# Patient Record
Sex: MALE | Race: WHITE | Employment: UNEMPLOYED | URBAN - METROPOLITAN AREA
[De-identification: names, ages, dates, MRNs, and addresses within clinical notes are randomized per-mention and may not be internally consistent; named-entity substitution may affect disease eponyms.]

---

## 2018-01-22 DIAGNOSIS — M25.552 PELVIC JOINT PAIN, LEFT: Primary | ICD-10-CM

## 2018-01-23 ENCOUNTER — OFFICE VISIT (OUTPATIENT)
Dept: ORTHOPEDIC SURGERY | Age: 23
End: 2018-01-23
Payer: MEDICARE

## 2018-01-23 VITALS — WEIGHT: 315 LBS | BODY MASS INDEX: 41.75 KG/M2 | HEIGHT: 73 IN

## 2018-01-23 DIAGNOSIS — M25.552 PELVIC JOINT PAIN, LEFT: Primary | ICD-10-CM

## 2018-01-23 DIAGNOSIS — S32.402D CLOSED DISPLACED FRACTURE OF LEFT ACETABULUM WITH ROUTINE HEALING, UNSPECIFIED PORTION OF ACETABULUM, SUBSEQUENT ENCOUNTER: ICD-10-CM

## 2018-01-23 PROBLEM — R45.851 SUICIDAL IDEATION: Status: ACTIVE | Noted: 2017-10-03

## 2018-01-23 PROBLEM — Z72.51 RISK FOR SEXUALLY TRANSMITTED DISEASE: Status: ACTIVE | Noted: 2017-11-28

## 2018-01-23 PROBLEM — K92.1 BLOOD IN THE STOOL: Status: ACTIVE | Noted: 2017-11-28

## 2018-01-23 PROBLEM — J01.00 ACUTE NON-RECURRENT MAXILLARY SINUSITIS: Status: ACTIVE | Noted: 2017-11-24

## 2018-01-23 PROBLEM — R79.89 ELEVATED LFTS: Status: ACTIVE | Noted: 2018-01-09

## 2018-01-23 PROBLEM — Z87.01 HISTORY OF PNEUMONIA: Status: ACTIVE | Noted: 2017-11-24

## 2018-01-23 PROBLEM — K21.9 GERD (GASTROESOPHAGEAL REFLUX DISEASE): Status: ACTIVE | Noted: 2017-07-08

## 2018-01-23 PROBLEM — T14.8XXA SKIN EXCORIATION: Status: ACTIVE | Noted: 2017-11-28

## 2018-01-23 PROBLEM — K59.09 OTHER CONSTIPATION: Status: ACTIVE | Noted: 2017-01-11

## 2018-01-23 PROBLEM — F98.8 ATTENTION DEFICIT DISORDER: Status: ACTIVE | Noted: 2017-07-08

## 2018-01-23 PROCEDURE — 4004F PT TOBACCO SCREEN RCVD TLK: CPT | Performed by: ORTHOPAEDIC SURGERY

## 2018-01-23 PROCEDURE — 99213 OFFICE O/P EST LOW 20 MIN: CPT | Performed by: ORTHOPAEDIC SURGERY

## 2018-01-23 PROCEDURE — G8484 FLU IMMUNIZE NO ADMIN: HCPCS | Performed by: ORTHOPAEDIC SURGERY

## 2018-01-23 PROCEDURE — G8427 DOCREV CUR MEDS BY ELIG CLIN: HCPCS | Performed by: ORTHOPAEDIC SURGERY

## 2018-01-23 PROCEDURE — G8417 CALC BMI ABV UP PARAM F/U: HCPCS | Performed by: ORTHOPAEDIC SURGERY

## 2018-01-23 RX ORDER — OMEPRAZOLE 20 MG/1
20 CAPSULE, DELAYED RELEASE ORAL 2 TIMES DAILY
Status: ON HOLD | COMMUNITY
End: 2019-04-24 | Stop reason: SDUPTHER

## 2018-01-23 RX ORDER — CITALOPRAM 40 MG/1
40 TABLET ORAL DAILY
Status: ON HOLD | COMMUNITY
End: 2019-04-24 | Stop reason: SDUPTHER
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
## 2019-04-19 ENCOUNTER — HOSPITAL ENCOUNTER (INPATIENT)
Age: 24
LOS: 5 days | Discharge: HOME | DRG: 885 | End: 2019-04-24
Attending: EMERGENCY MEDICINE | Admitting: PSYCHIATRY & NEUROLOGY
Payer: MEDICARE

## 2019-04-19 DIAGNOSIS — F32.A DEPRESSION WITH SUICIDAL IDEATION: Primary | ICD-10-CM

## 2019-04-19 DIAGNOSIS — R45.851 DEPRESSION WITH SUICIDAL IDEATION: Primary | ICD-10-CM

## 2019-04-19 PROBLEM — F32.3 MAJOR DEPRESSIVE DISORDER WITH PSYCHOTIC FEATURES (HCC): Status: ACTIVE | Noted: 2019-04-19

## 2019-04-19 PROBLEM — F33.9 MDD (MAJOR DEPRESSIVE DISORDER), RECURRENT EPISODE (HCC): Status: ACTIVE | Noted: 2019-04-19

## 2019-04-19 PROCEDURE — 6370000000 HC RX 637 (ALT 250 FOR IP): Performed by: PSYCHIATRY & NEUROLOGY

## 2019-04-19 PROCEDURE — 99285 EMERGENCY DEPT VISIT HI MDM: CPT

## 2019-04-19 PROCEDURE — 6370000000 HC RX 637 (ALT 250 FOR IP): Performed by: EMERGENCY MEDICINE

## 2019-04-19 PROCEDURE — 1240000000 HC EMOTIONAL WELLNESS R&B

## 2019-04-19 RX ORDER — BENZTROPINE MESYLATE 1 MG/ML
2 INJECTION INTRAMUSCULAR; INTRAVENOUS 2 TIMES DAILY PRN
Status: CANCELLED | OUTPATIENT
Start: 2019-04-19

## 2019-04-19 RX ORDER — CHOLECALCIFEROL (VITAMIN D3) 1250 MCG
50000 CAPSULE ORAL WEEKLY
COMMUNITY
End: 2019-06-10 | Stop reason: DRUGHIGH

## 2019-04-19 RX ORDER — NICOTINE 21 MG/24HR
1 PATCH, TRANSDERMAL 24 HOURS TRANSDERMAL DAILY
Status: CANCELLED | OUTPATIENT
Start: 2019-04-19

## 2019-04-19 RX ORDER — HALOPERIDOL 5 MG/ML
10 INJECTION INTRAMUSCULAR EVERY 6 HOURS PRN
Status: CANCELLED | OUTPATIENT
Start: 2019-04-19

## 2019-04-19 RX ORDER — RISPERIDONE 2 MG/1
2 TABLET, FILM COATED ORAL 2 TIMES DAILY
Status: DISCONTINUED | OUTPATIENT
Start: 2019-04-19 | End: 2019-04-24 | Stop reason: HOSPADM

## 2019-04-19 RX ORDER — TRAZODONE HYDROCHLORIDE 50 MG/1
50 TABLET ORAL NIGHTLY PRN
Status: CANCELLED | OUTPATIENT
Start: 2019-04-19

## 2019-04-19 RX ORDER — CITALOPRAM 40 MG/1
40 TABLET ORAL DAILY
Status: DISCONTINUED | OUTPATIENT
Start: 2019-04-19 | End: 2019-04-20

## 2019-04-19 RX ORDER — ALPRAZOLAM 0.25 MG/1
0.5 TABLET ORAL ONCE
Status: COMPLETED | OUTPATIENT
Start: 2019-04-19 | End: 2019-04-19

## 2019-04-19 RX ORDER — IBUPROFEN 400 MG/1
200 TABLET ORAL EVERY 6 HOURS PRN
Status: DISCONTINUED | OUTPATIENT
Start: 2019-04-19 | End: 2019-04-24 | Stop reason: HOSPADM

## 2019-04-19 RX ORDER — MAGNESIUM HYDROXIDE/ALUMINUM HYDROXICE/SIMETHICONE 120; 1200; 1200 MG/30ML; MG/30ML; MG/30ML
30 SUSPENSION ORAL EVERY 6 HOURS PRN
Status: CANCELLED | OUTPATIENT
Start: 2019-04-19

## 2019-04-19 RX ORDER — BENZTROPINE MESYLATE 1 MG/ML
2 INJECTION INTRAMUSCULAR; INTRAVENOUS 2 TIMES DAILY PRN
Status: DISCONTINUED | OUTPATIENT
Start: 2019-04-19 | End: 2019-04-24 | Stop reason: HOSPADM

## 2019-04-19 RX ORDER — OMEGA-3 FATTY ACIDS/FISH OIL 300-1000MG
1 CAPSULE ORAL EVERY 6 HOURS PRN
Status: DISCONTINUED | OUTPATIENT
Start: 2019-04-19 | End: 2019-04-19 | Stop reason: CLARIF

## 2019-04-19 RX ORDER — CETIRIZINE HYDROCHLORIDE 10 MG/1
10 TABLET ORAL DAILY
Status: DISCONTINUED | OUTPATIENT
Start: 2019-04-19 | End: 2019-04-24 | Stop reason: HOSPADM

## 2019-04-19 RX ORDER — BUSPIRONE HYDROCHLORIDE 10 MG/1
10 TABLET ORAL 2 TIMES DAILY
Status: DISCONTINUED | OUTPATIENT
Start: 2019-04-19 | End: 2019-04-24 | Stop reason: HOSPADM

## 2019-04-19 RX ORDER — ACETAMINOPHEN 325 MG/1
650 TABLET ORAL EVERY 4 HOURS PRN
Status: CANCELLED | OUTPATIENT
Start: 2019-04-19

## 2019-04-19 RX ORDER — LORAZEPAM 2 MG/ML
2 INJECTION INTRAMUSCULAR EVERY 6 HOURS PRN
Status: CANCELLED | OUTPATIENT
Start: 2019-04-19

## 2019-04-19 RX ORDER — ACETAMINOPHEN 325 MG/1
650 TABLET ORAL EVERY 4 HOURS PRN
Status: DISCONTINUED | OUTPATIENT
Start: 2019-04-19 | End: 2019-04-24 | Stop reason: HOSPADM

## 2019-04-19 RX ORDER — PANTOPRAZOLE SODIUM 40 MG/1
40 TABLET, DELAYED RELEASE ORAL
Status: DISCONTINUED | OUTPATIENT
Start: 2019-04-20 | End: 2019-04-24 | Stop reason: HOSPADM

## 2019-04-19 RX ORDER — ERGOCALCIFEROL 1.25 MG/1
50000 CAPSULE ORAL WEEKLY
Status: DISCONTINUED | OUTPATIENT
Start: 2019-04-21 | End: 2019-04-24 | Stop reason: HOSPADM

## 2019-04-19 RX ORDER — HYDROXYZINE PAMOATE 50 MG/1
25 CAPSULE ORAL 3 TIMES DAILY PRN
Status: CANCELLED | OUTPATIENT
Start: 2019-04-19

## 2019-04-19 RX ORDER — RANITIDINE 150 MG/1
150 TABLET ORAL 2 TIMES DAILY
Status: ON HOLD | COMMUNITY
End: 2019-04-24 | Stop reason: SDUPTHER

## 2019-04-19 RX ORDER — MAGNESIUM HYDROXIDE/ALUMINUM HYDROXICE/SIMETHICONE 120; 1200; 1200 MG/30ML; MG/30ML; MG/30ML
15 SUSPENSION ORAL EVERY 6 HOURS PRN
Status: DISCONTINUED | OUTPATIENT
Start: 2019-04-19 | End: 2019-04-24 | Stop reason: HOSPADM

## 2019-04-19 RX ORDER — CETIRIZINE HYDROCHLORIDE 10 MG/1
10 TABLET ORAL DAILY
Status: ON HOLD | COMMUNITY
End: 2019-06-14 | Stop reason: SDUPTHER

## 2019-04-19 RX ORDER — NICOTINE 21 MG/24HR
1 PATCH, TRANSDERMAL 24 HOURS TRANSDERMAL DAILY
Status: DISCONTINUED | OUTPATIENT
Start: 2019-04-19 | End: 2019-04-22

## 2019-04-19 RX ORDER — TRAZODONE HYDROCHLORIDE 50 MG/1
50 TABLET ORAL NIGHTLY PRN
Status: DISCONTINUED | OUTPATIENT
Start: 2019-04-19 | End: 2019-04-24 | Stop reason: HOSPADM

## 2019-04-19 RX ORDER — RISPERIDONE 2 MG/1
2 TABLET, FILM COATED ORAL 2 TIMES DAILY
Status: ON HOLD | COMMUNITY
End: 2019-04-24 | Stop reason: HOSPADM

## 2019-04-19 RX ORDER — FAMOTIDINE 20 MG/1
20 TABLET, FILM COATED ORAL DAILY
Status: DISCONTINUED | OUTPATIENT
Start: 2019-04-19 | End: 2019-04-24 | Stop reason: HOSPADM

## 2019-04-19 RX ORDER — HALOPERIDOL 5 MG/ML
5 INJECTION INTRAMUSCULAR EVERY 4 HOURS PRN
Status: DISCONTINUED | OUTPATIENT
Start: 2019-04-19 | End: 2019-04-24 | Stop reason: HOSPADM

## 2019-04-19 RX ORDER — OLANZAPINE 5 MG/1
5 TABLET ORAL
Status: COMPLETED | OUTPATIENT
Start: 2019-04-19 | End: 2019-04-19

## 2019-04-19 RX ADMIN — OLANZAPINE 5 MG: 5 TABLET, FILM COATED ORAL at 21:10

## 2019-04-19 RX ADMIN — FAMOTIDINE 20 MG: 20 TABLET, FILM COATED ORAL at 18:49

## 2019-04-19 RX ADMIN — CITALOPRAM HYDROBROMIDE 40 MG: 40 TABLET ORAL at 18:49

## 2019-04-19 RX ADMIN — ALPRAZOLAM 0.5 MG: 0.25 TABLET ORAL at 15:02

## 2019-04-19 RX ADMIN — TRAZODONE HYDROCHLORIDE 50 MG: 50 TABLET ORAL at 21:10

## 2019-04-19 RX ADMIN — BUSPIRONE HYDROCHLORIDE 10 MG: 10 TABLET ORAL at 18:49

## 2019-04-19 RX ADMIN — CETIRIZINE HYDROCHLORIDE 10 MG: 10 TABLET, FILM COATED ORAL at 18:49

## 2019-04-19 RX ADMIN — RISPERIDONE 2 MG: 2 TABLET ORAL at 21:10

## 2019-04-19 ASSESSMENT — SLEEP AND FATIGUE QUESTIONNAIRES
AVERAGE NUMBER OF SLEEP HOURS: 5
SLEEP PATTERN: DIFFICULTY FALLING ASLEEP;INSOMNIA;RESTLESSNESS;DISTURBED/INTERRUPTED SLEEP
DIFFICULTY ARISING: NO
DIFFICULTY STAYING ASLEEP: YES
DIFFICULTY FALLING ASLEEP: YES
RESTFUL SLEEP: NO
DO YOU USE A SLEEP AID: NO
DO YOU HAVE DIFFICULTY SLEEPING: YES

## 2019-04-19 ASSESSMENT — PAIN DESCRIPTION - ONSET: ONSET: GRADUAL

## 2019-04-19 ASSESSMENT — ENCOUNTER SYMPTOMS
ABDOMINAL PAIN: 0
SHORTNESS OF BREATH: 0

## 2019-04-19 ASSESSMENT — PAIN DESCRIPTION - FREQUENCY: FREQUENCY: CONTINUOUS

## 2019-04-19 ASSESSMENT — PAIN DESCRIPTION - PAIN TYPE: TYPE: ACUTE PAIN

## 2019-04-19 ASSESSMENT — PATIENT HEALTH QUESTIONNAIRE - PHQ9: SUM OF ALL RESPONSES TO PHQ QUESTIONS 1-9: 19

## 2019-04-19 ASSESSMENT — PAIN DESCRIPTION - DESCRIPTORS: DESCRIPTORS: BURNING

## 2019-04-19 ASSESSMENT — PAIN SCALES - GENERAL: PAINLEVEL_OUTOF10: 3

## 2019-04-19 ASSESSMENT — PAIN DESCRIPTION - LOCATION: LOCATION: BUTTOCKS

## 2019-04-19 ASSESSMENT — LIFESTYLE VARIABLES: HISTORY_ALCOHOL_USE: NO

## 2019-04-19 NOTE — PROGRESS NOTES
Medication History completed:    New medications: ranitidine, vitamin D3, cetirizine, risperidone    Medications discontinued: Trintellix, methylphenidate, aripiprazole, Adderall, alprazolam    Changes to dosing: none    Stated allergies: As listed    Other pertinent information: Medications confirmed with [employer]. OARRS history reviewed.     Thank you,  Arlene Millard, PharmD  [phone]

## 2019-04-19 NOTE — ED PROVIDER NOTES
EMERGENCY DEPARTMENT ENCOUNTER    Pt Name: Stalin Loya  MRN: [id number]  [place] 1995  Date of evaluation: 4/19/19  CHIEF COMPLAINT       Chief Complaint   Patient presents with    Suicidal     HISTORY OF PRESENT ILLNESS   Presents with suicidal thoughts. H/o depression with psychotic features and autism spectrum. Always feels suicidal. Worse after arguing with his parents yesterday. Lives alone in his side of a duplex. Does not share it with his parents. States he requires visiting nurses so he doesn't overdose on all his pills and kill himself. Has considered jumping off a bridge. Hasn't been here since 2015, but has required many [facility] admissions at outside facilities. The history is provided by the patient. Mental Health Problem   Presenting symptoms: suicidal thoughts    Degree of incapacity (severity): Moderate  Onset quality:  Unable to specify  Duration:  2 days (worse from baseline)  Timing:  Constant  Progression:  Worsening  Chronicity:  Chronic  Context: not noncompliant    Treatment compliance: All of the time  Relieved by:  Nothing  Worsened by:  Family interactions  Associated symptoms: no abdominal pain, no chest pain, no fatigue and no headaches    Risk factors: hx of mental illness        REVIEW OF SYSTEMS     Review of Systems   Constitutional: Negative for fatigue. HENT: Negative for congestion. Eyes: Negative for visual disturbance. Respiratory: Negative for shortness of breath. Cardiovascular: Negative for chest pain. Gastrointestinal: Negative for abdominal pain. Endocrine: Negative for polydipsia and polyuria. Musculoskeletal: Negative for neck pain. Skin: Negative for pallor. Neurological: Negative for headaches. Hematological: Does not bruise/bleed easily. Psychiatric/Behavioral: Positive for suicidal ideas.      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    Injury of frontal lobe ([facility] [address]     Multiple sensory deficit syndrome      SURGICAL HISTORY       Past Surgical History:   Procedure Laterality Date    BLADDER SURGERY      FRACTURE SURGERY  4/6/2012    Lt hip    MOUTH SURGERY      PELVIC FRACTURE SURGERY       CURRENT MEDICATIONS       Current Discharge Medication List      CONTINUE these medications which have NOT CHANGED    Details   risperiDONE (RISPERDAL) 2 MG tablet Take 2 mg by mouth 2 times daily      Cholecalciferol (VITAMIN D3) 14752 units CAPS Take 50,000 Units by mouth once a week      cetirizine (ZYRTEC) 10 MG tablet Take 10 mg by mouth daily      ranitidine (ZANTAC) 150 MG tablet Take 150 mg by mouth 2 times daily      omeprazole (PRILOSEC) 20 MG delayed release capsule Take 20 mg by mouth 2 times daily      citalopram (CELEXA) 40 MG tablet Take 40 mg by mouth daily      Mirabegron ER 25 MG TB24 Take 25 mg by mouth daily      busPIRone (BUSPAR) 10 MG tablet Take 10 mg by mouth 2 times daily. ibuprofen (ADVIL;MOTRIN) 200 MG CAPS Take 1 capsule by mouth every 6 hours as needed for Pain            ALLERGIES     is allergic to hydromorphone. FAMILY HISTORY     is adopted. SOCIAL HISTORY       Social History     Tobacco Use    Smoking status: Current Every Day Smoker     Packs/day: 0.25   Substance Use Topics    Alcohol use: No    Drug use: Yes     Types: Marijuana, Cocaine     Comment: Last use 2 weeks before Christmas, not since being put on probation     PHYSICAL EXAM     INITIAL VITALS: BP (!) 130/92   Pulse 96   Temp 98.1 °F (36.7 °C) (Oral)   Resp 14   Ht 6' 2\" (1.88 m)   Wt (!) 396 lb (179.6 kg)   SpO2 96%   BMI 50.84 kg/m²    Physical Exam   Constitutional: He appears well-developed and well-nourished. He appears distressed. HENT:   Nose: No rhinorrhea. Mouth/Throat: Oropharynx is clear and moist.   Eyes: Conjunctivae are normal. No scleral icterus. Cardiovascular: Normal rate, regular rhythm and intact distal pulses. Pulmonary/Chest: Effort normal and breath sounds normal. No respiratory distress. He has no wheezes. He has no rales. Abdominal: Soft. There is no tenderness. There is no guarding. Musculoskeletal: Normal range of motion. He exhibits no deformity. Neurological: He is alert. He exhibits normal muscle tone. Coordination normal.   Skin: Capillary refill takes less than 2 seconds. No rash noted. No pallor. Psychiatric: His speech is normal. He expresses suicidal ideation. MEDICAL DECISION MAKING:   Expresses suicidal ideation. Endorses compliance with medications. Has a plan. Medically cleared at 2:40 PM without need for labs. Accepted for admission to [facility] by the Psychiatrist.         CRITICAL CARE:       PROCEDURES:    Procedures    DIAGNOSTIC RESULTS   EKG:All EKG's are interpreted by the Emergency Department Physician who either signs or Co-signs this chart in the absence of a cardiologist.        RADIOLOGY:All plain film, CT, MRI, and formal ultrasound images (except ED bedside ultrasound) are read by the radiologist, see reports below, unless otherwisenoted in MDM or here. No orders to display     LABS: All lab results were reviewed by myself, and all abnormals are listed below.   Labs Reviewed - No data to display    EMERGENCY DEPARTMENTCOURSE:         Vitals:    Vitals:    04/19/19 1419 04/19/19 1648   BP: (!) 115/99 (!) 130/92   Pulse: 87 96   Resp: 15 14   Temp: 97.2 °F (36.2 °C) 98.1 °F (36.7 °C)   TempSrc: Oral Oral   SpO2: 96% 96%   Weight: (!) 396 lb (179.6 kg) (!) 396 lb (179.6 kg)   Height: 6' 2\" (1.88 m) 6' 2\" (1.88 m)       The patient was given the following medications while in the emergency department:  Orders Placed This Encounter   Medications    ALPRAZolam (XANAX) tablet 0.5 mg    busPIRone (BUSPAR) tablet 10 mg    cetirizine (ZYRTEC) tablet 10 mg    Vitamin D3 CAPS 50,000 Units    citalopram (CELEXA) tablet 40 mg    DISCONTD: ibuprofen (ADVIL;MOTRIN) CAPS 1 capsule    Mirabegron ER TB24 25 mg    pantoprazole (PROTONIX) tablet 40 mg    famotidine (PEPCID) tablet 20 mg    risperiDONE (RISPERDAL) tablet 2 mg    acetaminophen (TYLENOL) tablet 650 mg    OLANZapine (ZYPREXA) tablet 5 mg    haloperidol lactate (HALDOL) injection 5 mg    traZODone (DESYREL) tablet 50 mg    benztropine mesylate (COGENTIN) injection 2 mg    magnesium hydroxide (MILK OF MAGNESIA) 400 MG/5ML suspension 30 mL    aluminum & magnesium hydroxide-simethicone (MAALOX) 200-200-20 MG/5ML suspension 15 mL    nicotine (NICODERM CQ) 21 MG/24HR 1 patch    ibuprofen (ADVIL;MOTRIN) tablet 200 mg     CONSULTS:  IP CONSULT TO HOSPITALIST    FINAL IMPRESSION      1.  Depression with suicidal ideation          DISPOSITION/PLAN   DISPOSITION Admitted 04/19/2019 03:41:47 PM      PATIENT REFERRED TO:  Ho Nelsno, APRN - [facility]  [address]  [phone]          DISCHARGE MEDICATIONS:  Current Discharge Medication List        Jo Lopez MD  Attending Emergency Physician                    César Tanner MD  04/19/19 2002

## 2019-04-19 NOTE — BH NOTE
Patient given tobacco quitline number 33908810788 at this time, refusing to call at this time, states \" I just dont want to quit now\"- patient given information as to the dangers of long term tobacco use. Continue to reinforce the importance of tobacco cessation.

## 2019-04-19 NOTE — ED NOTES
Pt states he has super woods. Pt states he has a tumor in his ear and that if he adds water it will grow and kill him. Pt states he does not trust himself and would probably OD or jump off bridge.  Pt cooperative     Kate Pedersen RN  04/19/19 [address] [facility] [place], JUSTIN  04/19/19 9575
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
## 2019-04-20 PROCEDURE — 90792 PSYCH DIAG EVAL W/MED SRVCS: CPT | Performed by: NURSE PRACTITIONER

## 2019-04-20 PROCEDURE — 6370000000 HC RX 637 (ALT 250 FOR IP): Performed by: NURSE PRACTITIONER

## 2019-04-20 PROCEDURE — 6370000000 HC RX 637 (ALT 250 FOR IP): Performed by: PSYCHIATRY & NEUROLOGY

## 2019-04-20 PROCEDURE — 1240000000 HC EMOTIONAL WELLNESS R&B

## 2019-04-20 PROCEDURE — 99222 1ST HOSP IP/OBS MODERATE 55: CPT | Performed by: INTERNAL MEDICINE

## 2019-04-20 RX ORDER — HYDROXYZINE HYDROCHLORIDE 25 MG/1
25 TABLET, FILM COATED ORAL 3 TIMES DAILY PRN
Status: DISCONTINUED | OUTPATIENT
Start: 2019-04-20 | End: 2019-04-24 | Stop reason: HOSPADM

## 2019-04-20 RX ADMIN — HYDROXYZINE HYDROCHLORIDE 25 MG: 25 TABLET ORAL at 15:17

## 2019-04-20 RX ADMIN — TRAZODONE HYDROCHLORIDE 50 MG: 50 TABLET ORAL at 21:36

## 2019-04-20 RX ADMIN — HYDROXYZINE HYDROCHLORIDE 25 MG: 25 TABLET ORAL at 21:36

## 2019-04-20 RX ADMIN — BUSPIRONE HYDROCHLORIDE 10 MG: 10 TABLET ORAL at 17:13

## 2019-04-20 RX ADMIN — RISPERIDONE 2 MG: 2 TABLET ORAL at 21:36

## 2019-04-20 RX ADMIN — LURASIDONE HYDROCHLORIDE 40 MG: 40 TABLET, FILM COATED ORAL at 17:13

## 2019-04-20 ASSESSMENT — SLEEP AND FATIGUE QUESTIONNAIRES
AVERAGE NUMBER OF SLEEP HOURS: 5
DO YOU HAVE DIFFICULTY SLEEPING: YES
RESTFUL SLEEP: NO
SLEEP PATTERN: DIFFICULTY FALLING ASLEEP;DISTURBED/INTERRUPTED SLEEP;EARLY AWAKENING;RESTLESSNESS
DIFFICULTY STAYING ASLEEP: YES
DIFFICULTY FALLING ASLEEP: YES
DO YOU USE A SLEEP AID: NO
DIFFICULTY ARISING: NO

## 2019-04-20 ASSESSMENT — PATIENT HEALTH QUESTIONNAIRE - PHQ9: SUM OF ALL RESPONSES TO PHQ QUESTIONS 1-9: 17

## 2019-04-20 ASSESSMENT — LIFESTYLE VARIABLES: HISTORY_ALCOHOL_USE: NO

## 2019-04-20 NOTE — GROUP NOTE
[facility] not participate in [facility] despite staff invitation to attend.

## 2019-04-20 NOTE — PLAN OF CARE
Problem: Pain:  Description  Pain management should include both nonpharmacologic and pharmacologic interventions. Goal: Pain level will decrease  4/20/2019 1540 by Maritza Hernandez RN  Outcome: Ongoing     Problem: Suicide risk  Description  Suicide risk  Goal: Provide patient with safe environment  4/20/2019 1540 by Maritza Hernandez RN  Outcome: Ongoing     Problem: Anger Management/Homicidal Ideation:  Goal: Able to display appropriate communication and problem solving  4/20/2019 1540 by Maritza Hernandez RN  Outcome: Ongoing     Problem: Anger Management/Homicidal Ideation:  Goal: Absence of homicidal ideation  4/20/2019 1540 by Maritza Hernandez RN  Outcome: Ongoing     Problem: Altered Mood, Psychotic Behavior:  Goal: Able to demonstrate trust by eating, participating in treatment and following staff's direction  4/20/2019 1540 by Maritza Hernandez RN  Outcome: Ongoing     Problem: Altered Mood, Psychotic Behavior:  Goal: Able to verbalize decrease in frequency and intensity of hallucinations  4/20/2019 1540 by Maritza Hernandez RN  Outcome: Ongoing     Problem: Altered Mood, Psychotic Behavior:  Goal: Able to verbalize reality based thinking  4/20/2019 1540 by Maritza Hernandez RN  Outcome: Ongoing     Problem: Altered Mood, Psychotic Behavior:  Goal: Absence of self-harm  4/20/2019 1540 by Maritza Hernandez RN  Outcome: Ongoing     Problem: Altered Mood, Psychotic Behavior:  Goal: Compliance with prescribed medication regimen will improve  4/20/2019 1540 by Maritza Hernandez RN  Outcome: Ongoing     Patient is medication compliant and in behavioral control. Patient has attended groups and been social with peers in the common areas of the unit. Patient admits to homicidal ideation with no plan. Patient relates that he might become suicidal when he is discharged due to family stress. He states that, \"after I leave I'm afraid my monsters will come out. \"  Patient denies auditory and visual hallucinations.   Patient requested medication for increased anxiety after 1:1 talk time. Patient has engaged in ADL's. Patient has consumed meals and snacks this shift. Patient reports improved sleep. Patient has remained free from harm. Every 15 minute and spontaneous safety checks have been maintained.

## 2019-04-20 NOTE — GROUP NOTE
Group Therapy Note    Date: April 20    Group Start Time: 1600  Group End Time: 4339  Group Topic: Healthy Living/Wellness    [facility]    Yung Urbano    Patient's Goal:  Increased coping skills    Notes:  Pt participated in group appropriately    Status After Intervention:  Improved    Participation Level:  Active Listener and Interactive    Participation Quality: Appropriate, Attentive, Sharing and Supportive    Speech:  normal    Thought Process/Content: Logical  Linear    Affective Functioning: Congruent    Mood: anxious and depressed    Level of consciousness:  Alert, Oriented x4 and Attentive    Response to Learning: Able to verbalize current knowledge/experience, Able to verbalize/acknowledge new learning, Able to retain information, Capable of insight and Progressing to goal    Endings: None Reported    Modes of Intervention: Education, Support, Socialization and Exploration    Discipline Responsible: [employer]    Signature:  Yung Urbano

## 2019-04-20 NOTE — BH NOTE
Psychiatric Admission Note Nurse Practitioner     Mendel Lobstein is a 26 y.o. male who was voluntarily admitted from the [address] [place] [address] AN AFFILIATE OF [facility] with SI to jump off a bridge into the water. He also admitted to HI to kill his family/parents with whom he recently had a fight. He admitted to voices of the devil telling him to kill himself. Today Cr Vu is interviewed in his room in bed. He is lethargic requiring considerable encouragement to participate in our interview. He is flat and poorly reactive. He stated that he does not feel that his depression medication is working but that some of his medications do work. He has a nursing agency that comes in daily to administer his medications. There is obvious thought blocking and delayed response during our interaction. Flaco admits that when he gets into a fight with his family, the voices increase as well as his behaviors. He was discharged from [facility] due to threatened violence towards his provider. He is denying SI, HI, hallucinations, depression and anxiety currently stating that he is feeling better since admission. Specifically when asked regarding the command devil voices, he denies. He is not seen responding to internal stimuli. Chart and medications reviewed. Therapeutic support, empathetic care and psycho education provided greater than 20 minutes. At this time there is no safe alternative other than inpatient care. Past Psychiatric History   Patient was discharged from his previous psychiatrist at [address] [place] [address] due to threatened violence towards the provider. . Reported history of psychiatric inpatient hospitalizations. Reported history of suicide attempts. History of Substance Abuse     Denies alcohol use or use of any illicit drugs. He does admit to a previous history of cigarette, marijuana, ETOH and cocaine use.     Family History of psychiatric disorders    Family history: denied Patient is adopted and is unaware of his education: Not on file    Highest education level: Not on file   Occupational History    Not on file   Social Needs    Financial resource strain: Not on file    Food insecurity:     Worry: Not on file     Inability: Not on file    Transportation needs:     Medical: Not on file     Non-medical: Not on file   Tobacco Use    Smoking status: Current Every Day Smoker     Packs/day: 0.25   Substance and Sexual Activity    Alcohol use: No    Drug use: Yes     Types: Marijuana, Cocaine     Comment: Last use 2 weeks before Lali, not since being put on probation    Sexual activity: Never   Lifestyle    Physical activity:     Days per week: Not on file     Minutes per session: Not on file    Stress: Not on file   Relationships    Social connections:     Talks on phone: Not on file     Gets together: Not on file     Attends Sabianism service: Not on file     Active member of club or organization: Not on file     Attends meetings of clubs or organizations: Not on file     Relationship status: Not on file    Intimate partner violence:     Fear of current or ex partner: Not on file     Emotionally abused: Not on file     Physically abused: Not on file     Forced sexual activity: Not on file   Other Topics Concern    Not on file   Social History Narrative    Not on file         Mental Status  Pt. was alert, fully oriented, and cooperative. Appearance and hygiene weredisheveled, poor hygiene . Mood was depressed. Affect was dysthymic and poorly reactive. Thought process was demonstrative of poverty of thought and thought blocking. Patient denied any hallucinations or paranoia. Patient denied suicidal ideations. Patient denied homicidal ideations . Patient's gross cognitive functions were impaired. Insight and judgement were poor. Both recent and remote memory were impaired.     Psychomotor status was slowed     Diagnostic Impression    Schizoaffective Disorder      Medications   busPIRone  10 mg Oral BID    cetirizine  10 mg Oral Daily    Vitamin D3  50,000 Units Oral Weekly    citalopram  40 mg Oral Daily    pantoprazole  40 mg Oral QAM AC    famotidine  20 mg Oral Daily    risperiDONE  2 mg Oral BID    nicotine  1 patch Transdermal Daily     acetaminophen, haloperidol lactate, traZODone, benztropine mesylate, magnesium hydroxide, aluminum & magnesium hydroxide-simethicone, ibuprofen    Treatment Plan:  · Continue inpatient psychiatric treatment  · Supportive therapy with medication management. Reviewed risks and benefits as well as potential side effects with patient. · Continue home medications. · Discontinue Celexa 40mg daily. · New Order for Latuda 40mg daily with dinner beginning 4/20/19. · Ordered IM consult for elevated BP on 4/20/19. · Review medications for efficacy and side effects. · Therapeutic support and empathetic care provided greater than 20 minutes. · Engage in therapeutic activities and groups. · Follow up at [facility] after symptoms stabilized.     Estimated length of stay: 5-7 days    Diya Forte APRN - CNP  Psychiatric Advanced Practice Nurse

## 2019-04-20 NOTE — PLAN OF CARE
Problem: Anger Management/Homicidal Ideation:  Goal: Able to display appropriate communication and problem solving  Description  Able to display appropriate communication and problem solving  Outcome: Ongoing  Note:   Pt displayed appropriate communication and problem solving. Pt asked to talk to writer in his room. Expressed concerns over \"hatred\" by adopted/biological parents. Pt was re-directed with positive results. Problem: Anger Management/Homicidal Ideation:  Goal: Ability to verbalize frustrations and anger appropriately will improve  Description  Ability to verbalize frustrations and anger appropriately will improve  Outcome: Ongoing  Note:   Pt verbalized frustrations and anger appropriately and followed directions. Problem: Anger Management/Homicidal Ideation:  Goal: Absence of angry outbursts  Description  Absence of angry outbursts  Outcome: Ongoing  Note:   Pt had no angry outbursts at this time. Problem: Anger Management/Homicidal Ideation:  Goal: Absence of homicidal ideation  Description  Absence of homicidal ideation  Outcome: Ongoing  Note:   Pt admits to fleeting homicidal ideations with no plans. Receptive of re-direction. Problem: Anger Management/Homicidal Ideation:  Goal: Participates in care planning  Description  Participates in care planning  Outcome: Ongoing  Note:   Pt participates in care planning. Problem: Anger Management/Homicidal Ideation:  Goal: Patient specific goal  Description  Patient specific goal  Outcome: Ongoing     Problem: Altered Mood, Psychotic Behavior:  Goal: Able to demonstrate trust by eating, participating in treatment and following staff's direction  Description  Able to demonstrate trust by eating, participating in treatment and following staff's direction  Outcome: Ongoing  Note:   Pt Patient has been calm, controlled and med complaint. Accepting of 1:1 talk time with staff.       Problem: Altered Mood, Psychotic Behavior:  Goal: Able to verbalize decrease in frequency and intensity of hallucinations  Description  Able to verbalize decrease in frequency and intensity of hallucinations  Outcome: Ongoing  Note:   Pt denies hallucinations at this time. Problem: Altered Mood, Psychotic Behavior:  Goal: Able to verbalize reality based thinking  Description  Able to verbalize reality based thinking  Outcome: Ongoing  Note:   Problem: Altered Mood, Psychotic Behavior:  Goal: Absence of self-harm  Description  Absence of self-harm  Outcome: Ongoing  Note:   Pt admits to fleeting SI with no plans, contracts for safety. Problem: Altered Mood, Psychotic Behavior:  Goal: Ability to achieve adequate nutritional intake will improve  Description  Ability to achieve adequate nutritional intake will improve  Outcome: Ongoing  Note:   Pt has adequate nutritional intake. Problem: Altered Mood, Psychotic Behavior:  Goal: Ability to interact with others will improve  Description  Ability to interact with others will improve  Outcome: Ongoing  Note:   Pt is social with peers at this time. Problem: Altered Mood, Psychotic Behavior:  Goal: Compliance with prescribed medication regimen will improve  Description  Compliance with prescribed medication regimen will improve  Outcome: Ongoing  Note:   Pt is medication compliant at this time. Pt able to verbalize reality based thinking.

## 2019-04-20 NOTE — PLAN OF CARE
[facility]  Initial Interdisciplinary Treatment Plan NOTE    Original treatment plan Date & Time: 4/20/19 0810    Admission Type:  Admission Type: Voluntary    Reason for admission:   Reason for Admission: SI to jump off bridge into river; HI toward parents, caretakers, peers    Estimated Length of Stay:  5-7days  Estimated Discharge Date:   to be determined by physician    PATIENT STRENGTHS:  Patient Strengths:Strengths: Communication  Patient Strengths and Limitations:Limitations: Perceives need for assistance with self-care, General negative or hopeless attitude about future/recovery, Apathetic / unmotivated, Difficult relationships / poor social skills, Hopeless about future  Addictive Behavior: Addictive Behavior  In the past 3 months, have you felt or has someone told you that you have a problem with:  : Eating (too much/too little)  Do you have a history of Chemical Use?: Yes  Do you have a history of Alcohol Use?: No  Do you have a history of Street Drug Abuse?: Yes  Histroy of Prescripton Drug Abuse?: No  Medical Problems:  Past Medical History:   Diagnosis Date    Autistic disorder     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    Injury of frontal lobe (HCC)     Multiple sensory deficit syndrome      Status EXAM:Status and Exam  Normal: No  Facial Expression: Flat  Affect: Blunt  Level of Consciousness: Alert  Mood:Normal: No  Mood: Anxious  Motor Activity:Normal: Yes  Motor Activity: Decreased  Interview Behavior: Cooperative  Preception: [place] to Person, [place] to Time, [place] to Place, [place] to Situation  Attention:Normal: No  Attention: Distractible, Unable to Concentrate  Thought Processes: Circumstantial, Flt.of Ideas  Thought Content:Normal: No  Thought Content: Poverty of Content  Hallucinations: (vopices \"telling me to hurt self\" but no voices at this time.)  Delusions: Yes  Delusions: Grandeur  Memory:Normal: No  Memory: Poor Recent, Poor Remote  Insight and Judgment: No  Insight

## 2019-04-20 NOTE — CONSULTS
[address] [facility]    Consult Note. Date:   4/20/2019  Patient name:  Stalin Loya  Date of admission:  4/19/2019  2:00 PM  MRN:   [id number]  YOB: 1995    Pt seen at the request of Jessy Das MD    CHIEF COMPLAINT:     History Obtained From:  Patient and chart review. HISTORY OF PRESENT ILLNESS:      The patient is a 26 y.o.  male who is admitted to the hospital for medical management      Past Medical History:   has a past medical history of Autistic disorder, Benign tumor of ear canal, Injury of frontal lobe ([address] and Multiple sensory deficit syndrome. Past Surgical History:   has a past surgical history that includes Bladder surgery; fracture surgery (4/6/2012); Pelvic fracture surgery; and Mouth surgery. Home Medications:    Prior to Admission medications    Medication Sig Start Date End Date Taking? Authorizing Provider   risperiDONE (RISPERDAL) 2 MG tablet Take 2 mg by mouth 2 times daily    Historical Provider, MD   Cholecalciferol (VITAMIN D3) 48825 units CAPS Take 50,000 Units by mouth once a week    Historical Provider, MD   cetirizine (ZYRTEC) 10 MG tablet Take 10 mg by mouth daily    Historical Provider, MD   ranitidine (ZANTAC) 150 MG tablet Take 150 mg by mouth 2 times daily    Historical Provider, MD   omeprazole (PRILOSEC) 20 MG delayed release capsule Take 20 mg by mouth 2 times daily    Historical Provider, MD   citalopram (CELEXA) 40 MG tablet Take 40 mg by mouth daily    Historical Provider, MD   Mirabegron ER 25 MG TB24 Take 25 mg by mouth daily    Historical Provider, MD   busPIRone (BUSPAR) 10 MG tablet Take 10 mg by mouth 2 times daily. Historical Provider, MD   ibuprofen (ADVIL;MOTRIN) 200 MG CAPS Take 1 capsule by mouth every 6 hours as needed for Pain     Historical Provider, MD       Allergies:  Hydromorphone    Social History:   reports that he has been smoking. He has been smoking about 0.25 packs per day. He does not have any smokeless tobacco history on file. He reports that he has current or past drug history. Drugs: Marijuana and Cocaine. He reports that he does not drink alcohol. Family History: family history is not on file. He was adopted. REVIEW OF SYSTEMS:    · Constitutional: Negative for weight loss  · Eyes: Negative for visual changes, diplopia, scleral icterus. · ENT: Negative for Headaches, hearing loss, vertigo, mouth sores, sore throat. · Cardiovascular: Negative for lightheadedness/orthostatic symptoms ,chest pain, dyspnea on exertion, palpitations or loss of consciousness. · Respiratory: Negative for cough or wheezing, sputum production, hemoptysis, pleuritic pain. · Gastrointestinal: Negative for nausea/vomiting, change in bowel habits, abdominal pain, dysphagia/appetite loss, hematemesis, blood in stools. · Genitourinary:Negative for change in bladder habits, dysuria, trouble voiding, hematuria. · Musculoskeletal: Negative for gait disturbance, weakness, joint complaints. · Integumentary: Negative for rash, pruritis. · Neurological: Negative for headache, dizziness, change in muscle strength, numbness/tingling, change in gait, balance, coordination,   · Endocrine: negative for temperature intolerance, excessive thirst, fluid intake, or urination, tremor. · Hematologic/Lymphatic: negative for abnormal bruising or bleeding, blood clots, swollen lymph nodes. · Allergic/Immunologic: negative for nasal congestion, pruritis, hives. PHYSICAL EXAM:    /68   Pulse 71   Temp 98 °F (36.7 °C) (Oral)   Resp 12   Ht 6' 2\" (1.88 m)   Wt (!) 396 lb (179.6 kg)   SpO2 96%   BMI 50.84 kg/m²      · General appearance: well nourished  · HEENT: Head: Normocephalic, no lesions, without obvious abnormality.   · Lungs: clear to auscultation bilaterally  · Heart: regular rate and rhythm, S1, S2 normal, no murmur, click, rub or the stool    Body mass index 45.0-49.9, adult (HCC)    Elevated LFTs    GERD (gastroesophageal reflux disease)    History of pneumonia    Other constipation    Risk for sexually transmitted disease    Severe recurrent major [facility]    Skin excoriation    Suicidal ideation    Major depressive disorder with psychotic features [facility]    MDD (major depressive disorder), recurrent episode ([facility]    Depression with suicidal ideation     Obesity  With tripp   not using cpap due to ontolerance   sbp 100     No sx   cont sa,  nme   will follow    gerd on ppi     PLAN:  Cont same    follow bp       MD BRITTANIE Martinez[address], [address] [place] [address].    Phone [phone]   Fax: [phone]  Answering Service: [phone]

## 2019-04-20 NOTE — H&P
None     Inability: None    Transportation needs:     Medical: None     Non-medical: None   Tobacco Use    Smoking status: Current Every Day Smoker     Packs/day: 0.25   Substance and Sexual Activity    Alcohol use: No    Drug use: Yes     Types: Marijuana, Cocaine     Comment: Last use 2 weeks before Christmas, not since being put on probation    Sexual activity: Never   Lifestyle    Physical activity:     Days per week: None     Minutes per session: None    Stress: None   Relationships    Social connections:     Talks on phone: None     Gets together: None     Attends Evangelical service: None     Active member of club or organization: None     Attends meetings of clubs or organizations: None     Relationship status: None    Intimate partner violence:     Fear of current or ex partner: None     Emotionally abused: None     Physically abused: None     Forced sexual activity: None   Other Topics Concern    None   Social History Narrative    None           REVIEW OF SYSTEMS      Allergies   Allergen Reactions    Hydromorphone Itching     Other reaction(s): ITCH       No current facility-administered medications on file prior to encounter. Current Outpatient Medications on File Prior to Encounter   Medication Sig Dispense Refill    risperiDONE (RISPERDAL) 2 MG tablet Take 2 mg by mouth 2 times daily      Cholecalciferol (VITAMIN D3) 57415 units CAPS Take 50,000 Units by mouth once a week      cetirizine (ZYRTEC) 10 MG tablet Take 10 mg by mouth daily      ranitidine (ZANTAC) 150 MG tablet Take 150 mg by mouth 2 times daily      omeprazole (PRILOSEC) 20 MG delayed release capsule Take 20 mg by mouth 2 times daily      citalopram (CELEXA) 40 MG tablet Take 40 mg by mouth daily      Mirabegron ER 25 MG TB24 Take 25 mg by mouth daily      busPIRone (BUSPAR) 10 MG tablet Take 10 mg by mouth 2 times daily.       ibuprofen (ADVIL;MOTRIN) 200 MG CAPS Take 1 capsule by mouth every 6 hours as needed for Pain                         General health:  Fairly good. No fever or chills. Skin:  No itching, redness or rash. Head, eyes, ears, nose, throat:  No headache, epistaxis, rhinorrhea hearing loss or sore throat. Neck:  No pain, stiffness or masses. Cardiovascular/Respiratory system:  No chest pain, palpitation, shortness of breath, coughing or expectoration. Gastrointestinal tract: No abdominal pain, nausea, vomiting, diarrhea or constipation. Genitourinary:  No burning on micturition. No hesitancy, urgency, frequency or discoloration of urine. Locomotor:  No bone or joint pains. No swelling or deformities. Neuropsychiatric:  See HPI. GENERAL PHYSICAL EXAM:     Vitals: /68   Pulse 71   Temp 98 °F (36.7 °C) (Oral)   Resp 12   Ht 6' 2\" (1.88 m)   Wt (!) 396 lb (179.6 kg)   SpO2 96%   BMI 50.84 kg/m²  Body mass index is 50.84 kg/m². Pt was examined with a nurse present in the room. GENERAL APPEARANCE:  Ada Narvaez is 26 y.o.,  male, severely obese, nourished, conscious, alert. Does not appear to be distress or pain at this time. SKIN:  Warm, dry, no cyanosis or jaundice. HEAD:  Normocephalic, atraumatic, no swelling or tenderness. EYES:  Pupils equal, reactive to light, Conjunctiva is clear, EOMs intact yesi. eyelids WNL. EARS:  No discharge, no marked hearing loss. NOSE:  No rhinorrhea, epistaxis or septal deformity. THROAT:  Not congested. No ulceration bleeding or discharge. NECK:  No stiffness, trachea central.  No palpable masses or L.N.      CHEST:  Symmetrical and equal on expansion. HEART:  Regular rate and rhythm. S1 > S2, No audible murmurs or gallops. LUNGS:  Equal on expansion, normal breath sounds. No adventitious sounds. ABDOMEN:  Obese. Soft on palpation. No localized tenderness. No guarding or rigidity. No palpable organomegaly.     LYMPHATICS:  No palpable cervical Lymphadenopathy. LOCOMOTOR, BACK AND SPINE:  No tenderness or deformities. EXTREMITIES:  Symmetrical, no pretibial edema. Nings sign negative. No discoloration or ulcerations. NEUROLOGIC:  The patient is conscious, alert, oriented, as autism Cranial nerve II-XII intact, taste was not examined. No apparent focal sensory or motor deficits. Muscle strength equal Willis. No facial droop, tongue protrudes centrally, no slurring of the speech. PROVISIONAL DIAGNOSES:      Active Problems:    Major depressive disorder with psychotic features ([facility])    MDD (major depressive disorder), [facility]    Depression with suicidal ideation  Resolved Problems:    * No resolved hospital problems.  Merlyn SCHUMACHER PA-C on 4/20/2019 at 6:09 PM

## 2019-04-20 NOTE — PLAN OF CARE
Problem: Anger Management/Homicidal Ideation:  Goal: Ability to verbalize frustrations and anger appropriately will improve  4/20/2019 0927 by Des Leo RN  Outcome: Completed     Problem: Anger Management/Homicidal Ideation:  Goal: Participates in care planning  4/20/2019 0927 by Des Leo RN  Outcome: Completed     Problem: Anger Management/Homicidal Ideation:  Goal: Patient specific goal  4/20/2019 0927 by Des Leo RN  Outcome: Completed

## 2019-04-20 NOTE — GROUP NOTE
Group Therapy Note    Date: April 20    Group Start Time: 0900  Group End Time: 2998  Group Topic: [facility], [facility]    Pt did not participate in [employer] and Goal Setting Group despite staff encouragement.         Signature:  Susannah Mon

## 2019-04-20 NOTE — PLAN OF CARE
Problem: Altered Mood, Psychotic Behavior:  Goal: Ability to achieve adequate nutritional intake will improve  4/20/2019 0929 by Roxanne Foreman RN  Outcome: Completed     Problem: Altered Mood, Psychotic Behavior:  Goal: Ability to interact with others will improve  4/20/2019 0929 by Roxanne Foreman RN  Outcome: Completed     Problem: Altered Mood, Psychotic Behavior:  Goal: Patient specific goal  Outcome: Completed

## 2019-04-20 NOTE — GROUP NOTE
Group Therapy Note    Date:     Group Start Time:   Group End Time:   Group Topic: Healthy Living/Wellness    [facility] MABLE Brar        Group Therapy Note    Attendees: All participants shared group materials no issues.          Patient's Goal:  ***    Notes:  ***    Status After Intervention:  {Status After Intervention:612511453}    Participation Level: {Participation Level:219186007}    Participation Quality: {[facility] PARTICIPATION QUALITY:895095533}      Speech:  {ED  CD_SPEECH:35277}      Thought Process/Content: {Thought Process/Content:358319556}      Affective Functioning: {Affective Functionin}      Mood: {Mood:931515694}      Level of consciousness:  {Level of consciousness:663997667}      Response to Learnin Sima Conor [facility] Responses to Learnin}      Endings: {[facility] Endings:33143}    Modes of Intervention: {[facility] Modes of Intervention:549390701}      Discipline Responsible: Brie PAGAN Multidisciplinary:[id number]}      Signature:  Lula Brar

## 2019-04-21 PROCEDURE — 6370000000 HC RX 637 (ALT 250 FOR IP): Performed by: NURSE PRACTITIONER

## 2019-04-21 PROCEDURE — 6370000000 HC RX 637 (ALT 250 FOR IP): Performed by: PSYCHIATRY & NEUROLOGY

## 2019-04-21 PROCEDURE — 1240000000 HC EMOTIONAL WELLNESS R&B

## 2019-04-21 PROCEDURE — 99231 SBSQ HOSP IP/OBS SF/LOW 25: CPT | Performed by: INTERNAL MEDICINE

## 2019-04-21 PROCEDURE — 99232 SBSQ HOSP IP/OBS MODERATE 35: CPT | Performed by: NURSE PRACTITIONER

## 2019-04-21 RX ADMIN — RISPERIDONE 2 MG: 2 TABLET ORAL at 21:49

## 2019-04-21 RX ADMIN — FAMOTIDINE 20 MG: 20 TABLET, FILM COATED ORAL at 08:11

## 2019-04-21 RX ADMIN — BUSPIRONE HYDROCHLORIDE 10 MG: 10 TABLET ORAL at 17:09

## 2019-04-21 RX ADMIN — CETIRIZINE HYDROCHLORIDE 10 MG: 10 TABLET, FILM COATED ORAL at 08:11

## 2019-04-21 RX ADMIN — TRAZODONE HYDROCHLORIDE 50 MG: 50 TABLET ORAL at 21:49

## 2019-04-21 RX ADMIN — HYDROXYZINE HYDROCHLORIDE 25 MG: 25 TABLET ORAL at 08:12

## 2019-04-21 RX ADMIN — HYDROXYZINE HYDROCHLORIDE 25 MG: 25 TABLET ORAL at 16:37

## 2019-04-21 RX ADMIN — BUSPIRONE HYDROCHLORIDE 10 MG: 10 TABLET ORAL at 08:11

## 2019-04-21 RX ADMIN — RISPERIDONE 2 MG: 2 TABLET ORAL at 08:11

## 2019-04-21 RX ADMIN — PANTOPRAZOLE SODIUM 40 MG: 40 TABLET, DELAYED RELEASE ORAL at 08:11

## 2019-04-21 RX ADMIN — ERGOCALCIFEROL 50000 UNITS: 1.25 CAPSULE ORAL at 08:11

## 2019-04-21 RX ADMIN — LURASIDONE HYDROCHLORIDE 40 MG: 40 TABLET, FILM COATED ORAL at 17:09

## 2019-04-21 NOTE — PROGRESS NOTES
to ASD   Suicidal Ideations: denies suicidal ideation   Homicidal Ideations: Negative for homicidal ideation      Medication Side Effects: absent       Attention Span attention span appeared shorter than expected for age     Clinical Assessment Medical Decision    Axis I: Major Depressive Disorder with Psychotic Features    Precautions with Justification:   None    Medication Review/Mgmt: Medications reviewed without changes    Medical Issues: See Chart    Assessment of Risk for Harm to Self/Others:  None    PLAN:  · Continue inpatient psychiatric treatment  · Supportive therapy with medication management. Reviewed risks and benefits as well as potential side effects with patient. · Continue home medications. · Discontinue Celexa 40mg daily. · New Order for Latuda 40mg daily with dinner beginning 4/20/19. · Ordered IM consult for elevated BP on 4/20/19. · Review medications for efficacy and side effects. · Therapeutic support and empathetic care provided. · Engage in therapeutic activities and groups. · Follow up at [facility] after symptoms stabilized.     Anticipated Discharge Date: TBD    Patient's Response to Treatment: positive    Rudy Masters  4/21/2019  8:14 AM

## 2019-04-21 NOTE — PROGRESS NOTES
[address] [facility].                Date:   4/21/2019  Patient name:  Kailash Harrington  Date of admission:  4/19/2019  2:00 PM  MRN:   [id number]  YOB: 1995    Pt seen at the request of Aureliano Lynch MD    CHIEF COMPLAINT:     History Obtained From:  Patient and chart review. HISTORY OF PRESENT ILLNESS:      The patient is a 26 y.o.  male who is admitted to the hospital for medical management     bp controlled   Past Medical History:   has a past medical history of Autistic disorder, Benign tumor of ear canal, Injury of frontal lobe ([address] and Multiple sensory deficit syndrome. Past Surgical History:   has a past surgical history that includes Bladder surgery; fracture surgery (4/6/2012); Pelvic fracture surgery; and Mouth surgery. Home Medications:    Prior to Admission medications    Medication Sig Start Date End Date Taking? Authorizing Provider   risperiDONE (RISPERDAL) 2 MG tablet Take 2 mg by mouth 2 times daily    Historical Provider, MD   Cholecalciferol (VITAMIN D3) 67876 units CAPS Take 50,000 Units by mouth once a week    Historical Provider, MD   cetirizine (ZYRTEC) 10 MG tablet Take 10 mg by mouth daily    Historical Provider, MD   ranitidine (ZANTAC) 150 MG tablet Take 150 mg by mouth 2 times daily    Historical Provider, MD   omeprazole (PRILOSEC) 20 MG delayed release capsule Take 20 mg by mouth 2 times daily    Historical Provider, MD   citalopram (CELEXA) 40 MG tablet Take 40 mg by mouth daily    Historical Provider, MD   Mirabegron ER 25 MG TB24 Take 25 mg by mouth daily    Historical Provider, MD   busPIRone (BUSPAR) 10 MG tablet Take 10 mg by mouth 2 times daily. Historical Provider, MD   ibuprofen (ADVIL;MOTRIN) 200 MG CAPS Take 1 capsule by mouth every 6 hours as needed for Pain     Historical Provider, MD       Allergies:  Hydromorphone    Social History:   reports that he has been smoking. He has been smoking about 0.25 packs per day. He does not have any smokeless tobacco history on file. He reports that he has current or past drug history. Drugs: Marijuana and Cocaine. He reports that he does not drink alcohol. Family History: family history is not on file. He was adopted. REVIEW OF SYSTEMS:    · Constitutional: Negative for weight loss  · Eyes: Negative for visual changes, diplopia, scleral icterus. · ENT: Negative for Headaches, hearing loss, vertigo, mouth sores, sore throat. · Cardiovascular: Negative for lightheadedness/orthostatic symptoms ,chest pain, dyspnea on exertion, palpitations or loss of consciousness. · Respiratory: Negative for cough or wheezing, sputum production, hemoptysis, pleuritic pain. · Gastrointestinal: Negative for nausea/vomiting, change in bowel habits, abdominal pain, dysphagia/appetite loss, hematemesis, blood in stools. · Genitourinary:Negative for change in bladder habits, dysuria, trouble voiding, hematuria. · Musculoskeletal: Negative for gait disturbance, weakness, joint complaints. · Integumentary: Negative for rash, pruritis. · Neurological: Negative for headache, dizziness, change in muscle strength, numbness/tingling, change in gait, balance, coordination,   · Endocrine: negative for temperature intolerance, excessive thirst, fluid intake, or urination, tremor. · Hematologic/Lymphatic: negative for abnormal bruising or bleeding, blood clots, swollen lymph nodes. · Allergic/Immunologic: negative for nasal congestion, pruritis, hives. PHYSICAL EXAM:    BP (!) 104/57   Pulse 85   Temp 98.2 °F (36.8 °C) (Oral)   Resp 14   Ht 6' 2\" (1.88 m)   Wt (!) 396 lb (179.6 kg)   SpO2 96%   BMI 50.84 kg/m²      · General appearance: well nourished  · HEENT: Head: Normocephalic, no lesions, without obvious abnormality.   · Lungs: clear to auscultation bilaterally  · Heart: regular rate and rhythm, S1, S2 normal, no murmur, click, rub or gallop  · Abdomen: soft, non-tender; bowel sounds normal; no masses,  no organomegaly  · Extremities: extremities normal, atraumatic, no cyanosis or edema  · Neurological: Gait normal. Reflexes normal and symmetric. Sensation grossly normal  · Skin - no rash, no lump   · Eye no icterus no redness  · Lymphatic system-no lymphadenopathy no splenomegaly       DIAGNOSTICS:    Laboratory Testing:  CBC: No results for input(s): WBC, HGB, PLT in the last 72 hours. BMP:  No results for input(s): NA, K, CL, CO2, BUN, CREATININE, GLUCOSE in the last 72 hours. S. Calcium:No results for input(s): CALCIUM in the last 72 hours. S. Ionized Calcium:No results for input(s): IONCA in the last 72 hours. S. Magnesium:No results for input(s): MG in the last 72 hours. S. Phosphorus:No results for input(s): PHOS in the last 72 hours. S. Glucose:No results for input(s): POCGLU in the last 72 hours. Glycosylated hemoglobin A1C: No results for input(s): LABA1C in the last 72 hours. INR: No results for input(s): INR in the last 72 hours. Hepatic functions: No results for input(s): ALKPHOS, ALT, AST, PROT, BILITOT, BILIDIR, LABALBU in the last 72 hours. Pancreatic functions:No results for input(s): LACTA, AMYLASE in the last 72 hours. S. Lactic Acid: No results for input(s): LACTA in the last 72 hours. Cardiac enzymes:No results for input(s): CKTOTAL, CKMB, CKMBINDEX, TROPONINI in the last 72 hours. BNP:No results for input(s): BNP in the last 72 hours. Lipid profile: No results for input(s): CHOL, TRIG, HDL, LDLCALC in the last 72 hours. Invalid input(s): LDL  Blood Gases: No results found for: PH, PCO2, PO2, HCO3, O2SAT  Thyroid functions:   Lab Results   Component Value Date    TSH 1.83 11/03/2012        Imaging/Diagonstics:    No results found.       ASSESSMENT:    Patient Active Problem List   Diagnosis    Autistic spectrum disorder    Acute non-recurrent maxillary sinusitis    Attention deficit disorder    Blood in the stool    Body mass index 45.0-49.9, adult (HCC)    Elevated LFTs    GERD (gastroesophageal reflux disease)    History of pneumonia    Other constipation    Risk for sexually transmitted disease    Severe recurrent major depression with psychotic features [facility]    Skin excoriation    Suicidal ideation    Major depressive disorder with psychotic features ([facility]    MDD (major depressive disorder), recurrent episode ([facility]    Depression with suicidal ideation     Obesity  With tripp   not using cpap due to ontolerance   sbp 100     No sx   cont sa,  nme   will follow    gerd on ppi     PLAN:  Cont same    follow bp      4/21   bp controlled   cont same     MD BRITTANIE Borden[address], [address] [place] [address].    Phone [phone]   Fax: [phone]  Answering Service: [phone]

## 2019-04-21 NOTE — GROUP NOTE
Group Therapy Note    Date: April 21    Group Start Time: 1000  Group End Time: 1050  Group Topic: Psychoeducation    RAJ Moreno, LSW    Pt declined to attend psycho education at 1000 am despite encouragement. Pt offered 1:1 and refused. Signature:   RAJ Kahn, DANNY

## 2019-04-21 NOTE — PLAN OF CARE
[address] [facility]  Day 3 Interdisciplinary Treatment Plan NOTE    Review Date & Time: 4/21/19 1516    Admission Type:   Admission Type: Voluntary    Reason for admission:  Reason for Admission: SI to jump off bridge into river; HI toward parents, caretakers, peers  Estimated Length of Stay:  5-7 days  Estimated Discharge Date Update: to be determined by physician    PATIENT STRENGTHS:  Patient Strengths:Strengths: Communication, Connection to output provider, Medication Compliance, Positive Support, Social Skills  Patient Strengths and Limitations:Limitations: Difficult relationships / poor social skills, External locus of control, Tendency to isolate self, Difficulty problem solving/relies on others to help solve problems, Hopeless about future  Addictive Behavior:Addictive Behavior  In the past 3 months, have you felt or has someone told you that you have a problem with:  : None  Do you have a history of Chemical Use?: Yes  Do you have a history of Alcohol Use?: No  Do you have a history of Street Drug Abuse?: Yes  Histroy of Prescripton Drug Abuse?: No  Medical Problems:  Past Medical History:   Diagnosis Date    Autistic disorder     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    Injury of frontal lobe (HCC)     Multiple sensory deficit syndrome        Risk:  Fall RiskTotal: 69  Bairon Scale Bairon Scale Score: 22  BVC Total: 0  Change in scores:  No Changes to plan of Care:  No    Status EXAM:   Status and Exam  Normal: No  Facial Expression: Flat  Affect: Blunt  Level of Consciousness: Alert  Mood:Normal: No  Mood: Helpless, Anxious, Depressed  Motor Activity:Normal: Yes  Motor Activity: Decreased  Interview Behavior: Cooperative  Preception: [place] to Person, [place] to Time, [place] to Place, [place] to Situation  Attention:Normal: No  Attention: Distractible  Thought Processes: Circumstantial, Flt.of Ideas  Thought Content:Normal: No  Thought Content: Preoccupations  Hallucinations: None(pt denies)  Delusions: No  Delusions: Grandeur  Memory:Normal: No  Memory: Poor Recent, Poor Remote  Insight and Judgment: No  Insight and Judgment: Poor Judgment  Present Suicidal Ideation: No  Present Homicidal Ideation: No    Daily Assessment Last Entry:   Daily Sleep (WDL): Within Defined Limits         Patient Currently in Pain: Denies  Daily Nutrition (WDL): Within Defined Limits    Patient Monitoring:  Frequency of Checks: 4 times per hour, close    Psychiatric Symptoms:   Depression Symptoms  Depression Symptoms: Impaired concentration  Anxiety Symptoms  Anxiety Symptoms: Generalized  Milly Symptoms  Milly Symptoms: Flight of ideas, Pressured speech     Psychosis Symptoms  Delusion Type: Somatic    Suicide Risk CSSR-S:  1) Within the past month, have you wished you were dead or wished you could go to sleep and not wake up? : Yes  2) Have you actually had any thoughts of killing yourself? : Yes  3) Have you been thinking about how you might kill yourself? : Yes  5) Have you started to work out or worked out the details of how to kill yourself?  Do you intend to carry out this plan? : Yes  6) Have you ever done anything, started to do anything, or prepared to do anything to end your life?: Yes  Change in Result: Change in Plan of care:      EDUCATION:   Learner Progress Toward Treatment Goals: Reviewed results and recommendations of this team, Reviewed group plan and strategies, Reviewed signs, symptoms and risk of self harm and violent behavior, Reviewed goals and plan of care    Method:  small group, individual verbal education    Outcome: Verbalized by patient but needs reinforcement to obtain goals    PATIENT GOALS:  Short term: Use coping skills to help with issues with step father  Long term:  Continue using coping skills    PLAN/TREATMENT RECOMMENDATIONS UPDATE:  continue with group therapies, increased socialization, continue planning for after discharge goals, continue with medication compliance    SHORT-TERM GOALS UPDATE:   Time frame for Short-Term Goals:  5-7 days    LONG-TERM GOALS UPDATE:   Time frame for Long-Term Goals:  6 months    Members Present in Team Meeting:   See signature sheet  Alexandra Taylor

## 2019-04-21 NOTE — PLAN OF CARE
Problem: Suicide risk  Goal: Provide patient with safe environment  Description  Provide patient with safe environment  4/20/2019 2242 by Nehal Monsalve RN  Outcome: Ongoing  Note:   Patient denies any suicidal or homicidal ideations. Patient denies any hallucinations. Patient spoke to writer 1:1 for a while about  his day today with his mother, patient states he is mad at his mother for being with his stepdad, patient doesn't like step dad. Patient also states he has lost a lot of weight since he has been here because he has a very strict diet of only being able to eat \"high processed foods, I can only eat [employer] and cheese and [employer]". Patient states he isn't sleeping well. Patient was social with peers and ate two hot dogs tonight. Problem: Anger Management/Homicidal Ideation:  Goal: Able to display appropriate communication and problem solving  Description  Able to display appropriate communication and problem solving  4/20/2019 2242 by Nehal Monsalve RN  Outcome: Ongoing  Note:   Patient denies any suicidal or homicidal ideations. Patient denies any hallucinations. Patient spoke to writer 1:1 for a while about  his day today with his mother, patient states he is mad at his mother for being with his stepdad, patient doesn't like step dad. Patient also states he has lost a lot of weight since he has been here because he has a very strict diet of only being able to eat \"high processed foods, I can only eat [employer] and cheese and [employer]". Patient states he isn't sleeping well. Patient was social with peers and ate two hot dogs tonight. Problem: Anger Management/Homicidal Ideation:  Goal: Absence of angry outbursts  Description  Absence of angry outbursts  4/20/2019 2242 by Nehal Monsalve RN  Outcome: Ongoing  Note:   Patient denies any suicidal or homicidal ideations. Patient denies any hallucinations.  Patient spoke to writer 1:1 for a while about  his day today with his mother, patient states he is mad at his mother for being with his stepdad, patient doesn't like step dad. Patient also states he has lost a lot of weight since he has been here because he has a very strict diet of only being able to eat \"high processed foods, I can only eat [place] and cheese and [employer]". Patient states he isn't sleeping well. Patient was social with peers and ate two hot dogs tonight.

## 2019-04-21 NOTE — GROUP NOTE
Group Therapy Note    Date: April 21    Group Start Time: 0900  Group End Time: 0915  Group Topic: Community Meeting    NATALIE AKINS    SHIRIN Ortega    Group Therapy Note    Attendees: 9         Patient's Goal:  Pt will demonstrate improved interpersonal skills    Notes:  Pt attended group and participated    Status After Intervention:  Improved    Participation Level:  Active Listener and Interactive    Participation Quality: Appropriate, Attentive and Sharing      Speech:  normal      Thought Process/Content: Logical      Affective Functioning: Congruent      Mood: euthymic      Level of consciousness:  Alert, Oriented x4 and Attentive      Response to Learning: Able to verbalize current knowledge/experience, Able to verbalize/acknowledge new learning, Able to retain information and Progressing to goal      Endings: None Reported    Modes of Intervention: Education, Support, Socialization, Exploration, Problem-solving and Limit-setting      Discipline Responsible: Psychoeducational Specialist      Signature:  SHIRIN Ortega

## 2019-04-21 NOTE — GROUP NOTE
Group Therapy Note    Date: April 21    Group Start Time: 1330  Group End Time: 4990  Group Topic: Psychoeducation    NATALIE BHSHIRIN Lockhart    Group Therapy Note    Attendees: 11         Patient's Goal:  Pt will demonstrate improved interpersonal skills    Notes:  Pt attended group and participated appropriately      Status After Intervention:  Improved    Participation Level: Interactive    Participation Quality: Appropriate, Attentive, Sharing and Intrusive      Speech:  loud      Thought Process/Content: Logical      Affective Functioning: Exaggerated      Mood: euthymic      Level of consciousness:  Alert, Oriented x4 and Attentive      Response to Learning: Able to verbalize current knowledge/experience, Able to verbalize/acknowledge new learning, Able to retain information and Able to change behavior      Endings: None Reported    Modes of Intervention: Education, Support, Socialization, Exploration and Media      Discipline Responsible: Psychoeducational Specialist      Signature:  Brian Bourne

## 2019-04-22 PROBLEM — F32.3 MAJOR DEPRESSIVE DISORDER WITH PSYCHOTIC FEATURES (HCC): Chronic | Status: ACTIVE | Noted: 2019-04-19

## 2019-04-22 PROCEDURE — 6370000000 HC RX 637 (ALT 250 FOR IP): Performed by: PSYCHIATRY & NEUROLOGY

## 2019-04-22 PROCEDURE — 6370000000 HC RX 637 (ALT 250 FOR IP): Performed by: NURSE PRACTITIONER

## 2019-04-22 PROCEDURE — 1240000000 HC EMOTIONAL WELLNESS R&B

## 2019-04-22 PROCEDURE — 99232 SBSQ HOSP IP/OBS MODERATE 35: CPT | Performed by: PSYCHIATRY & NEUROLOGY

## 2019-04-22 RX ADMIN — RISPERIDONE 2 MG: 2 TABLET ORAL at 20:26

## 2019-04-22 RX ADMIN — TRAZODONE HYDROCHLORIDE 50 MG: 50 TABLET ORAL at 20:26

## 2019-04-22 RX ADMIN — HYDROXYZINE HYDROCHLORIDE 25 MG: 25 TABLET ORAL at 08:07

## 2019-04-22 RX ADMIN — HYDROXYZINE HYDROCHLORIDE 25 MG: 25 TABLET ORAL at 20:26

## 2019-04-22 RX ADMIN — BUSPIRONE HYDROCHLORIDE 10 MG: 10 TABLET ORAL at 08:07

## 2019-04-22 RX ADMIN — RISPERIDONE 2 MG: 2 TABLET ORAL at 08:07

## 2019-04-22 RX ADMIN — LURASIDONE HYDROCHLORIDE 40 MG: 40 TABLET, FILM COATED ORAL at 16:59

## 2019-04-22 RX ADMIN — BUSPIRONE HYDROCHLORIDE 10 MG: 10 TABLET ORAL at 16:59

## 2019-04-22 RX ADMIN — FAMOTIDINE 20 MG: 20 TABLET, FILM COATED ORAL at 08:07

## 2019-04-22 RX ADMIN — IBUPROFEN 200 MG: 400 TABLET, FILM COATED ORAL at 09:33

## 2019-04-22 RX ADMIN — CETIRIZINE HYDROCHLORIDE 10 MG: 10 TABLET, FILM COATED ORAL at 08:07

## 2019-04-22 RX ADMIN — PANTOPRAZOLE SODIUM 40 MG: 40 TABLET, DELAYED RELEASE ORAL at 08:11

## 2019-04-22 ASSESSMENT — PAIN - FUNCTIONAL ASSESSMENT
PAIN_FUNCTIONAL_ASSESSMENT: 0-10
PAIN_FUNCTIONAL_ASSESSMENT: ACTIVITIES ARE NOT PREVENTED

## 2019-04-22 ASSESSMENT — PAIN SCALES - GENERAL: PAINLEVEL_OUTOF10: 3

## 2019-04-22 ASSESSMENT — PAIN DESCRIPTION - DESCRIPTORS: DESCRIPTORS: ACHING;DISCOMFORT

## 2019-04-22 NOTE — GROUP NOTE
Group Therapy Note    Date: April 21    Group Start Time: 2030  Group End Time: 2122  Group Topic: Wrap-Up    [facility]    [place] [facility]        Group Therapy Note    Attendees: 16         Patient's Goal:  Pt refused group attendance    Notes:      Status After Intervention:      Participation Level:     Participation Quality:       Affective Functioning:       Mood:       Level of consciousness:        Response to Learning:       Endings:     Modes of Intervention:       Discipline Responsible: Behavorial Health Tech      Signature:  Derrel Loveless

## 2019-04-22 NOTE — BH NOTE
Department of Psychiatry  Attending Progress Note  Chief Complaint: Major depressive disorder with psychotic features ([address]     SUBJECTIVE:    Patient is a 68-year-old male with a long-term history of autistic spectrum disorder. He is angry upset with his father about finances matters and he was threatening him and he got angry with his mother. He said his parents live about 3 minutes to walk from his home. He gets Social Security disability. He denies any drug use. He was having suicidal thoughts and was brought to ER and got admitted to the hospital.  He still has some suicidal thoughts. He denies any side effects from medication. He denies any hallucinations or delusions. He denies any homicidal thoughts or plans. He has limited insight. His judgment is impaired.   OBJECTIVE    Physical  /79   Pulse 96   Temp 97.8 °F (36.6 °C) (Oral)   Resp 14   Ht 6' 2\" (1.88 m)   Wt (!) 396 lb (179.6 kg)   SpO2 96%   BMI 50.84 kg/m²      Mental Status Evaluation:  Orientation: alertness: alert   Mood:. anxious      Affect:  constricted      Appearance:  age appropriate   Activity:  Psychomotor Agitation   Gait/Posture: Normal   Speech:  pressured   Thought Process:  circumstantial   Thought Content:  He denies delusions and hallucinations   Sensorium:  person and place   Cognition:  grossly intact   Memory: intact   Insight:  limited   Judgment: limited   Suicidal Ideations: passive   Homicidal Ideations: Negative for homicidal ideation      Medication Side Effects: absent       Attention Span attention span appeared shorter than expected for age     Medications  Current Facility-Administered Medications   Medication Dose Route Frequency Provider Last Rate Last Dose    lurasidone (LATUDA) tablet 40 mg  40 mg Oral Dinner LITO Sevilla - CNP   40 mg at 04/21/19 1709    hydrOXYzine (ATARAX) tablet 25 mg  25 mg Oral TID PRN Marcelo Huang APRN - CNP   25 mg at 04/22/19 0807    Mirabegron ER TB24 25 mg  25 mg Oral Daily Hansa Cancer, APRN - CNP   25 mg at 04/22/19 0496    busPIRone (BUSPAR) tablet 10 mg  10 mg Oral BID Claudette Valiente MD   10 mg at 04/22/19 3860    cetirizine (ZYRTEC) tablet 10 mg  10 mg Oral Daily Claudette Valiente MD   10 mg at 04/22/19 9146    vitamin D (ERGOCALCIFEROL) capsule 50,000 Units  50,000 Units Oral Weekly Claudette Valiente MD   50,000 Units at 04/21/19 0811    pantoprazole (PROTONIX) tablet 40 mg  40 mg Oral QAM AC Claudette Valiente MD   40 mg at 04/22/19 2632    famotidine (PEPCID) tablet 20 mg  20 mg Oral Daily Claudette Valiente MD   20 mg at 04/22/19 5447    risperiDONE (RISPERDAL) tablet 2 mg  2 mg Oral BID Claudette Valiente MD   2 mg at 04/22/19 2037    acetaminophen (TYLENOL) tablet 650 mg  650 mg Oral Q4H PRN Claudette Valiente MD        haloperidol lactate (HALDOL) injection 5 mg  5 mg Intramuscular Q4H PRN Claudette Valiente MD        traZODone (DESYREL) tablet 50 mg  50 mg Oral Nightly PRN Claudette Valinete MD   50 mg at 04/21/19 2149    benztropine mesylate (COGENTIN) injection 2 mg  2 mg Intramuscular BID PRN Claudette Valiente MD        magnesium hydroxide (MILK OF MAGNESIA) 400 MG/5ML suspension 30 mL  30 mL Oral Daily PRN Claudette Valiente MD        aluminum & magnesium hydroxide-simethicone (MAALOX) 200-200-20 MG/5ML suspension 15 mL  15 mL Oral Q6H PRN Claudette Valiente MD        ibuprofen (ADVIL;MOTRIN) tablet 200 mg  200 mg Oral Q6H PRN Clarissa Gurrola MD             lurasidone  40 mg Oral Dinner    Mirabegron ER  25 mg Oral Daily    busPIRone  10 mg Oral BID    cetirizine  10 mg Oral Daily    vitamin D  50,000 Units Oral Weekly    pantoprazole  40 mg Oral QAM AC    famotidine  20 mg Oral Daily    risperiDONE  2 mg Oral BID       ASSESSMENT  Major depressive disorder with psychotic features ([facility]     Patient's Response to Treatment: positive    PLAN  Dragon voice recognition software used in portions of this document.   To continue current management.

## 2019-04-22 NOTE — GROUP NOTE
Group Therapy Note    Date: April 22    Group Start Time: 0900  Group End Time: 0915  Group Topic: Community Meeting    NATALIE Mccullough [place], [address]           Patient's Goal:  Discharge planning. Prepare for talk that he has to have with his father once discharged. Status After Intervention:  Improved    Participation Level:  Active Listener and Interactive    Participation Quality: Appropriate, Attentive and Sharing      Speech:  normal      Thought Process/Content: Logical      Affective Functioning: Congruent      Level of consciousness:  Alert and Attentive      Response to Learning: Able to verbalize current knowledge/experience, Able to verbalize/acknowledge new learning, Able to retain information, Capable of insight and Progressing to goal      Endings: None Reported    Modes of Intervention: Education, Support, Socialization, Exploration, Clarifying, Problem-solving, Confrontation and Reality-testing      Discipline Responsible: Psychoeducational Specialist      Signature:  Zev Spence

## 2019-04-22 NOTE — GROUP NOTE
Group Therapy Note    Date: April 22    Group Start Time: 1330  Group End Time: 1415  Group Topic: Psychoeducation    NATALIE Akers, CTRS       Patient's Goal:  To increase awareness / knowledge of facts about mental health in order to be able to better advocate for self. Notes:  Patient attended group and actively participated in task at hand. Patient conversed appropriately with peers and Aristides Zhou. Status After Intervention:  Improved    Participation Level:  Active Listener and Interactive    Participation Quality: Appropriate, Attentive, Sharing and Supportive      Speech:  normal      Thought Process/Content: Logical      Affective Functioning: Congruent      Level of consciousness:  Alert and Attentive      Response to Learning: Able to verbalize current knowledge/experience, Able to verbalize/acknowledge new learning, Able to retain information, Capable of insight and Progressing to goal      Endings: None Reported    Modes of Intervention: Education, Support, Socialization, Exploration, Clarifying, Problem-solving, Activity, Confrontation, Limit-setting and Reality-testing      Discipline Responsible: Psychoeducational Specialist      Signature:  Ailyn Santana

## 2019-04-22 NOTE — PLAN OF CARE
Problem: Anger Management/Homicidal Ideation:  Goal: Able to display appropriate communication and problem solving  Description  Able to display appropriate communication and problem solving  Outcome: Ongoing  Note:   Patient denies suicidal/homicidal ideations, denies hallucinations, patient was isolative to his room most of shift, came out later for medications, accepting of 1:1 time, patient reports people making fun of him about being bisexual is a trigger for him and he sometimes has problems being in the hospital, patient pleasant and cooperative and verbalized understanding of appropriate outlets for his anger, expressed desire for discharge but he verbalized understanding of needing to be controlled before that is able to happen, accepting of his need for hospitalization at this time, medication compliant and behaviorally controlled

## 2019-04-22 NOTE — GROUP NOTE
Group Documentation    Date: April 22    Group Start Time: 1600  Group End Time: 2057  Group Topic: Healthy Living/Wellness    [facility] MABLE Castro    Patient's Goal:  Increased knowledge of life situations and goals    Status After Intervention:  Improved    Participation Level:  Active Listener and Interactive    Participation Quality: Appropriate, Attentive, Sharing and Supportive    Speech:  normal    Thought Process/Content: Logical  Linear    Affective Functioning: Congruent    Mood: anxious    Level of consciousness:  Alert, Oriented x4 and Attentive    Response to Learning: Able to verbalize current knowledge/experience, Able to verbalize/acknowledge new learning, Able to retain information, Capable of insight and Progressing to goal    Endings: None Reported    Modes of Intervention: Education, Support, Socialization and Exploration    Discipline Responsible: [employer]    Signature:  Mike Castro

## 2019-04-22 NOTE — GROUP NOTE
Group Therapy Note    Date: April 22    Group Start Time: 1100  Group End Time: 1145  Group Topic: Psychotherapy    [address] LPC        Group Therapy Note    Attendees: 5/11         Patient's Goal:  To work on relationships and increase coping strategies    Notes:  Participated in group     Status After Intervention:  Improved    Participation Level: Interactive    Participation Quality: Sharing      Speech:  normal      Thought Process/Content: Linear      Affective Functioning: Constricted/Restricted      Mood: anxious      Level of consciousness:  Oriented x4      Response to Learning: Able to verbalize current knowledge/experience      Endings: None Reported    Modes of Intervention: Problem-solving      Discipline Responsible: /Counselor      Signature:  Yu Johnson LPC

## 2019-04-23 PROCEDURE — 6370000000 HC RX 637 (ALT 250 FOR IP): Performed by: NURSE PRACTITIONER

## 2019-04-23 PROCEDURE — 6370000000 HC RX 637 (ALT 250 FOR IP): Performed by: PSYCHIATRY & NEUROLOGY

## 2019-04-23 PROCEDURE — 99232 SBSQ HOSP IP/OBS MODERATE 35: CPT | Performed by: PSYCHIATRY & NEUROLOGY

## 2019-04-23 PROCEDURE — 1240000000 HC EMOTIONAL WELLNESS R&B

## 2019-04-23 RX ADMIN — RISPERIDONE 2 MG: 2 TABLET ORAL at 08:30

## 2019-04-23 RX ADMIN — CETIRIZINE HYDROCHLORIDE 10 MG: 10 TABLET, FILM COATED ORAL at 08:30

## 2019-04-23 RX ADMIN — TRAZODONE HYDROCHLORIDE 50 MG: 50 TABLET ORAL at 19:55

## 2019-04-23 RX ADMIN — HYDROXYZINE HYDROCHLORIDE 25 MG: 25 TABLET ORAL at 14:49

## 2019-04-23 RX ADMIN — RISPERIDONE 2 MG: 2 TABLET ORAL at 19:55

## 2019-04-23 RX ADMIN — HYDROXYZINE HYDROCHLORIDE 25 MG: 25 TABLET ORAL at 19:56

## 2019-04-23 RX ADMIN — PANTOPRAZOLE SODIUM 40 MG: 40 TABLET, DELAYED RELEASE ORAL at 08:30

## 2019-04-23 RX ADMIN — HYDROXYZINE HYDROCHLORIDE 25 MG: 25 TABLET ORAL at 08:30

## 2019-04-23 RX ADMIN — BUSPIRONE HYDROCHLORIDE 10 MG: 10 TABLET ORAL at 08:30

## 2019-04-23 RX ADMIN — LURASIDONE HYDROCHLORIDE 40 MG: 40 TABLET, FILM COATED ORAL at 18:18

## 2019-04-23 RX ADMIN — BUSPIRONE HYDROCHLORIDE 10 MG: 10 TABLET ORAL at 18:18

## 2019-04-23 RX ADMIN — IBUPROFEN 200 MG: 400 TABLET, FILM COATED ORAL at 19:55

## 2019-04-23 RX ADMIN — FAMOTIDINE 20 MG: 20 TABLET, FILM COATED ORAL at 08:30

## 2019-04-23 ASSESSMENT — PAIN SCALES - GENERAL: PAINLEVEL_OUTOF10: 4

## 2019-04-23 NOTE — BH NOTE
Department of Psychiatry  Attending Progress Note  Chief Complaint: Major depressive disorder with psychotic features ([address]     SUBJECTIVE:    Patient is pacing up and down. He has poor attention and concentration. Is unable to sit in one place. He still has some anger and thoughts to go off on people but he said he won't do weight and in control himself. He also spoke to his mother agreed for him to come back home when he is stable.   OBJECTIVE    Physical  /80   Pulse 85   Temp 98.6 °F (37 °C) (Oral)   Resp 14   Ht 6' 2\" (1.88 m)   Wt (!) 396 lb (179.6 kg)   SpO2 96%   BMI 50.84 kg/m²      Mental Status Evaluation:  Orientation: alertness: alert   Mood:. anxious and constricted      Affect:  constricted      Appearance:  age appropriate   Activity:  Psychomotor Agitation   Gait/Posture: Normal   Speech:  delayed, increased latency of response, normal pitch and normal volume   Thought Process:  circumstantial   Thought Content:  He denies delusions and hallucinations   Sensorium:  person, place and time/date   Cognition:  grossly intact   Memory: intact   Insight:  limited   Judgment: limited   Suicidal Ideations: denies suicidal ideation   Homicidal Ideations: Negative for homicidal ideation      Medication Side Effects: absent       Attention Span attention span appeared shorter than expected for age     Medications  Current Facility-Administered Medications   Medication Dose Route Frequency Provider Last Rate Last Dose    lurasidone (LATUDA) tablet 40 mg  40 mg Oral Dinner LITO Catherine - CNP   40 mg at 04/22/19 1659    hydrOXYzine (ATARAX) tablet 25 mg  25 mg Oral TID PRN Milagro Torres APRN - CNP   25 mg at 04/23/19 0830    Mirabegron ER TB24 25 mg  25 mg Oral Daily Milagro Torres, APRN - CNP   25 mg at 04/23/19 0830    busPIRone (BUSPAR) tablet 10 mg  10 mg Oral BID Tanika Gamboa MD   10 mg at 04/23/19 0830    cetirizine (ZYRTEC) tablet 10 mg  10 mg Oral Daily Tanika Gamboa MD   10 mg at 04/23/19 0830    vitamin D (ERGOCALCIFEROL) capsule 50,000 Units  50,000 Units Oral Weekly Johan Hunter MD   50,000 Units at 04/21/19 0811    pantoprazole (PROTONIX) tablet 40 mg  40 mg Oral QAM AC Johan Hunter MD   40 mg at 04/23/19 0830    famotidine (PEPCID) tablet 20 mg  20 mg Oral Daily Johan Hunter MD   20 mg at 04/23/19 0830    risperiDONE (RISPERDAL) tablet 2 mg  2 mg Oral BID Johan Hunter MD   2 mg at 04/23/19 0830    acetaminophen (TYLENOL) tablet 650 mg  650 mg Oral Q4H PRN Johan Hunter MD        haloperidol lactate (HALDOL) injection 5 mg  5 mg Intramuscular Q4H PRN Johan Hunter MD        traZODone (DESYREL) tablet 50 mg  50 mg Oral Nightly PRN Johan Hunter MD   50 mg at 04/22/19 2026    benztropine mesylate (COGENTIN) injection 2 mg  2 mg Intramuscular BID PRN Johan Hunter MD        magnesium hydroxide (MILK OF MAGNESIA) 400 MG/5ML suspension 30 mL  30 mL Oral Daily PRN Johan Hunter MD        aluminum & magnesium hydroxide-simethicone (MAALOX) 200-200-20 MG/5ML suspension 15 mL  15 mL Oral Q6H PRN Johan Hunter MD        ibuprofen (ADVIL;MOTRIN) tablet 200 mg  200 mg Oral Q6H PRN Michael MANRIQUEZ MD   200 mg at 04/22/19 0933         lurasidone  40 mg Oral Dinner    Mirabegron ER  25 mg Oral Daily    busPIRone  10 mg Oral BID    cetirizine  10 mg Oral Daily    vitamin D  50,000 Units Oral Weekly    pantoprazole  40 mg Oral QAM AC    famotidine  20 mg Oral Daily    risperiDONE  2 mg Oral BID       ASSESSMENT  Major depressive disorder with psychotic features ([facility] [address]     Patient's Response to Treatment: positive    PLAN  Dragon voice recognition software used in portions of this document. To continue current management.

## 2019-04-23 NOTE — GROUP NOTE
Group Therapy Note    Date: April 23    Group Start Time: 0900  Group End Time: 0930  Group Topic: Community Meeting    SHIRIN Gama    Patient's Goal:  Discharge planning, stable mood, go to groups, talk with parents, talk with     Status After Intervention:  Improved    Participation Level:  Active Listener and Interactive    Participation Quality: Appropriate, Attentive and Sharing    Speech:  normal    Thought Process/Content: Logical    Affective Functioning: Congruent    Level of consciousness:  Alert, Oriented x4 and Attentive    Response to Learning: Able to verbalize current knowledge/experience, Capable of insight and Progressing to goal    Endings: None Reported    Modes of Intervention: Education, Socialization, Exploration, Problem-solving and Activity    Discipline Responsible: Psychoeducational Specialist    Signature:  German Vega, [address]

## 2019-04-23 NOTE — PLAN OF CARE
Problem: Suicide risk  Goal: Provide patient with safe environment  Description  Provide patient with safe environment  4/22/2019 2213 by Hudson Gorman RN  Outcome: Ongoing     Problem: Altered Mood, Psychotic Behavior:  Goal: Able to demonstrate trust by eating, participating in treatment and following staff's direction  Description  Able to demonstrate trust by eating, participating in treatment and following staff's direction  4/22/2019 2213 by Hudson Gorman RN  Outcome: Ongoing     Problem: Altered Mood, Psychotic Behavior:  Goal: Able to verbalize decrease in frequency and intensity of hallucinations  Description  Able to verbalize decrease in frequency and intensity of hallucinations  4/22/2019 2213 by Hudson Gorman RN  Outcome: Ongoing  Note:   Patient denies any hallucinations at this time. Pt reports he always feels suicidal at home because he gets lonely and has nobody to talk to. Pt denies suicidal thoughts at this time. Pt is agreeable to seeking out should thoughts of self harm arise. Safe environment maintained. Q15 minute checks for safety cont per unit policy. Will cont to monitor for safety and provide support and reassurance as needed. Pt is medication compliant, social on the unit, and attends groups. Pt reports readiness for discharge.

## 2019-04-23 NOTE — GROUP NOTE
Group Therapy Note    Date: April 22    Group Start Time: 2030  Group End Time: 2050  Group Topic: Wrap-Up    NATALIE Koehler, RN        Group Therapy Note    Attendees: 10         Patient's Goal:  To talk with mother    Notes:  PT states he talked with mother today and it went well they did not argue    Status After Intervention:  Improved    Participation Level:  Active Listener    Participation Quality: Appropriate      Speech:  pressured      Thought Process/Content: Logical      Affective Functioning: Congruent      Mood: anxious      Level of consciousness:  Alert and Oriented x4      Response to Learning: Progressing to goal      Endings: None Reported    Modes of Intervention: Education      Discipline Responsible: Registered Nurse      Signature:  Nallely Robertson RN

## 2019-04-23 NOTE — GROUP NOTE
Group Therapy Note    Date: April 23    Group Start Time: 1000  Group End Time: 0247  Group Topic: Healthy Living/Wellness    [address], [facility], [place]        Group Therapy Note    Attendees: 5/10         Patient's Goal:  Learn how to budget money    Notes:       Status After Intervention:  Improved    Participation Level:  Active Listener and Interactive    Participation Quality: Appropriate      Speech:  normal      Thought Process/Content: Logical  Linear      Affective Functioning: Congruent      Mood: euphoric      Level of consciousness:  Alert and Oriented x4      Response to Learning: Able to verbalize current knowledge/experience and Able to verbalize/acknowledge new learning      Endings: None Reported    Modes of Intervention: Education, Support, Socialization and Exploration      Discipline Responsible: Psychoeducational Specialist      Signature:  Raheel Wayne

## 2019-04-23 NOTE — PLAN OF CARE
Patient observed with bright affect and pleasant/cooperative upon approach. Anxious, but is controlled. Focused and motivated for discharge. Explained what warrants discharge and the importance of compliance upon discharge. Patient agrees. ADL's intact and appropriately dressed. Reports \"good\" sleep and appetite. 1:1 interaction provided. Med education provided and patient receptive. Patient denies SI, HI, and hallucinations. Writer explained unit routines and programming expectations. Encouraged to attend groups and socialize with peers. Patient receptive. Patient is medication compliant, behavior control, and has remained free from harm. Writer will continue to monitor and intervene as needed. Safety maintained.

## 2019-04-23 NOTE — GROUP NOTE
Group Therapy Note    Date: April 23    Group Start Time: 1100  Group End Time: 1140  Group Topic: Psychotherapy    NATALIE CUEVASI C    Via Chad Peters, LPC        Group Therapy Note    Attendees: 5/10         Patient's Goal:  To improve relationships and increase coping skills    Notes:  participated well in group    Status After Intervention:  Improved    Participation Level: Interactive    Participation Quality: Sharing      Speech:  normal      Thought Process/Content: Logical      Affective Functioning: Congruent      Mood: anxious      Level of consciousness:  Oriented x4      Response to Learning: Able to verbalize current knowledge/experience      Endings: None Reported    Modes of Intervention: Problem-solving      Discipline Responsible: /Counselor      Signature:  Sophia Crow LPC

## 2019-04-23 NOTE — GROUP NOTE
Group Therapy Note    Date: April 23    Group Start Time: 1600  Group End Time: 1625  Group Topic: Healthy Living/Wellness    [facility]    Tate Waters RN        Group Therapy Note    Attendees: 10/20         Patient's Goal:  Smoking Cessation    Notes:  Patient Participated    Status After Intervention:  Improved    Participation Level:  Active Listener    Participation Quality: Appropriate      Speech:  normal      Thought Process/Content: Logical      Affective Functioning: Congruent      Mood: euthymic      Level of consciousness:  Oriented x4      Response to Learning: Able to verbalize current knowledge/experience      Endings: None Reported    Modes of Intervention: Education      Discipline Responsible: Registered Nurse      Signature:  Tate Waters RN

## 2019-04-24 VITALS
OXYGEN SATURATION: 96 % | TEMPERATURE: 98.4 F | RESPIRATION RATE: 14 BRPM | DIASTOLIC BLOOD PRESSURE: 73 MMHG | BODY MASS INDEX: 40.43 KG/M2 | WEIGHT: 315 LBS | HEIGHT: 74 IN | SYSTOLIC BLOOD PRESSURE: 120 MMHG | HEART RATE: 59 BPM

## 2019-04-24 PROCEDURE — 6370000000 HC RX 637 (ALT 250 FOR IP): Performed by: PSYCHIATRY & NEUROLOGY

## 2019-04-24 PROCEDURE — 5130000000 HC BRIDGE APPOINTMENT

## 2019-04-24 PROCEDURE — 99239 HOSP IP/OBS DSCHRG MGMT >30: CPT | Performed by: PSYCHIATRY & NEUROLOGY

## 2019-04-24 RX ORDER — TRAZODONE HYDROCHLORIDE 50 MG/1
50 TABLET ORAL NIGHTLY PRN
Qty: 30 TABLET | Refills: 0 | Status: SHIPPED | OUTPATIENT
Start: 2019-04-24 | End: 2019-06-10 | Stop reason: DRUGHIGH

## 2019-04-24 RX ORDER — CITALOPRAM 40 MG/1
40 TABLET ORAL DAILY
Qty: 30 TABLET | Refills: 0 | Status: ON HOLD | OUTPATIENT
Start: 2019-04-24 | End: 2019-06-14 | Stop reason: SDUPTHER

## 2019-04-24 RX ORDER — BUSPIRONE HYDROCHLORIDE 10 MG/1
10 TABLET ORAL 2 TIMES DAILY
Qty: 60 TABLET | Refills: 0 | Status: ON HOLD | OUTPATIENT
Start: 2019-04-24 | End: 2019-06-14 | Stop reason: SDUPTHER

## 2019-04-24 RX ORDER — OMEPRAZOLE 20 MG/1
20 CAPSULE, DELAYED RELEASE ORAL 2 TIMES DAILY
Qty: 30 CAPSULE | Refills: 0 | Status: ON HOLD | OUTPATIENT
Start: 2019-04-24 | End: 2019-06-14 | Stop reason: SDUPTHER

## 2019-04-24 RX ORDER — RANITIDINE 150 MG/1
150 TABLET ORAL 2 TIMES DAILY
Qty: 60 TABLET | Refills: 0 | Status: ON HOLD | OUTPATIENT
Start: 2019-04-24 | End: 2019-06-14 | Stop reason: HOSPADM

## 2019-04-24 RX ADMIN — BUSPIRONE HYDROCHLORIDE 10 MG: 10 TABLET ORAL at 08:10

## 2019-04-24 RX ADMIN — CETIRIZINE HYDROCHLORIDE 10 MG: 10 TABLET, FILM COATED ORAL at 08:10

## 2019-04-24 RX ADMIN — RISPERIDONE 2 MG: 2 TABLET ORAL at 08:10

## 2019-04-24 RX ADMIN — PANTOPRAZOLE SODIUM 40 MG: 40 TABLET, DELAYED RELEASE ORAL at 08:10

## 2019-04-24 RX ADMIN — FAMOTIDINE 20 MG: 20 TABLET, FILM COATED ORAL at 08:10

## 2019-04-24 NOTE — GROUP NOTE
Group Therapy Note    Date: April 24    Group Start Time: 1100  Group End Time: 9362  Group Topic: Psychotherapy    [facility]    DANNY Stanford        Group Therapy Note    Attendees: 7/11         Patient's Goal:  Provide patient with safe environment to express self and process emotions     Notes:  Pt left group early, stated he needed to use the bathroom. Pt is scheduled to be discharged today. Status After Intervention:  Improved    Participation Level:  Active Listener    Participation Quality: Appropriate      Speech:  normal      Thought Process/Content: Logical      Affective Functioning: Congruent      Mood: euthymic      Level of consciousness:  Alert and Oriented x4      Response to Learning: Able to verbalize current knowledge/experience      Endings: None Reported    Modes of Intervention: Education, Support and Socialization      Discipline Responsible: /Counselor      Signature:  DANNY Brock

## 2019-04-24 NOTE — BH NOTE
[facility]  Discharge Note    Pt discharged with followings belongings:   Dentures: None  Vision - Corrective Lenses: None  Hearing Aid: None  Jewelry: None  Body Piercings Removed: N/A  Clothing: Pants, Shirt, Undergarments (Comment), Jacket / coat, Footwear  Were All Patient Medications Collected?: Not Applicable   Pt had no valuable, was sent home with documented belongings. Patient left department with Departure Mode: By self, With caregiver via Mobility at Departure: Ambulatory, discharged to Discharged to: Private Residence. Patient education on aftercare instructions: yes  Information faxed to [facility] by LPN Patient verbalize understanding of AVS:  yes.     Status EXAM upon discharge:  Status and Exam  Normal: Yes  Facial Expression: Brightened, Exaggerated  Affect: Appropriate  Level of Consciousness: Alert  Mood:Normal: Yes  Mood: Anxious  Motor Activity:Normal: Yes  Motor Activity: Increased  Interview Behavior: Cooperative  Preception: [place] to Person, [place] Daily to Time, [place] to Place, [place] to Situation  Attention:Normal: No  Attention: Distractible  Thought Processes: Circumstantial  Thought Content:Normal: Yes  Thought Content: Preoccupations  Hallucinations: None  Delusions: No  Delusions: [place]  Memory:Normal: Yes  Memory: Poor Recent, Poor Remote  Insight and Judgment: No  Insight and Judgment: Poor Judgment, Poor Insight  Present Suicidal Ideation: No  Present Homicidal Ideation: No    Edinson Mejia RN

## 2019-04-24 NOTE — DISCHARGE SUMMARY
DISCHARGE SUMMARY  Patient ID:  Ziyad Rivera  [id number]  87 y.o.  1995    Admit date: 4/19/2019    Discharge date and time: 4/24/2019  10:00 AM     Admitting Physician: Deshaun Vásquez MD     Discharge Physician:  Earline Contreras MD    Admission Diagnoses: Major depressive disorder with psychotic features ([facility] [address] [F32.3]  MDD (major depressive disorder), recurrent episode ([address] [F33.9]    Discharge Diagnoses:   Major depressive disorder with psychotic features [facility])     Patient Active Problem List   Diagnosis Code    Autistic spectrum disorder F84.0    Acute non-recurrent maxillary sinusitis J01.00    Attention deficit disorder F98.8    Blood in the stool K92.1    Body mass index 45.0-49.9, adult ([address] Z68.42    Elevated LFTs R94.5    GERD (gastroesophageal reflux disease) K21.9    History of pneumonia Z87.01    Other constipation K59.09    Risk for sexually transmitted disease Z72.51    Severe recurrent major depression with psychotic features ([address] F33.3    Skin excoriation T14. 8XXA    Suicidal ideation R45.851    Major depressive disorder with psychotic features ([address] F32.3    MDD (major depressive disorder), recurrent episode ([address] F33.9    Depression with suicidal ideation F32.9, R45.851        Admission Condition: poor    Discharged Condition: stable    Indication for Admission: threat to self    History of Present Illnes (present tense wording indicates findings from admission exam on 4/19/2019 and are not necessarily indicative of current findings): Hospital Course:   Upon admission, Ziyad Rivera was provided a safe secure environment, introduced to unit milieu. Patient participated in groups and individual therapies. Meds were adjusted. After few days of hospital care, patient began to feel improvement. Depression lifted, thoughts to harm self ceased. Sleep improved, appetite was good. On morning rounds 4/24/2019, patient endorsed feeling ready for discharge.   Patient MG tablet Comments:   Reason for Stopping:                   Discharge Exam:  Level of consciousness:  Within normal limits  Appearance: Street clothes, seated, with good grooming  Behavior/Motor: No abnormalities noted  Attitude toward examiner:  Cooperative, attentive, good eye contact  Speech:  spontaneous, normal rate, normal volume and well articulated  Mood:  euthymic  Affect:  mood congruent  Thought processes:  linear, goal directed and coherent  Thought content:  Homocidal ideation denies  Suicidal Ideation:  denies suicidal ideation  Delusions:  no evidence of delusions  Perceptual Disturbance:  denies any perceptual disturbance  Cognition:  In tact  Memory: age appropriate  Insight & Judgement: fair  Medication side effects:  denies     Disposition: home    Patient Instructions: Activity: activity as tolerated    Follow-up as scheduled with Zepf     Time Spent: 35 minutes    Engagement: Patient displayed a good level of engagement with the treatments offered during this admission. Discharge planning, findings, and recommendations were discussed with patient's mother  Signed:  Alyssa Kelly   4/24/2019  10:00 AM  Dragon voice recognition software used in portions of this document.

## 2019-04-24 NOTE — CARE COORDINATION
Bridge Appointment completed: Reviewed Discharge Instructions with patient. Patient verbalizes understanding and agreement with the discharge plan using the teachback method. Discharge Arrangements:  Follow up with Cheyenne Munroe notified: n/a  Discharge destination/address: [place]  Transported by:  [address] [place] [address] provides picking pt up around 1 pm with staff Kerry Sewell
Clinician spoke with Rosa Smart [phone] whom reports patient is linked with them, and on going issues with father and patient, they are both triggers for each other. STANLEY to confirm where patient gets psych services according to STANLEY she thinks he is still seeing Dr Camilo Russ at [facility] and will check with mother to see where he is getting medications.
Patient is in agreement to receive all services and groups at [facility] and would like that instead of doing a telemed and getting therapy from several different providers and have stable services in one place.
Patient is requesting services at [facility] and clinician spoke with STANLEY and she reports all services need to be in one place due to mother not happy with [facility] telemed doc and with Dr Consuelo Mariee and has not been seeing either recently.
[facility] per patient request to advise of patient d/c, patient states he receives weekly nursing services through this agency.
for self harm attempts [facility] 2016    Plan of Care   medication management, group/individual therapies, family meetings, psycho -education, treatment team meetings to assist with stabilization    Initial Discharge Plan:  [facility] , [address] and parents to transport home if [facility] [phone] supported living services cannot    Clinical Summary: Patient is a single 26year old  male admitted to the [facility] for SI HI with plan to jump off [place] bridge. Patient reports no current SI AH VH but reports continued HI towards a current patient that is' \"Loud, annoying and thinks he can preach to anyone\" and towards his father but has no specific plan on how he would harm them. Patient reports he is currently being switched over by his mother to a new psychiatrist through [facility] and they will be going to [place] to see this doctor. Patient reports he lives in own apartment with 24 hour staffing through [employer] and gets nursing services through [facility]. Patient reports strained reationships with parents and especially with his dad per patient. Patient reports he has support services from , two health care groups and from his mom at times. Patient Ox4, cooperative, impulsive, hygiene was poor, but patient had clean clothes on. Affect was constricted most of assessment and then elevated when patient spoke about peer on unit and father. Patient to return home to apartment via [employer] staff and to link with "[employer] per patient. Patient reports no legal issues, denies AOD use and reports no armed forces hx.

## 2019-04-24 NOTE — GROUP NOTE
Group Therapy Note    Date: April 24    Group Start Time: 0900  Group End Time: 0930  Group Topic: Community Meeting    NATALIE HURLEY    [place], [place]           Patient's short term goal for today:  Mend relationships- make phone calls. Status After Intervention:  Improved    Participation Level:  Active Listener and Interactive    Participation Quality: Appropriate, Attentive and Sharing      Speech:  normal      Thought Process/Content: Logical      Affective Functioning: Congruent      Level of consciousness:  Alert and Attentive      Response to Learning: Able to verbalize current knowledge/experience, Able to verbalize/acknowledge new learning, Able to retain information, Capable of insight and Progressing to goal      Endings: None Reported    Modes of Intervention: Education, Support, Socialization, Exploration, Clarifying, Problem-solving, Confrontation, Limit-setting and Reality-testing      Discipline Responsible: Psychoeducational Specialist      Signature:  Delma Villalba

## 2019-04-24 NOTE — DISCHARGE INSTR - COC
Continuity of Care Form    Patient Name: Ziyad Rivera   :  1995  MRN:  [id number]    Admit date:  2019  Discharge date:  ***    Code Status Order: Full Code   Advance Directives:   Advance Care Flowsheet Documentation     Date/Time Healthcare Directive Type of Healthcare Directive Copy in [address] Agent's Name Healthcare Agent's Phone Number    19 1628  No, patient does not have an advance directive for healthcare treatment -- -- -- -- --          Admitting Physician:  Deshaun Vásquez MD  PCP: LITO Cunningham CNP    Discharging Nurse: [facility] Unit/Room#: 3267/7108-08  Discharging Unit Phone Number: ***    Emergency Contact:   Extended Emergency Contact Information  Primary Emergency Contact: Belen Cedclark  Address: [address] [place] [address] Phone: [phone]  Work Phone: [phone]  Relation: Parent  Secondary Emergency Contact: Tanmay Carrillo  Address: [address] [place] [address] Phone: [phone]  Work Phone: [phone]  Relation: Parent    Past Surgical History:  Past Surgical History:   Procedure Laterality Date    BLADDER SURGERY      FRACTURE SURGERY  2012    Lt hip    MOUTH SURGERY      PELVIC FRACTURE SURGERY         Immunization History: There is no immunization history on file for this patient. Active Problems:  Patient Active Problem List   Diagnosis Code    Autistic spectrum disorder F84.0    Acute non-recurrent maxillary sinusitis J01.00    Attention deficit disorder F98.8    Blood in the stool K92.1    Body mass index 45.0-49.9, adult (HCC) Z68.42    Elevated LFTs R94.5    GERD (gastroesophageal reflux disease) K21.9    History of pneumonia Z87.01    Other constipation K59.09    Risk for sexually transmitted disease Z72.51    Severe recurrent major depression with psychotic features ([facility] [address] F33.3    Skin excoriation T14. 8XXA    Suicidal ideation R45.851    Major depressive disorder with psychotic features ([facility] [address] F32.3    MDD (major depressive disorder), recurrent episode ([facility] F33.9    Depression with suicidal ideation F32.9, R45.851       Isolation/Infection:   Isolation          No Isolation            Nurse Assessment:  Last Vital Signs: /73   Pulse 59   Temp 98.4 °F (36.9 °C)   Resp 14   Ht 6' 2\" (1.88 m)   Wt (!) 396 lb (179.6 kg)   SpO2 96%   BMI 50.84 kg/m²     Last documented pain score (0-10 scale): Pain Level: 4  Last Weight:   Wt Readings from Last 1 Encounters:   04/19/19 (!) 396 lb (179.6 kg)     Mental Status:  {IP PT MENTAL STATUS:20030}    IV Access:  { HILARIO IV ACCESS:774578326}    Nursing Mobility/ADLs:  Walking   {P DME WHBW:815588228}  Transfer  {P DME OXAP:432067328}  Bathing  {P DME BRANDEN:350583084}  Dressing  {P DME CTQX:372684162}  Toileting  {P DME CIAP:242300183}  Feeding  {[facility] DME VPXE:789978401}  Med Admin  {P DME ZHJI:805252292}  Med Delivery   { HILARIO MED Delivery:398122325}    Wound Care Documentation and Therapy:        Elimination:  Continence:   · Bowel: {YES / FO:97263}  · Bladder: {YES / LZ:28321}  Urinary Catheter: {Urinary Catheter:430210690}   Colostomy/Ileostomy/Ileal Conduit: {YES / IZ:24682}       Date of Last BM: ***  No intake or output data in the 24 hours ending 04/24/19 1020  No intake/output data recorded.     Safety Concerns:     [address] [employer] Safety Concerns:940913333}    Impairments/Disabilities:      [address] [employer] Impairments/Disabilities:613437120}    Nutrition Therapy:  Current Nutrition Therapy:   [address] [employer] Diet List:145047549}    Routes of Feeding: {CHP DME Other Feedings:828887873}  Liquids: {Slp liquid thickness:06630}  Daily Fluid Restriction: {CHP DME Yes amt example:976959068}  Last Modified Barium Swallow with Video (Video Swallowing Test): {[facility] [place]:483178686}    Treatments at the Time of Hospital Discharge:   Respiratory Treatments: ***  Oxygen Therapy:  {Therapy; copd oxygen:69114}  Ventilator:    {MH CC Vent DQCA:190572205}    Rehab Therapies: {THERAPEUTIC INTERVENTION:5673542259}  Weight Bearing Status/Restrictions: 50Dulce GALDAMEZ Weight Bearin}  Other Medical Equipment (for information only, NOT a DME order):  {EQUIPMENT:314368778}  Other Treatments: ***    Patient's personal belongings (please select all that are sent with patient):  {P DME Belongings:686819212}    RN SIGNATURE:  {Esignature:715720574}    CASE MANAGEMENT/SOCIAL WORK SECTION    Inpatient Status Date: ***    Readmission Risk Assessment Score:  Readmission Risk              Risk of Unplanned Readmission:        6           Discharging to Facility/ Agency   · Name:   · Address:  · Phone:  · Fax:    Dialysis Facility (if applicable)   · Name:  · Address:  · Dialysis Schedule:  · Phone:  · Fax:    / signature: {Esignature:327396659}    PHYSICIAN SECTION    Prognosis: {Prognosis:6040291742}    Condition at Discharge: [address] Patient Condition:235653998}    Rehab Potential (if transferring to Rehab): {Prognosis:8503829315}    Recommended Labs or Other Treatments After Discharge: ***    Physician Certification: I certify the above information and transfer of Concepcion Reeder  is necessary for the continuing treatment of the diagnosis listed and that he requires {Admit to Appropriate Level of Care:36238} for {GREATER/LESS:794706102} 30 days.      Update Admission H&P: {CHP DME Changes in KJTON:135088969}    PHYSICIAN SIGNATURE:  {Esignature:070370157}

## 2019-04-24 NOTE — BH NOTE
Patient given tobacco quitline number [id number] at this time, refusing to call at this time, states \" I just dont want to quit now\"- patient given information as to the dangers of long term tobacco use. Continue to reinforce the importance of tobacco cessation.

## 2019-06-10 ENCOUNTER — HOSPITAL ENCOUNTER (INPATIENT)
Age: 24
LOS: 4 days | Discharge: HOME | DRG: 885 | End: 2019-06-14
Attending: EMERGENCY MEDICINE | Admitting: PSYCHIATRY & NEUROLOGY
Payer: MEDICARE

## 2019-06-10 DIAGNOSIS — R45.851 DEPRESSION WITH SUICIDAL IDEATION: Primary | ICD-10-CM

## 2019-06-10 DIAGNOSIS — F32.A DEPRESSION WITH SUICIDAL IDEATION: Primary | ICD-10-CM

## 2019-06-10 PROCEDURE — 1240000000 HC EMOTIONAL WELLNESS R&B

## 2019-06-10 PROCEDURE — 80307 DRUG TEST PRSMV CHEM ANLYZR: CPT

## 2019-06-10 PROCEDURE — 99285 EMERGENCY DEPT VISIT HI MDM: CPT

## 2019-06-10 RX ORDER — ASCORBIC ACID 500 MG
500 TABLET ORAL DAILY
Status: ON HOLD | COMMUNITY
End: 2019-06-14 | Stop reason: SDUPTHER

## 2019-06-10 RX ORDER — TRAZODONE HYDROCHLORIDE 50 MG/1
50 TABLET ORAL NIGHTLY PRN
Status: DISCONTINUED | OUTPATIENT
Start: 2019-06-11 | End: 2019-06-14 | Stop reason: HOSPADM

## 2019-06-10 RX ORDER — GLUCOSAMINE/CHONDR SU A SOD 750-600 MG
120 TABLET ORAL DAILY
Status: ON HOLD | COMMUNITY
End: 2019-06-14 | Stop reason: SDUPTHER

## 2019-06-10 RX ORDER — PHENTERMINE HYDROCHLORIDE 37.5 MG/1
37.5 TABLET ORAL
Status: ON HOLD | COMMUNITY
End: 2019-06-11

## 2019-06-10 RX ORDER — DOCUSATE SODIUM 100 MG/1
100 CAPSULE, LIQUID FILLED ORAL 2 TIMES DAILY
Status: ON HOLD | COMMUNITY
End: 2019-06-14 | Stop reason: SDUPTHER

## 2019-06-10 RX ORDER — BLACK COHOSH ROOT EXTRACT 80 MG
1 CAPSULE ORAL DAILY
Status: ON HOLD | COMMUNITY
End: 2019-06-14 | Stop reason: HOSPADM

## 2019-06-10 RX ORDER — HYDROXYZINE HYDROCHLORIDE 25 MG/1
25 TABLET, FILM COATED ORAL 3 TIMES DAILY PRN
Status: DISCONTINUED | OUTPATIENT
Start: 2019-06-10 | End: 2019-06-14 | Stop reason: HOSPADM

## 2019-06-10 RX ORDER — NICOTINE 21 MG/24HR
1 PATCH, TRANSDERMAL 24 HOURS TRANSDERMAL DAILY
Status: DISCONTINUED | OUTPATIENT
Start: 2019-06-11 | End: 2019-06-10

## 2019-06-10 RX ORDER — RISPERIDONE 2 MG/1
2 TABLET, FILM COATED ORAL 2 TIMES DAILY
Status: ON HOLD | COMMUNITY
End: 2019-06-14 | Stop reason: SDUPTHER

## 2019-06-10 RX ORDER — VITAMIN E 268 MG
800 CAPSULE ORAL DAILY
Status: ON HOLD | COMMUNITY
End: 2019-06-14 | Stop reason: HOSPADM

## 2019-06-10 RX ORDER — MAGNESIUM HYDROXIDE/ALUMINUM HYDROXICE/SIMETHICONE 120; 1200; 1200 MG/30ML; MG/30ML; MG/30ML
30 SUSPENSION ORAL EVERY 6 HOURS PRN
Status: DISCONTINUED | OUTPATIENT
Start: 2019-06-10 | End: 2019-06-14 | Stop reason: HOSPADM

## 2019-06-10 RX ORDER — BENZTROPINE MESYLATE 1 MG/ML
2 INJECTION INTRAMUSCULAR; INTRAVENOUS 2 TIMES DAILY PRN
Status: DISCONTINUED | OUTPATIENT
Start: 2019-06-10 | End: 2019-06-14 | Stop reason: HOSPADM

## 2019-06-10 RX ORDER — TRAZODONE HYDROCHLORIDE 100 MG/1
100 TABLET ORAL NIGHTLY
Status: ON HOLD | COMMUNITY
End: 2019-06-14 | Stop reason: SDUPTHER

## 2019-06-10 RX ORDER — ACETAMINOPHEN 325 MG/1
650 TABLET ORAL EVERY 4 HOURS PRN
Status: DISCONTINUED | OUTPATIENT
Start: 2019-06-10 | End: 2019-06-14 | Stop reason: HOSPADM

## 2019-06-10 ASSESSMENT — LIFESTYLE VARIABLES: HISTORY_ALCOHOL_USE: NO

## 2019-06-10 ASSESSMENT — SLEEP AND FATIGUE QUESTIONNAIRES
AVERAGE NUMBER OF SLEEP HOURS: 8
DO YOU USE A SLEEP AID: NO
DO YOU HAVE DIFFICULTY SLEEPING: NO

## 2019-06-10 ASSESSMENT — PATIENT HEALTH QUESTIONNAIRE - PHQ9: SUM OF ALL RESPONSES TO PHQ QUESTIONS 1-9: 6

## 2019-06-10 ASSESSMENT — PAIN SCALES - GENERAL: PAINLEVEL_OUTOF10: 0

## 2019-06-11 LAB
ABSOLUTE EOS #: 0.2 K/UL (ref 0–0.4)
ABSOLUTE IMMATURE GRANULOCYTE: ABNORMAL K/UL (ref 0–0.3)
ABSOLUTE LYMPH #: 4.9 K/UL (ref 1–4.8)
ABSOLUTE MONO #: 1 K/UL (ref 0.1–1.3)
ALBUMIN SERPL-MCNC: 3.8 G/DL (ref 3.5–5.2)
ALBUMIN/GLOBULIN RATIO: ABNORMAL (ref 1–2.5)
ALP BLD-CCNC: 94 U/L (ref 40–129)
ALT SERPL-CCNC: 67 U/L (ref 5–41)
ANION GAP SERPL CALCULATED.3IONS-SCNC: 12 MMOL/L (ref 9–17)
AST SERPL-CCNC: 47 U/L
BASOPHILS # BLD: 1 % (ref 0–2)
BASOPHILS ABSOLUTE: 0.1 K/UL (ref 0–0.2)
BILIRUB SERPL-MCNC: 0.42 MG/DL (ref 0.3–1.2)
BUN BLDV-MCNC: 7 MG/DL (ref 6–20)
BUN/CREAT BLD: ABNORMAL (ref 9–20)
CALCIUM SERPL-MCNC: 9.3 MG/DL (ref 8.6–10.4)
CHLORIDE BLD-SCNC: 105 MMOL/L (ref 98–107)
CHOLESTEROL/HDL RATIO: 6.4
CHOLESTEROL: 161 MG/DL
CO2: 25 MMOL/L (ref 20–31)
CREAT SERPL-MCNC: 0.86 MG/DL (ref 0.7–1.2)
DIFFERENTIAL TYPE: ABNORMAL
EOSINOPHILS RELATIVE PERCENT: 2 % (ref 0–4)
ESTIMATED AVERAGE GLUCOSE: 137 MG/DL
GFR AFRICAN AMERICAN: >60 ML/MIN
GFR NON-AFRICAN AMERICAN: >60 ML/MIN
GFR SERPL CREATININE-BSD FRML MDRD: ABNORMAL ML/MIN/{1.73_M2}
GFR SERPL CREATININE-BSD FRML MDRD: ABNORMAL ML/MIN/{1.73_M2}
GLUCOSE BLD-MCNC: 116 MG/DL (ref 70–99)
HBA1C MFR BLD: 6.4 % (ref 4–6)
HCT VFR BLD CALC: 38.8 % (ref 41–53)
HDLC SERPL-MCNC: 25 MG/DL
HEMOGLOBIN: 12.7 G/DL (ref 13.5–17.5)
IMMATURE GRANULOCYTES: ABNORMAL %
LDL CHOLESTEROL: 94 MG/DL (ref 0–130)
LYMPHOCYTES # BLD: 49 % (ref 24–44)
MCH RBC QN AUTO: 28.3 PG (ref 26–34)
MCHC RBC AUTO-ENTMCNC: 32.6 G/DL (ref 31–37)
MCV RBC AUTO: 86.6 FL (ref 80–100)
MONOCYTES # BLD: 10 % (ref 1–7)
NRBC AUTOMATED: ABNORMAL PER 100 WBC
PDW BLD-RTO: 14 % (ref 11.5–14.9)
PLATELET # BLD: 259 K/UL (ref 150–450)
PLATELET ESTIMATE: ABNORMAL
PMV BLD AUTO: 8.6 FL (ref 6–12)
POTASSIUM SERPL-SCNC: 4.5 MMOL/L (ref 3.7–5.3)
RBC # BLD: 4.49 M/UL (ref 4.5–5.9)
RBC # BLD: ABNORMAL 10*6/UL
SEG NEUTROPHILS: 38 % (ref 36–66)
SEGMENTED NEUTROPHILS ABSOLUTE COUNT: 3.6 K/UL (ref 1.3–9.1)
SODIUM BLD-SCNC: 142 MMOL/L (ref 135–144)
THYROXINE, FREE: 1.02 NG/DL (ref 0.93–1.7)
TOTAL PROTEIN: 6.7 G/DL (ref 6.4–8.3)
TRIGL SERPL-MCNC: 210 MG/DL
TSH SERPL DL<=0.05 MIU/L-ACNC: 3.92 MIU/L (ref 0.3–5)
VLDLC SERPL CALC-MCNC: ABNORMAL MG/DL (ref 1–30)
WBC # BLD: 9.7 K/UL (ref 3.5–11)
WBC # BLD: ABNORMAL 10*3/UL

## 2019-06-11 PROCEDURE — 36415 COLL VENOUS BLD VENIPUNCTURE: CPT

## 2019-06-11 PROCEDURE — 6370000000 HC RX 637 (ALT 250 FOR IP): Performed by: NURSE PRACTITIONER

## 2019-06-11 PROCEDURE — 85025 COMPLETE CBC W/AUTO DIFF WBC: CPT

## 2019-06-11 PROCEDURE — 80061 LIPID PANEL: CPT

## 2019-06-11 PROCEDURE — 80053 COMPREHEN METABOLIC PANEL: CPT

## 2019-06-11 PROCEDURE — 84439 ASSAY OF FREE THYROXINE: CPT

## 2019-06-11 PROCEDURE — 1240000000 HC EMOTIONAL WELLNESS R&B

## 2019-06-11 PROCEDURE — 83036 HEMOGLOBIN GLYCOSYLATED A1C: CPT

## 2019-06-11 PROCEDURE — 90792 PSYCH DIAG EVAL W/MED SRVCS: CPT | Performed by: PSYCHIATRY & NEUROLOGY

## 2019-06-11 PROCEDURE — 84443 ASSAY THYROID STIM HORMONE: CPT

## 2019-06-11 RX ORDER — OMEGA-3-ACID ETHYL ESTERS 1 G/1
2 CAPSULE, LIQUID FILLED ORAL DAILY
Status: DISCONTINUED | OUTPATIENT
Start: 2019-06-11 | End: 2019-06-14 | Stop reason: HOSPADM

## 2019-06-11 RX ORDER — DOCUSATE SODIUM 100 MG/1
100 CAPSULE, LIQUID FILLED ORAL 2 TIMES DAILY
Status: DISCONTINUED | OUTPATIENT
Start: 2019-06-11 | End: 2019-06-14 | Stop reason: HOSPADM

## 2019-06-11 RX ORDER — LACTOBACILLUS RHAMNOSUS GG 10B CELL
1 CAPSULE ORAL DAILY
Status: DISCONTINUED | OUTPATIENT
Start: 2019-06-11 | End: 2019-06-14 | Stop reason: HOSPADM

## 2019-06-11 RX ORDER — ASCORBIC ACID 500 MG
500 TABLET ORAL DAILY
Status: DISCONTINUED | OUTPATIENT
Start: 2019-06-11 | End: 2019-06-14 | Stop reason: HOSPADM

## 2019-06-11 RX ORDER — FAMOTIDINE 20 MG/1
20 TABLET, FILM COATED ORAL 2 TIMES DAILY
Status: DISCONTINUED | OUTPATIENT
Start: 2019-06-11 | End: 2019-06-14 | Stop reason: HOSPADM

## 2019-06-11 RX ORDER — BLACK COHOSH ROOT EXTRACT 80 MG
1 CAPSULE ORAL DAILY
Status: DISCONTINUED | OUTPATIENT
Start: 2019-06-11 | End: 2019-06-11 | Stop reason: CLARIF

## 2019-06-11 RX ORDER — RISPERIDONE 2 MG/1
2 TABLET, FILM COATED ORAL 2 TIMES DAILY
Status: DISCONTINUED | OUTPATIENT
Start: 2019-06-11 | End: 2019-06-14 | Stop reason: HOSPADM

## 2019-06-11 RX ORDER — CETIRIZINE HYDROCHLORIDE 10 MG/1
10 TABLET ORAL DAILY
Status: DISCONTINUED | OUTPATIENT
Start: 2019-06-11 | End: 2019-06-14 | Stop reason: HOSPADM

## 2019-06-11 RX ORDER — FLUTICASONE PROPIONATE 110 UG/1
2 AEROSOL, METERED RESPIRATORY (INHALATION) 2 TIMES DAILY
Status: DISCONTINUED | OUTPATIENT
Start: 2019-06-11 | End: 2019-06-14 | Stop reason: HOSPADM

## 2019-06-11 RX ORDER — VITAMIN E 268 MG
800 CAPSULE ORAL DAILY
Status: DISCONTINUED | OUTPATIENT
Start: 2019-06-11 | End: 2019-06-14 | Stop reason: HOSPADM

## 2019-06-11 RX ORDER — BUSPIRONE HYDROCHLORIDE 5 MG/1
10 TABLET ORAL 2 TIMES DAILY
Status: DISCONTINUED | OUTPATIENT
Start: 2019-06-11 | End: 2019-06-14 | Stop reason: HOSPADM

## 2019-06-11 RX ADMIN — TRAZODONE HYDROCHLORIDE 50 MG: 50 TABLET ORAL at 21:15

## 2019-06-11 RX ADMIN — Medication 1 CAPSULE: at 15:08

## 2019-06-11 RX ADMIN — BUSPIRONE HYDROCHLORIDE 10 MG: 5 TABLET ORAL at 17:56

## 2019-06-11 RX ADMIN — VITAMIN D, TAB 1000IU (100/BT) 1000 UNITS: 25 TAB at 15:09

## 2019-06-11 RX ADMIN — FAMOTIDINE 20 MG: 20 TABLET ORAL at 15:10

## 2019-06-11 RX ADMIN — DOCUSATE SODIUM 100 MG: 100 CAPSULE, LIQUID FILLED ORAL at 15:10

## 2019-06-11 RX ADMIN — CETIRIZINE HYDROCHLORIDE 10 MG: 10 TABLET, FILM COATED ORAL at 15:08

## 2019-06-11 RX ADMIN — DOCUSATE SODIUM 100 MG: 100 CAPSULE, LIQUID FILLED ORAL at 21:15

## 2019-06-11 RX ADMIN — RISPERIDONE 2 MG: 2 TABLET ORAL at 15:08

## 2019-06-11 RX ADMIN — OMEGA-3-ACID ETHYL ESTERS 2 G: 1 CAPSULE, LIQUID FILLED ORAL at 15:08

## 2019-06-11 RX ADMIN — FAMOTIDINE 20 MG: 20 TABLET ORAL at 21:15

## 2019-06-11 RX ADMIN — RISPERIDONE 2 MG: 2 TABLET ORAL at 21:15

## 2019-06-11 RX ADMIN — FLUTICASONE PROPIONATE 2 PUFF: 110 AEROSOL, METERED RESPIRATORY (INHALATION) at 15:09

## 2019-06-11 RX ADMIN — FLUTICASONE PROPIONATE 2 PUFF: 110 AEROSOL, METERED RESPIRATORY (INHALATION) at 21:15

## 2019-06-11 RX ADMIN — VITAMIN E CAP 400 UNIT 800 UNITS: 400 CAP at 15:10

## 2019-06-11 RX ADMIN — OXYCODONE HYDROCHLORIDE AND ACETAMINOPHEN 500 MG: 500 TABLET ORAL at 15:10

## 2019-06-11 RX ADMIN — HYDROXYZINE HYDROCHLORIDE 25 MG: 25 TABLET, FILM COATED ORAL at 08:42

## 2019-06-11 ASSESSMENT — PATIENT HEALTH QUESTIONNAIRE - PHQ9: SUM OF ALL RESPONSES TO PHQ QUESTIONS 1-9: 6

## 2019-06-11 ASSESSMENT — SLEEP AND FATIGUE QUESTIONNAIRES
AVERAGE NUMBER OF SLEEP HOURS: 8
DO YOU HAVE DIFFICULTY SLEEPING: NO
DO YOU USE A SLEEP AID: NO

## 2019-06-11 ASSESSMENT — LIFESTYLE VARIABLES: HISTORY_ALCOHOL_USE: NO

## 2019-06-11 ASSESSMENT — ENCOUNTER SYMPTOMS
COUGH: 0
ABDOMINAL PAIN: 0

## 2019-06-11 NOTE — GROUP NOTE
Group Therapy Note    Date: June 11    Group Start Time: 1100  Group End Time: 1140  Group Topic: Recreational    [facility] [id number] [facility]        Group Therapy Note    Attendees: 4         Patient's Goal: Patient will demonstrate increased interpersonal interaction   Notes:  Patient attended group and participated fully     Status After Intervention:  Improved    Participation Level:  Active Listener and Interactive    Participation Quality: Appropriate      Speech:  normal      Thought Process/Content: Logical      Affective Functioning: Congruent      Mood: euthymic      Level of consciousness:  Alert and Oriented x4      Response to Learning: Progressing to goal      Endings: None Reported    Modes of Intervention: Socialization and Activity      Discipline Responsible: Psychoeducational Specialist      Signature:  Julianne Stacy

## 2019-06-11 NOTE — BH NOTE
Psychiatric Admission Note         Priti Ervin is a 27 y.o. male who was admitted from the emergency department. He said he lost his cocoon when he was interacting with his mother. He lives in a group home. He was threatening the group home people his mother and wanted to kill himself. He said he was angry about a small thing and he could not control himself. With this he is admitted to [facility]    Patient is living in a group home. It is stab to 15 hours a day. They are now trying to change him to a different group home. He denies any drug and alcohol use. His mother and father are involved in his care. He has good relationship with his siblings. He denies any legal problems. He denies any physical sexual or emotional abuse in him. He denies any family history of mental illness suicide or drug and alcohol abuse. Past Psychiatric History   Patient reports current outpatient psychiatric linkage. . Reported history of psychiatric inpatient hospitalizations. Reported history of suicide attempts. History of Substance Abuse     Denies alcohol use or use of any illicit drugs.     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    Injury of frontal lobe ([address]     Multiple sensory deficit syndrome       Past Surgical History:   Procedure Laterality Date    BLADDER SURGERY      FRACTURE SURGERY  4/6/2012    Lt hip    MOUTH SURGERY      PELVIC FRACTURE SURGERY         Labs  Recent Results (from the past 72 hour(s))   Comprehensive Metabolic Panel w/ Reflex to MG    Collection Time: 06/11/19  7:02 AM   Result Value Ref Range    Glucose 116 (H) 70 - 99 mg/dL    BUN 7 6 - 20 mg/dL    CREATININE 0.86 0.70 - 1.20 mg/dL    Bun/Cre Ratio NOT REPORTED 9 - 20    Calcium 9.3 8.6 - 10.4 mg/dL    Sodium 142 135 - 144 mmol/L    Potassium 4.5 3.7 - 5.3 mmol/L    Chloride 105 98 - 107 mmol/L    CO2 25 20 - 31 mmol/L    Anion Gap 12 9 - 17 mmol/L    Alkaline Phosphatase 94 40 - 129 U/L    ALT 67 (H) 5 - 41 U/L    AST 47 (H) <40 U/L    Total Bilirubin 0.42 0.3 - 1.2 mg/dL    Total Protein 6.7 6.4 - 8.3 g/dL    Alb 3.8 3.5 - 5.2 g/dL    Albumin/Globulin Ratio NOT REPORTED 1.0 - 2.5    GFR Non-African American >60 >60 mL/min    GFR African American >60 >60 mL/min    GFR Comment          GFR Staging NOT REPORTED    Hemoglobin A1c    Collection Time: 06/11/19  7:02 AM   Result Value Ref Range    Hemoglobin A1C 6.4 (H) 4.0 - 6.0 %    Estimated Avg Glucose 137 mg/dL   TSH without Reflex    Collection Time: 06/11/19  7:02 AM   Result Value Ref Range    TSH 3.92 0.30 - 5.00 mIU/L   T4, free    Collection Time: 06/11/19  7:02 AM   Result Value Ref Range    Thyroxine, Free 1.02 0.93 - 1.70 ng/dL   Lipid panel - fasting    Collection Time: 06/11/19  7:02 AM   Result Value Ref Range    Cholesterol 161 <200 mg/dL    HDL 25 (L) >40 mg/dL    LDL Cholesterol 94 0 - 130 mg/dL    Chol/HDL Ratio 6.4 (H) <5    Triglycerides 210 (H) <150 mg/dL    VLDL NOT REPORTED (H) 1 - 30 mg/dL   CBC auto differential    Collection Time: 06/11/19  7:02 AM   Result Value Ref Range    WBC 9.7 3.5 - 11.0 k/uL    RBC 4.49 (L) 4.5 - 5.9 m/uL    Hemoglobin 12.7 (L) 13.5 - 17.5 g/dL    Hematocrit 38.8 (L) 41 - 53 %    MCV 86.6 80 - 100 fL    MCH 28.3 26 - 34 pg    MCHC 32.6 31 - 37 g/dL    RDW 14.0 11.5 - 14.9 %    Platelets 763 932 - 356 k/uL    MPV 8.6 6.0 - 12.0 fL    NRBC Automated NOT REPORTED per 100 WBC    Differential Type NOT REPORTED     Seg Neutrophils 38 36 - 66 %    Lymphocytes 49 (H) 24 - 44 %    Monocytes 10 (H) 1 - 7 %    Eosinophils % 2 0 - 4 %    Basophils 1 0 - 2 %    Immature Granulocytes NOT REPORTED 0 %    Segs Absolute 3.60 1.3 - 9.1 k/uL    Absolute Lymph # 4.90 (H) 1.0 - 4.8 k/uL    Absolute Mono # 1.00 0.1 - 1.3 k/uL    Absolute Eos # 0.20 0.0 - 0.4 k/uL    Basophils # 0.10 0.0 - 0.2 k/uL    Absolute Immature Granulocyte NOT REPORTED 0.00 - 0.30 k/uL    WBC Morphology NOT REPORTED     RBC Morphology NOT REPORTED     Platelet Estimate NOT REPORTED        SOCIAL HISTORY  Social History     Socioeconomic History    Marital status: Single     Spouse name: Not on file    Number of children: Not on file    Years of education: Not on file    Highest education level: Not on file   Occupational History    Not on file   Social Needs    Financial resource strain: Not on file    Food insecurity:     Worry: Not on file     Inability: Not on file    Transportation needs:     Medical: Not on file     Non-medical: Not on file   Tobacco Use    Smoking status: Former Smoker     Packs/day: 0.00     Types: Cigarettes    Smokeless tobacco: Current User     Types: Chew   Substance and Sexual Activity    Alcohol use: No    Drug use: Yes     Types: Marijuana, Cocaine     Comment: Last use 2 weeks before [place], not since being put on probation    Sexual activity: Never   Lifestyle    Physical activity:     Days per week: Not on file     Minutes per session: Not on file    Stress: Not on file   Relationships    Social connections:     Talks on phone: Not on file     Gets together: Not on file     Attends Sikh service: Not on file     Active member of club or organization: Not on file     Attends meetings of clubs or organizations: Not on file     Relationship status: Not on file    Intimate partner violence:     Fear of current or ex partner: Not on file     Emotionally abused: Not on file     Physically abused: Not on file     Forced sexual activity: Not on file   Other Topics Concern    Not on file   Social History Narrative    Not on file         Mental Status  Pt. was alert, fully oriented, and cooperative. Appearance and hygiene wereappropriate, well-groomed . Mood was depressed.  Affect was \"dysthymic and poorly reactive Thought process was linear and well-organized. Patient denied any hallucinations or paranoia. Patient endorsed suicidal ideations. Patient denied homicidal ideations . Patient's gross cognitive functions were intact. Insight and judgement were poor. Both recent and remote memory were intact. Psychomotor status was slowed     Diagnostic Impression  Principal Problem:    Depression with suicidal ideation  Resolved Problems:    * No resolved hospital problems. *      Medications    acetaminophen, hydrOXYzine, traZODone, benztropine mesylate, magnesium hydroxide, aluminum & magnesium hydroxide-simethicone, nicotine polacrilex    Treatment Plan:    1. Admit to inpatient psychiatric treatment  2. Supportive therapy with medication management. Reviewed risks and benefits as well as potential side effects with patient. 3. Therapeutic activities and groups  4. Follow up at [facility] after symptoms stabilized    Estimated length of stay: 5-7 days    Keny Dietrich MD  Psychiatrist.  Dragon voice recognition software used in portions of this document.  Occasionally words are mis-transcribed

## 2019-06-11 NOTE — PLAN OF CARE
RT ASSESSMENT TREATMENT GOALS    ? Pt Goal:  Pt will identify 1-2 positive coping skills by time of discharge. ?Pt Goal:  Pt will identify 1-2 positive aspects of self by time of discharge. ?Pt Goal:  Pt will remain on task/topic for 15-30 minutes during group by time of discharge. ?Pt Goal:  Pt will identify 1-2 aspects of relapse prevention plan by time of discharge. ?Pt Goal:  Pt will join in conversation with peers 1-2 times per group by time of discharge. ?Pt Goal:  Pt will identify 1-2 new leisure interests by time of discharge. ?Pt Goal:  Pt will not voice any delusional content by time of discharge.

## 2019-06-11 NOTE — H&P
HISTORY and [id number]       NAME:  Zach Hamilton  MRN: [id number]   YOB: 1995   Date: 6/11/2019   Age: 27 y.o. Gender: male     COMPLAINT AND PRESENT HISTORY:    Zach Hamilton is a 27 y.o.  male, admitted because of increasing depression with suicidal ideation. Patient was brought into the emergency department by police. It was reported that the patient had taken out his window screen and was sitting out a 2nd story window and stating that he was going to jump, and he asked the police to get their guns and shoot him. He's says that he has been feeling depressed and suicidal for \"9 years\". Patient has a history of autism disorder. Patient admits to having some family issues. Admits to homicidal ideations towards his family. Patient admits to having fair to poor sleep patterns and some changes in his concentration. He denies any current history of alcohol or substance abuse. Patient states that he has been sober for 2 years with marijuana in 4 years with cocaine. Recent states that he has his own place. Patient states that he has been compliant with his medications. Patient denies any somatic complaints. No chest pain or shortness of breath. No fever/chills.       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    SURGICAL HISTORY       Past Surgical History:   Procedure Laterality Date    BLADDER SURGERY      FRACTURE SURGERY  4/6/2012    Lt hip    MOUTH SURGERY      PELVIC FRACTURE SURGERY         FAMILY HISTORY       Family History   Adopted: Yes       SOCIAL HISTORY       Social History     Socioeconomic History    Marital status: Single     Spouse name: None    Number of children: None    Years of education: None    Highest education level: None   Occupational History    None   Social Needs    Financial resource strain: None    Food insecurity:     Worry: None     Inability: None    Transportation needs:     Medical: None     Non-medical: None   Tobacco Use    Smoking status: Former Smoker     Packs/day: 0.00     Types: Cigarettes    Smokeless tobacco: Current User     Types: Chew   Substance and Sexual Activity    Alcohol use: No    Drug use: Yes     Types: Marijuana, Cocaine     Comment: Last use 2 weeks before [place], not since being put on probation    Sexual activity: Never   Lifestyle    Physical activity:     Days per week: None     Minutes per session: None    Stress: None   Relationships    Social connections:     Talks on phone: None     Gets together: None     Attends Mu-ism service: None     Active member of club or organization: None     Attends meetings of clubs or organizations: None     Relationship status: None    Intimate partner violence:     Fear of current or ex partner: None     Emotionally abused: None     Physically abused: None     Forced sexual activity: None   Other Topics Concern    None   Social History Narrative    None           REVIEW OF SYSTEMS      Allergies   Allergen Reactions    Hydromorphone Itching     Other reaction(s): ITCH       No current facility-administered medications on file prior to encounter.       Current Outpatient Medications on File Prior to Encounter Medication Sig Dispense Refill    budesonide (PULMICORT FLEXHALER) 180 MCG/ACT AEPB inhaler Inhale 1 puff into the lungs 2 times daily      vitamin D (CHOLECALCIFEROL) 1000 UNIT TABS tablet Take 1,000 Units by mouth daily      traZODone (DESYREL) 100 MG tablet Take 100 mg by mouth nightly      docusate sodium (COLACE) 100 MG capsule Take 100 mg by mouth 2 times daily      Omega 3-6-9 Fatty Acids (OMEGA 3-6-9 COMPLEX) CAPS Take 1 capsule by mouth daily      vitamin C (ASCORBIC ACID) 500 MG tablet Take 500 mg by mouth daily      Ginkgo Biloba 120 MG TABS Take 120 mg by mouth daily      vitamin E 400 UNIT capsule Take 800 Units by mouth daily      Probiotic CAPS Take 1 capsule by mouth daily      risperiDONE (RISPERDAL) 2 MG tablet Take 2 mg by mouth 2 times daily      busPIRone (BUSPAR) 10 MG tablet Take 1 tablet by mouth 2 times daily 60 tablet 0    omeprazole (PRILOSEC) 20 MG delayed release capsule Take 1 capsule by mouth 2 times daily 30 capsule 0    ranitidine (ZANTAC) 150 MG tablet Take 1 tablet by mouth 2 times daily 60 tablet 0    citalopram (CELEXA) 40 MG tablet Take 1 tablet by mouth daily 30 tablet 0    cetirizine (ZYRTEC) 10 MG tablet Take 10 mg by mouth daily      Mirabegron ER 25 MG TB24 Take 25 mg by mouth daily      ibuprofen (ADVIL;MOTRIN) 200 MG CAPS Take 1 capsule by mouth every 6 hours as needed for Pain                         General health:  Fairly good. No fever or chills. Skin:  No itching, redness or rash. Head, eyes, ears, nose, throat:  No headache, epistaxis, rhinorrhea hearing loss or sore throat. Neck:  No pain, stiffness or masses. Cardiovascular/Respiratory system:  No chest pain, palpitation, shortness of breath, coughing or expectoration. Gastrointestinal tract: No abdominal pain, nausea, vomiting, diarrhea or constipation. Genitourinary:  No burning on micturition.   No hesitancy, urgency, frequency or discoloration of urine. Locomotor:  No bone or joint pains. No swelling or deformities. Neuropsychiatric:  See HPI. GENERAL PHYSICAL EXAM:     Vitals: BP (!) 132/90   Pulse 75   Temp 97.5 °F (36.4 °C) (Oral)   Resp 18   Ht 6' 1\" (1.854 m)   Wt (!) 405 lb (183.7 kg)   SpO2 96%   BMI 53.43 kg/m²  Body mass index is 53.43 kg/m². Pt was examined with a nurse present in the room. GENERAL APPEARANCE:  Pascual Feliz is 27 y.o.,  male, severely obese, nourished, conscious, alert. Does not appear to be distress or pain at this time. SKIN:  Warm, dry, no cyanosis or jaundice. HEAD:  Normocephalic, atraumatic, no swelling or tenderness. EYES:  Pupils equal, reactive to light, Conjunctiva is clear, EOMs intact willis. eyelids WNL. EARS:  No discharge, no marked hearing loss. NOSE:  No rhinorrhea, epistaxis or septal deformity. THROAT:  Not congested. No ulceration bleeding or discharge. NECK:  No stiffness, trachea central.  No palpable masses or L.N.      CHEST:  Symmetrical and equal on expansion. HEART:  Regular rate and rhythm. S1 > S2, No audible murmurs or gallops. LUNGS:  Equal on expansion, normal breath sounds. No adventitious sounds. ABDOMEN:  Obese. Soft on palpation. No localized tenderness. No guarding or rigidity. No palpable organomegaly. LYMPHATICS:  No palpable cervical Lymphadenopathy. LOCOMOTOR, BACK AND SPINE:  No tenderness or deformities. EXTREMITIES:  Symmetrical, no pretibial edema. Nings sign negative. No discoloration or ulcerations. NEUROLOGIC:  The patient is conscious, alert, oriented,Cranial nerve II-XII intact, taste was not examined. No apparent focal sensory or motor deficits. Muscle strength equal Willis. No facial droop, tongue protrudes centrally, no slurring of the speech.             PROVISIONAL DIAGNOSES:      Principal Problem:    Depression with suicidal ideation  Resolved Problems:    * No resolved hospital problems.  *      TEO HIGH, LITO - CNP on 6/11/2019 at 1:45 PM
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
## 2019-06-11 NOTE — GROUP NOTE
Group Therapy Note    Date: June 11    Group Start Time: 1330  Group End Time: 8467  Group Topic: Healthy Living/Wellness    [address], [facility]        Group Therapy Note    Attendees: 7/18         Patient's Goal:  Understand the benefits of exercise and healthy daily activities    Notes: Active in healthy living    Status After Intervention:  Improved    Participation Level:  Active Listener and Interactive    Participation Quality: Appropriate and Attentive      Speech:  normal      Thought Process/Content: Logical      Affective Functioning: Congruent      Mood: euphoric      Level of consciousness:  Alert and Oriented x4      Response to Learning: Able to verbalize current knowledge/experience and Able to verbalize/acknowledge new learning      Endings: None Reported    Modes of Intervention: Education, Socialization, Problem-solving and Activity      Discipline Responsible: Psychoeducational Specialist      Signature:  Xavier Dhillon

## 2019-06-11 NOTE — ED NOTES
Paperwork and pt's belongings provided to [facility] staff. Pt escorted to [facility] via two [facility] staff members. Pt cooperative exiting [facility].

## 2019-06-11 NOTE — ED PROVIDER NOTES
[address] [facility]  eMERGENCY dEPARTMENT eNCOUnter      Pt Name: Radu Celis  MRN: [id number]  [place] 1995  Date of evaluation: 6/10/19      CHIEF COMPLAINT     Suicidal Thoughts    HISTORY OF PRESENT ILLNESS   HPI 27 y.o. male was brought to the Emergency Department by police. Mother called police because the patient had made suicidal thoughts. Police completed a pink slip. Police report that he had taken out his window screen and was sitting out a 2nd story window and stating that he was going to jump, and he asked the police to get their guns and shoot him. He's says that he has been feeling depressed and suicidal for \"9 years\". Pt was seen at an OSH ed on 5/25/19 for suicidal thoughts, but those changed and he was discharged home. REVIEW OF SYSTEMS     Review of Systems   Constitutional: Negative for chills and fever. HENT: Negative for congestion. Eyes: Negative for visual disturbance. Respiratory: Negative for cough. Cardiovascular: Negative for chest pain. Gastrointestinal: Negative for abdominal pain. Genitourinary: Negative for dysuria. Musculoskeletal: Negative for arthralgias. Neurological: Negative for headaches. Hematological: Negative for adenopathy. Psychiatric/Behavioral: Positive for decreased concentration, dysphoric mood and suicidal ideas.          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    Injury of frontal lobe (HCC)     Multiple sensory deficit syndrome        SURGICAL HISTORY       Past Surgical History:   Procedure Laterality Date    BLADDER SURGERY      FRACTURE SURGERY  4/6/2012    Lt hip    MOUTH SURGERY      PELVIC FRACTURE SURGERY         CURRENT MEDICATIONS       Current Discharge Medication List      CONTINUE these medications which have NOT CHANGED    Details   vitamin D (CHOLECALCIFEROL) 1000 UNIT TABS tablet Take 1,000 Units by mouth daily traZODone (DESYREL) 100 MG tablet Take 100 mg by mouth nightly      phentermine (ADIPEX-P) 37.5 MG tablet Take 37.5 mg by mouth every morning (before breakfast). docusate sodium (COLACE) 100 MG capsule Take 100 mg by mouth 2 times daily      Omega 3-6-9 Fatty Acids (OMEGA 3-6-9 COMPLEX) CAPS Take 1 capsule by mouth daily      vitamin C (ASCORBIC ACID) 500 MG tablet Take 500 mg by mouth daily      Ginkgo Biloba 120 MG TABS Take 120 mg by mouth daily      vitamin E 400 UNIT capsule Take 800 Units by mouth daily      Probiotic CAPS Take 1 capsule by mouth daily      risperiDONE (RISPERDAL) 2 MG tablet Take 2 mg by mouth 2 times daily      busPIRone (BUSPAR) 10 MG tablet Take 1 tablet by mouth 2 times daily  Qty: 60 tablet, Refills: 0      omeprazole (PRILOSEC) 20 MG delayed release capsule Take 1 capsule by mouth 2 times daily  Qty: 30 capsule, Refills: 0      ranitidine (ZANTAC) 150 MG tablet Take 1 tablet by mouth 2 times daily  Qty: 60 tablet, Refills: 0      citalopram (CELEXA) 40 MG tablet Take 1 tablet by mouth daily  Qty: 30 tablet, Refills: 0      cetirizine (ZYRTEC) 10 MG tablet Take 10 mg by mouth daily      Mirabegron ER 25 MG TB24 Take 25 mg by mouth daily      ibuprofen (ADVIL;MOTRIN) 200 MG CAPS Take 1 capsule by mouth every 6 hours as needed for Pain              ALLERGIES     is allergic to hydromorphone. FAMILY HISTORY     is adopted. SOCIAL HISTORY      reports that he has quit smoking. His smoking use included cigarettes. He smoked 0.00 packs per day. His smokeless tobacco use includes chew. He reports that he has current or past drug history. Drugs: Marijuana and Cocaine. He reports that he does not drink alcohol.     PHYSICAL EXAM     INITIAL VITALS: BP (!) 147/86   Pulse 75   Temp 98.6 °F (37 °C) (Oral)   Resp 16   Ht 6' 1\" (1.854 m)   Wt (!) 405 lb (183.7 kg)   SpO2 96%   BMI 53.43 kg/m²   Gen:NAD  Head: Normocephalic, atraumatic  Eye: Pupils equal round reactive to light, no conjunctivitis  Heart: Regular rate and rhythm no murmurs  Lungs: Clear to auscultation bilaterally, no respiratory distress  Chest wall: No crepitus, no tenderness palpation  Abdomen: Soft, nontender, nondistended, with no peritoneal signs  Neurologic: Patient is alert and oriented x3, motor and sensation is intact in all 4 extremities, cerebellar function is normal  Extremities: Full range of motion, no cyanosis, no edema, no signs of trauma, no tenderness to palpation    MEDICAL DECISION MAKING:   MDM    27 y.o. male with depression, suicidal thoughts,  He does not appear intoxicated. He is showing no signs of any acute active toxidrome. He denies any overdose attempt. He denies any medical complaints at this time. We'll screen for any acetaminophen or salicylate ingestion, we'll check baseline renal function, liver function, a drug screen, cbc and reassess. Hospital Course:   8:48 PM  Pt is refusing any laboratory studies. Reports he is afraid of needles. CBC, Lipid panel, T4, TSH, CMP from 2/14/19 reviewed and were unremarkable at that time. Pt is medically clear for psychiatric evaluation. DIAGNOSTIC RESULTS       LABS: All lab results were reviewed by myself, and all abnormals are listed below.   Labs Reviewed   URINE DRUG SCREEN   COMPREHENSIVE METABOLIC PANEL W/ REFLEX TO MG FOR LOW K   HEMOGLOBIN A1C   TSH WITHOUT REFLEX   T4, FREE   LIPID PANEL   CBC WITH AUTO DIFFERENTIAL       EMERGENCY DEPARTMENT COURSE:   Vitals:    Vitals:    06/10/19 2027 06/10/19 2311 06/10/19 2314   BP: (!) 152/83 (!) 147/86    Pulse: 88 75    Resp: 16 16    Temp: 98.9 °F (37.2 °C) 98.6 °F (37 °C)    TempSrc: Oral Oral    SpO2: 94%  96%   Weight: (!) 400 lb (181.4 kg) (!) 405 lb (183.7 kg)    Height:  6' 1\" (1.854 m)        The patient was given the following medications while in the emergency department:  Orders Placed This Encounter   Medications    acetaminophen (TYLENOL) tablet 650 mg   

## 2019-06-11 NOTE — PROGRESS NOTES
Medication History completed:    New medications: docusate, ginko biloba, omega complex, phentermine, probiotic, risperidone, vitamin C, vitamin E    Medications discontinued: Latuda    Changes to dosing:   Vitamin D changed to 1,000 units daily  Trazodone changed to 100 mg nightly    Stated allergies: As listed    Other pertinent information: Medications confirmed with list provided by home health nurse.      Thank you,  Mary Chamorro, PharmD  [phone]

## 2019-06-11 NOTE — PLAN OF CARE
[facility]  Initial Interdisciplinary Treatment Plan NO      Original treatment plan Date & Time:6/11/19    Admission Type:  Admission Type:  Involuntary(Pt signed in on admission)    Reason for admission:   Reason for Admission: Pt is suicidal and having increased depression    Estimated Length of Stay:  5-7days  Estimated Discharge Date: to be determined by physician    PATIENT STRENGTHS:  Patient Strengths:Strengths: No significant Physical Illness, Medication Compliance  Patient Strengths and Limitations:Limitations: Difficult relationships / poor social skills, General negative or hopeless attitude about future/recovery, Tendency to isolate self  Addictive Behavior: Addictive Behavior  In the past 3 months, have you felt or has someone told you that you have a problem with:  : None  Do you have a history of Chemical Use?: No  Do you have a history of Alcohol Use?: No  Do you have a history of Street Drug Abuse?: No  Histroy of Prescripton Drug Abuse?: No  Medical Problems:  Past Medical History:   Diagnosis Date    Autistic disorder     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    Injury of frontal lobe (HCC)     Multiple sensory deficit syndrome      Status EXAM:Status and Exam  Normal: Yes  Facial Expression: Brightened  Affect: Blunt  Level of Consciousness: Alert  Mood:Normal: No  Mood: Depressed  Motor Activity:Normal: Yes  Interview Behavior: Cooperative, Evasive  Preception: [place] to Person, [place] to Time, [place] to Place, [place] to Situation  Attention:Normal: No  Attention: Distractible  Thought Content:Normal: No  Thought Content: Preoccupations, Poverty of Content  Hallucinations: None  Delusions: No  Memory:Normal: Yes  Insight and Judgment: No  Insight and Judgment: Poor Judgment, Poor Insight  Present Suicidal Ideation: No(pt denies)  Present Homicidal Ideation: No(homicidal toward family members, denies current plan)    EDUCATION:   Learner Progress Toward Treatment Goals: reviewed group plans and strategies for care    Method:group therapy, medication compliance, individualized assessments and care planning    Outcome: needs reinforcement    PATIENT GOALS: to be discussed with patient within 72 hours    PLAN/TREATMENT RECOMMENDATIONS:     continue group therapy , medications compliance, goal setting, individualized assessments and care, continue to monitor pt on unit      SHORT-TERM GOALS:   Time frame for Short-Term Goals: 5-7 days    LONG-TERM GOALS:  Time frame for Long-Term Goals: 6 months  Members Present in Team Meeting: See Signature Sheet    [place], [place]

## 2019-06-11 NOTE — ED NOTES
Pt refusing blood work at this time. Dr Moncho Dinh notified. Per Dr Moncho Dinh, pt medically cleared at this time.       Edyta Bell RN  06/10/19 2045

## 2019-06-11 NOTE — GROUP NOTE
Group Therapy Note    Date: June 11    Group Start Time: 0900  Group End Time: 0945  Group Topic: Community Meeting    [facility] [address] , [place]        Group Therapy Note    Attendees: 8         Patient's Goal:  Patient will demonstrate increased interpersonal interaction     Notes:  Patient attended group and participated appropriately     Status After Intervention:  Improved    Participation Level:  Active Listener and Interactive    Participation Quality: Appropriate, Attentive and Sharing      Speech:  normal      Thought Process/Content: Logical      Affective Functioning: Congruent      Mood: euthymic      Level of consciousness:  Alert and Oriented x4      Response to Learning: Progressing to goal      Endings: None Reported    Modes of Intervention: Education, Support and Socialization      Discipline Responsible: Psychoeducational Specialist      Signature:  Jennifer Monique

## 2019-06-11 NOTE — CARE COORDINATION
BHI Biopsychosocial Assessment    Current Level of Psychosocial Functioning     Independent x  Dependent    Minimal Assist     Comments:    Psychosocial High Risk Factors (check all that apply)    Unable to obtain meds   Chronic illness/pain    Substance abuse   Lack of Family Support   Financial stress   Isolation   Inadequate Community Resources  Suicide attempt(s)  Not taking medications   Victim of crime   Developmental Delay x  Unable to manage personal needs    Age 72 or older   Homeless  No transportation   Readmission within 30 days  Unemployment  Traumatic Event    Comments:   Psychiatric Advanced Directives: pt denies     Family to Involve in Treatment: pt reports his mother is supportive; signed FATOUMATA for mother, Dennis ANGEL, and [facility].. Sexual Orientation:  N/A    Patient Strengths: pt has stable housing, support staff, stable income; community supports     Patient Barriers: pt has history of repeated psychiatric hospitalizations and history of violent behavior       Opiate Education Provided:  Pt denies history of opiate abuse. CMHC/mental health history: Pt states he has in-home services for mental health resources     Plan of Care   medication management, group/individual therapies, family meetings, psycho -education, treatment team meetings to assist with stabilization    Initial Discharge Plan:  Pt states he may lose in-home support staff due to negative interactions with staff prior to his admit. SW left voice mail with Dennis ANGEL to inquire as to patient supports. Clinical Summary:  Ladonna Zavala is a 27year old who has been admitted to [facility] with report of increase in depression and anxiety; pt chart states patient was making suicidal gestures prior to admission, including sitting in an open 2nd floor window and threatening to jump. Patient is observed this date as cooperative during assessment, though labile throughout.  Pt needs redirection to stay on topic,

## 2019-06-11 NOTE — BH NOTE
`[facility]  Admission Note     Admission Type:   Admission Type: Voluntary    Reason for admission:  Reason for Admission: Pt is suicidal and having increased depression    PATIENT STRENGTHS:  Strengths: No significant Physical Illness, Medication Compliance    Patient Strengths and Limitations:  Limitations: Difficult relationships / poor social skills, General negative or hopeless attitude about future/recovery, Tendency to isolate self    Addictive Behavior:   Addictive Behavior  In the past 3 months, have you felt or has someone told you that you have a problem with:  : None  Do you have a history of Chemical Use?: No  Do you have a history of Alcohol Use?: No  Do you have a history of Street Drug Abuse?: No  Histroy of Prescripton Drug Abuse?: No    Medical Problems:   Past Medical History:   Diagnosis Date    Autistic disorder     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    Injury of frontal lobe (HCC)     Multiple sensory deficit syndrome        Status EXAM:  Status and Exam  Normal: Yes  Facial Expression: Brightened  Affect: Blunt  Level of Consciousness: Alert  Mood:Normal: No  Mood: Depressed  Motor Activity:Normal: Yes  Interview Behavior: Cooperative, Evasive  Preception: [place] to Person, [place] to Time, [place] to Place, [place] to Situation  Attention:Normal: No  Attention: Distractible  Thought Content:Normal: No  Thought Content: Preoccupations, Poverty of Content  Hallucinations: None  Delusions: No  Memory:Normal: Yes  Insight and Judgment: No  Insight and Judgment: Poor Judgment, Poor Insight  Present Suicidal Ideation: No(pt denies)  Present Homicidal Ideation: No(homicidal toward family members, denies current plan)    Pt admitted with followings belongings:  Dentures: None  Vision - Corrective Lenses: None  Hearing Aid: None  Jewelry: None  Body Piercings Removed: N/A  Clothing: Socks, Footwear, Pants, Shirt, Undergarments (Comment)  Were All Patient Medications Collected?: Not Applicable  Other Valuables: Cell phone, [employer] placed in safe in security envelope, number: [phone]. Patient's home medications were verified. Patient oriented to surroundings and program expectations and copy of patient rights given. Received admission packet. Consents reviewed, signed. Patient verbalized understanding. Suicidal to jump off second story ledge after argument with adoptive mother, asked police to shoot him, threatened to tie up family and kill them one by one to watch them suffer. Pt stated on admission that he had argument with family over finances. Pt reports he has been taking his meds, home nurse gives doses. Lives alone. Last here April 2019. Autistic, history of violence toward police, on probation.                  Jamaica Feliz RN

## 2019-06-12 PROCEDURE — 6370000000 HC RX 637 (ALT 250 FOR IP): Performed by: NURSE PRACTITIONER

## 2019-06-12 PROCEDURE — 99232 SBSQ HOSP IP/OBS MODERATE 35: CPT | Performed by: PSYCHIATRY & NEUROLOGY

## 2019-06-12 PROCEDURE — 1240000000 HC EMOTIONAL WELLNESS R&B

## 2019-06-12 RX ADMIN — BUSPIRONE HYDROCHLORIDE 10 MG: 5 TABLET ORAL at 17:48

## 2019-06-12 RX ADMIN — Medication 1 CAPSULE: at 09:24

## 2019-06-12 RX ADMIN — FAMOTIDINE 20 MG: 20 TABLET ORAL at 09:24

## 2019-06-12 RX ADMIN — RISPERIDONE 2 MG: 2 TABLET ORAL at 21:17

## 2019-06-12 RX ADMIN — OMEGA-3-ACID ETHYL ESTERS 2 G: 1 CAPSULE, LIQUID FILLED ORAL at 09:24

## 2019-06-12 RX ADMIN — VITAMIN D, TAB 1000IU (100/BT) 1000 UNITS: 25 TAB at 09:25

## 2019-06-12 RX ADMIN — TRAZODONE HYDROCHLORIDE 50 MG: 50 TABLET ORAL at 21:16

## 2019-06-12 RX ADMIN — RISPERIDONE 2 MG: 2 TABLET ORAL at 09:24

## 2019-06-12 RX ADMIN — VITAMIN E CAP 400 UNIT 800 UNITS: 400 CAP at 09:24

## 2019-06-12 RX ADMIN — FAMOTIDINE 20 MG: 20 TABLET ORAL at 21:17

## 2019-06-12 RX ADMIN — OXYCODONE HYDROCHLORIDE AND ACETAMINOPHEN 500 MG: 500 TABLET ORAL at 09:24

## 2019-06-12 RX ADMIN — BUSPIRONE HYDROCHLORIDE 10 MG: 5 TABLET ORAL at 09:24

## 2019-06-12 RX ADMIN — HYDROXYZINE HYDROCHLORIDE 25 MG: 25 TABLET, FILM COATED ORAL at 21:17

## 2019-06-12 RX ADMIN — FLUTICASONE PROPIONATE 2 PUFF: 110 AEROSOL, METERED RESPIRATORY (INHALATION) at 21:15

## 2019-06-12 RX ADMIN — FLUTICASONE PROPIONATE 2 PUFF: 110 AEROSOL, METERED RESPIRATORY (INHALATION) at 09:23

## 2019-06-12 RX ADMIN — CETIRIZINE HYDROCHLORIDE 10 MG: 10 TABLET, FILM COATED ORAL at 09:24

## 2019-06-12 RX ADMIN — DOCUSATE SODIUM 100 MG: 100 CAPSULE, LIQUID FILLED ORAL at 21:17

## 2019-06-12 NOTE — GROUP NOTE
Group Therapy Note    Date: June 12    Group Start Time: 0900  Group End Time: 0920  Group Topic: Community Meeting    NATALIE AKINS    SHIRIN Lizarraga    Group Therapy Note    Attendees: 11    Patient's Goal:  Pt will demonstrate improved interpersonal skills and identify daily goal    Notes:  Pt attended group but left group early and did not participate. Status After Intervention:  Unchanged    Participation Level:  Active Listener and Minimal    Participation Quality: Appropriate and Resistant      Speech:  normal      Thought Process/Content: Logical      Affective Functioning: Constricted/Restricted      Mood: euthymic      Level of consciousness:  Alert and Inattentive      Response to Learning: Able to verbalize current knowledge/experience and Resistant      Endings: None Reported    Modes of Intervention: Education, Support, Socialization, Exploration, Activity and Limit-setting      Discipline Responsible: Psychoeducational Specialist      Signature:  SHIRIN Lizarraga

## 2019-06-12 NOTE — PROGRESS NOTES
Pharmacy Med Education Group Note    Date: 6/12/19  Start Time: 6151  End Time: 7630    Number Participants in Group:  9    Goal:  Patient will demonstrate an understanding of the medications intended purpose and possible adverse effects  Topic: [place] for Pharmacy Med Ed Group    Discipline Responsible:     OT  AT  [facility].  RT     X Other       Participation Level:     None  Minimal      X Active Listener    X Interactive    Monopolizing         Participation Quality:    X Appropriate  Inappropriate     X       Attentive        Intrusive          Sharing        Resistant          Supportive        Lethargic       Affective:     X Congruent  Incongruent  Blunted  Flat    Constricted  Anxious  Elated  Angry    Labile  Depressed  Other         Cognitive:    X Alert  Oriented PPTP     Concentration   X G  F  P   Attention Span   X G  F  P   Short-Term Memory   X G  F  P   Long-Term Memory  G  F  P   ProblemSolving/  Decision Making  G  F  P   Ability to Process  Information   X G  F  P      Contributing Factors             Delusional             Hallucinating             Flight of Ideas             Other:       Modes of Intervention:    X Education   X Support  Exploration    Clarifying  Problem Solving  Confrontation    Socialization  Limit Setting  Reality Testing    Activity  Movement  Media    Other:            Response to Learning:    X Able to verbalize current knowledge/experience    Able to verbalize/acknowledge new learning    Able to retain information    Capable of insight    Able to change behavior    Progressing to goal    Other:        Comments:     Marianela Quiñones,PharmD, 6/12/2019, 4:34 PM

## 2019-06-12 NOTE — PLAN OF CARE
Pt denies all except anxiety and depression calm controlled cooperative with treatment cooperative during 1:1, reports normal sleep and appetite, out in the dayroom selectively social attending groups. Spontaneous safety checks to be maintained by staff.

## 2019-06-12 NOTE — PLAN OF CARE
Problem: Anger Management/Homicidal Ideation:  Goal: Ability to verbalize frustrations and anger appropriately will improve  Description  Ability to verbalize frustrations and anger appropriately will improve  6/11/2019 2209 by Noy Madrigal RN  Note:   PT was accepting of 1:1 meet with RN. PT was accepting of PM program.  PT has been flat, isolative most of PM shift. When asked, pt denied current SI, HI, voices/visuals, admitted to anx/dep however stated he it is getting better. PT was compliant with PM medications. Pt Agreed to seek out health care staff if stressors were to become to be to much. Safety checks have been maintained every 15 minutes and at irregular intervals throughout the shift. Problem: Depressive Behavior With or Without Suicide Precautions:  Goal: Able to verbalize and/or display a decrease in depressive symptoms  Description  Able to verbalize and/or display a decrease in depressive symptoms  Note:   PT was accepting of 1:1 meet with RN. PT was accepting of PM program.  PT has been flat, isolative most of PM shift. When asked, pt denied current SI, HI, voices/visuals, admitted to anx/dep however stated he it is getting better. PT denied further 1:1 and agreed to come talk to staff if needed. PT was compliant with PM medications. Pt Agreed to seek out health care staff if stressors were to become to be to much. Safety checks have been maintained every 15 minutes and at irregular intervals throughout the shift. Problem: Depressive Behavior With or Without Suicide Precautions:  Goal: Absence of self-harm  Description  Absence of self-harm  Note:   PT was accepting of 1:1 meet with RN. PT remains free from any self harm, falls, or any other type of injury as of this time. PT is wearing non-skid stockings at the time of assessment. PT verbalizes understanding of individual fall risks and ways to prevent them. PT agreed to use the call light if needed.   PT agreed to seek out health care staff if stressors or other issues were to become to be too much. Safety checks have been maintained every 15 minutes and at irregular intervals throughout this shift.

## 2019-06-12 NOTE — GROUP NOTE
Group Therapy Note    Date: June 12    Group Start Time: 1000  Group End Time: 5200  Group Topic: Cognitive Skills    SHIRIN Boyd        Group Therapy Note    Attendees: 11         Patient's Goal:  Patient will demonstrate increased interpersonal interaction     Notes:  Patient attended group and participated fully     Status After Intervention:  Improved    Participation Level:  Active Listener and Interactive    Participation Quality: Appropriate, Attentive and Sharing      Speech:  normal      Thought Process/Content: Logical      Affective Functioning: Congruent      Mood: euthymic      Level of consciousness:  Alert and Oriented x4      Response to Learning: Progressing to goal      Endings: None Reported    Modes of Intervention: Socialization, Problem-solving and Activity      Discipline Responsible: Psychoeducational Specialist      Signature:  Aleyda Tanner

## 2019-06-12 NOTE — BH NOTE
famotidine (PEPCID) tablet 20 mg  20 mg Oral BID Rebecca Montoya, APRN - CNP   20 mg at 06/12/19 8241    lactobacillus (CULTURELLE) capsule 1 capsule  1 capsule Oral Daily Rebecca Montoya, APRN - CNP   1 capsule at 06/12/19 8117    docusate sodium (COLACE) capsule 100 mg  100 mg Oral BID Rebecca Montoya, APRN - CNP   100 mg at 06/11/19 2115    cetirizine (ZYRTEC) tablet 10 mg  10 mg Oral Daily Rebecca Montoya, APRN - CNP   10 mg at 06/12/19 3815    busPIRone (BUSPAR) tablet 10 mg  10 mg Oral BID Rebecca Montoya, APRN - CNP   10 mg at 06/12/19 0924    fluticasone (FLOVENT HFA) 110 MCG/ACT inhaler 2 puff  2 puff Inhalation BID Rebecca Montoya, APRN - CNP   2 puff at 06/12/19 0923    Mirabegron ER TB24 25 mg  25 mg Oral Daily Rebecca Montoya, APRN - CNP        vitamin E capsule 800 Units  800 Units Oral Daily Rebecca Montoya, APRN - CNP   800 Units at 06/12/19 6168    omega-3 acid ethyl esters (LOVAZA) capsule 2 g  2 g Oral Daily Rebecca Montoya, APRN - CNP   2 g at 06/12/19 1632    acetaminophen (TYLENOL) tablet 650 mg  650 mg Oral Q4H PRN Willaim Marvin, APRN - CNP        hydrOXYzine (ATARAX) tablet 25 mg  25 mg Oral TID PRN Willaim Marvin, APRN - CNP   25 mg at 06/11/19 0842    traZODone (DESYREL) tablet 50 mg  50 mg Oral Nightly PRN Willaim Marvin, APRN - CNP   50 mg at 06/11/19 2115    benztropine mesylate (COGENTIN) injection 2 mg  2 mg Intramuscular BID PRN Willaim Marvin, APRN - CNP        magnesium hydroxide (MILK OF MAGNESIA) 400 MG/5ML suspension 30 mL  30 mL Oral Daily PRN Willaim Marvin, APRN - CNP        aluminum & magnesium hydroxide-simethicone (MAALOX) 200-200-20 MG/5ML suspension 30 mL  30 mL Oral Q6H PRN Willaim Marvin, APRN - CNP        nicotine polacrilex (NICORETTE) gum 2 mg  2 mg Oral Q2H PRN LITO English - PREMA             risperiDONE  2 mg Oral BID    vitamin C  500 mg Oral Daily    vitamin D  1,000 Units Oral Daily    famotidine  20 mg Oral BID    lactobacillus  1 capsule Oral Daily    docusate sodium  100 mg Oral BID    cetirizine  10 mg Oral Daily    busPIRone  10 mg Oral BID    fluticasone  2 puff Inhalation BID    Mirabegron ER  25 mg Oral Daily    vitamin E  800 Units Oral Daily    omega-3 acid ethyl esters  2 g Oral Daily       ASSESSMENT  Depression with suicidal ideation     Patient's Response to Treatment: positive    PLAN  Dragon voice recognition software used in portions of this document. To continue current management.

## 2019-06-12 NOTE — PLAN OF CARE
[address] [place] [address]  Day 3 Interdisciplinary Treatment Plan NOTE    Review Date & Time: 6/12/2019   1322    Patient was in treatment team    Admission Type:   Admission Type: Involuntary(Pt signed in on admission)    Reason for admission:  Reason for Admission: Pt is suicidal and having increased depression  Estimated Length of Stay Update:  5-7 days   Estimated Discharge Date Update: to be determined by physician     PATIENT STRENGTHS:  Patient Strengths Strengths: No significant Physical Illness, Communication  Patient Strengths and Limitations:Limitations: Unrealistic self-view, Lacks leisure interests  Addictive Behavior:Addictive Behavior  In the past 3 months, have you felt or has someone told you that you have a problem with:  : None  Do you have a history of Chemical Use?: No  Do you have a history of Alcohol Use?: No  Do you have a history of Street Drug Abuse?: No  Histroy of Prescripton Drug Abuse?: No  Medical Problems:  Past Medical History:   Diagnosis Date    Autistic disorder     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  Changes to plan of Care 0    Status EXAM:   Status and Exam  Normal: No  Facial Expression: Flat  Affect: Appropriate  Level of Consciousness: Alert  Mood:Normal: No  Mood: Depressed  Motor Activity:Normal: No  Motor Activity: Decreased  Interview Behavior: Cooperative, Evasive  Preception: [place] to Person, [place] to Time, [place] to Place, [place] to Situation  Attention:Normal: No  Attention: Distractible  Thought Processes: Circumstantial  Thought Content:Normal: No  Thought Content: Preoccupations, Poverty of Content  Hallucinations: None  Delusions: No  Memory:Normal: Yes  Insight and Judgment: No  Insight and Judgment: Poor Judgment, Poor Insight, Unmotivated  Present Suicidal Ideation: No(denied

## 2019-06-12 NOTE — GROUP NOTE
Group Therapy Note    Date: June 12    Group Start Time: 1100  Group End Time: 0474  Group Topic: Psychotherapy    NATALIE BHI MABLE Izaguirre LPC    Group Therapy Note    Attendees: 11/20Patient's Goal:  Improve relationships and increase insight and coping skills    Notes:  participated in group    Status After Intervention:  Improved    Participation Level:  Active Listener and Interactive    Participation Quality: Appropriate, Attentive, Sharing and Supportive    Speech:  normal    Thought Process/Content: Logical /Linear    Affective Functioning: Congruent    Mood: anxious    Level of consciousness:  Alert, Oriented x4 and Attentive    Response to Learning: Able to verbalize current knowledge/experience, Able to verbalize/acknowledge new learning, Able to retain information, Capable of insight, Able to change behavior and Progressing to goal    Endings: None Reported    Modes of Intervention: Support, Exploration and Problem-solving    Discipline Responsible: /Counselor      Signature:  Jaymie Moraes LPC

## 2019-06-13 PROCEDURE — 1240000000 HC EMOTIONAL WELLNESS R&B

## 2019-06-13 PROCEDURE — 6370000000 HC RX 637 (ALT 250 FOR IP): Performed by: NURSE PRACTITIONER

## 2019-06-13 PROCEDURE — 99232 SBSQ HOSP IP/OBS MODERATE 35: CPT | Performed by: NURSE PRACTITIONER

## 2019-06-13 RX ADMIN — BUSPIRONE HYDROCHLORIDE 10 MG: 5 TABLET ORAL at 08:30

## 2019-06-13 RX ADMIN — OMEGA-3-ACID ETHYL ESTERS 2 G: 1 CAPSULE, LIQUID FILLED ORAL at 08:34

## 2019-06-13 RX ADMIN — VITAMIN D, TAB 1000IU (100/BT) 1000 UNITS: 25 TAB at 08:40

## 2019-06-13 RX ADMIN — RISPERIDONE 2 MG: 2 TABLET ORAL at 08:30

## 2019-06-13 RX ADMIN — HYDROXYZINE HYDROCHLORIDE 25 MG: 25 TABLET, FILM COATED ORAL at 21:23

## 2019-06-13 RX ADMIN — HYDROXYZINE HYDROCHLORIDE 25 MG: 25 TABLET, FILM COATED ORAL at 08:32

## 2019-06-13 RX ADMIN — OXYCODONE HYDROCHLORIDE AND ACETAMINOPHEN 500 MG: 500 TABLET ORAL at 08:31

## 2019-06-13 RX ADMIN — FLUTICASONE PROPIONATE 2 PUFF: 110 AEROSOL, METERED RESPIRATORY (INHALATION) at 08:37

## 2019-06-13 RX ADMIN — BUSPIRONE HYDROCHLORIDE 10 MG: 5 TABLET ORAL at 17:50

## 2019-06-13 RX ADMIN — Medication 1 CAPSULE: at 08:34

## 2019-06-13 RX ADMIN — FAMOTIDINE 20 MG: 20 TABLET ORAL at 08:31

## 2019-06-13 RX ADMIN — DOCUSATE SODIUM 100 MG: 100 CAPSULE, LIQUID FILLED ORAL at 21:23

## 2019-06-13 RX ADMIN — RISPERIDONE 2 MG: 2 TABLET ORAL at 21:24

## 2019-06-13 RX ADMIN — CETIRIZINE HYDROCHLORIDE 10 MG: 10 TABLET, FILM COATED ORAL at 08:31

## 2019-06-13 RX ADMIN — DOCUSATE SODIUM 100 MG: 100 CAPSULE, LIQUID FILLED ORAL at 08:31

## 2019-06-13 RX ADMIN — TRAZODONE HYDROCHLORIDE 50 MG: 50 TABLET ORAL at 21:23

## 2019-06-13 RX ADMIN — ACETAMINOPHEN 650 MG: 325 TABLET, FILM COATED ORAL at 14:03

## 2019-06-13 RX ADMIN — VITAMIN E CAP 400 UNIT 800 UNITS: 400 CAP at 10:16

## 2019-06-13 RX ADMIN — FLUTICASONE PROPIONATE 2 PUFF: 110 AEROSOL, METERED RESPIRATORY (INHALATION) at 21:24

## 2019-06-13 RX ADMIN — FAMOTIDINE 20 MG: 20 TABLET ORAL at 21:23

## 2019-06-13 ASSESSMENT — PAIN SCALES - GENERAL
PAINLEVEL_OUTOF10: 7
PAINLEVEL_OUTOF10: 4

## 2019-06-13 NOTE — PROGRESS NOTES
Department of Psychiatry  Attending Progress Note    Chief Complaint: Depression with suicidal ideation     SUBJECTIVE:  Patient is seen today in the day area. He is cooperative and pleasant. He states that he he is feeling better. He denies any suicidal or homicidal thoughts. He has been attending select groups. He showered today. He remains impulsive and childlike at times. Talked about ways to control his temper and his behaviors. Explored his feelings and concerns. Reassurance and support provided. Charting and medications reviewed. There is no identifiable safe alternative other than continued hospitalization.        OBJECTIVE    Physical  /68   Pulse 63   Temp 97.5 °F (36.4 °C) (Oral)   Resp 16   Ht 6' 1\" (1.854 m)   Wt (!) 405 lb (183.7 kg)   SpO2 96%   BMI 53.43 kg/m²      Mental Status Evaluation:  Orientation: alertness: alert   Mood:. anxious and depressed      Affect:  blunted      Appearance:  casually dressed and overweight   Activity:  Restless & fidgety   Gait/Posture: Normal   Speech:  loud and pressured   Thought Process:  circumstantial   Thought Content:  logical   Sensorium:  person, place and time/date   Cognition:  grossly intact   Memory: intact   Insight:  fair   Judgment: fair   Suicidal Ideations: denies suicidal ideation   Homicidal Ideations: Negative for homicidal ideation      Medication Side Effects: absent       Attention Span attention span appeared shorter than expected for age     Medications  Current Facility-Administered Medications   Medication Dose Route Frequency Provider Last Rate Last Dose    risperiDONE (RISPERDAL) tablet 2 mg  2 mg Oral BID LITO Salcido - CNP   2 mg at 06/13/19 0830    vitamin C (ASCORBIC ACID) tablet 500 mg  500 mg Oral Daily Leonid Graves APRN - CNP   500 mg at 06/13/19 0831    vitamin D (CHOLECALCIFEROL) tablet 1,000 Units  1,000 Units Oral Daily Leonid Graves, APRN - CNP   1,000 Units at 06/13/19 0840    famotidine (PEPCID) tablet 20 mg sodium  100 mg Oral BID    cetirizine  10 mg Oral Daily    busPIRone  10 mg Oral BID    fluticasone  2 puff Inhalation BID    Mirabegron ER  25 mg Oral Daily    vitamin E  800 Units Oral Daily    omega-3 acid ethyl esters  2 g Oral Daily       ASSESSMENT  Depression with suicidal ideation     Patient's Response to Treatment: positive    PLAN    · Supportive therapy with medication management. Reviewed risks and benefits as well as potential side effects with patient. · Therapeutic activities and groups  · Follow up at [facility] mental health [place] after symptoms stabilized  · Discharge planning with social work.        Electronically signed by LITO Gonzalez CNP on 6/13/2019 at 3:13 PM.

## 2019-06-13 NOTE — BH NOTE
patient refused to attend Wellness  group at 1600 after encouragement from staff.   1:1 talk time provided as alternative to group session

## 2019-06-13 NOTE — GROUP NOTE
Group Therapy Note    Date: June 13    Group Start Time: 1100  Group End Time: 1140  Group Topic: Psychoeducation    [facility]    Patient refused to attend Psychoeducational / Interpersonal Skills group at 1100 after encouragement from staff. 1:1 talk time offered as alternative to group session but pt declined.        Signature:  Alexandra Morton [place]

## 2019-06-13 NOTE — GROUP NOTE
Group Therapy Note    Date: June 13    Group Start Time: 1330  Group End Time: 9766  Group Topic: Recreational    [facility] LATASHA Taylor, PATRICES        Group Therapy Note    Attendees: 7/18    patient refused to attend stress management group at [phone] after encouragement from staff. 1:1 talk time provided as alternative to group session.

## 2019-06-13 NOTE — PLAN OF CARE
Problem: Depressive Behavior With or Without Suicide Precautions:  Goal: Able to verbalize and/or display a decrease in depressive symptoms  Description  Able to verbalize and/or display a decrease in depressive symptoms  6/12/2019 2222 by David Barragan LPN  Outcome: Ongoing  Patient isolative to room. Patient has flat affect and verbalizes little insight. Patient is medication compliant and verbalizes no concerns with his medications. Patient discusses no plans for discharge at this time. Will continue to monitor. Problem: Depressive Behavior With or Without Suicide Precautions:  Goal: Absence of self-harm  Description  Absence of self-harm  6/12/2019 2222 by David Barragan LPN  Outcome: Ongoing  Patient remains free from self-harm. Patient currently denies any suicidal or homicidal thoughts, as well as any auditory or visual hallucinations. Patient agrees to seek staff if thoughts arise. 15 minute checks maintained for patient safety. Will continue to monitor and provide support and reassurance as needed.

## 2019-06-13 NOTE — GROUP NOTE
Group Therapy Note    Date: June 13    Group Start Time: 1430  Group End Time: 1520  Group Topic: Psychoeducation    NATALIE LATASHA AKINS    SHIRIN Yip    Group Therapy Note    Attendees: 8         Patient's Goal:  To increase interpersonal skills. Notes:  Pt attended group and participated appropriately. Status After Intervention:  Improved    Participation Level:  Active Listener and Interactive    Participation Quality: Appropriate, Attentive and Sharing      Speech:  normal      Thought Process/Content: Logical      Affective Functioning: Congruent      Mood: euthymic      Level of consciousness:  Alert, Oriented x4 and Attentive      Response to Learning: Able to verbalize current knowledge/experience, Able to verbalize/acknowledge new learning, Able to retain information, Capable of insight and Progressing to goal      Endings: None Reported    Modes of Intervention: Education, Support, Socialization, Exploration, Clarifying, Confrontation and Reality-testing      Discipline Responsible: Psychoeducational Specialist      Signature:  SHIRIN Moreno

## 2019-06-13 NOTE — GROUP NOTE
Group Therapy Note    Date: June 13    Group Start Time: 1600  Group End Time: 1630  Group Topic: Healthy Living/Wellness    [facility] LATASHA Crawley LPN        Group Therapy Note    Attendees: 9         Patient's Goal:  To improve his mood.     Notes:      Status After Intervention:  Improved    Participation Level: Interactive    Participation Quality: Sharing      Speech:  normal      Thought Process/Content: Logical      Affective Functioning: Flat      Mood: elevated      Level of consciousness:  Alert      Response to Learning: Able to retain information      Endings: None Reported    Modes of Intervention: Socialization      Discipline Responsible: Licensed Practical Nurse      Signature:  Debbie Zuluaga LPN

## 2019-06-14 VITALS
RESPIRATION RATE: 16 BRPM | WEIGHT: 315 LBS | TEMPERATURE: 97.5 F | DIASTOLIC BLOOD PRESSURE: 78 MMHG | HEART RATE: 54 BPM | BODY MASS INDEX: 41.75 KG/M2 | OXYGEN SATURATION: 96 % | HEIGHT: 73 IN | SYSTOLIC BLOOD PRESSURE: 116 MMHG

## 2019-06-14 PROCEDURE — 6370000000 HC RX 637 (ALT 250 FOR IP): Performed by: NURSE PRACTITIONER

## 2019-06-14 PROCEDURE — 5130000000 HC BRIDGE APPOINTMENT

## 2019-06-14 PROCEDURE — 99239 HOSP IP/OBS DSCHRG MGMT >30: CPT | Performed by: PSYCHIATRY & NEUROLOGY

## 2019-06-14 RX ORDER — OMEPRAZOLE 20 MG/1
20 CAPSULE, DELAYED RELEASE ORAL 2 TIMES DAILY
Qty: 30 CAPSULE | Refills: 0 | Status: SHIPPED | OUTPATIENT
Start: 2019-06-14

## 2019-06-14 RX ORDER — GLUCOSAMINE/CHONDR SU A SOD 750-600 MG
120 TABLET ORAL DAILY
Qty: 60 TABLET | Refills: 0 | Status: ON HOLD | OUTPATIENT
Start: 2019-06-14 | End: 2019-08-29 | Stop reason: HOSPADM

## 2019-06-14 RX ORDER — ASCORBIC ACID 500 MG
500 TABLET ORAL DAILY
Qty: 30 TABLET | Refills: 0 | Status: ON HOLD | OUTPATIENT
Start: 2019-06-14 | End: 2019-08-29 | Stop reason: HOSPADM

## 2019-06-14 RX ORDER — BUSPIRONE HYDROCHLORIDE 10 MG/1
10 TABLET ORAL 2 TIMES DAILY
Qty: 60 TABLET | Refills: 0 | Status: ON HOLD | OUTPATIENT
Start: 2019-06-14 | End: 2019-08-29 | Stop reason: HOSPADM

## 2019-06-14 RX ORDER — OMEGA-3 FATTY ACIDS/FISH OIL 300-1000MG
1 CAPSULE ORAL EVERY 6 HOURS PRN
Qty: 60 CAPSULE | Refills: 0 | Status: SHIPPED | OUTPATIENT
Start: 2019-06-14 | End: 2020-06-28

## 2019-06-14 RX ORDER — DOCUSATE SODIUM 100 MG/1
100 CAPSULE, LIQUID FILLED ORAL 2 TIMES DAILY
Qty: 60 CAPSULE | Refills: 0 | Status: ON HOLD | OUTPATIENT
Start: 2019-06-14 | End: 2019-08-29 | Stop reason: HOSPADM

## 2019-06-14 RX ORDER — RISPERIDONE 2 MG/1
2 TABLET, FILM COATED ORAL 2 TIMES DAILY
Qty: 60 TABLET | Refills: 0 | Status: ON HOLD | OUTPATIENT
Start: 2019-06-14 | End: 2019-08-29 | Stop reason: HOSPADM

## 2019-06-14 RX ORDER — CITALOPRAM 40 MG/1
40 TABLET ORAL DAILY
Qty: 30 TABLET | Refills: 0 | Status: ON HOLD | OUTPATIENT
Start: 2019-06-14 | End: 2019-08-29 | Stop reason: HOSPADM

## 2019-06-14 RX ORDER — TRAZODONE HYDROCHLORIDE 100 MG/1
100 TABLET ORAL NIGHTLY
Qty: 30 TABLET | Refills: 0 | Status: ON HOLD | OUTPATIENT
Start: 2019-06-14 | End: 2019-08-29 | Stop reason: HOSPADM

## 2019-06-14 RX ORDER — OMEGA-3-ACID ETHYL ESTERS 1 G/1
2 CAPSULE, LIQUID FILLED ORAL DAILY
Qty: 60 CAPSULE | Refills: 0 | Status: ON HOLD | OUTPATIENT
Start: 2019-06-15 | End: 2019-08-29 | Stop reason: HOSPADM

## 2019-06-14 RX ORDER — CETIRIZINE HYDROCHLORIDE 10 MG/1
10 TABLET ORAL DAILY
Qty: 30 TABLET | Refills: 0 | Status: SHIPPED | OUTPATIENT
Start: 2019-06-14

## 2019-06-14 RX ADMIN — DOCUSATE SODIUM 100 MG: 100 CAPSULE, LIQUID FILLED ORAL at 08:30

## 2019-06-14 RX ADMIN — OMEGA-3-ACID ETHYL ESTERS 2 G: 1 CAPSULE, LIQUID FILLED ORAL at 08:30

## 2019-06-14 RX ADMIN — BUSPIRONE HYDROCHLORIDE 10 MG: 5 TABLET ORAL at 08:30

## 2019-06-14 RX ADMIN — VITAMIN E CAP 400 UNIT 800 UNITS: 400 CAP at 08:30

## 2019-06-14 RX ADMIN — OXYCODONE HYDROCHLORIDE AND ACETAMINOPHEN 500 MG: 500 TABLET ORAL at 08:30

## 2019-06-14 RX ADMIN — Medication 1 CAPSULE: at 08:30

## 2019-06-14 RX ADMIN — RISPERIDONE 2 MG: 2 TABLET ORAL at 08:30

## 2019-06-14 RX ADMIN — VITAMIN D, TAB 1000IU (100/BT) 1000 UNITS: 25 TAB at 08:30

## 2019-06-14 RX ADMIN — FAMOTIDINE 20 MG: 20 TABLET ORAL at 08:30

## 2019-06-14 RX ADMIN — CETIRIZINE HYDROCHLORIDE 10 MG: 10 TABLET, FILM COATED ORAL at 08:30

## 2019-06-14 RX ADMIN — FLUTICASONE PROPIONATE 2 PUFF: 110 AEROSOL, METERED RESPIRATORY (INHALATION) at 08:30

## 2019-06-14 ASSESSMENT — PAIN SCALES - GENERAL: PAINLEVEL_OUTOF10: 5

## 2019-06-14 NOTE — GROUP NOTE
Group Therapy Note    Date: June 14    Group Start Time: 1000  Group End Time: 1050  Group Topic: Psychoeducation    SHIRIN Maher    Group Therapy Note    Attendees: 5       Patient's Goal:  Pt will demonstrate improved interpersonal skills and identify motivation     Notes:  Pt attended group and participated appropriately    Status After Intervention:  Improved    Participation Level:  Active Listener and Interactive    Participation Quality: Appropriate and Supportive      Speech:  normal      Thought Process/Content: Logical  Linear      Affective Functioning: Congruent      Mood: euthymic      Level of consciousness:  Alert, Oriented x4 and Attentive      Response to Learning: Able to verbalize current knowledge/experience, Able to verbalize/acknowledge new learning, Able to retain information, Capable of insight, Able to change behavior and Progressing to goal      Endings: None Reported    Modes of Intervention: Education, Support, Socialization, Exploration, Activity and Media      Discipline Responsible: Psychoeducational Specialist      Signature:  Brian Valencia [place]

## 2019-06-14 NOTE — GROUP NOTE
Group Therapy Note    Date: June 14    Group Start Time: 0900  Group End Time: 0930  Group Topic: Community Meeting    [address], [address]        Group Therapy Note    Attendees: 4/17         Patient's Goal:  To set a daily goal    Status After Intervention:  Improved    Participation Level:  Active Listener and Interactive    Participation Quality: Appropriate and Attentive      Speech:  normal      Thought Process/Content: Logical      Affective Functioning: Congruent      Mood: euphoric      Level of consciousness:  Alert and Oriented x4      Response to Learning: Able to verbalize current knowledge/experience and Able to verbalize/acknowledge new learning      Endings: None Reported    Modes of Intervention: Education, Support and Socialization      Discipline Responsible: Psychoeducational Specialist      Signature:  Ollie Pretty

## 2019-06-14 NOTE — BH NOTE
[facility][1]  Discharge Note    Pt discharged with followings belongings:   Dentures: None  Vision - Corrective Lenses: None  Hearing Aid: None  Jewelry: None  Body Piercings Removed: N/A  Clothing: Socks, Footwear, Pants, Shirt, Undergarments (Comment)  Were All Patient Medications Collected?: Not Applicable  Other Valuables: Cell phone, [address] sent home with patient. Valuables retrieved from safe, Security envelope number: [id number] and returned to patient. Patient left department with Departure Mode: With caregiver via Mobility at Departure: Ambulatory, discharged to Discharged to: Private Residence.  Patient education on aftercare instructions: complete  Information faxed to aftercare facility by staff Patient verbalize understanding of AVS.      Status EXAM upon discharge:  Status and Exam  Normal: Yes  Facial Expression: Brightened  Affect: Appropriate  Level of Consciousness: Alert  Mood:Normal: No  Mood: Helpless  Motor Activity:Normal: No  Motor Activity: Decreased  Interview Behavior: Cooperative  Preception: [place] to Person, [place] to Time, [place] to Place, [place] to Situation  Attention:Normal: No  Attention: Distractible  Thought Processes: Blocking  Thought Content:Normal: No  Thought Content: Poverty of Content, Preoccupations  Hallucinations: None  Delusions: No  Memory:Normal: Yes  Insight and Judgment: Yes  Insight and Judgment: Poor Judgment  Present Suicidal Ideation: No  Present Homicidal Ideation: No      Metabolic Screening:    Lab Results   Component Value Date    LABA1C 6.4 (H) 06/11/2019       Lab Results   Component Value Date    CHOL 161 06/11/2019    CHOL 150 11/03/2012     Lab Results   Component Value Date    TRIG 210 (H) 06/11/2019    TRIG 197 (H) 11/03/2012     Lab Results   Component Value Date    HDL 25 (L) 06/11/2019    HDL 32 (L) 11/03/2012     No components found for: LDLCAL  No results found for: LABVLDL    Monae Lin RN

## 2019-06-14 NOTE — DISCHARGE SUMMARY
DISCHARGE SUMMARY  Patient ID:  Jet Mejía  [id number]  45 y.o.  1995    Admit date: 6/10/2019    Discharge date and time: 6/14/2019  10:36 AM     Admitting Physician: Tanja Madrid MD     Discharge Physician:  Hiral Pinto MD    Admission Diagnoses: Depression with suicidal ideation [F32.9, R45.851]  Depression with suicidal ideation [F32.9, R45.851]    Discharge Diagnoses:   Depression with suicidal ideation     Patient Active Problem List   Diagnosis Code    Autistic spectrum disorder F84.0    Acute non-recurrent maxillary sinusitis J01.00    Attention deficit disorder F98.8    Blood in the stool K92.1    Body mass index 45.0-49.9, adult (HCC) Z68.42    Elevated LFTs R94.5    GERD (gastroesophageal reflux disease) K21.9    History of pneumonia Z87.01    Other constipation K59.09    Risk for sexually transmitted disease Z72.51    Severe recurrent major depression with psychotic features ([address] F33.3    Skin excoriation T14. 8XXA    Suicidal ideation R45.851    Major depressive disorder with psychotic features ([address] F32.3    MDD (major depressive disorder), recurrent episode ([address] F33.9    Depression with suicidal ideation F32.9, R45.851        Admission Condition: poor    Discharged Condition: stable    Indication for Admission: threat to self    History of Present Illnes (present tense wording indicates findings from admission exam on 6/10/2019 and are not necessarily indicative of current findings): Hospital Course:   Upon admission, Jet Mejía was provided a safe secure environment, introduced to unit milieu. Patient participated in groups and individual therapies. Meds were adjusted. After few days of hospital care, patient began to feel improvement. Depression lifted, thoughts to harm self ceased. Sleep improved, appetite was good. On morning rounds 6/14/2019, patient endorsed feeling ready for discharge.   Patient denies suicidal or homicidal ideations, denies hallucinations or delusions. Denies SE's from meds. It was decided that pt had achieved maximum benefit from hospital care and can be discharged     Consults:   None    Significant Diagnostic Studies: Routine labs and diagnostics    Treatments: Psychotropic medications, therapy with group, milieu, and 1:1 with nurses, social workers, PARUPAL/CNP, and Attending physician.       Discharge Medications:  Current Discharge Medication List      START taking these medications    Details   omega-3 acid ethyl esters (LOVAZA) 1 g capsule Take 2 capsules by mouth daily  Qty: 60 capsule, Refills: 0         CONTINUE these medications which have CHANGED    Details   ibuprofen (ADVIL;MOTRIN) 200 MG CAPS Take 1 capsule by mouth every 6 hours as needed for Pain  Qty: 60 capsule, Refills: 0      busPIRone (BUSPAR) 10 MG tablet Take 1 tablet by mouth 2 times daily  Qty: 60 tablet, Refills: 0      budesonide (PULMICORT FLEXHALER) 180 MCG/ACT AEPB inhaler Inhale 1 puff into the lungs 2 times daily  Qty: 1 each, Refills: 0      citalopram (CELEXA) 40 MG tablet Take 1 tablet by mouth daily  Qty: 30 tablet, Refills: 0      traZODone (DESYREL) 100 MG tablet Take 1 tablet by mouth nightly  Qty: 30 tablet, Refills: 0      Probiotic CAPS Take 1 capsule by mouth daily  Qty: 30 capsule, Refills: 0      cetirizine (ZYRTEC) 10 MG tablet Take 1 tablet by mouth daily  Qty: 30 tablet, Refills: 0      risperiDONE (RISPERDAL) 2 MG tablet Take 1 tablet by mouth 2 times daily  Qty: 60 tablet, Refills: 0      docusate sodium (COLACE) 100 MG capsule Take 1 capsule by mouth 2 times daily  Qty: 60 capsule, Refills: 0      Ginkgo Biloba 120 MG TABS Take 120 mg by mouth daily  Qty: 60 tablet, Refills: 0      omeprazole (PRILOSEC) 20 MG delayed release capsule Take 1 capsule by mouth 2 times daily  Qty: 30 capsule, Refills: 0      Mirabegron ER 25 MG TB24 Take 1 tablet by mouth daily  Qty: 30 tablet, Refills: 0      vitamin C (ASCORBIC ACID) 500 MG tablet Take 1

## 2019-06-14 NOTE — GROUP NOTE
Group Therapy Note    Date: June 14    Group Start Time: 1430  Group End Time: 7190  Group Topic: Recreational    [address], [place]        Group Therapy Note    Attendees: 2/15           Patient's Goal:  Learn the benefits of leisure education    Status After Intervention:  Improved    Participation Level:  Active Listener and Interactive    Participation Quality: Appropriate and Attentive      Speech:  normal      Thought Process/Content: Logical      Affective Functioning: Congruent      Mood: euphoric      Level of consciousness:  Alert and Oriented x4      Response to Learning: Able to verbalize current knowledge/experience and Able to verbalize/acknowledge new learning      Endings: None Reported    Modes of Intervention: Exploration      Discipline Responsible: Psychoeducational Specialist      Signature:  Rebecca John

## 2019-06-14 NOTE — PLAN OF CARE
Problem: Anger Management/Homicidal Ideation:  Goal: Absence of homicidal ideation  Description  Absence of homicidal ideation  Outcome: Ongoing  Note:   Patient denies any suicidal or homicidal ideations,denies any hallucinations. Patient states he has been eating well and sleeping well. Patient states he is going home to his family tomorrow and states he feels ready. Went to group and has been social on the unit. Problem: Depressive Behavior With or Without Suicide Precautions:  Goal: Able to verbalize and/or display a decrease in depressive symptoms  Description  Able to verbalize and/or display a decrease in depressive symptoms  6/13/2019 2043 by Ashely Kohler RN  Outcome: Ongoing  Note:   Patient denies any suicidal or homicidal ideations,denies any hallucinations. Patient states he has been eating well and sleeping well. Patient states he is going home to his family tomorrow and states he feels ready. Went to group and has been social on the unit. Problem: Depressive Behavior With or Without Suicide Precautions:  Goal: Absence of self-harm  Description  Absence of self-harm  6/13/2019 2043 by Ashely Kohler RN  Outcome: Ongoing  Note:   Patient denies any suicidal or homicidal ideations,denies any hallucinations. Patient states he has been eating well and sleeping well. Patient states he is going home to his family tomorrow and states he feels ready. Went to group and has been social on the unit.

## 2019-06-14 NOTE — GROUP NOTE
Group Therapy Note    Date: June 14    Group Start Time: 1100  Group End Time: 7357  Group Topic: Recreational    [facility] LATASHA [place]        Group Therapy Note    Attendees: 3/18         Patient's Goal:  LEARNING HOW TO BUDGET    Status After Intervention:  Improved    Participation Level:  Active Listener and Interactive    Participation Quality: Appropriate and Attentive      Speech:  normal      Thought Process/Content: Logical      Affective Functioning: Congruent      Mood: euphoric      Level of consciousness:  Alert and Oriented x4      Response to Learning: Able to verbalize current knowledge/experience and Able to verbalize/acknowledge new learning      Endings: None Reported    Modes of Intervention: Education and Socialization      Discipline Responsible: Psychoeducational Specialist      Signature:  Nadeen Carbone

## 2019-06-14 NOTE — PROGRESS NOTES
called pt's psychiatric medication provider twice. Once around 10am and again at 3:45pm and left 2 voicemails. No return call was made.

## 2019-06-14 NOTE — GROUP NOTE
Group Therapy Note    Date: June 14    Group Start Time: 1330  Group End Time: 2757  Group Topic: Recreational    [facility]    SHIRIN Farias    Group Therapy Note    Attendees: 4         Patient's Goal:  To increase interpersonal skills    Notes:  Pt attended group and participated appropriately. Status After Intervention:  Improved    Participation Level:  Active Listener and Interactive    Participation Quality: Appropriate, Attentive and Sharing      Speech:  normal      Thought Process/Content: Logical      Affective Functioning: Congruent      Mood: euthymic      Level of consciousness:  Alert, Oriented x4 and Attentive      Response to Learning: Able to verbalize current knowledge/experience, Able to verbalize/acknowledge new learning, Able to retain information, Capable of insight and Progressing to goal      Endings: None Reported    Modes of Intervention: Education, Support, Socialization, Exploration, Problem-solving, Activity, Media and Reality-testing      Discipline Responsible: Psychoeducational Specialist      Signature:  SHIRIN Farias

## 2019-08-24 ENCOUNTER — HOSPITAL ENCOUNTER (INPATIENT)
Age: 24
LOS: 5 days | Discharge: HOME | DRG: 885 | End: 2019-08-29
Attending: PSYCHIATRY & NEUROLOGY | Admitting: PSYCHIATRY & NEUROLOGY
Payer: MEDICARE

## 2019-08-24 PROBLEM — F33.3 MAJOR PSYCHOTIC DEPRESSION, RECURRENT (HCC): Status: ACTIVE | Noted: 2019-08-24

## 2019-08-24 PROBLEM — F33.9 MAJOR DEPRESSION, RECURRENT (HCC): Status: ACTIVE | Noted: 2019-08-24

## 2019-08-24 PROCEDURE — 6370000000 HC RX 637 (ALT 250 FOR IP): Performed by: PSYCHIATRY & NEUROLOGY

## 2019-08-24 PROCEDURE — 1240000000 HC EMOTIONAL WELLNESS R&B

## 2019-08-24 PROCEDURE — 6370000000 HC RX 637 (ALT 250 FOR IP): Performed by: NURSE PRACTITIONER

## 2019-08-24 RX ORDER — ACETAMINOPHEN 325 MG/1
650 TABLET ORAL EVERY 4 HOURS PRN
Status: DISCONTINUED | OUTPATIENT
Start: 2019-08-24 | End: 2019-08-29 | Stop reason: HOSPADM

## 2019-08-24 RX ORDER — PANTOPRAZOLE SODIUM 20 MG/1
20 TABLET, DELAYED RELEASE ORAL
Status: DISCONTINUED | OUTPATIENT
Start: 2019-08-24 | End: 2019-08-26

## 2019-08-24 RX ORDER — ESCITALOPRAM OXALATE 10 MG/1
10 TABLET ORAL DAILY
Status: ON HOLD | COMMUNITY
End: 2019-08-29 | Stop reason: SDUPTHER

## 2019-08-24 RX ORDER — BUSPIRONE HYDROCHLORIDE 10 MG/1
10 TABLET ORAL 2 TIMES DAILY
Status: DISCONTINUED | OUTPATIENT
Start: 2019-08-24 | End: 2019-08-27

## 2019-08-24 RX ORDER — CITALOPRAM 20 MG/1
20 TABLET ORAL DAILY
Status: DISCONTINUED | OUTPATIENT
Start: 2019-08-24 | End: 2019-08-25

## 2019-08-24 RX ORDER — IBUPROFEN 400 MG/1
400 TABLET ORAL EVERY 6 HOURS PRN
Status: DISCONTINUED | OUTPATIENT
Start: 2019-08-24 | End: 2019-08-29 | Stop reason: HOSPADM

## 2019-08-24 RX ORDER — FLUTICASONE PROPIONATE 110 UG/1
1 AEROSOL, METERED RESPIRATORY (INHALATION) 2 TIMES DAILY
Status: DISCONTINUED | OUTPATIENT
Start: 2019-08-24 | End: 2019-08-29 | Stop reason: HOSPADM

## 2019-08-24 RX ORDER — RANITIDINE 150 MG/1
150 TABLET ORAL 2 TIMES DAILY
Status: ON HOLD | COMMUNITY
End: 2019-08-29 | Stop reason: HOSPADM

## 2019-08-24 RX ORDER — CITALOPRAM 40 MG/1
40 TABLET ORAL DAILY
Status: DISCONTINUED | OUTPATIENT
Start: 2019-08-24 | End: 2019-08-24

## 2019-08-24 RX ORDER — NICOTINE 21 MG/24HR
1 PATCH, TRANSDERMAL 24 HOURS TRANSDERMAL DAILY
Status: DISCONTINUED | OUTPATIENT
Start: 2019-08-24 | End: 2019-08-29 | Stop reason: HOSPADM

## 2019-08-24 RX ORDER — CETIRIZINE HYDROCHLORIDE 10 MG/1
10 TABLET ORAL DAILY
Status: DISCONTINUED | OUTPATIENT
Start: 2019-08-24 | End: 2019-08-29 | Stop reason: HOSPADM

## 2019-08-24 RX ORDER — TRAZODONE HYDROCHLORIDE 50 MG/1
50 TABLET ORAL NIGHTLY PRN
Status: DISCONTINUED | OUTPATIENT
Start: 2019-08-24 | End: 2019-08-29 | Stop reason: HOSPADM

## 2019-08-24 RX ORDER — NAPROXEN 250 MG/1
250 TABLET ORAL 2 TIMES DAILY PRN
Status: ON HOLD | COMMUNITY
End: 2019-08-29 | Stop reason: HOSPADM

## 2019-08-24 RX ADMIN — PANTOPRAZOLE SODIUM 20 MG: 20 TABLET, DELAYED RELEASE ORAL at 17:14

## 2019-08-24 RX ADMIN — ACETAMINOPHEN 650 MG: 325 TABLET, FILM COATED ORAL at 17:51

## 2019-08-24 RX ADMIN — CETIRIZINE HYDROCHLORIDE 10 MG: 10 TABLET, FILM COATED ORAL at 11:49

## 2019-08-24 RX ADMIN — TRAZODONE HYDROCHLORIDE 50 MG: 50 TABLET ORAL at 20:51

## 2019-08-24 RX ADMIN — IBUPROFEN 400 MG: 400 TABLET ORAL at 20:51

## 2019-08-24 RX ADMIN — CITALOPRAM HYDROBROMIDE 20 MG: 20 TABLET ORAL at 11:49

## 2019-08-24 RX ADMIN — Medication 1 PUFF: at 20:58

## 2019-08-24 RX ADMIN — Medication 1 PUFF: at 11:49

## 2019-08-24 RX ADMIN — BUSPIRONE HYDROCHLORIDE 10 MG: 10 TABLET ORAL at 17:14

## 2019-08-24 ASSESSMENT — PAIN SCALES - GENERAL
PAINLEVEL_OUTOF10: 5
PAINLEVEL_OUTOF10: 0
PAINLEVEL_OUTOF10: 5
PAINLEVEL_OUTOF10: 9

## 2019-08-24 ASSESSMENT — PAIN - FUNCTIONAL ASSESSMENT
PAIN_FUNCTIONAL_ASSESSMENT: 0-10
PAIN_FUNCTIONAL_ASSESSMENT: 0-10

## 2019-08-24 ASSESSMENT — SLEEP AND FATIGUE QUESTIONNAIRES
DIFFICULTY FALLING ASLEEP: NO
DO YOU USE A SLEEP AID: YES
DIFFICULTY ARISING: NO
DIFFICULTY STAYING ASLEEP: YES
RESTFUL SLEEP: NO
DO YOU HAVE DIFFICULTY SLEEPING: YES
SLEEP PATTERN: INSOMNIA;RESTLESSNESS
AVERAGE NUMBER OF SLEEP HOURS: 4

## 2019-08-24 ASSESSMENT — PAIN DESCRIPTION - LOCATION: LOCATION: HIP

## 2019-08-24 ASSESSMENT — PAIN DESCRIPTION - PAIN TYPE: TYPE: OTHER (COMMENT)

## 2019-08-24 ASSESSMENT — PATIENT HEALTH QUESTIONNAIRE - PHQ9: SUM OF ALL RESPONSES TO PHQ QUESTIONS 1-9: 6

## 2019-08-24 ASSESSMENT — PAIN DESCRIPTION - ORIENTATION: ORIENTATION: RIGHT

## 2019-08-24 ASSESSMENT — LIFESTYLE VARIABLES: HISTORY_ALCOHOL_USE: NO

## 2019-08-24 ASSESSMENT — PAIN DESCRIPTION - DESCRIPTORS: DESCRIPTORS: ACHING;DISCOMFORT

## 2019-08-24 NOTE — GROUP NOTE
Group Therapy Note     Date: August 24     Group Start Time: 1330     Group End Time: 3688     Group Topic: Cognitive Skills     [facility] LATASHA Peter, CTRS           Group Therapy Note     Pt did not participate in Cognitive Skills Group at 1330 when encouraged by RT. Pt was resting in room et refused talk time offered as an alternative to group.      Discipline Responsible: Psychoeducational Specialist        Signature:  Mindy Gomez
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
## 2019-08-25 PROCEDURE — 6370000000 HC RX 637 (ALT 250 FOR IP): Performed by: NURSE PRACTITIONER

## 2019-08-25 PROCEDURE — 1240000000 HC EMOTIONAL WELLNESS R&B

## 2019-08-25 PROCEDURE — 6370000000 HC RX 637 (ALT 250 FOR IP): Performed by: PSYCHIATRY & NEUROLOGY

## 2019-08-25 PROCEDURE — 99223 1ST HOSP IP/OBS HIGH 75: CPT | Performed by: PSYCHIATRY & NEUROLOGY

## 2019-08-25 RX ORDER — HALOPERIDOL 5 MG/ML
10 INJECTION INTRAMUSCULAR 2 TIMES DAILY PRN
Status: DISCONTINUED | OUTPATIENT
Start: 2019-08-25 | End: 2019-08-29 | Stop reason: HOSPADM

## 2019-08-25 RX ORDER — LORAZEPAM 2 MG/ML
2 INJECTION INTRAMUSCULAR EVERY 6 HOURS PRN
Status: DISCONTINUED | OUTPATIENT
Start: 2019-08-25 | End: 2019-08-29 | Stop reason: HOSPADM

## 2019-08-25 RX ORDER — HALOPERIDOL 5 MG/ML
10 INJECTION INTRAMUSCULAR 2 TIMES DAILY PRN
Status: DISCONTINUED | OUTPATIENT
Start: 2019-08-25 | End: 2019-08-25

## 2019-08-25 RX ORDER — ESCITALOPRAM OXALATE 10 MG/1
10 TABLET ORAL DAILY
Status: DISCONTINUED | OUTPATIENT
Start: 2019-08-25 | End: 2019-08-29 | Stop reason: HOSPADM

## 2019-08-25 RX ORDER — LORAZEPAM 1 MG/1
2 TABLET ORAL EVERY 6 HOURS PRN
Status: DISCONTINUED | OUTPATIENT
Start: 2019-08-25 | End: 2019-08-29 | Stop reason: HOSPADM

## 2019-08-25 RX ORDER — HALOPERIDOL 10 MG/1
10 TABLET ORAL 2 TIMES DAILY PRN
Status: DISCONTINUED | OUTPATIENT
Start: 2019-08-25 | End: 2019-08-29 | Stop reason: HOSPADM

## 2019-08-25 RX ORDER — LORAZEPAM 2 MG/ML
2 INJECTION INTRAMUSCULAR EVERY 6 HOURS PRN
Status: DISCONTINUED | OUTPATIENT
Start: 2019-08-25 | End: 2019-08-25

## 2019-08-25 RX ORDER — PALIPERIDONE 3 MG/1
3 TABLET, EXTENDED RELEASE ORAL DAILY
Status: DISCONTINUED | OUTPATIENT
Start: 2019-08-25 | End: 2019-08-29 | Stop reason: HOSPADM

## 2019-08-25 RX ADMIN — CITALOPRAM HYDROBROMIDE 20 MG: 20 TABLET ORAL at 08:09

## 2019-08-25 RX ADMIN — Medication 1 PUFF: at 08:12

## 2019-08-25 RX ADMIN — ESCITALOPRAM OXALATE 10 MG: 10 TABLET ORAL at 12:40

## 2019-08-25 RX ADMIN — BUSPIRONE HYDROCHLORIDE 10 MG: 10 TABLET ORAL at 16:46

## 2019-08-25 RX ADMIN — BUSPIRONE HYDROCHLORIDE 10 MG: 10 TABLET ORAL at 08:09

## 2019-08-25 RX ADMIN — PALIPERIDONE 3 MG: 3 TABLET, EXTENDED RELEASE ORAL at 12:41

## 2019-08-25 RX ADMIN — LORAZEPAM 2 MG: 1 TABLET ORAL at 20:08

## 2019-08-25 RX ADMIN — PANTOPRAZOLE SODIUM 20 MG: 20 TABLET, DELAYED RELEASE ORAL at 08:09

## 2019-08-25 RX ADMIN — Medication 1 PUFF: at 20:07

## 2019-08-25 RX ADMIN — HALOPERIDOL 10 MG: 10 TABLET ORAL at 20:08

## 2019-08-25 RX ADMIN — TRAZODONE HYDROCHLORIDE 50 MG: 50 TABLET ORAL at 20:08

## 2019-08-25 RX ADMIN — CETIRIZINE HYDROCHLORIDE 10 MG: 10 TABLET, FILM COATED ORAL at 08:09

## 2019-08-26 PROCEDURE — 1240000000 HC EMOTIONAL WELLNESS R&B

## 2019-08-26 PROCEDURE — 6370000000 HC RX 637 (ALT 250 FOR IP): Performed by: PSYCHIATRY & NEUROLOGY

## 2019-08-26 PROCEDURE — 6370000000 HC RX 637 (ALT 250 FOR IP): Performed by: NURSE PRACTITIONER

## 2019-08-26 PROCEDURE — 99232 SBSQ HOSP IP/OBS MODERATE 35: CPT | Performed by: NURSE PRACTITIONER

## 2019-08-26 RX ORDER — PANTOPRAZOLE SODIUM 20 MG/1
20 TABLET, DELAYED RELEASE ORAL 2 TIMES DAILY WITH MEALS
Status: DISCONTINUED | OUTPATIENT
Start: 2019-08-26 | End: 2019-08-29 | Stop reason: HOSPADM

## 2019-08-26 RX ADMIN — TRAZODONE HYDROCHLORIDE 50 MG: 50 TABLET ORAL at 22:26

## 2019-08-26 RX ADMIN — PALIPERIDONE 3 MG: 3 TABLET, EXTENDED RELEASE ORAL at 08:02

## 2019-08-26 RX ADMIN — PANTOPRAZOLE SODIUM 20 MG: 20 TABLET, DELAYED RELEASE ORAL at 08:02

## 2019-08-26 RX ADMIN — BUSPIRONE HYDROCHLORIDE 10 MG: 10 TABLET ORAL at 16:42

## 2019-08-26 RX ADMIN — LORAZEPAM 2 MG: 1 TABLET ORAL at 16:41

## 2019-08-26 RX ADMIN — Medication 1 PUFF: at 08:07

## 2019-08-26 RX ADMIN — CETIRIZINE HYDROCHLORIDE 10 MG: 10 TABLET, FILM COATED ORAL at 08:02

## 2019-08-26 RX ADMIN — LORAZEPAM 2 MG: 1 TABLET ORAL at 22:25

## 2019-08-26 RX ADMIN — BUSPIRONE HYDROCHLORIDE 10 MG: 10 TABLET ORAL at 08:02

## 2019-08-26 RX ADMIN — PANTOPRAZOLE SODIUM 20 MG: 20 TABLET, DELAYED RELEASE ORAL at 16:43

## 2019-08-26 RX ADMIN — Medication 1 PUFF: at 22:25

## 2019-08-26 RX ADMIN — HALOPERIDOL 10 MG: 10 TABLET ORAL at 16:43

## 2019-08-26 RX ADMIN — ESCITALOPRAM OXALATE 10 MG: 10 TABLET ORAL at 08:02

## 2019-08-26 ASSESSMENT — ENCOUNTER SYMPTOMS
VOMITING: 0
DIARRHEA: 0
COUGH: 0
NAUSEA: 0
SHORTNESS OF BREATH: 0
WHEEZING: 0
ABDOMINAL PAIN: 0

## 2019-08-26 ASSESSMENT — PAIN SCALES - GENERAL: PAINLEVEL_OUTOF10: 0

## 2019-08-26 NOTE — PROGRESS NOTES
himself. He states at times he has thoughts of wanting to hurt others [place] some people here. \"  He refused to further elaborate as to who it was he wanted to harm. Denies any auditory or visual hallucinations. Patient does not appear to be responding. Due to the patient's continued risk of harm to self as well as others, his one-to-one close observation will continue. Patient makes hopeless and helpless statements about himself. He is unable to contract for safety inside the hospital, let alone outside the hospital.  Due to this the patient remains at risk of harm to himself and remain hospitalized for the purpose of safety and stabilization. OBJECTIVE    Physical  /66   Pulse 84   Temp 97.9 °F (36.6 °C) (Oral)   Resp 19   Ht 6' 1\" (1.854 m)   Wt (!) 415 lb (188.2 kg)   SpO2 (!) 89%   BMI 54.75 kg/m²      Mental Status Evaluation:  Orientation: alertness: alert   Mood:. angry, depressed, irritable and labile      Affect:  inappropriate, labile and mood-incongruent      Appearance:  age appropriate, casually dressed and overweight   Activity:  Psychomotor Retardation   Gait/Posture: Normal   Speech:  loud, pressured and profane   Thought Process:  tangential   Thought Content:  Denies hallucinations. Intermittent suicidal and homicidal ideation. No notes of delusions or paranoia. Sensorium:  person, place, time/date and situation   Cognition:  grossly intact   Memory: intact   Insight:  limited   Judgment: limited   Suicidal Ideations: active and without plan   Homicidal Ideations: Positive for homicidal ideation      Medication Side Effects: absent       Attention Span attention span and concentration were age appropriate       Labs  No results found for this or any previous visit (from the past 67 hour(s)).     Medications  Current Facility-Administered Medications   Medication Dose Route Frequency Provider Last Rate Last Dose    pantoprazole (PROTONIX) tablet 20 mg  20 mg Oral BID WC Sanna Parrish MD   20 mg at 08/26/19 1643    escitalopram (LEXAPRO) tablet 10 mg  10 mg Oral Daily Sanna Parrish MD   10 mg at 08/26/19 0802    paliperidone (INVEGA) extended release tablet 3 mg  3 mg Oral Daily Sanna Parrish MD   3 mg at 08/26/19 0802    haloperidol lactate (HALDOL) injection 10 mg  10 mg Intramuscular BID PRN LITO Greenwood CNP        Or    haloperidol (HALDOL) tablet 10 mg  10 mg Oral BID PRN LITO Greenwood CNP   10 mg at 08/26/19 1643    LORazepam (ATIVAN) injection 2 mg  2 mg Intramuscular Q6H PRN LITO Greenwood CNP        Or    LORazepam (ATIVAN) tablet 2 mg  2 mg Oral Q6H PRN LITO Greenwood CNP   2 mg at 08/26/19 1641    acetaminophen (TYLENOL) tablet 650 mg  650 mg Oral Q4H PRN Sanna Parrish MD   650 mg at 08/24/19 1751    traZODone (DESYREL) tablet 50 mg  50 mg Oral Nightly PRN Sanna Parrish MD   50 mg at 08/25/19 2008    magnesium hydroxide (MILK OF MAGNESIA) 400 MG/5ML suspension 30 mL  30 mL Oral Daily PRN Sanna Parrish MD        nicotine (NICODERM CQ) 21 MG/24HR 1 patch  1 patch Transdermal Daily Sanna Parrish MD        fluticasone (FLOVENT HFA) 110 MCG/ACT inhaler 1 puff  1 puff Inhalation BID Sanna Parrish MD   1 puff at 08/26/19 0807    busPIRone (BUSPAR) tablet 10 mg  10 mg Oral BID Sanna Parrish MD   10 mg at 08/26/19 1642    cetirizine (ZYRTEC) tablet 10 mg  10 mg Oral Daily Sanna Parrish MD   10 mg at 08/26/19 0802    ibuprofen (ADVIL;MOTRIN) tablet 400 mg  400 mg Oral Q6H PRN LITO Greenwood CNP   400 mg at 08/24/19 2051         pantoprazole  20 mg Oral BID WC    escitalopram  10 mg Oral Daily    paliperidone  3 mg Oral Daily    nicotine  1 patch Transdermal Daily    fluticasone  1 puff Inhalation BID    busPIRone  10 mg Oral BID    cetirizine  10 mg Oral Daily       ASSESSMENT  Major depression, recurrent ([facility] [address]     Patient's Response to Treatment: negative    PLAN  · Continue inpatient psychiatric treatment  · Supportive therapy

## 2019-08-26 NOTE — GROUP NOTE
Group Therapy Note    Date: August 26    Group Start Time: 1100  Group End Time: 9071  Group Topic: cognitive skills     SHIRIN Fine    Pt did not attend 1100 cognitive skills group d/t resting in room despite staff invitation to attend. talk time offered as alternative to group session, pt declined.          Signature:  Nyasia Givens

## 2019-08-26 NOTE — GROUP NOTE
Group Therapy Note    Date: August 26    Group Start Time: 1430  Group End Time: 1510  Group Topic: Cognitive Skills    [facility] ROXANNA Mosquera    Patient's Goal: To increase interpersonal interaction      Notes:      Status After Intervention:  Improved    Participation Level:  Active Listener and Interactive    Participation Quality: Appropriate, Attentive and Sharing      Speech:  normal      Thought Process/Content: Logical      Affective Functioning: Congruent      Mood: euthymic      Level of consciousness:  Alert, Oriented x4 and Attentive      Response to Learning: Progressing to goal      Endings: None Reported    Modes of Intervention: Education, Support, Socialization, Exploration, Clarifying, Problem-solving and Activity      Discipline Responsible: Psychoeducational Specialist      Signature:  Lashae Mosquera

## 2019-08-27 PROCEDURE — 6370000000 HC RX 637 (ALT 250 FOR IP): Performed by: NURSE PRACTITIONER

## 2019-08-27 PROCEDURE — 1240000000 HC EMOTIONAL WELLNESS R&B

## 2019-08-27 PROCEDURE — 6370000000 HC RX 637 (ALT 250 FOR IP): Performed by: PSYCHIATRY & NEUROLOGY

## 2019-08-27 PROCEDURE — 99232 SBSQ HOSP IP/OBS MODERATE 35: CPT | Performed by: NURSE PRACTITIONER

## 2019-08-27 RX ORDER — BUSPIRONE HYDROCHLORIDE 10 MG/1
10 TABLET ORAL 3 TIMES DAILY
Status: DISCONTINUED | OUTPATIENT
Start: 2019-08-27 | End: 2019-08-29 | Stop reason: HOSPADM

## 2019-08-27 RX ADMIN — BUSPIRONE HYDROCHLORIDE 10 MG: 10 TABLET ORAL at 16:34

## 2019-08-27 RX ADMIN — Medication 1 PUFF: at 08:59

## 2019-08-27 RX ADMIN — ESCITALOPRAM OXALATE 10 MG: 10 TABLET ORAL at 08:59

## 2019-08-27 RX ADMIN — BUSPIRONE HYDROCHLORIDE 10 MG: 10 TABLET ORAL at 08:59

## 2019-08-27 RX ADMIN — CETIRIZINE HYDROCHLORIDE 10 MG: 10 TABLET, FILM COATED ORAL at 08:59

## 2019-08-27 RX ADMIN — Medication 1 PUFF: at 21:20

## 2019-08-27 RX ADMIN — PALIPERIDONE 3 MG: 3 TABLET, EXTENDED RELEASE ORAL at 08:59

## 2019-08-27 RX ADMIN — PANTOPRAZOLE SODIUM 20 MG: 20 TABLET, DELAYED RELEASE ORAL at 16:34

## 2019-08-27 RX ADMIN — TRAZODONE HYDROCHLORIDE 50 MG: 50 TABLET ORAL at 21:20

## 2019-08-27 RX ADMIN — BUSPIRONE HYDROCHLORIDE 10 MG: 10 TABLET ORAL at 21:20

## 2019-08-27 RX ADMIN — PANTOPRAZOLE SODIUM 20 MG: 20 TABLET, DELAYED RELEASE ORAL at 08:59

## 2019-08-27 NOTE — PROGRESS NOTES
Department of Psychiatry  Attending Progress Note  Chief Complaint: Major depression, recurrent ([facility] [address]     SUBJECTIVE: This is a 27-year-old male being seen for follow-up. Patient was admitted due to worsening depression and suicidal ideation. Nursing reports that the patient's 1:1 close observation continues. He has been medication compliant. The patient did shower this morning. Today the patient is seen in his room with 1:1 present. The patient is more conversational and appropriate during interaction. The patient currently denies suicidal and homicidal ideation. He reports that he has thoughts of harming himself and others \"daily\" and that his last thoughts of death/dying were last night. The patient states that he had thoughts \"of just not wanting to be here\" but denied any specific plan or intent. He has been sleeping well. The patient denies difficulty with sleep onset or maintenance. The patient denies auditory and visual hallucination. Discussed the patient's continued worry as he states \"I think about everything bad that could happen. \"  Patient expresses difficulty in controlling his worry and thoughts. States that he often experiences racing thoughts. The patient reports that he has depression and that this is related \"the things I have to deal with. \"  He is oppositional to recommendations to improve his loneliness. He states \"I don't want to go to a club house and be with people who have disabilities. I want to be around normal people. I want my friends to be normal people. \"  The patient is tangential and does present easily distracted. No notes of delusions or paranoia. Supportive and empathetic listening provided. Documentation and charting reviewed from past 24 hours. Due to patient's inability to contract for safety outside of the hospital patient remains at risk of harm to himself and remain hospitalized for the purpose of safety and stabilization.     OBJECTIVE    Physical  BP 109/66   Pulse 84   Temp 97.9 °F (36.6 °C) (Oral)   Resp 19   Ht 6' 1\" (1.854 m)   Wt (!) 415 lb (188.2 kg)   SpO2 (!) 89%   BMI 54.75 kg/m²      Mental Status Evaluation:  Orientation: alertness: alert   Mood:. angry, depressed, irritable and labile      Affect:  inappropriate, labile and mood-incongruent      Appearance:  age appropriate, casually dressed and overweight   Activity:  Psychomotor Retardation   Gait/Posture: Normal   Speech:  loud, pressured and profane   Thought Process:  tangential   Thought Content:  Denies hallucinations. Intermittent suicidal and homicidal ideation. No notes of delusions or paranoia. Sensorium:  person, place, time/date and situation   Cognition:  grossly intact   Memory: intact   Insight:  limited   Judgment: limited   Suicidal Ideations: active and without plan   Homicidal Ideations: Positive for homicidal ideation      Medication Side Effects: absent       Attention Span Poor; distractible       Labs  No results found for this or any previous visit (from the past 67 hour(s)).     Medications  Current Facility-Administered Medications   Medication Dose Route Frequency Provider Last Rate Last Dose    busPIRone (BUSPAR) tablet 10 mg  10 mg Oral TID LITO Araujo CNP        pantoprazole (PROTONIX) tablet 20 mg  20 mg Oral BID  Joe Canela MD   20 mg at 08/27/19 0859    escitalopram (LEXAPRO) tablet 10 mg  10 mg Oral Daily Joe Canela MD   10 mg at 08/27/19 0859    paliperidone (INVEGA) extended release tablet 3 mg  3 mg Oral Daily Shyanne Greene MD   3 mg at 08/27/19 0859    haloperidol lactate (HALDOL) injection 10 mg  10 mg Intramuscular BID PRN Carolina Soto APRN - CNP        Or    haloperidol (HALDOL) tablet 10 mg  10 mg Oral BID PRN Carolina Soto APRN - CNP   10 mg at 08/26/19 1643    LORazepam (ATIVAN) injection 2 mg  2 mg Intramuscular Q6H PRN Carolina Soto APRBRET - CNP        Or    LORazepam (ATIVAN) tablet 2 mg  2 mg Oral Q6H PRN Melissa KIM

## 2019-08-27 NOTE — PLAN OF CARE
[facility]  Initial Interdisciplinary Treatment Plan NO      Original treatment plan Date & Time: 8/25/19  Admission Type:  Admission Type: Involuntary    Reason for admission:   Reason for Admission: SI no plan    Estimated Length of Stay:  5-7days  Estimated Discharge Date: to be determined by physician    PATIENT STRENGTHS:  Patient Strengths:Strengths: Positive Support, No significant Physical Illness  Patient Strengths and Limitations:Limitations: Tendency to isolate self, Lacks leisure interests, Difficulty problem solving/relies on others to help solve problems, Hopeless about future, Multiple barriers to leisure interests, Inappropriate/potentially harmful leisure interests (depression substance abuse anxiety poor coping skills)  Addictive Behavior: Addictive Behavior  In the past 3 months, have you felt or has someone told you that you have a problem with:  : None  Do you have a history of Chemical Use?: No  Do you have a history of Alcohol Use?: No  Do you have a history of Street Drug Abuse?: Yes  Histroy of Prescripton Drug Abuse?: No  Medical Problems:No past medical history on file.   Status EXAM:Status and Exam  Normal: No  Facial Expression: Elevated  Affect: Inappropriate  Level of Consciousness: Alert  Mood:Normal: No  Mood: Depressed, Anxious  Motor Activity:Normal: No  Motor Activity: Decreased  Interview Behavior: Cooperative  Preception: [place] to Person, Kely Delio to Time, [place] to Place, [place] to Situation  Attention:Normal: Yes  Attention: Distractible  Thought Processes: Circumstantial  Thought Content:Normal: Yes  Thought Content: Preoccupations  Hallucinations: None  Delusions: No  Memory:Normal: Yes  Memory: Poor Recent, Confabulation  Insight and Judgment: No  Insight and Judgment: Unmotivated  Present Suicidal Ideation: No  Present Homicidal Ideation: No    EDUCATION:   Learner Progress Toward Treatment Goals: reviewed group plans and strategies for care    Method:group
Problem: Anger Management/Homicidal Ideation:  Goal: Able to display appropriate communication and problem solving  Description  Able to display appropriate communication and problem solving  8/27/2019 1109 by Kyara Coates RN  Outcome: Ongoing  8/27/2019 1107 by Kyara Coates RN  Outcome: Ongoing  Goal: Absence of angry outbursts  Description  Absence of angry outbursts  8/27/2019 1109 by Kyara Coates RN  Outcome: Ongoing  8/27/2019 1107 by Kyara Coates RN  Outcome: Ongoing     Problem: Pain:  Goal: Pain level will decrease  Description  Pain level will decrease  8/27/2019 1109 by Kyara Coates RN  Outcome: Ongoing  8/27/2019 1107 by Kyara Coates RN  Outcome: Ongoing  Goal: Control of acute pain  Description  Control of acute pain  8/27/2019 1109 by Kyara Coates RN  Outcome: Ongoing  8/27/2019 1107 by Kyara Coates RN  Outcome: Ongoing  Goal: Control of chronic pain  Description  Control of chronic pain  8/27/2019 1109 by Kyara Coates RN  Outcome: Ongoing  8/27/2019 1107 by Kyara Coates RN  Outcome: Ongoing     Problem: Depressive Behavior With or Without Suicide Precautions:  Goal: Able to verbalize and/or display a decrease in depressive symptoms  Description  Able to verbalize and/or display a decrease in depressive symptoms  8/27/2019 1109 by Kyara Coates RN  Outcome: Ongoing  8/27/2019 1107 by Kyara Coates RN  Outcome: Ongoing  8/26/2019 2218 by Matilda Scott  Outcome: Ongoing  Goal: Ability to disclose and discuss suicidal ideas will improve  Description  Ability to disclose and discuss suicidal ideas will improve  8/27/2019 1109 by Kyara Coates RN  Outcome: Ongoing  8/27/2019 1107 by Kyara Coates RN  Outcome: Ongoing  8/26/2019 2226 by Matilda Scott  Outcome: Ongoing   Patient contracts for safety. Out for groups and meals. Showered. Medication compliant and behavior controlled. 1 to 1 discontinued.
Problem: Anger Management/Homicidal Ideation:  Goal: Able to display appropriate communication and problem solving  Description  Able to display appropriate communication and problem solving  Outcome: Ongoing  Note:   Patient is cooperative with thought blocking tendencies and preoccupied thoughts. Patient exhibits a flat affect, he's unable to id positive coping skills and expresses thoughts of hopelessness. Attempts to reinforce positive coping skills unsuccessful during assessment, group therapies encouraged but declined at this time.
Problem: Anger Management/Homicidal Ideation:  Goal: Absence of angry outbursts  Description  Absence of angry outbursts  Outcome: Ongoing  Note:   Pt encouraged to explore coping skills for reducing anxiety. None verbalized at this time. Pt admits to some  thoughts of self harm and is agreeable to seeking out should thoughts of self harm arise. Safe environment maintained. Q15 minute checks for safety cont per unit policy. Will cont to monitor for safety and provides support and reassurance as needed.
Problem: Depressive Behavior With or Without Suicide Precautions:  Goal: Ability to disclose and discuss suicidal ideas will improve  Description  Ability to disclose and discuss suicidal ideas will improve  8/26/2019 2226 by Rimma Vásquez  Outcome: Ongoing   Patient  continues 1:1 per doctor. Patient is been sleeping most of the shift. Currently denies all, just feeling frustrated. Med compliant.
and Judgment: Poor Judgment, Poor Insight  Present Suicidal Ideation: No  Present Homicidal Ideation: No    Daily Assessment Last Entry:   Daily Sleep (WDL): Within Defined Limits         Patient Currently in Pain: Denies  Daily Nutrition (WDL): Within Defined Limits    Patient Monitoring:  Frequency of Checks: 1 staff: 1 patient  - continuous    Psychiatric Symptoms:   Depression Symptoms  Depression Symptoms: Feelings of hopelessess, Impaired concentration  Anxiety Symptoms  Anxiety Symptoms: Generalized  Milly Symptoms  Milly Symptoms: Poor judgment, Pressured speech, Flight of ideas     Psychosis Symptoms  Delusion Type: No problems reported or observed. Suicide Risk CSSR-S:  1) Within the past month, have you wished you were dead or wished you could go to sleep and not wake up? : Yes  2) Have you actually had any thoughts of killing yourself? : No  6) Have you ever done anything, started to do anything, or prepared to do anything to end your life?: No  Change in Result No Change in Plan of care No      EDUCATION:   EDUCATION:   Learner Progress Toward Treatment Goals: Reviewed results and recommendations of this team, Reviewed group plan and strategies, Reviewed signs, symptoms and risk of self harm and violent behavior, Reviewed goals and plan of care    Method:small group, individual verbal education    Outcome:verbalized by patient, but needs reinforcement to obtain goals    PATIENT GOALS:  Short term: To increase positive coping skills  Long term:  To utilize support system     PLAN/TREATMENT RECOMMENDATIONS UPDATE: continue with group therapies, increased socialization, continue planning for after discharge goals, continue with medication compliance    SHORT-TERM GOALS UPDATE:   Time frame for Short-Term Goals: 5-7 days    LONG-TERM GOALS UPDATE:   Time frame for Long-Term Goals: 6 months  Members Present in Team Meeting: See Signature Sheet    Leslie Landa

## 2019-08-27 NOTE — GROUP NOTE
Group Therapy Note    Date: August 27    Group Start Time: 1330  Group End Time: 1400  Group Topic: cognitive skills     [facility] BHSHIRIN Haider    Pt did not attend 1330 cognitive skills group d/t resting in room despite staff invitation to attend. 1:1 talk time offered as alternative to group session, pt declined.          Signature:  Maria A Cerrato

## 2019-08-28 PROCEDURE — 6370000000 HC RX 637 (ALT 250 FOR IP): Performed by: NURSE PRACTITIONER

## 2019-08-28 PROCEDURE — 1240000000 HC EMOTIONAL WELLNESS R&B

## 2019-08-28 PROCEDURE — 6370000000 HC RX 637 (ALT 250 FOR IP): Performed by: PSYCHIATRY & NEUROLOGY

## 2019-08-28 PROCEDURE — 99232 SBSQ HOSP IP/OBS MODERATE 35: CPT | Performed by: NURSE PRACTITIONER

## 2019-08-28 RX ADMIN — ESCITALOPRAM OXALATE 10 MG: 10 TABLET ORAL at 08:19

## 2019-08-28 RX ADMIN — Medication 1 PUFF: at 08:19

## 2019-08-28 RX ADMIN — Medication 1 PUFF: at 21:11

## 2019-08-28 RX ADMIN — PANTOPRAZOLE SODIUM 20 MG: 20 TABLET, DELAYED RELEASE ORAL at 08:19

## 2019-08-28 RX ADMIN — IBUPROFEN 400 MG: 400 TABLET ORAL at 17:25

## 2019-08-28 RX ADMIN — BUSPIRONE HYDROCHLORIDE 10 MG: 10 TABLET ORAL at 08:19

## 2019-08-28 RX ADMIN — PALIPERIDONE 3 MG: 3 TABLET, EXTENDED RELEASE ORAL at 08:19

## 2019-08-28 RX ADMIN — CETIRIZINE HYDROCHLORIDE 10 MG: 10 TABLET, FILM COATED ORAL at 08:19

## 2019-08-28 RX ADMIN — BUSPIRONE HYDROCHLORIDE 10 MG: 10 TABLET ORAL at 21:11

## 2019-08-28 RX ADMIN — PANTOPRAZOLE SODIUM 20 MG: 20 TABLET, DELAYED RELEASE ORAL at 17:25

## 2019-08-28 RX ADMIN — BUSPIRONE HYDROCHLORIDE 10 MG: 10 TABLET ORAL at 17:00

## 2019-08-28 RX ADMIN — TRAZODONE HYDROCHLORIDE 50 MG: 50 TABLET ORAL at 21:11

## 2019-08-28 ASSESSMENT — PAIN SCALES - GENERAL: PAINLEVEL_OUTOF10: 10

## 2019-08-28 NOTE — GROUP NOTE
Group Therapy Note     Date: August 28, 2019     Group Start Time: 1000  Group End Time: 1045  Group Topic: Group Psychotherapy     [facility] A     Attendees: 5/11     Patient's Goal: PT will demonstrate increased interpersonal interaction and a clear understanding on multiple types of coping skills relating to the here-and-now therapeutic practice. Along with coping skills, group talked about [facility] 5 hierarchy of needs. Notes:  Patient is making progress, AEB participating in group discussion, actively listening, and supporting other group members. PT participates in group and encourages others to participate      Status After Intervention:  Improved     Participation Level: Active Listener and Interactive     Participation Quality: Appropriate, Attentive and Sharing     Speech:  Normal      Thought Process/Content: Logical      Affective Functioning: Congruent      Mood: Normal      Level of consciousness:  Alert, Oriented x4 and Attentive      Response to Learning: Able to verbalize current knowledge/experience, Able to verbalize/acknowledge new learning, Able to retain information and Progressing to goal      Endings: None Reported     Modes of Intervention: Education, Support, Socialization, Exploration, Clarifying and Problem-solving     Discipline Responsible: Licensed Social Worker     Lowell Rivas.  MSW, LSW

## 2019-08-28 NOTE — GROUP NOTE
Group Therapy Note    Date:     Group Start Time:   Group End Time:   Group Topic: Wrap-Up    NATALIE Campos RN      Group Therapy Note    Attendees:          Patient's Goal:  ***    Notes:  ***    Status After Intervention:  {Status After Intervention:509656913}    Participation Level: {Participation Level:093131663}    Participation Quality: {[facility] PARTICIPATION QUALITY:095502075}      Speech:  {[facility] CD_SPEECH:83167}      Thought Process/Content: {Thought Process/Content:642655323}      Affective Functioning: {Affective Functionin}      Mood: {Mood:067103649}      Level of consciousness:  {Level of consciousness:640964713}      Response to Learnin Sima Conor [facility] Responses to Learnin}      Endings: {[facility] Endings:98955}    Modes of Intervention: {[facility] Modes of Intervention:109414914}      Discipline Responsible: Brie PAGAN Multidisciplinary:[id number]}      Signature:  Yue Campos RN

## 2019-08-28 NOTE — GROUP NOTE
Group Therapy Note    Date: August 28    Group Start Time: 0900  Group End Time: 1422  Group Topic: Community Meeting    [facility] [address], CTRS        Group Therapy Note    Attendees: 7         Patient's Goal:  To increase socialization skills     Notes:  Patient attended group and participated     Status After Intervention:  Improved    Participation Level:  Active Listener and Interactive    Participation Quality: Appropriate, Attentive and Sharing      Speech:  normal      Thought Process/Content: Logical      Affective Functioning: Congruent      Mood: euthymic      Level of consciousness:  Alert and Oriented x4      Response to Learning: Progressing to goal      Endings: None Reported    Modes of Intervention: Education, Support and Socialization      Discipline Responsible: Psychoeducational Specialist      Signature:  Lorenzo Lind

## 2019-08-29 VITALS
HEIGHT: 73 IN | BODY MASS INDEX: 41.75 KG/M2 | WEIGHT: 315 LBS | HEART RATE: 62 BPM | RESPIRATION RATE: 14 BRPM | OXYGEN SATURATION: 89 % | TEMPERATURE: 97.3 F | SYSTOLIC BLOOD PRESSURE: 113 MMHG | DIASTOLIC BLOOD PRESSURE: 67 MMHG

## 2019-08-29 PROBLEM — F33.9 MAJOR DEPRESSION, RECURRENT (HCC): Status: RESOLVED | Noted: 2019-08-24 | Resolved: 2019-08-29

## 2019-08-29 PROBLEM — F33.3 MAJOR PSYCHOTIC DEPRESSION, RECURRENT (HCC): Status: RESOLVED | Noted: 2019-08-24 | Resolved: 2019-08-29

## 2019-08-29 PROCEDURE — 5130000000 HC BRIDGE APPOINTMENT

## 2019-08-29 PROCEDURE — 6370000000 HC RX 637 (ALT 250 FOR IP): Performed by: PSYCHIATRY & NEUROLOGY

## 2019-08-29 PROCEDURE — 99238 HOSP IP/OBS DSCHRG MGMT 30/<: CPT | Performed by: NURSE PRACTITIONER

## 2019-08-29 PROCEDURE — 6370000000 HC RX 637 (ALT 250 FOR IP): Performed by: NURSE PRACTITIONER

## 2019-08-29 RX ORDER — BUSPIRONE HYDROCHLORIDE 10 MG/1
10 TABLET ORAL 3 TIMES DAILY
Qty: 90 TABLET | Refills: 0 | Status: SHIPPED | OUTPATIENT
Start: 2019-08-29

## 2019-08-29 RX ORDER — PALIPERIDONE 3 MG/1
3 TABLET, EXTENDED RELEASE ORAL DAILY
Qty: 30 TABLET | Refills: 0 | Status: ON HOLD | OUTPATIENT
Start: 2019-08-30 | End: 2021-11-24 | Stop reason: HOSPADM

## 2019-08-29 RX ORDER — ESCITALOPRAM OXALATE 10 MG/1
10 TABLET ORAL DAILY
Qty: 30 TABLET | Refills: 0 | Status: ON HOLD | OUTPATIENT
Start: 2019-08-29 | End: 2021-11-24 | Stop reason: HOSPADM

## 2019-08-29 RX ORDER — TRAZODONE HYDROCHLORIDE 50 MG/1
50 TABLET ORAL NIGHTLY PRN
Qty: 30 TABLET | Refills: 0 | Status: SHIPPED | OUTPATIENT
Start: 2019-08-29

## 2019-08-29 RX ADMIN — Medication 1 PUFF: at 08:04

## 2019-08-29 RX ADMIN — BUSPIRONE HYDROCHLORIDE 10 MG: 10 TABLET ORAL at 14:53

## 2019-08-29 RX ADMIN — BUSPIRONE HYDROCHLORIDE 10 MG: 10 TABLET ORAL at 08:04

## 2019-08-29 RX ADMIN — ESCITALOPRAM OXALATE 10 MG: 10 TABLET ORAL at 08:04

## 2019-08-29 RX ADMIN — IBUPROFEN 400 MG: 400 TABLET ORAL at 10:52

## 2019-08-29 RX ADMIN — CETIRIZINE HYDROCHLORIDE 10 MG: 10 TABLET, FILM COATED ORAL at 08:04

## 2019-08-29 RX ADMIN — PANTOPRAZOLE SODIUM 20 MG: 20 TABLET, DELAYED RELEASE ORAL at 08:04

## 2019-08-29 RX ADMIN — PALIPERIDONE 3 MG: 3 TABLET, EXTENDED RELEASE ORAL at 08:04

## 2019-08-29 ASSESSMENT — PAIN SCALES - GENERAL: PAINLEVEL_OUTOF10: 6
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
## 2019-08-29 NOTE — GROUP NOTE
Group Therapy Note     Date: August 29  Group Start Time: 850am  Group End Time: 925am  Group Topic: Community meeting     Stephanie Schwartz, [place]           Group Therapy Note     Attendees: 6/20        Patient's Goal:  To increase social interaction and to explore and identify short term goals r/t wellness and recovery. RT discussed group schedule and unit structure/resources and encouraged pts to be engaged in their treatment. Pts were given opportunities to ask questions. Notes: Pt participated in group task and was able to identify short term goals r/t wellness and recovery with encouragement. Pt was pleasant on approach and supportive of peers. Status After Intervention:  Improved     Participation Level:  Active Listener and Interactive     Participation Quality: Appropriate, Attentive, Sharing         Speech:   Normal     Thought Process/Content: Logical,linear     Affective Functioning: Congruent     Mood: euthymic        Level of consciousness:  Alert, and Attentive        Response to Learning: Able to verbalize current knowledge/experience, Able to verbalize/acknowledge new learning, and Progressing to goal        Endings: None Reported     Modes of Intervention: Education, Support, Socialization, Exploration, Clarifying and Problem-solving        Discipline Responsible: Psychoeducational Specialist        Signature:  Nathaly Gao

## 2019-11-20 ENCOUNTER — HOSPITAL ENCOUNTER (EMERGENCY)
Age: 24
Discharge: HOME | End: 2019-11-21
Attending: EMERGENCY MEDICINE
Payer: MEDICARE

## 2019-11-20 DIAGNOSIS — R45.4 ANGER REACTION: Primary | ICD-10-CM

## 2019-11-20 PROCEDURE — 99285 EMERGENCY DEPT VISIT HI MDM: CPT

## 2019-11-21 VITALS
TEMPERATURE: 97.6 F | BODY MASS INDEX: 41.75 KG/M2 | HEART RATE: 90 BPM | HEIGHT: 73 IN | WEIGHT: 315 LBS | DIASTOLIC BLOOD PRESSURE: 86 MMHG | OXYGEN SATURATION: 96 % | RESPIRATION RATE: 16 BRPM | SYSTOLIC BLOOD PRESSURE: 136 MMHG

## 2019-11-21 ASSESSMENT — PAIN DESCRIPTION - LOCATION: LOCATION: BACK

## 2019-11-21 ASSESSMENT — PAIN SCALES - GENERAL: PAINLEVEL_OUTOF10: 5

## 2019-11-21 ASSESSMENT — PAIN DESCRIPTION - PAIN TYPE: TYPE: CHRONIC PAIN

## 2019-11-21 ASSESSMENT — PAIN DESCRIPTION - ORIENTATION: ORIENTATION: LOWER;UPPER

## 2019-11-21 ASSESSMENT — PAIN DESCRIPTION - DESCRIPTORS: DESCRIPTORS: ACHING

## 2019-11-23 ASSESSMENT — ENCOUNTER SYMPTOMS
COUGH: 0
RHINORRHEA: 0
SHORTNESS OF BREATH: 0
COLOR CHANGE: 0
EYE REDNESS: 0
SINUS PRESSURE: 0
VOMITING: 0
NAUSEA: 0
EYE PAIN: 0
WHEEZING: 0
SINUS PAIN: 0
DIARRHEA: 0
ABDOMINAL PAIN: 0

## 2019-12-10 ENCOUNTER — HOSPITAL ENCOUNTER (EMERGENCY)
Age: 24
Discharge: HOME | End: 2019-12-10
Attending: EMERGENCY MEDICINE
Payer: MEDICARE

## 2019-12-10 VITALS
BODY MASS INDEX: 42.66 KG/M2 | RESPIRATION RATE: 18 BRPM | HEART RATE: 74 BPM | DIASTOLIC BLOOD PRESSURE: 70 MMHG | HEIGHT: 72 IN | TEMPERATURE: 97.6 F | WEIGHT: 315 LBS | OXYGEN SATURATION: 100 % | SYSTOLIC BLOOD PRESSURE: 140 MMHG

## 2019-12-10 DIAGNOSIS — F32.A DEPRESSION, UNSPECIFIED DEPRESSION TYPE: Primary | ICD-10-CM

## 2019-12-10 PROCEDURE — 99284 EMERGENCY DEPT VISIT MOD MDM: CPT

## 2020-06-28 ENCOUNTER — HOSPITAL ENCOUNTER (EMERGENCY)
Age: 25
Discharge: HOME | End: 2020-06-28
Attending: EMERGENCY MEDICINE
Payer: MEDICARE

## 2020-06-28 VITALS
TEMPERATURE: 97.6 F | OXYGEN SATURATION: 97 % | WEIGHT: 315 LBS | RESPIRATION RATE: 18 BRPM | HEART RATE: 78 BPM | BODY MASS INDEX: 55.47 KG/M2 | DIASTOLIC BLOOD PRESSURE: 77 MMHG | SYSTOLIC BLOOD PRESSURE: 142 MMHG

## 2020-06-28 PROCEDURE — 96372 THER/PROPH/DIAG INJ SC/IM: CPT

## 2020-06-28 PROCEDURE — 6360000002 HC RX W HCPCS: Performed by: EMERGENCY MEDICINE

## 2020-06-28 PROCEDURE — 99283 EMERGENCY DEPT VISIT LOW MDM: CPT

## 2020-06-28 PROCEDURE — 6370000000 HC RX 637 (ALT 250 FOR IP): Performed by: EMERGENCY MEDICINE

## 2020-06-28 RX ORDER — LIDOCAINE 50 MG/G
1 PATCH TOPICAL DAILY
Qty: 10 PATCH | Refills: 0 | Status: SHIPPED | OUTPATIENT
Start: 2020-06-28 | End: 2020-07-08

## 2020-06-28 RX ORDER — ORPHENADRINE CITRATE 30 MG/ML
60 INJECTION INTRAMUSCULAR; INTRAVENOUS ONCE
Status: COMPLETED | OUTPATIENT
Start: 2020-06-28 | End: 2020-06-28

## 2020-06-28 RX ORDER — LIDOCAINE 4 G/G
1 PATCH TOPICAL ONCE
Status: DISCONTINUED | OUTPATIENT
Start: 2020-06-28 | End: 2020-06-28 | Stop reason: HOSPADM

## 2020-06-28 RX ORDER — IBUPROFEN 800 MG/1
800 TABLET ORAL EVERY 8 HOURS PRN
Qty: 30 TABLET | Refills: 0 | Status: SHIPPED | OUTPATIENT
Start: 2020-06-28

## 2020-06-28 RX ORDER — ACETAMINOPHEN 500 MG
1000 TABLET ORAL EVERY 6 HOURS PRN
Qty: 90 TABLET | Refills: 0 | Status: SHIPPED | OUTPATIENT
Start: 2020-06-28

## 2020-06-28 RX ORDER — KETOROLAC TROMETHAMINE 30 MG/ML
30 INJECTION, SOLUTION INTRAMUSCULAR; INTRAVENOUS ONCE
Status: COMPLETED | OUTPATIENT
Start: 2020-06-28 | End: 2020-06-28

## 2020-06-28 RX ORDER — CYCLOBENZAPRINE HCL 5 MG
5 TABLET ORAL 3 TIMES DAILY PRN
Qty: 15 TABLET | Refills: 0 | Status: SHIPPED | OUTPATIENT
Start: 2020-06-28 | End: 2020-07-08

## 2020-06-28 RX ADMIN — KETOROLAC TROMETHAMINE 30 MG: 30 INJECTION, SOLUTION INTRAMUSCULAR at 09:50

## 2020-06-28 RX ADMIN — ORPHENADRINE CITRATE 60 MG: 30 INJECTION INTRAMUSCULAR; INTRAVENOUS at 09:50

## 2020-06-28 ASSESSMENT — ENCOUNTER SYMPTOMS
ABDOMINAL PAIN: 0
SORE THROAT: 0
EYE PAIN: 0
DIARRHEA: 0
SHORTNESS OF BREATH: 0
BACK PAIN: 1
COUGH: 0
NAUSEA: 0

## 2020-06-28 ASSESSMENT — PAIN SCALES - GENERAL
PAINLEVEL_OUTOF10: 8
PAINLEVEL_OUTOF10: 8

## 2020-06-28 ASSESSMENT — PAIN DESCRIPTION - LOCATION: LOCATION: BACK
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
## 2020-06-28 NOTE — ED PROVIDER NOTES
[facility] ED     Emergency Department     Faculty Attestation        I performed a history and physical examination of the patient and discussed management with the resident. I reviewed the residents note and agree with the documented findings and plan of care. Any areas of disagreement are noted on the chart. I was personally present for the key portions of any procedures. I have documented in the chart those procedures where I was not present during the key portions. I have reviewed the emergency nurses triage note. I agree with the chief complaint, past medical history, past surgical history, allergies, medications, social and family history as documented unless otherwise noted below. For Physician Assistant/ Nurse Practitioner cases/documentation I have personally evaluated this patient and have completed at least one if not all key elements of the E/M (history, physical exam, and MDM). Additional findings are as noted. Vital Signs: BP: (!) 142/77  Pulse: 78  Resp: 18  Temp: 97.6 °F (36.4 °C) SpO2: 97 %  PCP:  SUDHA CORTEZ, APRN - CNP    Pertinent Comments:     Patient presents with acute on chronic back pain without any new injury. Denies Numbness/tingling or weakness. No loss of b/b function. No perianal numbness. Physical Exam:  5/5 strength in Bilateral LE's, sensation to light touch intact, DTR's are 2+ and equal.  Downgoing Babinski's Bilaterally. Abd is soft/nt/nd/+ BS/No pulsatile masses palpated. Plan: Pain control with short course followup. Critical Care  None    This patient was evaluated in the Emergency Department for symptoms described in the history of present illness. He/she was evaluated in the context of the global COVID-19 pandemic, which necessitated consideration that the patient might be at risk for infection with the SARS-CoV-2 virus that causes COVID-19.  Institutional protocols and algorithms that pertain to the evaluation of patients at risk for COVID-19 are in a state of rapid change based on information released by regulatory bodies including the CDC and federal and state organizations. These policies and algorithms were followed during the patient's care in the ED. (Please note that portions of this note were completed with a voice recognition program. Efforts were made to edit the dictations but occasionally words are mis-transcribed.  Whenever words are used in this note in any gender, they shall be construed as though they were used in the gender appropriate to the circumstances; and whenever words are used in this note in the singular or plural form, they shall be construed as though they were used in the form appropriate to the circumstances.)    MD Abiel Fisher  Attending Emergency Medicine Physician            Sadie Wolf MD  06/28/20 1754

## 2020-07-15 ENCOUNTER — NURSE TRIAGE (OUTPATIENT)
Dept: OTHER | Facility: CLINIC | Age: 25
End: 2020-07-15

## 2020-07-15 NOTE — TELEPHONE ENCOUNTER
Reason for Disposition   [1] COVID-19 diagnosed by positive lab test AND [2] mild symptoms (e.g., cough, fever, others) AND [0] no complications or SOB    Answer Assessment - Initial Assessment Questions  1. COVID-19 DIAGNOSIS: \"Who made your Coronavirus (COVID-19) diagnosis? \" \"Was it confirmed by a positive lab test?\" If not diagnosed by a HCP, ask \"Are there lots of cases (community spread) where you live? \" (See [facility] health department website, if unsure)      Nay francis  2. ONSET: \"When did the COVID-19 symptoms start? \"       7/14/2020  3. WORST SYMPTOM: \"What is your worst symptom? \" (e.g., cough, fever, shortness of breath, muscle aches)      body aches   4. COUGH: \"Do you have a cough? \" If so, ask: \"How bad is the cough? \"        no  5. FEVER: \"Do you have a fever? \" If so, ask: \"What is your temperature, how was it measured, and when did it start? \"      no  6. RESPIRATORY STATUS: \"Describe your breathing? \" (e.g., shortness of breath, wheezing, unable to speak)       no  7. BETTER-SAME-WORSE: [facility] you getting better, staying the same or getting worse compared to yesterday? \"  If getting worse, ask, \"In what way? \"      worse  8. HIGH RISK DISEASE: \"Do you have any chronic medical problems? \" (e.g., asthma, heart or lung disease, weak immune system, etc.)      Overweight, asthma  9. PREGNANCY: \"Is there any chance you are pregnant? \" \"When was your last menstrual period? \"      n/a  10. OTHER SYMPTOMS: \"Do you have any other symptoms? \"  (e.g., chills, fatigue, headache, loss of smell or taste, muscle pain, sore throat)        Sneezing, chills, joint and muscle pain, sore throat, headache and fatigue. Protocols used: CORONAVIRUS (GPTLQ-97) DIAGNOSED OR SUSPECTED-ADULT-    Caller was exposed to COVID 7/10/2020. S/s started 7/14/2020: sneezing, chills, joint and muscle pain, sore throat, headache and fatigue. No SOB or chest pain. Recommendation Home Care.

## 2021-04-10 ENCOUNTER — HOSPITAL ENCOUNTER (EMERGENCY)
Age: 26
Discharge: HOME | End: 2021-04-10
Attending: EMERGENCY MEDICINE
Payer: MEDICARE

## 2021-04-10 VITALS
RESPIRATION RATE: 16 BRPM | OXYGEN SATURATION: 98 % | TEMPERATURE: 98.3 F | HEART RATE: 80 BPM | SYSTOLIC BLOOD PRESSURE: 139 MMHG | DIASTOLIC BLOOD PRESSURE: 90 MMHG

## 2021-04-10 DIAGNOSIS — R45.851 SUICIDAL IDEATION: Primary | ICD-10-CM

## 2021-04-10 PROCEDURE — 99282 EMERGENCY DEPT VISIT SF MDM: CPT

## 2021-04-10 ASSESSMENT — ENCOUNTER SYMPTOMS
SHORTNESS OF BREATH: 0
DIARRHEA: 0
COUGH: 0
ABDOMINAL PAIN: 0
VOMITING: 0
BACK PAIN: 0

## 2021-04-11 NOTE — ED PROVIDER NOTES
EMERGENCY DEPARTMENT ENCOUNTER    Pt Name: Natalie Botello  MRN: [id number]  [place] 1995  Date of evaluation: 4/10/21  CHIEF COMPLAINT       Chief Complaint   Patient presents with    Mental Health Problem     HISTORY OF PRESENT ILLNESS   HPI     This is a 82-year-old male who comes in today the patient has a history of depression. Patient states that he feels suicidal he says that he has been feeling this way his whole life he has no plan. Patient tells me there is no way for me to kill myself at home because I do not have access to guns and the only knives that I have are in the kitchen and they are locked up. He says that he receives 24-hour care. Patient states that he has been feeling depressed because he has lost 3 jobs this year. He says that he feels sweaty because it is hot but he has no other medical complaints at this time. Says that he has not had any recent adjustment in his medications he follows up at [facility]. REVIEW OF SYSTEMS     Review of Systems   Constitutional: Negative for fever. HENT: Negative for congestion. Respiratory: Negative for cough and shortness of breath. Cardiovascular: Negative for chest pain. Gastrointestinal: Negative for abdominal pain, diarrhea and vomiting. Genitourinary: Negative for dysuria. Musculoskeletal: Negative for back pain. Skin: Negative for rash. Neurological: Negative for headaches. Psychiatric/Behavioral: Positive for suicidal ideas. All other systems reviewed and are negative.     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    Injury of frontal lobe (HCC)     Multiple sensory deficit syndrome      SURGICAL HISTORY       Past Surgical History:   Procedure Laterality Date    BLADDER SURGERY      FRACTURE SURGERY  4/6/2012    Lt hip    INNER EAR SURGERY Left     MOUTH SURGERY      PELVIC FRACTURE SURGERY Right     WISDOM TOOTH EXTRACTION CURRENT MEDICATIONS       Discharge Medication List as of 4/10/2021 11:26 PM      CONTINUE these medications which have NOT CHANGED    Details   ibuprofen (ADVIL;MOTRIN) 800 MG tablet Take 1 tablet by mouth every 8 hours as needed for Pain, Disp-30 tablet, R-0Print      acetaminophen (APAP EXTRA STRENGTH) 500 MG tablet Take 2 tablets by mouth every 6 hours as needed for Pain, Disp-90 tablet, R-0Print      busPIRone (BUSPAR) 10 MG tablet Take 1 tablet by mouth 3 times daily, Disp-90 tablet, R-0Normal      traZODone (DESYREL) 50 MG tablet Take 1 tablet by mouth nightly as needed for Sleep, Disp-30 tablet, R-0Normal      escitalopram (LEXAPRO) 10 MG tablet Take 1 tablet by mouth daily, Disp-30 tablet, R-0Normal      paliperidone (INVEGA) 3 MG extended release tablet Take 1 tablet by mouth daily, Disp-30 tablet, R-0Normal      budesonide (PULMICORT FLEXHALER) 180 MCG/ACT AEPB inhaler Inhale 1 puff into the lungs 2 times daily, Disp-1 each, R-0Normal      cetirizine (ZYRTEC) 10 MG tablet Take 1 tablet by mouth daily, Disp-30 tablet, R-0Normal      omeprazole (PRILOSEC) 20 MG delayed release capsule Take 1 capsule by mouth 2 times daily, Disp-30 capsule, R-0Normal           ALLERGIES     is allergic to hydromorphone. FAMILY HISTORY     is adopted. SOCIAL HISTORY       Social History     Tobacco Use    Smoking status: Current Some Day Smoker     Packs/day: 0.00     Types: Cigarettes    Smokeless tobacco: Current User     Types: Chew   Substance Use Topics    Alcohol use: No    Drug use: Yes     Types: Marijuana, Cocaine     Comment: Last use 2 weeks before Christmas, not since being put on probation     PHYSICAL EXAM     INITIAL VITALS: BP (!) 139/90   Pulse 80   Temp 98.3 °F (36.8 °C) (Oral)   Resp 16   SpO2 98%    Physical Exam  Vitals signs and nursing note reviewed. Constitutional:       General: He is not in acute distress. Appearance: He is well-developed. He is obese.    HENT:      Head: Normocephalic and atraumatic. Eyes:      Conjunctiva/sclera: Conjunctivae normal.   Neck:      Musculoskeletal: Neck supple. Cardiovascular:      Rate and Rhythm: Normal rate and regular rhythm. Heart sounds: No murmur. No friction rub. Pulmonary:      Effort: Pulmonary effort is normal. No respiratory distress. Breath sounds: Normal breath sounds. No wheezing or rhonchi. Abdominal:      General: There is no distension. Palpations: Abdomen is soft. Tenderness: There is no abdominal tenderness. There is no guarding or rebound. Skin:     General: Skin is warm and dry. Capillary Refill: Capillary refill takes less than 2 seconds. Neurological:      Mental Status: He is alert. Psychiatric:      Comments: Suicidal         EMERGENCY DEPARTMENTCOURSE:   Differential diagnosis includes exacerbation of chronic mental illness medication noncompliance, Acute stress reaction. Patient is complaining of suicidal ideation that has been going on his entire life he tells me himself that there is no way that he can kill himself and his 24-hour care facility do not believe that the patient meets inpatient criteria at this time. He will be discharged to follow-up with [place]            Vitals:    Vitals:    04/10/21 2259   BP: (!) 139/90   Pulse: 80   Resp: 16   Temp: 98.3 °F (36.8 °C)   TempSrc: Oral   SpO2: 98%       FINAL IMPRESSION      1.  Suicidal ideation         DISPOSITION/PLAN   DISPOSITION Decision To Discharge 04/10/2021 11:17:01 PM    Raymond Renee MD  Attending Emergency Physician    This charting supersedes any ED resident or staff charting and was written using speech recognition software        Raymond Renee MD  04/11/21 1458

## 2021-04-11 NOTE — ED NOTES
SW will continue to reach to out to pt's caregiver to pick pt up from the ED and transport pt back home. Pt waiting in ED waiting room until transport is arranged. SW encouraged pt to return to the hospital or call 911 if pt's symptoms worsen or pt is having thoughts of wanting to harm self/others. Pt verbalized pt's understanding. SW provided pt with pt's belongings and discharge paperwork. Pt cooperatively exited the [facility].

## 2021-04-11 NOTE — ED NOTES
CARLA contacted pt's on call caregiver. CARLA left a voicemail informing staff pt is being discharged and is in need of a ride back home.

## 2021-04-11 NOTE — ED NOTES
Adán Tyson is a 23year old male who presents to the ED via pt's caregiver for care of suicidal ideation. Pt reports pt is feeling suicidal and has been \"my whole life. \" Pt reports pt has been feeling increasingly depressed for the past two weeks due to \"life in general\" and arguing with pt's girlfriend. Pt has no plan or intent to harm self. Pt reports \" I can't hurt my self in the first place. I don't own a gun and all the knives are locked up in my home. \" Pt has 24 hr care. Pt is med compliant but states he my need a med adjustment. Pt is linked with [facility] and has an appt coming up with pt's therapist.     Enid Damon consulted with ED . ED Dr and CARLA agree pt is safe to be discharged home. Pt is not a risk to self or others at this time. Pt denies SI/HI. Pt agreeable to be discharged home and follow up outpatient services. SW encouraged pt to follow up with [facility] urgent care and [facility].

## 2021-11-20 ENCOUNTER — HOSPITAL ENCOUNTER (INPATIENT)
Age: 26
LOS: 4 days | Discharge: HOME | DRG: 885 | End: 2021-11-24
Attending: PSYCHIATRY & NEUROLOGY | Admitting: PSYCHIATRY & NEUROLOGY
Payer: MEDICARE

## 2021-11-20 PROBLEM — F43.10 PTSD (POST-TRAUMATIC STRESS DISORDER): Status: ACTIVE | Noted: 2021-11-20

## 2021-11-20 PROBLEM — F33.2 MAJOR DEPRESSIVE DISORDER, RECURRENT SEVERE WITHOUT PSYCHOTIC FEATURES (HCC): Status: ACTIVE | Noted: 2021-11-20

## 2021-11-20 PROBLEM — F41.1 GAD (GENERALIZED ANXIETY DISORDER): Status: ACTIVE | Noted: 2021-11-20

## 2021-11-20 PROBLEM — S06.9XAA TBI (TRAUMATIC BRAIN INJURY) (HCC): Status: ACTIVE | Noted: 2021-11-20

## 2021-11-20 PROCEDURE — 1240000000 HC EMOTIONAL WELLNESS R&B

## 2021-11-20 PROCEDURE — 6370000000 HC RX 637 (ALT 250 FOR IP): Performed by: PSYCHIATRY & NEUROLOGY

## 2021-11-20 PROCEDURE — APPSS60 APP SPLIT SHARED TIME 46-60 MINUTES: Performed by: NURSE PRACTITIONER

## 2021-11-20 PROCEDURE — 99223 1ST HOSP IP/OBS HIGH 75: CPT | Performed by: PSYCHIATRY & NEUROLOGY

## 2021-11-20 RX ORDER — MAGNESIUM HYDROXIDE/ALUMINUM HYDROXICE/SIMETHICONE 120; 1200; 1200 MG/30ML; MG/30ML; MG/30ML
30 SUSPENSION ORAL EVERY 6 HOURS PRN
Status: DISCONTINUED | OUTPATIENT
Start: 2021-11-20 | End: 2021-11-24 | Stop reason: HOSPADM

## 2021-11-20 RX ORDER — ACETAMINOPHEN 325 MG/1
650 TABLET ORAL EVERY 4 HOURS PRN
Status: DISCONTINUED | OUTPATIENT
Start: 2021-11-20 | End: 2021-11-24 | Stop reason: HOSPADM

## 2021-11-20 RX ORDER — TRAZODONE HYDROCHLORIDE 50 MG/1
50 TABLET ORAL NIGHTLY PRN
Status: DISCONTINUED | OUTPATIENT
Start: 2021-11-20 | End: 2021-11-20

## 2021-11-20 RX ORDER — PALIPERIDONE 3 MG/1
3 TABLET, EXTENDED RELEASE ORAL DAILY
Status: DISCONTINUED | OUTPATIENT
Start: 2021-11-20 | End: 2021-11-21

## 2021-11-20 RX ORDER — NICOTINE 21 MG/24HR
1 PATCH, TRANSDERMAL 24 HOURS TRANSDERMAL DAILY
Status: DISCONTINUED | OUTPATIENT
Start: 2021-11-20 | End: 2021-11-20

## 2021-11-20 RX ORDER — PANTOPRAZOLE SODIUM 40 MG/1
40 TABLET, DELAYED RELEASE ORAL
Status: DISCONTINUED | OUTPATIENT
Start: 2021-11-21 | End: 2021-11-24 | Stop reason: HOSPADM

## 2021-11-20 RX ORDER — HYDROXYZINE 50 MG/1
50 TABLET, FILM COATED ORAL 3 TIMES DAILY PRN
Status: DISCONTINUED | OUTPATIENT
Start: 2021-11-20 | End: 2021-11-24 | Stop reason: HOSPADM

## 2021-11-20 RX ORDER — ESCITALOPRAM OXALATE 10 MG/1
10 TABLET ORAL DAILY
Status: DISCONTINUED | OUTPATIENT
Start: 2021-11-20 | End: 2021-11-22

## 2021-11-20 RX ORDER — ACETAMINOPHEN 500 MG
1000 TABLET ORAL EVERY 6 HOURS PRN
Status: DISCONTINUED | OUTPATIENT
Start: 2021-11-20 | End: 2021-11-24 | Stop reason: HOSPADM

## 2021-11-20 RX ORDER — TRAZODONE HYDROCHLORIDE 50 MG/1
50 TABLET ORAL NIGHTLY PRN
Status: DISCONTINUED | OUTPATIENT
Start: 2021-11-20 | End: 2021-11-24 | Stop reason: HOSPADM

## 2021-11-20 RX ORDER — FLUTICASONE PROPIONATE 110 UG/1
1 AEROSOL, METERED RESPIRATORY (INHALATION) 2 TIMES DAILY
Refills: 0 | Status: DISCONTINUED | OUTPATIENT
Start: 2021-11-20 | End: 2021-11-24 | Stop reason: HOSPADM

## 2021-11-20 RX ORDER — POLYETHYLENE GLYCOL 3350 17 G/17G
17 POWDER, FOR SOLUTION ORAL DAILY PRN
Status: DISCONTINUED | OUTPATIENT
Start: 2021-11-20 | End: 2021-11-24 | Stop reason: HOSPADM

## 2021-11-20 RX ORDER — IBUPROFEN 400 MG/1
400 TABLET ORAL EVERY 6 HOURS PRN
Status: DISCONTINUED | OUTPATIENT
Start: 2021-11-20 | End: 2021-11-24 | Stop reason: HOSPADM

## 2021-11-20 RX ORDER — BUSPIRONE HYDROCHLORIDE 10 MG/1
10 TABLET ORAL 3 TIMES DAILY
Status: DISCONTINUED | OUTPATIENT
Start: 2021-11-20 | End: 2021-11-24 | Stop reason: HOSPADM

## 2021-11-20 RX ADMIN — BUSPIRONE HYDROCHLORIDE 10 MG: 10 TABLET ORAL at 14:53

## 2021-11-20 RX ADMIN — HYDROXYZINE HYDROCHLORIDE 50 MG: 50 TABLET, FILM COATED ORAL at 18:08

## 2021-11-20 RX ADMIN — PALIPERIDONE 3 MG: 3 TABLET, EXTENDED RELEASE ORAL at 14:53

## 2021-11-20 RX ADMIN — ESCITALOPRAM OXALATE 10 MG: 10 TABLET ORAL at 14:53

## 2021-11-20 ASSESSMENT — LIFESTYLE VARIABLES
HISTORY_ALCOHOL_USE: NO
HISTORY_ALCOHOL_USE: NO

## 2021-11-20 ASSESSMENT — SLEEP AND FATIGUE QUESTIONNAIRES
AVERAGE NUMBER OF SLEEP HOURS: 6
DO YOU HAVE DIFFICULTY SLEEPING: NO
DO YOU USE A SLEEP AID: NO

## 2021-11-20 ASSESSMENT — PAIN SCALES - GENERAL: PAINLEVEL_OUTOF10: 0

## 2021-11-20 ASSESSMENT — PATIENT HEALTH QUESTIONNAIRE - PHQ9: SUM OF ALL RESPONSES TO PHQ QUESTIONS 1-9: 17

## 2021-11-20 NOTE — PLAN OF CARE
Problem: Depressive Behavior With or Without Suicide Precautions:  Goal: Absence of self-harm  Description: Absence of self-harm  11/20/2021 1602 by Maru Joya LPN  Outcome: Met This Shift   Pt denies thoughts of self harm and is agreeable to seeking out should thoughts of self harm arise. Safe environment maintained. Q15 minute checks for safety cont per unit policy. Will cont to monitor for safety and provides support and reassurance as needed.

## 2021-11-20 NOTE — GROUP NOTE
Group Therapy Note    Date: 11/20/2021    Group Start Time: 1030  Group End Time: 7028  Group Topic: Psychoeducation    NATALIE BHRAJ Hogue, DANNY        Group Therapy Note    Attendees: 9/17       Patient was offered group therapy today but declined to participate despite encouragement from staff. 1:1 was offered.       Signature:  RAJ Abraham, DANNY

## 2021-11-20 NOTE — GROUP NOTE
Group Therapy Note    Date: 11/20/2021    Group Start Time: 0915  Group End Time: 0935  Group Topic: Community Meeting    NATALIE Mendieta        Group Therapy Note    Attendees: 9/17         Patient's Goal:  Make calls about housing    Notes:  Anxious, guarded    Status After Intervention:  Unchanged    Participation Level: Active Listener and Interactive    Participation Quality: Appropriate and Attentive      Speech:  normal      Thought Process/Content: Logical      Affective Functioning: Congruent      Mood: anxious      Level of consciousness:  Alert and Attentive      Response to Learning: Able to verbalize current knowledge/experience and Progressing to goal      Endings: None Reported    Modes of Intervention: Education, Support and Socialization      Discipline Responsible: Behavorial Health Tech      Signature:   Patel Mendieta

## 2021-11-20 NOTE — PLAN OF CARE
[id number] [address]  Initial Interdisciplinary Treatment Plan NO      Original treatment plan Date & Time: 11/20/2021 0845    Admission Type:  Admission Type: Voluntary    Reason for admission:   Reason for Admission: Pt voluntary from [facility] with suicidal ideation to jump out a window because him and his girlfriend broke up.     Estimated Length of Stay:  5-7days  Estimated Discharge Date: to be determined by physician    PATIENT STRENGTHS:  Patient Strengths:Strengths: Connection to output provider, Communication  Patient Strengths and Limitations:Limitations: General negative or hopeless attitude about future/recovery, Hopeless about future  Addictive Behavior: Addictive Behavior  In the past 3 months, have you felt or has someone told you that you have a problem with:  : None  Do you have a history of Chemical Use?: No  Do you have a history of Alcohol Use?: No  Do you have a history of Street Drug Abuse?: No  Histroy of Prescripton Drug Abuse?: No  Medical Problems:  Past Medical History:   Diagnosis Date    Autistic disorder     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    Injury of frontal lobe (HCC)     Multiple sensory deficit syndrome      Status EXAM:Status and Exam  Normal: No  Facial Expression: Expressionless  Affect: Blunt  Level of Consciousness: Alert  Mood:Normal: No  Mood: Anxious, Depressed, Sad  Motor Activity:Normal: Yes  Interview Behavior: Cooperative  Preception: Mentcle to Person, Katheleen Dessert to Time, Mentcle to Place, Mentcle to Situation  Attention:Normal: No  Attention: Distractible  Thought Processes: Circumstantial  Thought Content:Normal: No  Thought Content: Preoccupations  Hallucinations: None  Delusions: No  Memory:Normal: Yes  Insight and Judgment: No  Insight and Judgment: Poor Judgment, Poor Insight  Present Suicidal Ideation: Yes (contracts)  Present Homicidal Ideation: No    EDUCATION:   Learner Progress Toward Treatment Goals: reviewed group plans and strategies for

## 2021-11-20 NOTE — CARE COORDINATION
BHI Biopsychosocial Assessment    Current Level of Psychosocial Functioning     Independent X  Dependent    Minimal Assist     Comments:    Psychosocial High Risk Factors (check all that apply)    Unable to obtain meds   Chronic illness/pain    Substance abuse   Lack of Family Support X  Financial stress   Isolation   Inadequate Community Resources  Suicide attempt(s) X  Not taking medications   Victim of crime   Developmental Delay X  Unable to manage personal needs    Age 72 or older   Homeless  No transportation   Readmission within 30 days  Unemployment  Traumatic Event X    Comments:   Psychiatric Advanced Directives: n/a    Family to Involve in Treatment: Patient declined. Sexual Orientation:  n/a    Patient Strengths: Patient has insurance, income, housing, connection to community supports. Patient Barriers: Patient remains suicidal, is not able to identify appropriate coping skills, lacks family support. Opiate Education Provided:  Patient does not use opiates. CMHC/mental health history: [facility]   medication management, group/individual therapies, family meetings, psycho -education, treatment team meetings to assist with stabilization    Initial Discharge Plan:  Patient will discharge home where he has 24 hour staff through [facility]. He also has a DD . Clinical Summary:    Magdi Garsia is a 26 y/o male who was admitted to [place] [id number] for suicidal thoughts with a plan to hang himself or run into traffic. He continues to state he is suicidal during admission and states there is nothing anyone can do in the hospital to help him. Patient said he is not safe at home either and the only way SW can help him is to let him go so he can kill himself. Patient is labile and unable to express anything other than wanting to harm himself. He indicated he has been hospitalized multiple times and if they haven't been able to \"fix\" him then no one can.   Due to 24 hour staff assisting patient at home SW will need adequate notice to coordinate patient's discharge.
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
## 2021-11-20 NOTE — BH NOTE
[facility]  Admission Note     Admission Type:   Admission Type: Voluntary    Reason for admission:  Reason for Admission: Pt voluntary from [facility] with suicidal ideation to jump out a window because him and his girlfriend broke up.     PATIENT STRENGTHS:  Strengths: Connection to output provider, Communication    Patient Strengths and Limitations:  Limitations: General negative or hopeless attitude about future/recovery, Hopeless about future    Addictive Behavior:   Addictive Behavior  In the past 3 months, have you felt or has someone told you that you have a problem with:  : None  Do you have a history of Chemical Use?: No  Do you have a history of Alcohol Use?: No  Do you have a history of Street Drug Abuse?: No  Histroy of Prescripton Drug Abuse?: No    Medical Problems:   Past Medical History:   Diagnosis Date    Autistic disorder     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    Injury of frontal lobe (HCC)     Multiple sensory deficit syndrome        Status EXAM:  Status and Exam  Normal: No  Facial Expression: Expressionless  Affect: Blunt  Level of Consciousness: Alert  Mood:Normal: No  Mood: Anxious, Depressed, Sad  Motor Activity:Normal: Yes  Interview Behavior: Cooperative  Preception: [place] to Person, Marguerita Civil to Time, [place] to Place, [place] to Situation  Attention:Normal: No  Attention: Distractible  Thought Processes: Circumstantial  Thought Content:Normal: No  Thought Content: Preoccupations  Hallucinations: None  Delusions: No  Memory:Normal: Yes  Insight and Judgment: No  Insight and Judgment: Poor Judgment, Poor Insight  Present Suicidal Ideation: Yes (contracts)  Present Homicidal Ideation: No    Tobacco Screening:  Practical Counseling, on admission, garrett X, if applicable and completed (first 3 are required if patient doesn't refuse):            ( )  Recognizing danger situations (included triggers and roadblocks)                    ( )  Coping skills (new ways to manage stress, exercise, relaxation techniques, changing routine, distraction)                                                           ( )  Basic information about quitting (benefits of quitting, techniques in how to quit, available resources  ( ) Referral for counseling faxed to [facility]                                           ( ) Patient refused counseling  (X ) Patient has not smoked in the last 30 days    Metabolic Screening:    Lab Results   Component Value Date    LABA1C 6.4 (H) 06/11/2019       Lab Results   Component Value Date    CHOL 161 06/11/2019    CHOL 150 11/03/2012     Lab Results   Component Value Date    TRIG 210 (H) 06/11/2019    TRIG 197 (H) 11/03/2012     Lab Results   Component Value Date    HDL 25 (L) 06/11/2019    HDL 32 (L) 11/03/2012     No components found for: LDLCAL  No results found for: LABVLDL      Body mass index is 53.92 kg/m². BP Readings from Last 2 Encounters:   11/20/21 (!) 130/90   04/10/21 (!) 139/90           Pt admitted with followings belongings:  Dentures: None  Vision - Corrective Lenses: None  Hearing Aid: None  Jewelry: None  Body Piercings Removed: N/A  Clothing: Footwear, Jacket / coat, Pants, Shirt  Were All Patient Medications Collected?: Not Applicable  Other Valuables: Cell phone     Patient's home medications need verified with pharmacy. Patient oriented to surroundings and program expectations and copy of patient rights given. Received admission packet:  yes. Consents reviewed, signed yes. Refused n/a. Patient verbalize understanding:  yes. Patient education on precautions: yes. Patient voluntary from [facility] with suicidal ideation to jump out window due to break up with girlfriend. Patient states he has been off his medications for a couple days. Patient denies homicidal ideation. Patient denies auditory and visual hallucinations. Patient denies drug, alcohol, or tobacco use. Patient is linked with [facility] and lives in a group home.  Patient was cooperative with the admission process and signed all paperwork.               Alex Rao RN

## 2021-11-20 NOTE — PROGRESS NOTES
Behavioral Services  Medicare Certification Upon Admission    I certify that this patient's inpatient psychiatric hospital admission is medically necessary for:    [x] (1) Treatment which could reasonably be expected to improve this patient's condition,       [x] (2) Or for diagnostic study;     AND     [x](2) The inpatient psychiatric services are provided while the individual is under the care of a physician and are included in the individualized plan of care.     Estimated length of stay/service -3 to 6 days    Plan for post-hospital care -outpatient care    Electronically signed by Wan Hyman MD on 11/20/2021 at 12:36 PM

## 2021-11-21 PROCEDURE — 6370000000 HC RX 637 (ALT 250 FOR IP): Performed by: PSYCHIATRY & NEUROLOGY

## 2021-11-21 PROCEDURE — 1240000000 HC EMOTIONAL WELLNESS R&B

## 2021-11-21 PROCEDURE — 99232 SBSQ HOSP IP/OBS MODERATE 35: CPT | Performed by: NURSE PRACTITIONER

## 2021-11-21 PROCEDURE — 6370000000 HC RX 637 (ALT 250 FOR IP): Performed by: NURSE PRACTITIONER

## 2021-11-21 RX ORDER — DIPHENHYDRAMINE HYDROCHLORIDE 50 MG/ML
50 INJECTION INTRAMUSCULAR; INTRAVENOUS EVERY 6 HOURS PRN
Status: DISCONTINUED | OUTPATIENT
Start: 2021-11-21 | End: 2021-11-23

## 2021-11-21 RX ORDER — PALIPERIDONE 6 MG/1
6 TABLET, EXTENDED RELEASE ORAL DAILY
Status: DISCONTINUED | OUTPATIENT
Start: 2021-11-22 | End: 2021-11-24 | Stop reason: HOSPADM

## 2021-11-21 RX ORDER — LORAZEPAM 2 MG/ML
2 INJECTION INTRAMUSCULAR EVERY 6 HOURS PRN
Status: DISCONTINUED | OUTPATIENT
Start: 2021-11-21 | End: 2021-11-23

## 2021-11-21 RX ORDER — HALOPERIDOL 5 MG/ML
5 INJECTION INTRAMUSCULAR EVERY 6 HOURS PRN
Status: DISCONTINUED | OUTPATIENT
Start: 2021-11-21 | End: 2021-11-23

## 2021-11-21 RX ORDER — LORAZEPAM 1 MG/1
2 TABLET ORAL EVERY 6 HOURS PRN
Status: DISCONTINUED | OUTPATIENT
Start: 2021-11-21 | End: 2021-11-24 | Stop reason: HOSPADM

## 2021-11-21 RX ORDER — HALOPERIDOL 5 MG
5 TABLET ORAL EVERY 6 HOURS PRN
Status: DISCONTINUED | OUTPATIENT
Start: 2021-11-21 | End: 2021-11-24 | Stop reason: HOSPADM

## 2021-11-21 RX ADMIN — PALIPERIDONE 3 MG: 3 TABLET, EXTENDED RELEASE ORAL at 07:47

## 2021-11-21 RX ADMIN — BUSPIRONE HYDROCHLORIDE 10 MG: 10 TABLET ORAL at 07:48

## 2021-11-21 RX ADMIN — TRAZODONE HYDROCHLORIDE 50 MG: 50 TABLET ORAL at 20:50

## 2021-11-21 RX ADMIN — ESCITALOPRAM OXALATE 10 MG: 10 TABLET ORAL at 07:48

## 2021-11-21 RX ADMIN — PANTOPRAZOLE SODIUM 40 MG: 40 TABLET, DELAYED RELEASE ORAL at 07:48

## 2021-11-21 RX ADMIN — BUSPIRONE HYDROCHLORIDE 10 MG: 10 TABLET ORAL at 15:41

## 2021-11-21 RX ADMIN — HALOPERIDOL 5 MG: 5 TABLET ORAL at 20:50

## 2021-11-21 RX ADMIN — BUSPIRONE HYDROCHLORIDE 10 MG: 10 TABLET ORAL at 20:50

## 2021-11-21 NOTE — GROUP NOTE
Group Therapy Note    Date: 11/21/2021    Group Start Time: 1030  Group End Time: 5543  Group Topic: Psychoeducation    ARJ Concepcion, DANNY        Group Therapy Note    Attendees: 8/20         Patient was offered group therapy today but declined to participate despite encouragement from staff. 1:1 was offered.     Signature:  RAJ Vick, DANNY

## 2021-11-21 NOTE — PROGRESS NOTES
Daily Progress Note  11/21/2021    Patient Name: Karen July    CHIEF COMPLAINT: Depression with suicidal ideation with plan and intent to hang self or get hit by car         Emergency Medications: None     Scheduled medications: Adherent    SUBJECTIVE:   Patient was seen for follow-up assessment in his assigned room today. He immediately presented as irritable and expressed annoyance that writer was in his room talking to him. He remains discharge focused and stated \"I have been here long enough can you just discharge me already? \". Patient reported that he did not want to eat breakfast and that he only ate [employer] fries because the rest of the food was stuff he did not like. He was reminded that a dietitian would be reviewed with the to discuss food choices. He stated \"I do not know why it is not like that can fix anything I am still not going to like any of this food\". He continues to present with a very nihilistic and negative outlook. Patient rolled his eyes frequently during discussion. He described his mood today is horrible\". He was very sarcastic throughout conversation. Patient did share today that he has a history of cutting behavior when he was a teenager. He continues to endorse suicidal ideation but again stated \"but I am afraid of pain so I can never do it\". He denies that he is experiencing auditory or visual hallucinations. He makes no delusional or paranoid statements during conversation. He agrees to contract for safety on the unit but is unable to contract for safety in the community.     Appetite:  [] Normal/Adequate/Unchanged  [] Increased  [x] Decreased      Sleep:       [] Normal/Adequate/Unchanged  [x] Fair  [] Poor      Group Attendance on Unit:   [] Yes  [x] Selectively    [] No    Medication Side Effects: Denies         Mental Status Exam  Level of consciousness: Alert and awake   Appearance: Shirtless, seated on bed, with poor grooming and hygiene   Behavior/Motor: Guarded, no psychomotor abnormalities   Attitude toward examiner: Evasive, dismissive, sarcastic, good eye contact   Speech: spontaneous, monotone and loud   Mood: Irritable  Affect: Mood congruent  Thought processes: linear, goal directed and coherent   Thought content: Denies homicidal ideation  Suicidal Ideation: Endorses, no current plan, contracts for safety on unit  Delusions: Not evident  Perceptual Disturbance: patient is not observed responding to internal stimuli  Cognition: Oriented to self, location, time, and situation  Memory: Some impairment  Insight & Judgement: poor/limited    Data   height is 6' 2\" (1.88 m) and weight is 420 lb (190.5 kg) (abnormal). His oral temperature is 97.6 °F (36.4 °C). His blood pressure is 131/72 and his pulse is 50. His respiration is 14. Labs:   No visits with results within 2 Day(s) from this visit.    Latest known visit with results is:   Admission on 06/10/2019, Discharged on 06/14/2019   Component Date Value Ref Range Status    Glucose 06/11/2019 116* 70 - 99 mg/dL Final    BUN 06/11/2019 7  6 - 20 mg/dL Final    CREATININE 06/11/2019 0.86  0.70 - 1.20 mg/dL Final    Bun/Cre Ratio 06/11/2019 NOT REPORTED  9 - 20 Final    Calcium 06/11/2019 9.3  8.6 - 10.4 mg/dL Final    Sodium 06/11/2019 142  135 - 144 mmol/L Final    Potassium 06/11/2019 4.5  3.7 - 5.3 mmol/L Final    Chloride 06/11/2019 105  98 - 107 mmol/L Final    CO2 06/11/2019 25  20 - 31 mmol/L Final    Anion Gap 06/11/2019 12  9 - 17 mmol/L Final    Alkaline Phosphatase 06/11/2019 94  40 - 129 U/L Final    ALT 06/11/2019 67* 5 - 41 U/L Final    AST 06/11/2019 47* <40 U/L Final    Total Bilirubin 06/11/2019 0.42  0.3 - 1.2 mg/dL Final    Total Protein 06/11/2019 6.7  6.4 - 8.3 g/dL Final    Albumin 06/11/2019 3.8  3.5 - 5.2 g/dL Final    Albumin/Globulin Ratio 06/11/2019 NOT REPORTED  1.0 - 2.5 Final    GFR Non- 06/11/2019 >60  >60 mL/min Final    GFR African American 06/11/2019 >60  >60 mL/min Final    GFR Comment 06/11/2019        Final    Comment: Average GFR for 2129 years old:   116 mL/min/1.73sq m  Chronic Kidney Disease:   <60 mL/min/1.73sq m  Kidney failure:   <15 mL/min/1.73sq m              eGFR calculated using average adult body mass. Additional eGFR calculator available at:        byUs.br            GFR Staging 06/11/2019 NOT REPORTED   Final    Hemoglobin A1C 06/11/2019 6.4* 4.0 - 6.0 % Final    Estimated Avg Glucose 06/11/2019 137  mg/dL Final    Comment: The ADA and AACC recommend providing the estimated average glucose result to permit better   patient understanding of their HBA1c result.  TSH 06/11/2019 3.92  0.30 - 5.00 mIU/L Final    Thyroxine, Free 06/11/2019 1.02  0.93 - 1.70 ng/dL Final    Cholesterol 06/11/2019 161  <200 mg/dL Final    Comment:    Cholesterol Guidelines:      <200  Desirable   200-240  Borderline      >240  Undesirable         HDL 06/11/2019 25* >40 mg/dL Final    Comment:    HDL Guidelines:    <40     Undesirable   40-59    Borderline    >59     Desirable         LDL Cholesterol 06/11/2019 94  0 - 130 mg/dL Final    Comment:    LDL Guidelines:     <100    Desirable   100-129   Near to/above Desirable   130-159   Borderline      >159   Undesirable     Direct (measured) LDL and calculated LDL are not interchangeable tests.  Chol/HDL Ratio 06/11/2019 6.4* <5 Final            Triglycerides 06/11/2019 210* <150 mg/dL Final    Comment:    Triglyceride Guidelines:     <150   Desirable   150-199  Borderline   200-499  High     >499   Very high   Based on AHA Guidelines for fasting triglyceride, October 2012.          VLDL 06/11/2019 NOT REPORTED* 1 - 30 mg/dL Final    WBC 06/11/2019 9.7  3.5 - 11.0 k/uL Final    RBC 06/11/2019 4.49* 4.5 - 5.9 m/uL Final    Hemoglobin 06/11/2019 12.7* 13.5 - 17.5 g/dL Final    Hematocrit 06/11/2019 38.8* 41 - 53 % Final    MCV 06/11/2019 86.6  80 - suspension 30 mL, 30 mL, Oral, Q6H PRN  hydrOXYzine (ATARAX) tablet 50 mg, 50 mg, Oral, TID PRN  ibuprofen (ADVIL;MOTRIN) tablet 400 mg, 400 mg, Oral, Q6H PRN  polyethylene glycol (GLYCOLAX) packet 17 g, 17 g, Oral, Daily PRN  traZODone (DESYREL) tablet 50 mg, 50 mg, Oral, Nightly PRN  escitalopram (LEXAPRO) tablet 10 mg, 10 mg, Oral, Daily  busPIRone (BUSPAR) tablet 10 mg, 10 mg, Oral, TID  fluticasone (FLOVENT HFA) 110 MCG/ACT inhaler 1 puff, 1 puff, Inhalation, BID  acetaminophen (TYLENOL) tablet 1,000 mg, 1,000 mg, Oral, Q6H PRN  pantoprazole (PROTONIX) tablet 40 mg, 40 mg, Oral, QAM AC    ASSESSMENT  Major depressive disorder, recurrent severe without psychotic features ([facility] [address]         PLAN  Patient symptoms are: Remains Unstable  Titrate Invega to 6 mg by mouth daily  Monitor need and frequency of PRN medications  Encourage participation in groups and milieu  Attempt to develop insight  Psycho-education conducted  Supportive Therapy conducted  Probable discharge: To be determined by attending physician  Follow-up daily while inpatient    Patient continues to be monitored in the inpatient psychiatric facility at [facility] for safety and stabilization. Patient continues to need, on a daily basis, active treatment furnished directly by or requiring the supervision of inpatient psychiatric personnel. Electronically signed by LITO Dimas CNP on 11/21/2021 at 5:36 PM    **This report has been created using voice recognition software. It may contain minor errors which are inherent in voice recognition technology. **

## 2021-11-21 NOTE — PLAN OF CARE
Problem: Depressive Behavior With or Without Suicide Precautions:  Goal: Absence of self-harm  Description: Absence of self-harm  11/21/2021 1613 by Maximiliano Martínez LPN  Outcome: Ongoing   Pt admits to having thoughts of self harm. Agreeable to seeking out staff should feelings increase. Able to identify positive coping skills and plan for safety. Pt is med complaint and behavior controlled  Will cont to monitor q15 minutes for safety and provide support and reassurance as needed.

## 2021-11-21 NOTE — PLAN OF CARE
Problem: Altered Mood, Depressive Behavior:  Goal: Able to verbalize and/or display a decrease in depressive symptoms  Description: Able to verbalize and/or display a decrease in depressive symptoms  11/20/2021 2127 by Mark Rubio LPN  Outcome: Ongoing   Patient isolative to room. Will continue to monitor. Problem: Altered Mood, Depressive Behavior:  Goal: Able to verbalize support systems  Description: Able to verbalize support systems  11/20/2021 2127 by Mark Rubio LPN  Outcome: Ongoing     Problem: Depressive Behavior With or Without Suicide Precautions:  Goal: Ability to disclose and discuss suicidal ideas will improve  Description: Ability to disclose and discuss suicidal ideas will improve  11/20/2021 2127 by Mark Rubio LPN  Outcome: Ongoing   Patient admits to suicidal thoughts, although reports no plan. Patient contracts for safety. Safety checks maintained for patient safety. Will continue to monitor and provide support and reassurance as needed. Problem: Depressive Behavior With or Without Suicide Precautions:  Goal: Absence of self-harm  Description: Absence of self-harm  11/20/2021 2127 by Mark Rubio LPN  Outcome: Ongoing   Patient remains free from self harm. Patient agrees to seek staff if thoughts arise. 15 minute checks maintained for patient safety. Will continue to monitor and provide support and reassurance as needed.

## 2021-11-21 NOTE — BH NOTE
Safety Drill conducted on unit. Pt. Rooms and all areas of unit checked for safety issues. Pt. Denies any safety issues.

## 2021-11-21 NOTE — CONSULTS
Comprehensive Nutrition Assessment    Type and Reason for Visit:  Initial, Consult (Pt request to see RD, food preferences, ? eating disorder, nutrition supplements)    Nutrition Recommendations/Plan:  Continue current diet. Attempt to honor food preferences with available items. Nutrition Assessment:  Pt admitted due to depression with suicidal ideation. PO intake less than 50% of meals since admission. Pt reports a sensory disorder that contributes to intolerance to many foods related to taste and texture. He will only eat highly processed chicken, beef hot dogs, and [employer] Honey ham. Prefers [employer] chicken strips and [employer] and Cheese. States that he cannot eat the chicken strips provided in [facility] and likely will not like the ham. Voiced a few possible items that he may eat, such as peanut butter and jelly sandwiches and grilled cheese if made with cheddar cheese. Pt appeared to become angry with writer as he wants very specific foods and states that he eats a lot at home and doesn't want to be hungry. Declined offer of oral nutrition supplements. Attempts will be made to honor food preferences with foods available at this facility. Unsure if pt will be satisfied if his unreasonable requests are not met. Malnutrition Assessment:  Malnutrition Status:   At risk for malnutrition (Comment)    Context:  Acute Illness     Findings of the 6 clinical characteristics of malnutrition:  Energy Intake:  Mild decrease in energy intake (Comment)  Weight Loss:  No significant weight loss     Body Fat Loss:  No significant body fat loss     Muscle Mass Loss:  No significant muscle mass loss    Fluid Accumulation:  No significant fluid accumulation     Strength:  Not Performed    Estimated Daily Nutrient Needs:  Energy (kcal):  6114-9904 based on [place] with 1-1.1 factor; Weight Used for Energy Requirements:  Admission     Protein (g):  152 based on 0.8 gm per kg; Weight Used for Protein

## 2021-11-21 NOTE — PLAN OF CARE
[address] [place] [address]  Day 3 Interdisciplinary Treatment Plan NOTE    Review Date & Time: 11/21/2021 0928    Admission Type:   Admission Type: Voluntary    Reason for admission:  Reason for Admission: Pt voluntary from [facility] with suicidal ideation to jump out a window because him and his girlfriend broke up.   Estimated Length of Stay: 5-7 days  Estimated Discharge Date Update: to be determined by physician    PATIENT STRENGTHS:  Patient Strengths Strengths: Connection to output provider, Communication  Patient Strengths and Limitations:Limitations: Difficult relationships / poor social skills, External locus of control, Demonstrates discomfort with /lack of social skills, Lacks leisure interests, Multiple barriers to leisure interests, Inappropriate/potentially harmful leisure interests, Difficulty problem solving/relies on others to help solve problems, Apathetic / unmotivated, Hopeless about future  Addictive Behavior:Addictive Behavior  In the past 3 months, have you felt or has someone told you that you have a problem with:  : None  Do you have a history of Chemical Use?: No  Do you have a history of Alcohol Use?: No  Do you have a history of Street Drug Abuse?: No  Histroy of Prescripton Drug Abuse?: No  Medical Problems:  Past Medical History:   Diagnosis Date    Autistic disorder     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    Injury of frontal lobe (HCC)     Multiple sensory deficit syndrome        Risk:  Fall RiskTotal: 55  Bairon Scale Bairon Scale Score: 22  BVC    Change in scores no Changes to plan of Care no    Status EXAM:   Status and Exam  Normal: No  Facial Expression: Expressionless  Affect: Blunt  Level of Consciousness: Alert  Mood:Normal: No  Mood: Helpless, Depressed, Sad  Motor Activity:Normal: No  Motor Activity: Decreased  Interview Behavior: Cooperative  Preception: [place] to Person, [place] to Time, [place] to Place, [place] to Situation  Attention:Normal: No  Attention: Distractible  Thought Processes: Circumstantial  Thought Content:Normal: No  Thought Content: Preoccupations  Hallucinations: None  Delusions: No  Memory:Normal: Yes  Insight and Judgment: No  Insight and Judgment: Poor Judgment, Poor Insight  Present Suicidal Ideation: Yes (Pt admits suicidal thoughts - reports no plan, contracts for safety)  Present Homicidal Ideation: No    Daily Assessment Last Entry:             Patient Currently in Pain: No  Daily Nutrition (WDL): Within Defined Limits    Patient Monitoring:  Frequency of Checks: 4 times per hour, close    Psychiatric Symptoms:      Anxiety Symptoms  Anxiety Symptoms: Generalized  Milly Symptoms  Milly Symptoms: No problems reported or observed. Psychosis Symptoms  Delusion Type: No problems reported or observed. Suicide Risk CSSR-S:  1) Within the past month, have you wished you were dead or wished you could go to sleep and not wake up? : Yes  2) Have you actually had any thoughts of killing yourself? : Yes  3) Have you been thinking about how you might kill yourself? : No  5) Have you started to work out or worked out the details of how to kill yourself?  Do you intend to carry out this plan? : No  6) Have you ever done anything, started to do anything, or prepared to do anything to end your life?: No  Change in Result  NA  Change in Plan of care  NA       EDUCATION:   EDUCATION:   Learner Progress Toward Treatment Goals: Reviewed results and recommendations of this team, Reviewed group plan and strategies, Reviewed signs, symptoms and risk of self harm and violent behavior, Reviewed goals and plan of care    Method:small group, individual verbal education    Outcome:verbalized by patient, but needs reinforcement to obtain goals    PATIENT GOALS:  Short term:  Patient stated \"I don't think you guys can help me, I've been in and out of theses places since I was 11 and no one has ever helped me\" and was unable identify a short term goal despite encouragement from staff      Long term: Patient stated \"I just deal with it\"  Patient stated that his coping skills for \"dealing with it\"  include \"Shooting people in the face in video games, watching movies, listening to music, and long car drives\"     PLAN/TREATMENT RECOMMENDATIONS UPDATE: continue with group therapies, increased socialization, continue planning for after discharge goals, continue with medication compliance    SHORT-TERM GOALS UPDATE:   Time frame for Short-Term Goals: 5-7 days    LONG-TERM GOALS UPDATE:   Time frame for Long-Term Goals: 6 months  Members Present in Team Meeting: See Signature Sheet    Marley More

## 2021-11-22 PROCEDURE — 6370000000 HC RX 637 (ALT 250 FOR IP): Performed by: PSYCHIATRY & NEUROLOGY

## 2021-11-22 PROCEDURE — 99232 SBSQ HOSP IP/OBS MODERATE 35: CPT | Performed by: PSYCHIATRY & NEUROLOGY

## 2021-11-22 PROCEDURE — 6370000000 HC RX 637 (ALT 250 FOR IP): Performed by: NURSE PRACTITIONER

## 2021-11-22 PROCEDURE — 1240000000 HC EMOTIONAL WELLNESS R&B

## 2021-11-22 PROCEDURE — APPSS30 APP SPLIT SHARED TIME 16-30 MINUTES: Performed by: NURSE PRACTITIONER

## 2021-11-22 RX ORDER — ESCITALOPRAM OXALATE 10 MG/1
15 TABLET ORAL DAILY
Status: DISCONTINUED | OUTPATIENT
Start: 2021-11-23 | End: 2021-11-24 | Stop reason: HOSPADM

## 2021-11-22 RX ADMIN — HYDROXYZINE HYDROCHLORIDE 50 MG: 50 TABLET, FILM COATED ORAL at 19:51

## 2021-11-22 RX ADMIN — BUSPIRONE HYDROCHLORIDE 10 MG: 10 TABLET ORAL at 19:51

## 2021-11-22 RX ADMIN — ESCITALOPRAM OXALATE 10 MG: 10 TABLET ORAL at 10:12

## 2021-11-22 RX ADMIN — PANTOPRAZOLE SODIUM 40 MG: 40 TABLET, DELAYED RELEASE ORAL at 10:12

## 2021-11-22 RX ADMIN — BUSPIRONE HYDROCHLORIDE 10 MG: 10 TABLET ORAL at 14:19

## 2021-11-22 RX ADMIN — TRAZODONE HYDROCHLORIDE 50 MG: 50 TABLET ORAL at 19:51

## 2021-11-22 RX ADMIN — PALIPERIDONE 6 MG: 6 TABLET, EXTENDED RELEASE ORAL at 10:12

## 2021-11-22 RX ADMIN — BUSPIRONE HYDROCHLORIDE 10 MG: 10 TABLET ORAL at 10:12

## 2021-11-22 RX ADMIN — FLUTICASONE PROPIONATE 1 PUFF: 110 AEROSOL, METERED RESPIRATORY (INHALATION) at 19:50

## 2021-11-22 NOTE — PLAN OF CARE
Problem: Altered Mood, Depressive Behavior:  Goal: Able to verbalize and/or display a decrease in depressive symptoms  Description: Able to verbalize and/or display a decrease in depressive symptoms  Outcome: Ongoing  Note: Patient remains isolative and tearful. Patient denies thoughts to harm self but says he is \" scared of new people and doesn't trust anyone. \" Patient states he feels helpless about future. Problem: Altered Mood, Depressive Behavior:  Goal: Able to verbalize support systems  Description: Able to verbalize support systems  Outcome: Ongoing  Note: Patient states he has no support system accept for his staff. Broke up with girlfriend of 3 years and states its because \"we were controlling\"  Patient agrees to talk with staff when needed. Problem: Depressive Behavior With or Without Suicide Precautions:  Goal: Ability to disclose and discuss suicidal ideas will improve  Description: Ability to disclose and discuss suicidal ideas will improve  Outcome: Ongoing  Note: Patient denies suicidal ideations at this time and agrees to talk with staff if the thoughts arise. Problem: Depressive Behavior With or Without Suicide Precautions:  Goal: Absence of self-harm  Description: Absence of self-harm  Outcome: Ongoing  Note: Patient denies thoughts of self at this time and agrees to seek staff if the thoughts were to arise.
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
## 2021-11-22 NOTE — GROUP NOTE
Group Therapy Note    Date: 11/22/2021    Group Start Time: 1000  Group End Time: 2890  Group Topic: Psychotherapy    NATALIE HURLEY    RAJ Johnson LSW        Group Therapy Note    Attendees: 10/17         Patient's Goal:  Increase interpersonal relationship skills    Notes:  Patient was an active participant in group discussion    Status After Intervention:  Unchanged    Participation Level:  Active Listener and Interactive    Participation Quality: Appropriate      Speech:  pressured      Thought Process/Content: Linear      Affective Functioning: Congruent      Mood: depressed      Level of consciousness:  Alert and Oriented x4      Response to Learning: Able to verbalize current knowledge/experience and Able to verbalize/acknowledge new learning      Endings: None Reported    Modes of Intervention: Socialization, Exploration and Clarifying      Discipline Responsible: /Counselor      Signature:  RAJ Johnson LSW

## 2021-11-22 NOTE — GROUP NOTE
Group Therapy Note    Date: 11/22/2021    Group Start Time: 1100  Group End Time: 4896  Group Topic: Recreational    [facility]    Mariah Steel        Group Therapy Note    Attendees: 10/22       Patient's Goal:  Increase socialization; Increase cognitive simulation; Engage in leisure opportunities    Notes:  Patient attended and participated in group, having positive interactions with peers and staff throughout    Status After Intervention:  Improved    Participation Level:  Active Listener and Interactive    Participation Quality: Appropriate, Attentive and Sharing      Speech:  normal      Thought Process/Content: Logical  Linear      Affective Functioning: Congruent      Mood: euthymic      Level of consciousness:  Alert and Attentive      Response to Learning: Able to verbalize current knowledge/experience and Progressing to goal      Endings: None Reported    Modes of Intervention: Socialization, Activity and Reality-testing      Discipline Responsible: Psychoeducational Specialist      Signature:  Mariah Steel

## 2021-11-22 NOTE — GROUP NOTE
Group Therapy Note    Date: 11/22/2021    Group Start Time: 1430  Group End Time: 1525  Group Topic: Music Therapy    [facility]    Minerva Galena        Group Therapy Note    Attendees: 13/22       Patient's Goal:  Patients shared music and dedicated songs to important people in their lives. Goals to reflect on relationships; increase sense of community; Increase self-expression    Notes:  Patient attended and participated in group having positive interactions with peers and staff. Shared music and dedicated song, engaging in conversations about people. Dedicated song to his brothers    Status After Intervention:  Improved    Participation Level:  Active Listener and Interactive    Participation Quality: Appropriate, Attentive, Sharing and Supportive      Speech:  normal      Thought Process/Content: Logical  Linear      Affective Functioning: Congruent      Mood: euthymic      Level of consciousness:  Alert and Attentive      Response to Learning: Able to verbalize current knowledge/experience and Progressing to goal      Endings: None Reported    Modes of Intervention: Support, Socialization, Exploration, Activity, Media and Reality-testing      Discipline Responsible: Psychoeducational Specialist      Signature:  Minerva Lugo

## 2021-11-22 NOTE — PROGRESS NOTES
Pharmacy Med Education Group Note    Date: 11/22/2021  Start Time: 1330  End Time: 1400    Number Participants in Group:  13/22    Goal:  Patient will demonstrate an understanding of the medications intended purpose and possible adverse effects  Topic: [place] for Pharmacy Med Ed Group    Discipline Responsible:     OT  AT  [facility].  RT     X Other       Participation Level:     None  Minimal      X Active Listener    X Interactive    Monopolizing         Participation Quality:    X Appropriate  Inappropriate     X       Attentive        Intrusive          Sharing        Resistant          Supportive        Lethargic       Affective:     X Congruent  Incongruent  Blunted  Flat    Constricted  Anxious  Elated  Angry    Labile  Depressed  Other         Cognitive:    X Alert  Oriented PPTP     Concentration   X G  F  P   Attention Span   X G  F  P   Short-Term Memory   X G  F  P   Long-Term Memory  G  F  P   ProblemSolving/  Decision Making  G  F  P   Ability to Process  Information   X G  F  P      Contributing Factors             Delusional             Hallucinating             Flight of Ideas             Other:       Modes of Intervention:    X Education   X Support  Exploration    Clarifying  Problem Solving  Confrontation    Socialization  Limit Setting  Reality Testing    Activity  Movement  Media    Other:            Response to Learning:    X Able to verbalize current knowledge/experience    Able to verbalize/acknowledge new learning    Able to retain information    Capable of insight    Able to change behavior    Progressing to goal    Other:        Comments:     Virgil Heaton PharmD, BCPS  11/22/2021 2:18 PM

## 2021-11-23 LAB
CHOLESTEROL/HDL RATIO: 6
CHOLESTEROL: 169 MG/DL
ESTIMATED AVERAGE GLUCOSE: 117 MG/DL
HBA1C MFR BLD: 5.7 % (ref 4–6)
HDLC SERPL-MCNC: 28 MG/DL
LDL CHOLESTEROL: 109 MG/DL (ref 0–130)
TRIGL SERPL-MCNC: 161 MG/DL
VLDLC SERPL CALC-MCNC: ABNORMAL MG/DL (ref 1–30)

## 2021-11-23 PROCEDURE — 6370000000 HC RX 637 (ALT 250 FOR IP): Performed by: NURSE PRACTITIONER

## 2021-11-23 PROCEDURE — APPSS30 APP SPLIT SHARED TIME 16-30 MINUTES

## 2021-11-23 PROCEDURE — 36415 COLL VENOUS BLD VENIPUNCTURE: CPT

## 2021-11-23 PROCEDURE — 83036 HEMOGLOBIN GLYCOSYLATED A1C: CPT

## 2021-11-23 PROCEDURE — 80061 LIPID PANEL: CPT

## 2021-11-23 PROCEDURE — 6370000000 HC RX 637 (ALT 250 FOR IP): Performed by: PSYCHIATRY & NEUROLOGY

## 2021-11-23 PROCEDURE — 99232 SBSQ HOSP IP/OBS MODERATE 35: CPT | Performed by: PSYCHIATRY & NEUROLOGY

## 2021-11-23 PROCEDURE — 1240000000 HC EMOTIONAL WELLNESS R&B

## 2021-11-23 RX ORDER — LORAZEPAM 2 MG/ML
2 INJECTION INTRAMUSCULAR EVERY 6 HOURS PRN
Status: DISCONTINUED | OUTPATIENT
Start: 2021-11-23 | End: 2021-11-24 | Stop reason: HOSPADM

## 2021-11-23 RX ORDER — HALOPERIDOL 5 MG/ML
5 INJECTION INTRAMUSCULAR EVERY 6 HOURS PRN
Status: DISCONTINUED | OUTPATIENT
Start: 2021-11-23 | End: 2021-11-24 | Stop reason: HOSPADM

## 2021-11-23 RX ORDER — DIPHENHYDRAMINE HYDROCHLORIDE 50 MG/ML
50 INJECTION INTRAMUSCULAR; INTRAVENOUS EVERY 6 HOURS PRN
Status: DISCONTINUED | OUTPATIENT
Start: 2021-11-23 | End: 2021-11-24 | Stop reason: HOSPADM

## 2021-11-23 RX ADMIN — PANTOPRAZOLE SODIUM 40 MG: 40 TABLET, DELAYED RELEASE ORAL at 08:44

## 2021-11-23 RX ADMIN — BUSPIRONE HYDROCHLORIDE 10 MG: 10 TABLET ORAL at 14:22

## 2021-11-23 RX ADMIN — ESCITALOPRAM OXALATE 15 MG: 10 TABLET ORAL at 08:43

## 2021-11-23 RX ADMIN — PALIPERIDONE 6 MG: 6 TABLET, EXTENDED RELEASE ORAL at 08:43

## 2021-11-23 RX ADMIN — HYDROXYZINE HYDROCHLORIDE 50 MG: 50 TABLET, FILM COATED ORAL at 19:55

## 2021-11-23 RX ADMIN — BUSPIRONE HYDROCHLORIDE 10 MG: 10 TABLET ORAL at 19:55

## 2021-11-23 RX ADMIN — BUSPIRONE HYDROCHLORIDE 10 MG: 10 TABLET ORAL at 08:43

## 2021-11-23 RX ADMIN — TRAZODONE HYDROCHLORIDE 50 MG: 50 TABLET ORAL at 19:55

## 2021-11-23 NOTE — GROUP NOTE
Group Therapy Note    Date: 11/23/2021    Group Start Time: 0900  Group End Time: 0930  Group Topic: Community Meeting    [address] [facility] LATASHA Erwin        Group Therapy Note    Attendees: 13/22         Patient's Goal:  To attend Community Meeting      Notes:  Goals Group    Status After Intervention:  Unchanged    Participation Level:  Active Listener and Interactive    Participation Quality: Appropriate, Attentive, Sharing and Supportive      Speech:  normal      Thought Process/Content: Logical  Linear      Affective Functioning: Congruent      Mood: euthymic      Level of consciousness:  Oriented x4      Response to Learning: Able to verbalize current knowledge/experience and Able to verbalize/acknowledge new learning      Endings: None Reported    Modes of Intervention: Support and Socialization      Discipline Responsible: Behavorial Health Tech      Signature:  Anam Erwin

## 2021-11-23 NOTE — PROGRESS NOTES
Daily Progress Note  11/23/2021    Patient Name: Edd Deng:  Depression with suicidal ideation with plan and intent to hang self or get hit by a car          SUBJECTIVE:      Patient is seen today for a follow up assessment. Patient is compliant with scheduled medications. Patient has not received emergency medications in the past 24 hours. Patient is agreeable to interview in his room today. Nursing staff reports that patient has been somewhat irritable today but has been behaviorally in control. She is much less hyperverbal and less euphoric today. Patient endorses depression today rating is a 6 (0-10 scale 0 being none and 10 being the worst). Patient continues to endorse anxiety. Patient reports that his appetite has been adequate however he has been sleeping poorly and states that he has been waking up intermittently throughout the night. Patient reports improvement in suicidal ideation without current plan or intent. Patient denies homicidal ideation. Patient is able to contract for safety on this unit with this writer. Patient denies auditory and visual hallucinations. Patient reports that he lives in a \"duplex with staff. \"  He reports that lately he has been having trouble with staff because \"they do not want to staff me when I have friends over and I refuse to not have a social life. \"  He states that this is causing him some anxiety. He reports that he plans to go back to his duplex with the staff and hopefully that they cooperate with him. Per social work notes discharge planning was discussed with patient's . Patient denies medication side effects or medical concerns at this time. Patient continues to appear somewhat disheveled and unsure if patient is engaging hygiene needs. Writer encouraged patient to attend groups on the unit. At this time, the patient is not appropriate for a lower level of care.  There is risk of decompensation and patient warrants further hospitalization for safety and stabilization. Appetite:  [x] Normal/Adequate/Unchanged  [] Increased  [] Decreased      Sleep:       [] Normal/Adequate/Unchanged  [x] Fair  [] Poor      Group Attendance on Unit:   [] Yes  [x] Selectively    [] No    Medication Side Effects: Patient denies any medication side effects at the time of assessment. Mental Status Exam  Level of consciousness: Alert and awake. Appearance: Appropriate attire for setting, seated on bed, with poor grooming and hygiene, disheveled   Behavior/Motor: Approachable, no psychomotor abnormalities. Attitude toward examiner: Cooperative, attentive, fair eye contact. Speech: Normal rate, normal volume, monotone. Mood:  Patient reports \"okay I guess\". Affect: Blunted  Thought processes: Linear and coherent. Thought content: Denies homicidal ideation. Suicidal Ideation: Reports improvement in suicidal ideations, without current plan or intent, contracts for safety on the unit. Delusions: No evidence of delusions. Denies paranoia. Perceptual Disturbance: Patient does not appear to be responding to internal stimuli. Denies auditory hallucinations. Denies visual hallucinations. Cognition: Oriented to self, location, time, and situation. Memory: Intact. Insight & Judgement: Poor. Data   height is 6' 2\" (1.88 m) and weight is 420 lb (190.5 kg) (abnormal). His oral temperature is 97.2 °F (36.2 °C). His blood pressure is 118/57 (abnormal) and his pulse is 53. His respiration is 14. Labs:   Admission on 11/20/2021   Component Date Value Ref Range Status    Hemoglobin A1C 11/23/2021 5.7  4.0 - 6.0 % Final    Estimated Avg Glucose 11/23/2021 117  mg/dL Final    Comment: The ADA and AACC recommend providing the estimated average glucose result to permit better   patient understanding of their HBA1c result.       Cholesterol 11/23/2021 169  <200 mg/dL Final    Comment:    Cholesterol Guidelines:      <200 Desirable   200-240  Borderline      >240  Undesirable         HDL 11/23/2021 28* >40 mg/dL Final    Comment:    HDL Guidelines:    <40     Undesirable   40-59    Borderline    >59     Desirable         LDL Cholesterol 11/23/2021 109  0 - 130 mg/dL Final    Comment:    LDL Guidelines:     <100    Desirable   100-129   Near to/above Desirable   130-159   Borderline      >159   Undesirable     Direct (measured) LDL and calculated LDL are not interchangeable tests.  Chol/HDL Ratio 11/23/2021 6.0* <5 Final            Triglycerides 11/23/2021 161* <150 mg/dL Final    Comment:    Triglyceride Guidelines:     <150   Desirable   150-199  Borderline   200-499  High     >499   Very high   Based on AHA Guidelines for fasting triglyceride, October 2012.  VLDL 11/23/2021 NOT REPORTED* 1 - 30 mg/dL Final         Reviewed patient's current plan of care and vital signs with nursing staff.     Labs reviewed: [x] Yes    Medications  Current Facility-Administered Medications: haloperidol lactate (HALDOL) injection 5 mg, 5 mg, IntraMUSCular, Q6H PRN **AND** LORazepam (ATIVAN) injection 2 mg, 2 mg, IntraMUSCular, Q6H PRN **AND** diphenhydrAMINE (BENADRYL) injection 50 mg, 50 mg, IntraMUSCular, Q6H PRN  escitalopram (LEXAPRO) tablet 15 mg, 15 mg, Oral, Daily  haloperidol (HALDOL) tablet 5 mg, 5 mg, Oral, Q6H PRN **AND** LORazepam (ATIVAN) tablet 2 mg, 2 mg, Oral, Q6H PRN  paliperidone (INVEGA) extended release tablet 6 mg, 6 mg, Oral, Daily  acetaminophen (TYLENOL) tablet 650 mg, 650 mg, Oral, Q4H PRN  aluminum & magnesium hydroxide-simethicone (MAALOX) 200-200-20 MG/5ML suspension 30 mL, 30 mL, Oral, Q6H PRN  hydrOXYzine (ATARAX) tablet 50 mg, 50 mg, Oral, TID PRN  ibuprofen (ADVIL;MOTRIN) tablet 400 mg, 400 mg, Oral, Q6H PRN  polyethylene glycol (GLYCOLAX) packet 17 g, 17 g, Oral, Daily PRN  traZODone (DESYREL) tablet 50 mg, 50 mg, Oral, Nightly PRN  busPIRone (BUSPAR) tablet 10 mg, 10 mg, Oral, TID  fluticasone (FLOVENT HFA) 110 MCG/ACT inhaler 1 puff, 1 puff, Inhalation, BID  acetaminophen (TYLENOL) tablet 1,000 mg, 1,000 mg, Oral, Q6H PRN  pantoprazole (PROTONIX) tablet 40 mg, 40 mg, Oral, QAM AC    ASSESSMENT  Major depressive disorder, recurrent severe without psychotic features ([facility] [address]         PLAN  Patient symptoms are: Modestly Improving. Continue current medication regimen. Monitor need and frequency of PRN medications. Encourage participation in groups and milieu. Attempt to develop insight. Psycho-education conducted. Supportive Therapy conducted. Probable discharge is to be determined by MD.   Follow-up daily while inpatient. Patient continues to be monitored in the inpatient psychiatric facility at [facility] for safety and stabilization. Patient continues to need, on a daily basis, active treatment furnished directly by or requiring the supervision of inpatient psychiatric personnel. Electronically signed by LITO Acosta CNP on 11/23/2021 at 2:13 PM    **This report has been created using voice recognition software. It may contain minor errors which are inherent in voice recognition technology. **                                           Psychiatry Attending Attestation     I independently saw and evaluated the patient. I reviewed the Advance Practice Provider's documentation above. Any additional comments or changes to the Advance Practice Provider's documentation are stated below otherwise agree with assessment. Patient feels better than before. Mood and affect are better. Patient reports fleeting suicidal thoughts with no intent or plan. Patient notes that these thoughts are occurring less frequently. Denies any homicidal thoughts, that was explored with the patient. Oriented to time place and person. Recent and remote memory is intact. Patient feels hopeful. Sleep and appetite is good. No side effect from medication reported.  Side-effect of medication were discussed with the patient .  Patient is responding to current treatment. Discharge soon, if patient continues to show improvement. Case discussed with the staff.        Electronically signed by Akbar Kimbrough MD on 11/23/21 at 10:23 PM EST

## 2021-11-23 NOTE — GROUP NOTE
Group Therapy Note    Date: 11/23/2021    Group Start Time: 1100  Group End Time: 6754  Group Topic: Recreational    [facility] LATASHA Steel        Group Therapy Note    Pt did not attend recreational conversation group d/t resting in room despite staff invitation to attend. 1:1 talk time offered as alternative to group session, pt declined.

## 2021-11-23 NOTE — PLAN OF CARE
Problem: Altered Mood, Depressive Behavior:  Goal: Able to verbalize and/or display a decrease in depressive symptoms  Description: Able to verbalize and/or display a decrease in depressive symptoms  Outcome: Ongoing   Patient verbalizes/displays decrease in depressive symptoms aeb increased social interaction with peers/cooperation with staff    Problem: Depressive Behavior With or Without Suicide Precautions:  Goal: Ability to disclose and discuss suicidal ideas will improve  Description: Ability to disclose and discuss suicidal ideas will improve  Patient denies suicidal ideas at this time; patient agrees to seek out staff if suicidal ideas arise; 15-min safety checks continued      Problem: Depressive Behavior With or Without Suicide Precautions:  Goal: Absence of self-harm  Description: Absence of self-harm  Patient remained absent of self-harm this shift; patient agrees to seek out staff if thoughts to self-harm arise; 15-min safety checks continued

## 2021-11-23 NOTE — GROUP NOTE
Group Therapy Note    Date: 11/23/2021    Group Start Time: 1000  Group End Time: 2103  Group Topic: Psychotherapy    RAJ Munguia LSW        Group Therapy Note    Attendees: 11/22       Patient was offered group therapy today but declined to participate despite encouragement from staff. 1:1 was offered.         Signature:  RAJ Uriarte LSW

## 2021-11-23 NOTE — PLAN OF CARE
Problem: Depressive Behavior With or Without Suicide Precautions:  Goal: Ability to disclose and discuss suicidal ideas will improve  Description: Ability to disclose and discuss suicidal ideas will improve  Note: PT is brightened this evening and out and social with peers on unit. PT is hoping for discharge soon. PT did state he tried to call his ex girlfriend today and she hung up on him but he states he was able to stay in control after and states he knows he needs to stop calling her. PT took ordered medications and attends unit programming. PT maintained on 15 min safety checks and rounds at irregular intervals. Problem: Depressive Behavior With or Without Suicide Precautions:  Goal: Absence of self-harm  Description: Absence of self-harm  Note: Pt denies thoughts of self harm and is agreeable to seeking out should thoughts of self harm arise. Safe environment maintained. Q15 minute checks for safety cont per unit policy. Will cont to monitor for safety and provides support and reassurance as needed.

## 2021-11-23 NOTE — CARE COORDINATION
CARLA talked with patient's DD  Ramone Webster at [phone] regarding patient's demand for staff to be present 24/7 when he returns. Patient was overheard loudly talking to Ramone Webster earlier on the patient phone. She stated she discussed several options to finding a solution for more care for patient including day programming, increased remote access to staff when they are unable to be physically present and trying to locate additional staff as well as a different staffing agency. Patient was unwilling to accept any of her suggestions and unable to acknowledge his participation in the issue of his staffing. She states patient is able to be alone without staff, that he knows how to handle things when he feels unsafe, and that any knives or sharp objects are kept locked. Plan is for CARLA to call [employer] Corre[employer], his staffing agency, to coordinate probable discharge tomorrow for patient. Phone call to Janine Correia regarding patient's upcoming discharge- they can pick him up around [address] will verify with them tomorrow the time. Any new medications can be filled with the hospital pharmacy.

## 2021-11-24 VITALS
BODY MASS INDEX: 40.43 KG/M2 | HEIGHT: 74 IN | HEART RATE: 54 BPM | RESPIRATION RATE: 14 BRPM | DIASTOLIC BLOOD PRESSURE: 52 MMHG | SYSTOLIC BLOOD PRESSURE: 108 MMHG | WEIGHT: 315 LBS | TEMPERATURE: 98.2 F

## 2021-11-24 PROCEDURE — 99239 HOSP IP/OBS DSCHRG MGMT >30: CPT | Performed by: PSYCHIATRY & NEUROLOGY

## 2021-11-24 PROCEDURE — 6370000000 HC RX 637 (ALT 250 FOR IP): Performed by: PSYCHIATRY & NEUROLOGY

## 2021-11-24 PROCEDURE — 6370000000 HC RX 637 (ALT 250 FOR IP): Performed by: NURSE PRACTITIONER

## 2021-11-24 RX ORDER — PALIPERIDONE 6 MG/1
6 TABLET, EXTENDED RELEASE ORAL DAILY
Qty: 30 TABLET | Refills: 0 | Status: SHIPPED | OUTPATIENT
Start: 2021-11-24

## 2021-11-24 RX ORDER — ESCITALOPRAM OXALATE 5 MG/1
15 TABLET ORAL DAILY
Qty: 90 TABLET | Refills: 0 | Status: SHIPPED | OUTPATIENT
Start: 2021-11-24

## 2021-11-24 RX ADMIN — ESCITALOPRAM OXALATE 15 MG: 10 TABLET ORAL at 08:29

## 2021-11-24 RX ADMIN — PANTOPRAZOLE SODIUM 40 MG: 40 TABLET, DELAYED RELEASE ORAL at 08:30

## 2021-11-24 RX ADMIN — PALIPERIDONE 6 MG: 6 TABLET, EXTENDED RELEASE ORAL at 08:28

## 2021-11-24 RX ADMIN — BUSPIRONE HYDROCHLORIDE 10 MG: 10 TABLET ORAL at 08:30

## 2021-11-24 NOTE — GROUP NOTE
Group Therapy Note    Date: 11/24/2021    Group Start Time: 1040  Group End Time: 36  Group Topic: Psychotherapy    NATALIE HURLEY    RAJ Tomas LSW        Group Therapy Note    Attendees: 8/20         Patient's Goal:  Increase interpersonal relationship skills    Notes:  Patient was an active participant in group discussion    Status After Intervention:  Unchanged    Participation Level:  Active Listener and Interactive    Participation Quality: Appropriate      Speech:  pressured and loud      Thought Process/Content: Logical      Affective Functioning: Congruent      Mood: euthymic      Level of consciousness:  Alert, Oriented x4 and Attentive      Response to Learning: Able to verbalize current knowledge/experience, Able to verbalize/acknowledge new learning and Able to retain information      Endings: None Reported    Modes of Intervention: Support, Socialization and Exploration      Discipline Responsible: /Counselor      Signature:  RAJ Tomas LSW

## 2021-11-24 NOTE — GROUP NOTE
HS Relaxation Group   Date: November 23, 2021     Patient did not participate in HS relaxation group. 1:1 talk time was offered as an alternative. Will continue to encourage patient to participate in unit programming.      Signature: OTIS Tapia

## 2021-11-24 NOTE — DISCHARGE INSTR - DIET
Good nutrition is important when healing from an illness, injury, or surgery. Follow any nutrition recommendations given to you during your hospital stay. If you were given an oral nutrition supplement while in the hospital, continue to take this supplement at home. You can take it with meals, in-between meals, and/or before bedtime. These supplements can be purchased at most local grocery stores, pharmacies, and chain [email or web address]-stores. If you have any questions about your diet or nutrition, call the hospital and ask for the dietitian.   General diet  Increase fluids
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
## 2021-11-24 NOTE — GROUP NOTE
Group Therapy Note    Date: 11/24/2021    Group Start Time: 0900  Group End Time: 0940  Group Topic: Group Documentation    NATALIE Villela        Group Therapy Note    Attendees: 9/20         Patient's Goal:  D/C today - use coping skills at home and communicate with staff  Notes:  Goal setting/community meeting    Status After Intervention:  Improved    Participation Level:  Active Listener and Interactive    Participation Quality: Appropriate, Attentive, Sharing and Supportive      Speech:  normal      Thought Process/Content: Logical      Affective Functioning: Congruent      Mood: anxious      Level of consciousness:  Alert, Oriented x4 and Attentive      Response to Learning: Able to verbalize current knowledge/experience, Able to verbalize/acknowledge new learning, Able to retain information and Capable of insight      Endings: None Reported    Modes of Intervention: Education, Support, Socialization, Exploration and Problem-solving      Discipline Responsible: [address], [facility]      Signature:  Ellen Villela

## 2021-11-24 NOTE — PLAN OF CARE
Problem: Altered Mood, Depressive Behavior:  Goal: Able to verbalize and/or display a decrease in depressive symptoms  Description: Able to verbalize and/or display a decrease in depressive symptoms  11/23/2021 2233 by Parker Tan  Outcome: Ongoing  Patient denies depression but presents with depressive symptoms such as flat affect and isolative behaviors. Will continue to offer and provide support as needed as well as maintain Q15min checks for safety. Problem: Depressive Behavior With or Without Suicide Precautions:  Goal: Ability to disclose and discuss suicidal ideas will improve  Description: Ability to disclose and discuss suicidal ideas will improve  11/23/2021 2233 by Parker Tan  Outcome: Ongoing  Patient denies suicidal ideations. Agreeable to talking with staff if thoughts to harm self arise. Q15min checks maintained for safety. Problem: Depressive Behavior With or Without Suicide Precautions:  Goal: Absence of self-harm  Description: Absence of self-harm  11/23/2021 2233 by Parker Tan  Outcome: Ongoing  Safe environment maintained and patient remains free from self-harm. Agreeable to seeking staff should thoughts to harm self or others arise. Q15min checks for safety.

## 2021-11-24 NOTE — BH NOTE
[facility]  Discharge Note    Pt discharged with followings belongings:   Dentures: None  Vision - Corrective Lenses: None  Hearing Aid: None  Jewelry: None  Body Piercings Removed: N/A  Clothing: Footwear, Jacket / coat, Pants, Shirt  Were All Patient Medications Collected?: Not Applicable  Other Valuables: Cell phone   Valuables sent home with patient. Valuables retrieved from safe, Security envelope number:  [id number] and returned to patient. Patient education on aftercare instructions: yes  Information faxed to [facility] by RN Patient verbalize understanding of AVS:  yes. Status EXAM upon discharge:  Status and Exam  Normal: No  Facial Expression: Flat  Affect: Blunt  Level of Consciousness: Alert  Mood:Normal: No  Mood: Anxious  Motor Activity:Normal: Yes  Motor Activity: Decreased  Interview Behavior: Cooperative  Preception: [place] to Person, [place] to Time, [place] to Place, [place] to Situation  Attention:Normal: No  Attention: Distractible, Unable to Concentrate  Thought Processes: Circumstantial, Loose Assoc. Thought Content:Normal: No  Thought Content: Poverty of Content, Preoccupations  Hallucinations: None  Delusions: No  Memory:Normal: No  Memory: Confabulation  Insight and Judgment: No  Insight and Judgment: Poor Judgment, Poor Insight, Unrealistic  Present Suicidal Ideation: No  Present Homicidal Ideation: No      Metabolic Screening:    Lab Results   Component Value Date    LABA1C 5.7 11/23/2021       Lab Results   Component Value Date    CHOL 169 11/23/2021    CHOL 161 06/11/2019    CHOL 150 11/03/2012     Lab Results   Component Value Date    TRIG 161 (H) 11/23/2021    TRIG 210 (H) 06/11/2019    TRIG 197 (H) 11/03/2012     Lab Results   Component Value Date    HDL 28 (L) 11/23/2021    HDL 25 (L) 06/11/2019    HDL 32 (L) 11/03/2012     No components found for: LDLCAL  No results found for: LABVLDL    Patient discharged to home via mother with all belongings.  Patient denies thoughts of self harm and voices readiness for discharge.     Mare Billings RN

## 2021-11-24 NOTE — GROUP NOTE
HS Goal Group   Date: November 23, 2021     Patient did not participate in HS goal group. 1:1 talk time was offered as an alternative to group. Will continue to encourage patient to participate in unit programming.      Signature: OTIS Pham

## 2021-11-24 NOTE — CARE COORDINATION
Patient is now asking if one of his parents are able to come and pick him up. SW called his mom and left a voice message. Called his dad and he is not physically able to because of back injury. Notified [facility] (his staff) that there may be a possibility of his mom picking him up instead and they are OK with this as patient's dad said she is currently at a doctors appointment.

## 2023-01-14 ENCOUNTER — HOSPITAL ENCOUNTER (INPATIENT)
Age: 28
LOS: 4 days | Discharge: HOME | DRG: 885 | End: 2023-01-18
Attending: STUDENT IN AN ORGANIZED HEALTH CARE EDUCATION/TRAINING PROGRAM | Admitting: PSYCHIATRY & NEUROLOGY
Payer: MEDICARE

## 2023-01-14 DIAGNOSIS — R45.851 SUICIDAL IDEATION: Primary | ICD-10-CM

## 2023-01-14 LAB
ABSOLUTE EOS #: 0.1 K/UL (ref 0–0.4)
ABSOLUTE LYMPH #: 4.6 K/UL (ref 1–4.8)
ABSOLUTE MONO #: 0.9 K/UL (ref 0.1–1.3)
ALBUMIN SERPL-MCNC: 4.3 G/DL (ref 3.5–5.2)
ALP BLD-CCNC: 81 U/L (ref 40–129)
ALT SERPL-CCNC: 39 U/L (ref 5–41)
AMPHETAMINE SCREEN URINE: NEGATIVE
ANION GAP SERPL CALCULATED.3IONS-SCNC: 10 MMOL/L (ref 9–17)
AST SERPL-CCNC: 31 U/L
BACTERIA: NORMAL
BARBITURATE SCREEN URINE: NEGATIVE
BASOPHILS # BLD: 1 % (ref 0–2)
BASOPHILS ABSOLUTE: 0.1 K/UL (ref 0–0.2)
BENZODIAZEPINE SCREEN, URINE: NEGATIVE
BILIRUB SERPL-MCNC: 0.4 MG/DL (ref 0.3–1.2)
BILIRUBIN URINE: NEGATIVE
BUN BLDV-MCNC: 9 MG/DL (ref 6–20)
CALCIUM SERPL-MCNC: 9.7 MG/DL (ref 8.6–10.4)
CANNABINOID SCREEN URINE: NEGATIVE
CASTS UA: NORMAL /LPF
CHLORIDE BLD-SCNC: 103 MMOL/L (ref 98–107)
CO2: 25 MMOL/L (ref 20–31)
COCAINE METABOLITE, URINE: NEGATIVE
COLOR: YELLOW
CREAT SERPL-MCNC: 0.84 MG/DL (ref 0.7–1.2)
EOSINOPHILS RELATIVE PERCENT: 1 % (ref 0–4)
EPITHELIAL CELLS UA: NORMAL /HPF
ETHANOL PERCENT: <0.01 %
ETHANOL: <10 MG/DL
FENTANYL URINE: NEGATIVE
GFR SERPL CREATININE-BSD FRML MDRD: >60 ML/MIN/1.73M2
GLUCOSE BLD-MCNC: 105 MG/DL (ref 70–99)
GLUCOSE URINE: NEGATIVE
HCT VFR BLD CALC: 38.3 % (ref 41–53)
HEMOGLOBIN: 12.5 G/DL (ref 13.5–17.5)
KETONES, URINE: ABNORMAL
LEUKOCYTE ESTERASE, URINE: ABNORMAL
LYMPHOCYTES # BLD: 42 % (ref 24–44)
MAGNESIUM: 1.8 MG/DL (ref 1.6–2.6)
MCH RBC QN AUTO: 27.4 PG (ref 26–34)
MCHC RBC AUTO-ENTMCNC: 32.6 G/DL (ref 31–37)
MCV RBC AUTO: 84 FL (ref 80–100)
METHADONE SCREEN, URINE: NEGATIVE
MONOCYTES # BLD: 9 % (ref 1–7)
NITRITE, URINE: NEGATIVE
OPIATES, URINE: NEGATIVE
OXYCODONE SCREEN URINE: NEGATIVE
PDW BLD-RTO: 14.5 % (ref 11.5–14.9)
PH UA: 8 (ref 5–8)
PHENCYCLIDINE, URINE: NEGATIVE
PLATELET # BLD: 251 K/UL (ref 150–450)
PMV BLD AUTO: 8.7 FL (ref 6–12)
POTASSIUM SERPL-SCNC: 4 MMOL/L (ref 3.7–5.3)
PROTEIN UA: ABNORMAL
RBC # BLD: 4.56 M/UL (ref 4.5–5.9)
RBC UA: NORMAL /HPF
SEG NEUTROPHILS: 47 % (ref 36–66)
SEGMENTED NEUTROPHILS ABSOLUTE COUNT: 5.2 K/UL (ref 1.3–9.1)
SODIUM BLD-SCNC: 138 MMOL/L (ref 135–144)
SPECIFIC GRAVITY UA: 1.03 (ref 1–1.03)
TEST INFORMATION: NORMAL
TOTAL PROTEIN: 7.5 G/DL (ref 6.4–8.3)
TURBIDITY: CLEAR
URINE HGB: NEGATIVE
UROBILINOGEN, URINE: NORMAL
WBC # BLD: 10.9 K/UL (ref 3.5–11)
WBC UA: NORMAL /HPF

## 2023-01-14 PROCEDURE — G0480 DRUG TEST DEF 1-7 CLASSES: HCPCS

## 2023-01-14 PROCEDURE — 1240000000 HC EMOTIONAL WELLNESS R&B

## 2023-01-14 PROCEDURE — 83735 ASSAY OF MAGNESIUM: CPT

## 2023-01-14 PROCEDURE — 85025 COMPLETE CBC W/AUTO DIFF WBC: CPT

## 2023-01-14 PROCEDURE — 81001 URINALYSIS AUTO W/SCOPE: CPT

## 2023-01-14 PROCEDURE — 36415 COLL VENOUS BLD VENIPUNCTURE: CPT

## 2023-01-14 PROCEDURE — 80053 COMPREHEN METABOLIC PANEL: CPT

## 2023-01-14 PROCEDURE — 99285 EMERGENCY DEPT VISIT HI MDM: CPT

## 2023-01-14 PROCEDURE — 80307 DRUG TEST PRSMV CHEM ANLYZR: CPT

## 2023-01-14 ASSESSMENT — ENCOUNTER SYMPTOMS
CHEST TIGHTNESS: 0
EYE REDNESS: 0
EYE DISCHARGE: 0
NAUSEA: 0
VOMITING: 0
ABDOMINAL PAIN: 0
SORE THROAT: 0
RHINORRHEA: 0
SHORTNESS OF BREATH: 0
DIARRHEA: 0

## 2023-01-14 ASSESSMENT — LIFESTYLE VARIABLES
HOW OFTEN DO YOU HAVE A DRINK CONTAINING ALCOHOL: NEVER
HOW MANY STANDARD DRINKS CONTAINING ALCOHOL DO YOU HAVE ON A TYPICAL DAY: PATIENT DOES NOT DRINK

## 2023-01-14 ASSESSMENT — PAIN - FUNCTIONAL ASSESSMENT: PAIN_FUNCTIONAL_ASSESSMENT: NONE - DENIES PAIN

## 2023-01-15 PROCEDURE — 6370000000 HC RX 637 (ALT 250 FOR IP): Performed by: INTERNAL MEDICINE

## 2023-01-15 PROCEDURE — 99223 1ST HOSP IP/OBS HIGH 75: CPT | Performed by: NURSE PRACTITIONER

## 2023-01-15 PROCEDURE — 1240000000 HC EMOTIONAL WELLNESS R&B

## 2023-01-15 PROCEDURE — 6370000000 HC RX 637 (ALT 250 FOR IP): Performed by: NURSE PRACTITIONER

## 2023-01-15 PROCEDURE — 6370000000 HC RX 637 (ALT 250 FOR IP): Performed by: PSYCHIATRY & NEUROLOGY

## 2023-01-15 RX ORDER — HALOPERIDOL 5 MG/ML
5 INJECTION INTRAMUSCULAR EVERY 4 HOURS PRN
Status: DISCONTINUED | OUTPATIENT
Start: 2023-01-15 | End: 2023-01-18 | Stop reason: HOSPADM

## 2023-01-15 RX ORDER — PALIPERIDONE 6 MG/1
6 TABLET, EXTENDED RELEASE ORAL DAILY
Status: DISCONTINUED | OUTPATIENT
Start: 2023-01-15 | End: 2023-01-18 | Stop reason: HOSPADM

## 2023-01-15 RX ORDER — TRAZODONE HYDROCHLORIDE 50 MG/1
50 TABLET ORAL NIGHTLY PRN
Status: DISCONTINUED | OUTPATIENT
Start: 2023-01-15 | End: 2023-01-18 | Stop reason: HOSPADM

## 2023-01-15 RX ORDER — MAGNESIUM HYDROXIDE/ALUMINUM HYDROXICE/SIMETHICONE 120; 1200; 1200 MG/30ML; MG/30ML; MG/30ML
30 SUSPENSION ORAL EVERY 6 HOURS PRN
Status: DISCONTINUED | OUTPATIENT
Start: 2023-01-15 | End: 2023-01-18 | Stop reason: HOSPADM

## 2023-01-15 RX ORDER — FLUTICASONE PROPIONATE 110 UG/1
1 AEROSOL, METERED RESPIRATORY (INHALATION) 2 TIMES DAILY
Status: DISCONTINUED | OUTPATIENT
Start: 2023-01-15 | End: 2023-01-18 | Stop reason: HOSPADM

## 2023-01-15 RX ORDER — HYDROXYZINE 50 MG/1
50 TABLET, FILM COATED ORAL 3 TIMES DAILY PRN
Status: DISCONTINUED | OUTPATIENT
Start: 2023-01-15 | End: 2023-01-18 | Stop reason: HOSPADM

## 2023-01-15 RX ORDER — TRAZODONE HYDROCHLORIDE 50 MG/1
50 TABLET ORAL NIGHTLY PRN
Status: DISCONTINUED | OUTPATIENT
Start: 2023-01-15 | End: 2023-01-15

## 2023-01-15 RX ORDER — LORAZEPAM 1 MG/1
2 TABLET ORAL EVERY 4 HOURS PRN
Status: DISCONTINUED | OUTPATIENT
Start: 2023-01-15 | End: 2023-01-18 | Stop reason: HOSPADM

## 2023-01-15 RX ORDER — LORAZEPAM 2 MG/ML
2 INJECTION INTRAMUSCULAR EVERY 4 HOURS PRN
Status: DISCONTINUED | OUTPATIENT
Start: 2023-01-15 | End: 2023-01-18 | Stop reason: HOSPADM

## 2023-01-15 RX ORDER — HALOPERIDOL 5 MG/1
5 TABLET ORAL EVERY 4 HOURS PRN
Status: DISCONTINUED | OUTPATIENT
Start: 2023-01-15 | End: 2023-01-18 | Stop reason: HOSPADM

## 2023-01-15 RX ORDER — POLYETHYLENE GLYCOL 3350 17 G/17G
17 POWDER, FOR SOLUTION ORAL DAILY PRN
Status: DISCONTINUED | OUTPATIENT
Start: 2023-01-15 | End: 2023-01-18 | Stop reason: HOSPADM

## 2023-01-15 RX ORDER — DIPHENHYDRAMINE HYDROCHLORIDE 50 MG/ML
50 INJECTION INTRAMUSCULAR; INTRAVENOUS EVERY 4 HOURS PRN
Status: DISCONTINUED | OUTPATIENT
Start: 2023-01-15 | End: 2023-01-18 | Stop reason: HOSPADM

## 2023-01-15 RX ORDER — BUSPIRONE HYDROCHLORIDE 10 MG/1
10 TABLET ORAL 3 TIMES DAILY
Status: DISCONTINUED | OUTPATIENT
Start: 2023-01-15 | End: 2023-01-18 | Stop reason: HOSPADM

## 2023-01-15 RX ORDER — ACETAMINOPHEN 325 MG/1
650 TABLET ORAL EVERY 4 HOURS PRN
Status: DISCONTINUED | OUTPATIENT
Start: 2023-01-15 | End: 2023-01-18 | Stop reason: HOSPADM

## 2023-01-15 RX ORDER — ESCITALOPRAM OXALATE 10 MG/1
15 TABLET ORAL DAILY
Status: DISCONTINUED | OUTPATIENT
Start: 2023-01-15 | End: 2023-01-18 | Stop reason: HOSPADM

## 2023-01-15 RX ORDER — PANTOPRAZOLE SODIUM 40 MG/1
40 TABLET, DELAYED RELEASE ORAL DAILY
Status: DISCONTINUED | OUTPATIENT
Start: 2023-01-16 | End: 2023-01-18 | Stop reason: HOSPADM

## 2023-01-15 RX ORDER — IBUPROFEN 400 MG/1
400 TABLET ORAL EVERY 6 HOURS PRN
Status: DISCONTINUED | OUTPATIENT
Start: 2023-01-15 | End: 2023-01-18 | Stop reason: HOSPADM

## 2023-01-15 RX ORDER — NICOTINE 21 MG/24HR
1 PATCH, TRANSDERMAL 24 HOURS TRANSDERMAL DAILY
Status: DISCONTINUED | OUTPATIENT
Start: 2023-01-15 | End: 2023-01-17

## 2023-01-15 RX ORDER — PANTOPRAZOLE SODIUM 40 MG/1
40 TABLET, DELAYED RELEASE ORAL
Status: DISCONTINUED | OUTPATIENT
Start: 2023-01-16 | End: 2023-01-15

## 2023-01-15 RX ADMIN — HYDROXYZINE HYDROCHLORIDE 50 MG: 50 TABLET, FILM COATED ORAL at 00:34

## 2023-01-15 RX ADMIN — TRAZODONE HYDROCHLORIDE 50 MG: 50 TABLET ORAL at 22:22

## 2023-01-15 RX ADMIN — BUSPIRONE HYDROCHLORIDE 10 MG: 10 TABLET ORAL at 22:22

## 2023-01-15 RX ADMIN — HYDROXYZINE HYDROCHLORIDE 50 MG: 50 TABLET, FILM COATED ORAL at 22:22

## 2023-01-15 RX ADMIN — TRAZODONE HYDROCHLORIDE 50 MG: 50 TABLET ORAL at 00:34

## 2023-01-15 RX ADMIN — FLUTICASONE PROPIONATE 1 PUFF: 110 AEROSOL, METERED RESPIRATORY (INHALATION) at 22:22

## 2023-01-15 ASSESSMENT — PATIENT HEALTH QUESTIONNAIRE - PHQ9: SUM OF ALL RESPONSES TO PHQ QUESTIONS 1-9: 7

## 2023-01-15 ASSESSMENT — SLEEP AND FATIGUE QUESTIONNAIRES
SLEEP PATTERN: DIFFICULTY FALLING ASLEEP;DIFFICULTY ARISING;RESTLESSNESS
DO YOU USE A SLEEP AID: YES
DO YOU HAVE DIFFICULTY SLEEPING: YES
AVERAGE NUMBER OF SLEEP HOURS: 7

## 2023-01-15 NOTE — PROGRESS NOTES
RT ASSESSMENT TREATMENT GOALS    [x]Pt Goal: Pt will identify 1-2 positive coping skills by time of discharge. []Pt Goal: Pt will identify 1-2 positive aspects of self by time of discharge. []Pt Goal: Pt will remain on task/topic for 15-30 minutes during group by time of discharge. []Pt Goal: Pt will identify 1-2 aspects of relapse prevention plan by time of discharge. [x]Pt Goal: Pt will join in harmonious conversation with peers 1-2 times per group by time of discharge. []Pt Goal: Pt will identify 1-2 new leisure interests by time of discharge. []Pt Goal: Pt will not voice any delusional content by time of discharge.

## 2023-01-15 NOTE — GROUP NOTE
Group Therapy Note    Date: 1/15/2023    Group Start Time: 1400  Group End Time: 1500  Group Topic: Cognitive Skills    [facility]    Cyril Mccoy, PATRICES        Group Therapy Note    Attendees: 9/23     Patient's Goal: To increase social interaction and to improve/practice decision making, concentration, and communication skills. Notes: Pt participated fully in group task until pulled from group to talk with Nurse Practitioner. Pt was able to improve/practice decision making, concentration, and communication skills. Status After Intervention:  Improved     Participation Level: Active Listener and Interactive ,supportive     Participation Quality: Attentive, appropriate, sharing,supportive     Speech:  Normal content, pt does cooperate when asked to keep conversations focused on group task and lower volume of his voice slightly.      Thought Process/Content : Logical and linear r/t task     Affective Functioning: Congruent     Mood: Euthymic, laughing and joking with peers r/t task     Level of consciousness:  Alert     Response to Learning: Able to verbalize some current knowledge/experience, able to verbalize/acknowledge new learning , and Progressing to goal        Endings: None Reported     Modes of Intervention: Education, Support, Socialization, Exploration, Clarifying and Problem-solving        Discipline Responsible: Psychoeducational Specialist        Signature:  Anival Mejia

## 2023-01-15 NOTE — BH NOTE
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    Injury of frontal lobe (HCC)     Multiple sensory deficit syndrome        Status EXAM:  Mental Status and Behavioral Exam  Normal: No  Level of Assistance: Independent/Self  Facial Expression: Flat  Affect: Appropriate  Level of Consciousness: Alert  Frequency of Checks: 4 times per hour, close  Mood:Normal: No  Mood: Depressed, Anxious  Motor Activity:Normal: No  Motor Activity: Increased  Eye Contact: Good  Observed Behavior: Friendly, Cooperative, Preoccupied  Sexual Misconduct History: Current - no  Preception: [place] to person, [place] to time, [place] to place, [place] to situation  Attention:Normal: No  Attention: Distractible  Thought Processes: Circumstantial  Thought Content:Normal: No  Thought Content: Preoccupations  Depression Symptoms: Feelings of hopelessess, Feelings of worthlessness  Anxiety Symptoms: Generalized  Milly Symptoms: No problems reported or observed.  Hallucinations: None  Delusions: No  Memory:Normal: Yes  Insight and Judgment: No  Insight and Judgment: Poor judgment, Poor insight    Tobacco Screening:  Practical Counseling, on admission, garrett X, if applicable and completed (first 3 are required if patient doesn't refuse):            ( ) Recognizing danger situations (included triggers and roadblocks)                    ( ) Coping skills (new ways to manage stress,relaxation techniques, changing routine, distraction)                                                           ( ) Basic information about quitting (benefits of quitting, techniques in how  to quit, available resources  ( ) Referral for counseling faxed to [facility]                                                                                                                   ( ) Patient refused counseling  ( x) Patient has not smoked in the last 30 days    Metabolic Screening:    Lab Results   Component Value Date    LABA1C 5.7 11/23/2021       Lab Results   Component Value Date    CHOL 169 11/23/2021    CHOL 161 06/11/2019    CHOL 150 11/03/2012     Lab Results   Component Value Date    TRIG 161 (H) 11/23/2021    TRIG 210 (H) 06/11/2019    TRIG 197 (H) 11/03/2012     Lab Results   Component Value Date    HDL 28 (L) 11/23/2021    HDL 25 (L) 06/11/2019    HDL 32 (L) 11/03/2012     No components found for: LDLCAL  No results found for: LABVLDL      Body mass index is 55.08 kg/m². BP Readings from Last 2 Encounters:   01/15/23 134/85   11/24/21 (!) 108/52           Pt admitted with followings belongings:  Dental Appliances: None  Vision - Corrective Lenses: None  Hearing Aid: None  Jewelry: None  Body Piercings Removed: No  Clothing: Footwear, Pants, Shirt, Socks (sweat pants with string/ jacket with jimenez)  Other Valuables:  Other (Comment) (none)    Kaley Thomas RN

## 2023-01-15 NOTE — GROUP NOTE
Group Therapy Note    Date: 1/15/2023    Group Start Time: 2173  Group End Time: 0920  Group Topic: Community Meeting    [facility]    Marni Covarrubias LPN        Group Therapy Note    Attendees: 15/23  Community Meeting Group Note        Date: January 15, 2023 Start Time:  2234   End Time:  [address]      Number of Participants in Group & Unit Census:  8/23    Topic: community meeting    Goal of Group:today's goal      Comments:     Patient did not participate in [employer] group, despite staff encouragement and explanation of benefits. Patient remain seclusive to self. Q15 minute safety checks maintained for patient safety and will continue to encourage patient to attend unit programming.           Signature:  Marni Covarrubias LPN

## 2023-01-15 NOTE — ED PROVIDER NOTES
EMERGENCY DEPARTMENT ENCOUNTER    Pt Name: Damaris Swartz  MRN: [id number]  [place] 1995  Date of evaluation: 1/14/23  CHIEF COMPLAINT       Chief Complaint   Patient presents with    Suicidal     Brought by [employer] as walk in. Pt girlfriend called 46 for Pt making statements of suicide and having a lighter setting he was going to set himself on fire. Pt admits to suicidal thoughts. HISTORY OF PRESENT ILLNESS   This is a 68-year-old male he has a history of autism spectrum disorder and depression presenting with suicidal ideation    Suicidal plan to cut himself, shoot himself, shoot the police if they come to stop him    Chronic auditory hallucinations which tell him to do bad things    Admits to marijuana use no other drug or alcohol abuse    No other medical complaints    States he is \"on again off again\" in terms of taking his medication            REVIEW OF SYSTEMS     Review of Systems   Constitutional:  Negative for chills and fever. HENT:  Negative for rhinorrhea and sore throat. Eyes:  Negative for discharge and redness. Respiratory:  Negative for chest tightness and shortness of breath. Cardiovascular:  Negative for chest pain. Gastrointestinal:  Negative for abdominal pain, diarrhea, nausea and vomiting. Genitourinary:  Negative for dysuria and frequency. Musculoskeletal:  Negative for arthralgias and myalgias. Skin:  Negative for rash. Neurological:  Negative for weakness and numbness. Psychiatric/Behavioral:  Positive for suicidal ideas. All other systems reviewed and are negative.   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    Injury of frontal lobe ([facility] [address]     Multiple sensory deficit syndrome      Past Problem List  Patient Active Problem List   Diagnosis Code    Autism spectrum disorder F84.0    Acute non-recurrent maxillary sinusitis J01.00    Attention deficit disorder F98.8    Blood in the stool K92.1    Body mass index 45.0-49.9, adult ([facility]) Z68.42    Elevated LFTs R79.89    GERD (gastroesophageal reflux disease) K21.9    History of pneumonia Z87.01    Other constipation K59.09    Risk for sexually transmitted disease Z72.51    Severe recurrent major depression with psychotic features ([address] F33.3    Skin excoriation T14. 8XXA    Suicidal ideation R45.851    Major depressive disorder with psychotic features ([address] F32.3    MDD (major depressive disorder), recurrent episode ([address] F33.9    Depression with suicidal ideation F32. A, R45.851    Schizoaffective disorder, bipolar type ([address] F25.0    Major depressive disorder, recurrent severe without psychotic features ([address] F33.2    PTSD (post-traumatic stress disorder) F43.10    NATALYA (generalized anxiety disorder) F41.1    TBI (traumatic brain injury) S06. 9XAA     SURGICAL HISTORY       Past Surgical History:   Procedure Laterality Date    BLADDER SURGERY      FRACTURE SURGERY  4/6/2012    Lt hip    INNER EAR SURGERY Left     MOUTH SURGERY      PELVIC FRACTURE SURGERY Right     WISDOM TOOTH EXTRACTION       CURRENT MEDICATIONS       Current Discharge Medication List        CONTINUE these medications which have NOT CHANGED    Details   escitalopram (LEXAPRO) 5 MG tablet Take 3 tablets by mouth daily  Qty: 90 tablet, Refills: 0      paliperidone (INVEGA) 6 MG extended release tablet Take 1 tablet by mouth daily  Qty: 30 tablet, Refills: 0      ibuprofen (ADVIL;MOTRIN) 800 MG tablet Take 1 tablet by mouth every 8 hours as needed for Pain  Qty: 30 tablet, Refills: 0      acetaminophen (APAP EXTRA STRENGTH) 500 MG tablet Take 2 tablets by mouth every 6 hours as needed for Pain  Qty: 90 tablet, Refills: 0      busPIRone (BUSPAR) 10 MG tablet Take 1 tablet by mouth 3 times daily  Qty: 90 tablet, Refills: 0      traZODone (DESYREL) 50 MG tablet Take 1 tablet by mouth nightly as needed for Sleep  Qty: 30 tablet, Refills: 0      budesonide (PULMICORT FLEXHALER) 180 MCG/ACT AEPB inhaler Inhale 1 puff into the lungs 2 times daily  Qty: 1 each, Refills: 0      cetirizine (ZYRTEC) 10 MG tablet Take 1 tablet by mouth daily  Qty: 30 tablet, Refills: 0      omeprazole (PRILOSEC) 20 MG delayed release capsule Take 1 capsule by mouth 2 times daily  Qty: 30 capsule, Refills: 0           ALLERGIES     is allergic to hydromorphone.  FAMILY HISTORY     is adopted.      SOCIAL HISTORY       Social History     Tobacco Use    Smoking status: Former     Packs/day: 0.00     Types: Cigarettes    Smokeless tobacco: Current     Types: Chew   Vaping Use    Vaping Use: Never used   Substance Use Topics    Alcohol use: No    Drug use: Yes     Types: Marijuana (Weed), Cocaine     Comment: Last use 2 weeks before Christmas, not since being put on probation     PHYSICAL EXAM     INITIAL VITALS: BP (!) 140/82   Pulse 78   Temp 98.1 °F (36.7 °C) (Oral)   Resp 22   Ht 6' 2\" (1.88 m)   Wt (!) 429 lb (194.6 kg)   SpO2 95%   BMI 55.08 kg/m²    Physical Exam  Vitals and nursing note reviewed.   Constitutional:       Appearance: Normal appearance.   HENT:      Head: Normocephalic and atraumatic.      Nose: Nose normal.      Mouth/Throat:      Mouth: Mucous membranes are moist.   Eyes:      Conjunctiva/sclera: Conjunctivae normal.      Pupils: Pupils are equal, round, and reactive to light.   Cardiovascular:      Rate and Rhythm: Normal rate and regular rhythm.      Pulses: Normal pulses.      Heart sounds: Normal heart sounds.   Pulmonary:      Effort: Pulmonary effort is normal.      Breath sounds: Normal breath sounds.   Abdominal:      Palpations: Abdomen is soft.      Tenderness: There is no abdominal tenderness.   Musculoskeletal:         General: No swelling or deformity.      Cervical back: Normal range of motion.   Skin:     General: Skin is warm.      Findings: No rash.   Neurological:      General: No focal deficit present.      Mental Status: He is alert and oriented to person, place, and  time.   Psychiatric:         Mood and Affect: Mood normal.       MEDICAL DECISION MAKING / ED COURSE:     22-year-old presenting with suicidal homicidal ideation with plan    We will obtain medical clearance and discussed with psychiatry    1)  Number and Complexity of Problems Addressed at this Encounter  Problem List This Visit: Suicidal, homicidal    Differential Diagnosis: Depression with suicidal ideation, schizophrenia, malingering    Diagnoses Considered but Do Not Suspect: Metabolic derangement    Pertinent Comorbid Conditions: Depression    2)  Data Reviewed and Analyzed  (Lab and radiology tests/orders below in next section)    External Documents Reviewed: Previous admission to psychiatric facility here      3)  Treatment and Disposition    ED Course as of 01/15/23 0006   Sat Jan 14, 2023   2250 Microscopic Urinalysis:    WBC, UA 0 TO 2   RBC, UA 0 TO 2   Casts UA 0 TO 2   Epithelial Cells, UA 0 TO 2   Bacteria, UA None  unremarkable [JEAN CARLOS]   2311 CBC with Auto Differential(!):    WBC 10.9   RBC 4.56   Hemoglobin Quant 12.5(!)   Hematocrit 38.3(!)   MCV 84.0   MCH 27.4   MCHC 32.6   RDW 14.5   Platelet Count 452   MPV 8.7   Seg Neutrophils 47   Lymphocytes 42   Monocytes 9(!)   Eosinophils % 1   Basophils 1   Segs Absolute 5.20   Absolute Lymph # 4.60   Absolute Mono # 0.90   Absolute Eos # 0.10   Basophils Absolute 0.10  Unremarkable [JEAN CARLOS]   2312 Urine Drug Screen:    Amphetamine Screen, Ur NEGATIVE   Barbiturate Screen, Ur NEGATIVE   Benzodiazepine Screen, Urine NEGATIVE   Cocaine Metabolite, Urine NEGATIVE   METHADONE SCREEN, URINE, 90147 NEGATIVE   Opiates, Urine NEGATIVE   Phencyclidine, Urine NEGATIVE   Cannabinoid Scrn, Ur NEGATIVE   Oxycodone Screen, Ur NEGATIVE   Fentanyl, Ur NEGATIVE   TEST INFORMATION Assay provides medical screening only. The absence of expected drug(s) and/or metabolite(s) may indicate diluted or adulterated urine, limitations of testing or timing of collection.   Negative [JEAN CARLOS] 2320 CMP(!):    Glucose, Random 105(!)   BUN,BUNPL 9   Creatinine 0.84   Est, Glom Filt Rate >60   CALCIUM, SERUM, 198834 9.7   Sodium 138   Potassium 4.0   Chloride 103   CO2 25   Anion Gap 10   Alk Phos 81   ALT 39   AST 31   Bilirubin 0.4   Total Protein 7.5   Albumin 4.3  unremarkable [JEAN CARLOS]   2320 ETOH:    ETHANOL,ETHA <10   Ethanol percent <0.010  negative [JEAN CARLOS]   2320 Magnesium:    Magnesium 1.8  Negative   [JEAN CARLOS]   2320 Medically cleared [JEAN CARLOS]      ED Course User Index  [JEAN CARLOS] Genet Agrawal MD       Patient repeat assessment: Resting comfortably    Disposition discussion with patient/family, Shared Decision Making: Discussed admission to psychiatric facility patient agreeable    Case discussed with consulting clinician: Discussed with  who discussed with psychiatry patient will be admitted    72-year-old suicidal with plan admitted to psych facility    CRITICAL CARE:       PROCEDURES:    Procedures      DATA FOR LAB AND RADIOLOGY TESTS ORDERED BELOW ARE REVIEWED BY THE ED CLINICIAN:    RADIOLOGY: All x-rays, CT, MRI, and formal ultrasound images (except ED bedside ultrasound) are read by the radiologist, see reports below, unless otherwise noted in MDM or here. Reports below are reviewed by myself. No orders to display       LABS: Lab orders shown below, the results are reviewed by myself, and all abnormals are listed below.   Labs Reviewed   CBC WITH AUTO DIFFERENTIAL - Abnormal; Notable for the following components:       Result Value    Hemoglobin 12.5 (*)     Hematocrit 38.3 (*)     Monocytes 9 (*)     All other components within normal limits   COMPREHENSIVE METABOLIC PANEL - Abnormal; Notable for the following components:    Glucose 105 (*)     All other components within normal limits   URINALYSIS WITH REFLEX TO CULTURE - Abnormal; Notable for the following components:    Ketones, Urine TRACE (*)     Protein, UA NEGATIVE  Verified by sulfosalicylic acid test. (*)     Leukocyte Esterase, Urine TRACE (*)     All other components within normal limits   ETHANOL   MAGNESIUM   URINE DRUG SCREEN   MICROSCOPIC URINALYSIS       Vitals Reviewed:    Vitals:    01/14/23 2205 01/14/23 2230   BP: (!) 140/82    Pulse: 78 78   Resp: 22    Temp: 98.1 °F (36.7 °C)    TempSrc: Oral    SpO2: 95%    Weight: (!) 429 lb (194.6 kg)    Height: 6' 2\" (1.88 m)      MEDICATIONS GIVEN TO PATIENT THIS ENCOUNTER:  No orders of the defined types were placed in this encounter.    DISCHARGE PRESCRIPTIONS:  Current Discharge Medication List        PHYSICIAN CONSULTS ORDERED THIS ENCOUNTER:  IP CONSULT TO INTERNAL MEDICINE  FINAL IMPRESSION      1. Suicidal ideation          DISPOSITION/PLAN   DISPOSITION Admitted 01/14/2023 11:51:07 PM      OUTPATIENT FOLLOW UP THE PATIENT:  No follow-up provider specified.    MD Ari White MD  01/15/23 0006
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
## 2023-01-15 NOTE — PROGRESS NOTES
Behavioral Services  Medicare Certification Upon Admission    I certify that this patient's inpatient psychiatric hospital admission is medically necessary for:    [x] (1) Treatment which could reasonably be expected to improve this patient's condition,       [x] (2) Or for diagnostic study;     AND     [x](2) The inpatient psychiatric services are provided while the individual is under the care of a physician and are included in the individualized plan of care.     Estimated length of stay/service 2-9 days    Plan for post-hospital care -outpatient care    Electronically signed by Mono Hester MD on 1/15/2023 at 6:39 PM
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
## 2023-01-15 NOTE — PLAN OF CARE
[facility]  Initial Interdisciplinary Treatment Plan NO      Original treatment plan Date & Time: 1/15/2023   1325    Admission Type:  Admission Type: Voluntary    Reason for admission:   Reason for Admission: Suicidal/ increased depression    Estimated Length of Stay:  5-7days  Estimated Discharge Date: to be determined by physician    PATIENT STRENGTHS:  Patient Strengths:   Patient Strengths and Limitations:Limitations: Difficult relationships / poor social skills, Multiple barriers to leisure interests, Difficulty problem solving/relies on others to help solve problems, External locus of control  Addictive Behavior: Addictive Behavior  In the Past 3 Months, Have You Felt or Has Someone Told You That You Have a Problem With  : None  Medical Problems:  Past Medical History:   Diagnosis Date    Autistic disorder     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    Injury of frontal lobe ([facility]     Multiple sensory deficit syndrome      Status EXAM:Mental Status and Behavioral Exam  Normal: No  Level of Assistance: Independent/Self  Facial Expression: Flat  Affect: Appropriate  Level of Consciousness: Alert  Frequency of Checks: 4 times per hour, close  Mood:Normal: No  Mood: Depressed, Anxious  Motor Activity:Normal: No  Motor Activity: Increased  Eye Contact: Good  Observed Behavior: Friendly, Cooperative, Preoccupied  Sexual Misconduct History: Current - no  Preception: [place] to person, [place] to time, [place] to place, [place] to situation  Attention:Normal: No  Attention: Distractible  Thought Processes: Circumstantial  Thought Content:Normal: No  Thought Content: Preoccupations  Depression Symptoms: Feelings of hopelessess, Feelings of worthlessness  Anxiety Symptoms: Generalized  Milly Symptoms: No problems reported or observed.   Hallucinations: None  Delusions: No  Memory:Normal: Yes  Insight and Judgment: No  Insight and Judgment: Poor judgment, Poor insight    EDUCATION:   Learner Progress Toward Treatment Goals: reviewed group plans and strategies for care    Method:group therapy, medication compliance, individualized assessments and care planning    Outcome: needs reinforcement    PATIENT GOALS: to be discussed with patient within 72 hours    PLAN/TREATMENT RECOMMENDATIONS:     continue group therapy , medications compliance, goal setting, individualized assessments and care, continue to monitor pt on unit      SHORT-TERM GOALS: Patient refused to attend treatment team. No goals discussed   Time frame for Short-Term Goals: 5-7 days    LONG-TERM GOALS:Patient refused to attend treatment team. No goals discussed   Time frame for Long-Term Goals: 6 months  Members Present in Team Meeting: See Signature [facility], JUSTIN

## 2023-01-15 NOTE — ED NOTES
Nurse to nurse report called to [facility] unit.       Severa Blue, LPN  [phone]

## 2023-01-15 NOTE — PLAN OF CARE
Problem: Depression/Self Harm  Goal: Effect of psychiatric condition will be minimized and patient will be protected from self harm  Description: INTERVENTIONS:  1. Assess impact of patient's symptoms on level of functioning, self care needs and offer support as indicated  2. Assess patient/family knowledge of depression, impact on illness and need for teaching  3. Provide emotional support, presence and reassurance  4. Assess for possible suicidal thoughts or ideation. If patient expresses suicidal thoughts or statements do not leave alone, initiate Suicide Precautions, move to a room close to the nursing station and obtain sitter  5. Initiate consults as appropriate with Mental Health Professional, Spiritual Care, Psychosocial CNS, and consider a recommendation to the LIP for a Psychiatric Consultation  1/15/2023 1724 by Parminder Juan LPN  Outcome: Progressing  Flowsheets (Taken 1/15/2023 1724)  Effect of psychiatric condition will be minimized and patient will be protected from self harm:   Assess impact of patients symptoms on level of functioning, self care needs and offer support as indicated   Provide emotional support, presence and reassurance   Assess for suicidal thoughts or ideation. If patient expresses suicidal thoughts or statements do not leave alone, initiate Suicide Precautions, move near nurse station, obtain sitter  Note: Patient is thought blocked and slow to respond. Patient is denies thoughts of self harm, no thought of harming others, patient reports increase in anxiety and frustration with stressors at home. Patient denies having hallucinations. Patient is social on the unit with peers. Patient needs redirection at times in regards to treatment and milieu rules. Instructed on coping and communicating thoughts and concerns in regards to treatment. Visual checks maintained.

## 2023-01-15 NOTE — H&P
[facility] Internal Medicine  Gurmeet Najera MD; Manjit Collins MD; Rico Galvin MD; MD Monse Grijalva MD; Demarco Varma MD    [facility] Internal Medicine   IN-PATIENT SERVICE   [facility]    CONSULTATION / HISTORY AND PHYSICAL EXAMINATION            Date:   1/15/2023  Patient name:  Chino Carrillo  Date of admission:  1/14/2023 10:13 PM  MRN:   [id number]  Account:  [id number]  YOB: 1995  PCP:    LITO ABEBE CNP  Room:   [address] [place] [address]  Code Status:    Full Code    Physician Requesting Consult: Rafat Acosta MD    Reason for Consult:  H&P     Chief Complaint:     Chief Complaint   Patient presents with    Suicidal     Brought by [employer] as walk in. Pt girlfriend called 911 for Pt making statements of suicide and having a lighter setting he was going to set himself on fire. Pt admits to suicidal thoughts.        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 Authorizing Provider   escitalopram (LEXAPRO) 5 MG tablet Take 3 tablets by mouth daily 11/24/21   Simon Castellano MD   paliperidone (INVEGA) 6 MG extended release tablet Take 1 tablet by mouth daily 11/24/21   Simon Castellano  MD   ibuprofen (ADVIL;MOTRIN) 800 MG tablet Take 1 tablet by mouth every 8 hours as needed for Pain 6/28/20   Khurram Bianchi MD   acetaminophen (APAP EXTRA STRENGTH) 500 MG tablet Take 2 tablets by mouth every 6 hours as needed for Pain 6/28/20   Khurram Bianchi MD   busPIRone (BUSPAR) 10 MG tablet Take 1 tablet by mouth 3 times daily 8/29/19   LITO Rocha CNP   traZODone (DESYREL) 50 MG tablet Take 1 tablet by mouth nightly as needed for Sleep 8/29/19   LITO Rocha CNP   budesonide (PULMICORT FLEXHALER) 180 MCG/ACT AEPB inhaler Inhale 1 puff into the lungs 2 times daily 6/14/19   Saul Rodgers MD   cetirizine (ZYRTEC) 10 MG tablet Take 1 tablet by mouth daily 6/14/19   Saul Rodgers MD   omeprazole (PRILOSEC) 20 MG delayed release capsule Take 1 capsule by mouth 2 times daily 6/14/19   Saul Rodgers MD        Allergies:     Hydromorphone    Social History:     Tobacco:    reports that he has quit smoking. His smoking use included cigarettes. His smokeless tobacco use includes chew. Alcohol:      reports no history of alcohol use. Drug Use:  reports current drug use. Drugs: Marijuana (Weed) and Cocaine. Family History:     Family History   Adopted: Yes       Review of Systems:     Positive and Negative as described in HPI. CONSTITUTIONAL:  negative for fevers, chills, sweats, fatigue, weight loss  HEENT:  negative for vision, hearing changes, runny nose, throat pain  RESPIRATORY:  negative for shortness of breath, cough, congestion, wheezing. CARDIOVASCULAR:  negative for chest pain, palpitations.   GASTROINTESTINAL:  negative for nausea, vomiting, diarrhea, constipation, change in bowel habits, abdominal pain   GENITOURINARY:  negative for difficulty of urination, burning with urination, frequency   INTEGUMENT:  negative for rash, skin lesions, easy bruising   HEMATOLOGIC/LYMPHATIC:  negative for swelling/edema   ALLERGIC/IMMUNOLOGIC:  negative for urticaria , itching  ENDOCRINE:  negative increase in drinking, increase in urination, hot or cold intolerance  MUSCULOSKELETAL:  negative joint pains, muscle aches, swelling of joints  NEUROLOGICAL:  negative for headaches, dizziness, lightheadedness, numbness, pain, tingling extremities      Physical Exam:     /85   Pulse 82   Temp 98 °F (36.7 °C) (Oral)   Resp 16   Ht 6' 2\" (1.88 m)   Wt (!) 429 lb (194.6 kg)   SpO2 95%   BMI 55.08 kg/m²   Temp (24hrs), Av.1 °F (36.7 °C), Min:98 °F (36.7 °C), Max:98.1 °F (36.7 °C)    No results for input(s): POCGLU in the last 72 hours. No intake or output data in the 24 hours ending 01/15/23 1232    General Appearance:  alert, well appearing, and in no acute distress  Mental status: oriented to person, place, and time with normal affect  Head:  normocephalic, atraumatic. Eye: no icterus, redness, pupils equal and reactive, extraocular eye movements intact, conjunctiva clear  Ear: normal external ear, no discharge, hearing intact  Nose:  no drainage noted  Mouth: mucous membranes moist  Neck: supple, no carotid bruits, thyroid not palpable  Lungs: Bilateral equal air entry, clear to ausculation, no wheezing, rales or rhonchi, normal effort  Cardiovascular: normal rate, regular rhythm, no murmur, gallop, rub.   Abdomen: Soft, nontender, nondistended, normal bowel sounds, no hepatomegaly or splenomegaly  Neurologic: There are no new focal motor or sensory deficits, normal muscle tone and bulk, no abnormal sensation, normal speech, cranial nerves II through XII grossly intact  Skin: No gross lesions, rashes, bruising or bleeding on exposed skin area  Extremities:  peripheral pulses palpable, no pedal edema or calf pain with palpation    Investigations:      Laboratory Testing:  Recent Results (from the past 24 hour(s))   Urinalysis with Reflex to Culture    Collection Time: 23 10:29 PM    Specimen: Urine, clean catch   Result Value Ref Range    Color, UA Yellow Yellow    Turbidity UA Clear Clear    Glucose, Ur NEGATIVE NEGATIVE    Bilirubin Urine NEGATIVE NEGATIVE    Ketones, Urine TRACE (A) NEGATIVE    Specific Gravity, UA 1.029 1.000 - 1.030    Urine Hgb NEGATIVE NEGATIVE    pH, UA 8.0 5.0 - 8.0    Protein, UA NEGATIVE  Verified by sulfosalicylic acid test. (A) NEGATIVE    Urobilinogen, Urine Normal Normal    Nitrite, Urine NEGATIVE NEGATIVE    Leukocyte Esterase, Urine TRACE (A) NEGATIVE   Urine Drug Screen    Collection Time: 01/14/23 10:29 PM   Result Value Ref Range    Amphetamine Screen, Ur NEGATIVE NEGATIVE    Barbiturate Screen, Ur NEGATIVE NEGATIVE    Benzodiazepine Screen, Urine NEGATIVE NEGATIVE    Cocaine Metabolite, Urine NEGATIVE NEGATIVE    Methadone Screen, Urine NEGATIVE NEGATIVE    Opiates, Urine NEGATIVE NEGATIVE    Phencyclidine, Urine NEGATIVE NEGATIVE    Cannabinoid Scrn, Ur NEGATIVE NEGATIVE    Oxycodone Screen, Ur NEGATIVE NEGATIVE    Fentanyl, Ur NEGATIVE NEGATIVE    Test Information       Assay provides medical screening only. The absence of expected drug(s) and/or metabolite(s) may indicate diluted or adulterated urine, limitations of testing or timing of collection.    Microscopic Urinalysis    Collection Time: 01/14/23 10:29 PM   Result Value Ref Range    WBC, UA 0 TO 2 /HPF    RBC, UA 0 TO 2 /HPF    Casts UA 0 TO 2 /LPF    Epithelial Cells UA 0 TO 2 /HPF    Bacteria, UA None None   CBC with Auto Differential    Collection Time: 01/14/23 10:57 PM   Result Value Ref Range    WBC 10.9 3.5 - 11.0 k/uL    RBC 4.56 4.5 - 5.9 m/uL    Hemoglobin 12.5 (L) 13.5 - 17.5 g/dL    Hematocrit 38.3 (L) 41 - 53 %    MCV 84.0 80 - 100 fL    MCH 27.4 26 - 34 pg    MCHC 32.6 31 - 37 g/dL    RDW 14.5 11.5 - 14.9 %    Platelets 904 108 - 775 k/uL    MPV 8.7 6.0 - 12.0 fL    Seg Neutrophils 47 36 - 66 %    Lymphocytes 42 24 - 44 %    Monocytes 9 (H) 1 - 7 %    Eosinophils % 1 0 - 4 %    Basophils 1 0 - 2 %    Segs Absolute 5.20 1.3 - 9.1 k/uL    Absolute Lymph # 4.60 1.0 - 4.8 k/uL    Absolute Mono # 0.90 0.1 - 1.3 k/uL    Absolute Eos # 0.10 0.0 - 0.4 k/uL    Basophils Absolute 0.10 0.0 - 0.2 k/uL   CMP    Collection Time: 01/14/23 10:57 PM   Result Value Ref Range    Glucose 105 (H) 70 - 99 mg/dL    BUN 9 6 - 20 mg/dL    Creatinine 0.84 0.70 - 1.20 mg/dL    Est, Glom Filt Rate >60 >60 mL/min/1.73m2    Calcium 9.7 8.6 - 10.4 mg/dL    Sodium 138 135 - 144 mmol/L    Potassium 4.0 3.7 - 5.3 mmol/L    Chloride 103 98 - 107 mmol/L    CO2 25 20 - 31 mmol/L    Anion Gap 10 9 - 17 mmol/L    Alkaline Phosphatase 81 40 - 129 U/L    ALT 39 5 - 41 U/L    AST 31 <40 U/L    Total Bilirubin 0.4 0.3 - 1.2 mg/dL    Total Protein 7.5 6.4 - 8.3 g/dL    Albumin 4.3 3.5 - 5.2 g/dL   ETOH    Collection Time: 01/14/23 10:57 PM   Result Value Ref Range    Ethanol <10 <10 mg/dL    Ethanol percent <0.010 %   Magnesium    Collection Time: 01/14/23 10:57 PM   Result Value Ref Range    Magnesium 1.8 1.6 - 2.6 mg/dL       Imaging/Diagonstics:  No results found.     Assessment :      Hospital Problems             Last Modified POA    * (Principal) Depression with suicidal ideation (Chronic) 1/14/2023 Yes    Autism spectrum disorder 1/15/2023 Yes    Acute non-recurrent maxillary sinusitis 1/15/2023 Yes    Attention deficit disorder 1/15/2023 Yes    Body mass index 45.0-49.9, adult ([facility] [address] 1/15/2023 Yes    GERD (gastroesophageal reflux disease) 1/15/2023 Yes    Major depressive disorder with psychotic features ([facility] [address] (Chronic) 1/15/2023 Yes    PTSD (post-traumatic stress disorder) 1/15/2023 Yes       Plan:     82-year-old gentleman with underlying history of anxiety and depression admitted to inpatient psych for suicidal ideations,  Morbid obesity, low-calorie diet and lifestyle modification advised,  ADHD, continue home medications,  GERD, start omeprazole,  PTSD, home medication,  Chronic sinusitis, will monitor for any fever,  DVT prophylaxis, patient mobile,  Full CODE STATUS    Consultations: IP CONSULT TO INTERNAL MEDICINE  IP CONSULT TO Lesly Trimble MD  1/15/2023  12:32 PM    Copy sent to Dr. Ciara Shukla, APRN - CNP    Please note that this chart was generated using voice recognition Dragon dictation software. Although every effort was made to ensure the accuracy of this automated transcription, some errors in transcription may have occurred.

## 2023-01-15 NOTE — ED NOTES
Safeguard in [facility] AN AFFILIATE OF [facility] for patient watch. Safeguard informed that they need to stay with the patient at all time, must be present in the room and if they need a break or relief to let the nurse know so they can be replaced. Safeguard verbalizes understanding. Belongings and patient checked by security. Belongings locked up. Pt in blue gown. Mode of arrival (squad #, walk in, police, etc) : [employer]        Chief complaint(s): Mental Health Evaluation        Arrival Note (brief scenario, treatment PTA, etc). : Pt arrived to the ED voluntarily by [employer] due to suicidal ideation. PT attempted to cut his lower right leg with a tin can a few days ago when girlfriend intervened stated he was going to light himself on fire. Girlfriend called [employer] this evening due to pt referring to suicidal plans to harm himself. PT reported to writer he's autistic with suicidal to self, homicidal to no specific individual thoughts. PT stated he hasn't eaten for three days due to girlfriend spending all their food stamps on candy. Pt stated he lost his job today due to not having transportation and he states he lives 6 miles from job. PT has not been taking medications per ordered due to wanting to do what he wants and then takes medications when he feels like it. PT states another stressor at this time is that caregiver from group home living took his PS% and he wants it back causing him to feel more depressed. PT denies the use of drugs and alcohol at this time. PT denies visual and audio hallucinations. C= \"Have you ever felt that you should Cut down on your drinking? \"  No  A= \"Have people Annoyed you by criticizing your drinking? \"  No  G= \"Have you ever felt bad or Guilty about your drinking? \"  No  E= \"Have you ever had a drink as an Eye-opener first thing in the morning to steady your nerves or to help a hangover? \"  No      Deferred []      Reason for deferring: N/A    *If yes to two or more: probable alcohol abuse. Yousif Che, DEMETRION  20/81/66 8701

## 2023-01-16 LAB
CHOLESTEROL/HDL RATIO: 6.6
CHOLESTEROL: 172 MG/DL
ESTIMATED AVERAGE GLUCOSE: 114 MG/DL
HBA1C MFR BLD: 5.6 % (ref 4–6)
HDLC SERPL-MCNC: 26 MG/DL
LDL CHOLESTEROL: 108 MG/DL (ref 0–130)
TRIGL SERPL-MCNC: 188 MG/DL

## 2023-01-16 PROCEDURE — 83036 HEMOGLOBIN GLYCOSYLATED A1C: CPT

## 2023-01-16 PROCEDURE — 6370000000 HC RX 637 (ALT 250 FOR IP): Performed by: NURSE PRACTITIONER

## 2023-01-16 PROCEDURE — 6370000000 HC RX 637 (ALT 250 FOR IP): Performed by: INTERNAL MEDICINE

## 2023-01-16 PROCEDURE — 80061 LIPID PANEL: CPT

## 2023-01-16 PROCEDURE — 6370000000 HC RX 637 (ALT 250 FOR IP): Performed by: PSYCHIATRY & NEUROLOGY

## 2023-01-16 PROCEDURE — 99232 SBSQ HOSP IP/OBS MODERATE 35: CPT | Performed by: NURSE PRACTITIONER

## 2023-01-16 PROCEDURE — 1240000000 HC EMOTIONAL WELLNESS R&B

## 2023-01-16 PROCEDURE — 99222 1ST HOSP IP/OBS MODERATE 55: CPT | Performed by: INTERNAL MEDICINE

## 2023-01-16 PROCEDURE — 36415 COLL VENOUS BLD VENIPUNCTURE: CPT

## 2023-01-16 RX ADMIN — ESCITALOPRAM OXALATE 15 MG: 10 TABLET ORAL at 11:14

## 2023-01-16 RX ADMIN — BUSPIRONE HYDROCHLORIDE 10 MG: 10 TABLET ORAL at 15:10

## 2023-01-16 RX ADMIN — TRAZODONE HYDROCHLORIDE 50 MG: 50 TABLET ORAL at 20:40

## 2023-01-16 RX ADMIN — PALIPERIDONE 6 MG: 6 TABLET, EXTENDED RELEASE ORAL at 11:14

## 2023-01-16 RX ADMIN — BUSPIRONE HYDROCHLORIDE 10 MG: 10 TABLET ORAL at 11:14

## 2023-01-16 RX ADMIN — BUSPIRONE HYDROCHLORIDE 10 MG: 10 TABLET ORAL at 20:40

## 2023-01-16 RX ADMIN — PANTOPRAZOLE SODIUM 40 MG: 40 TABLET, DELAYED RELEASE ORAL at 11:14

## 2023-01-16 RX ADMIN — HYDROXYZINE HYDROCHLORIDE 50 MG: 50 TABLET, FILM COATED ORAL at 20:40

## 2023-01-16 NOTE — GROUP NOTE
Group Therapy Note    Date: 1/16/2023    Group Start Time: 0900  Group End Time: 0920  Group Topic: Group Documentation    NATALIE Esparza RN        Group Therapy Note    Attendees: 7/23    Community Meeting Group Note        Date: January 16, 2023 Start Time: 9am  End Time:  9:20am      Number of Participants in Group & Unit Census:  7/23    Topic: Goals group    Goal of Group:To establish attainable daily goal(s)      Comments:     Patient did not participate in Community Meeting group, despite staff encouragement and explanation of benefits.  Patient remain seclusive to self.  Q15 minute safety checks maintained for patient safety and will continue to encourage patient to attend unit programming.

## 2023-01-16 NOTE — PROGRESS NOTES
Daily Progress Note  1/16/2023    Patient Name: Pito Merritt    CHIEF COMPLAINT: Suicidal ideation with a plan to cut self, shoot self or light self on fire. Command auditory hallucinations         SUBJECTIVE:      Patient seen face-to-face for follow-up assessment. He is in the day area and is cooperative with discussion. Patient displays no insight into why he is in the hospital and states that his girlfriend would like to speak to this writer to state that he is not violent. Educated patient on the fact that he had multiple plans to end his life and had been exhibiting psychomotor agitation at time of admission. Patient verbalizes understanding and does confirm that he has problems with impulse control. He reports modestly improving suicidal ideation and is starting to feel safer from self here on the unit. Still not able to contract for safety in the community. He has been compliant with his scheduled psychotropic medications which include buspirone, escitalopram and paliperidone. Reviewed for any potential side effects and patient denies all. Tolerating medications well. Patient has remained in behavioral control and has not required any emergency medications. Patient symptoms are modestly improving but have yet to demonstrate stability.       Appetite:  [x] Adequate/Unchanged  [] Increased  [] Decreased      Sleep:       [x] Adequate/Unchanged  [] Fair  [] Poor      Group Attendance on Unit:   [] Yes   [] Selectively    [x] No    Compliant with scheduled medications: [x] Yes  [] No    Received emergency medications in past 24 hrs: [] Yes   [x] No    Medication Side Effects: Denies         Mental Status Exam  Level of consciousness: Alert and awake   Appearance: Appropriate attire for setting, standing, with poor grooming and hygiene, disheveled  Behavior/Motor: Approachable, engages with interviewer, no psychomotor abnormalities   Attitude toward examiner: Cooperative, easily distracted, good eye contact  Speech: spontaneous, normal rate, normal volume, and well articulated   Mood: Patient reports \"fine\"  Affect: Anxious  Thought processes: linear and goal directed   Thought content:  Denies homicidal ideation  Suicidal Ideation: Reports improvement in suicidal ideations, unable to contract for safety off unit  Delusions: No evidence of delusions. Perceptual Disturbance: Denies perceptual disturbances. Does not appear to be responding to to internal stimuli. Cognition: Oriented to self, location, time, and situation  Memory: intact  Insight: poor   Judgement: poor       Data   height is 6' 2\" (1.88 m) and weight is 429 lb (194.6 kg) (abnormal). His oral temperature is 98.3 °F (36.8 °C). His blood pressure is 107/53 (abnormal) and his pulse is 48 (abnormal). His respiration is 14 and oxygen saturation is 95%.    Labs:   Admission on 01/14/2023   Component Date Value Ref Range Status    WBC 01/14/2023 10.9  3.5 - 11.0 k/uL Final    RBC 01/14/2023 4.56  4.5 - 5.9 m/uL Final    Hemoglobin 01/14/2023 12.5 (A)  13.5 - 17.5 g/dL Final    Hematocrit 01/14/2023 38.3 (A)  41 - 53 % Final    MCV 01/14/2023 84.0  80 - 100 fL Final    MCH 01/14/2023 27.4  26 - 34 pg Final    MCHC 01/14/2023 32.6  31 - 37 g/dL Final    RDW 01/14/2023 14.5  11.5 - 14.9 % Final    Platelets 58/06/9822 251  150 - 450 k/uL Final    MPV 01/14/2023 8.7  6.0 - 12.0 fL Final    Seg Neutrophils 01/14/2023 47  36 - 66 % Final    Lymphocytes 01/14/2023 42  24 - 44 % Final    Monocytes 01/14/2023 9 (A)  1 - 7 % Final    Eosinophils % 01/14/2023 1  0 - 4 % Final    Basophils 01/14/2023 1  0 - 2 % Final    Segs Absolute 01/14/2023 5.20  1.3 - 9.1 k/uL Final    Absolute Lymph # 01/14/2023 4.60  1.0 - 4.8 k/uL Final    Absolute Mono # 01/14/2023 0.90  0.1 - 1.3 k/uL Final    Absolute Eos # 01/14/2023 0.10  0.0 - 0.4 k/uL Final    Basophils Absolute 01/14/2023 0.10  0.0 - 0.2 k/uL Final    Glucose 01/14/2023 105 (A)  70 - 99 mg/dL Final    BUN 01/14/2023 9  6 - 20 mg/dL Final    Creatinine 01/14/2023 0.84  0.70 - 1.20 mg/dL Final    Est, Glom Filt Rate 01/14/2023 >60  >60 mL/min/1.73m2 Final    Comment:       Effective Oct 3, 2022        These results are not intended for use in patients <25years of age. eGFR results are calculated without a race factor using the 2021 CKD-EPI equation. Careful clinical correlation is recommended, particularly when comparing to results   calculated using previous equations. The CKD-EPI equation is less accurate in patients with extremes of muscle mass, extra-renal   metabolism of creatine, excessive creatine ingestion, or following therapy that affects   renal tubular secretion.       Calcium 01/14/2023 9.7  8.6 - 10.4 mg/dL Final    Sodium 01/14/2023 138  135 - 144 mmol/L Final    Potassium 01/14/2023 4.0  3.7 - 5.3 mmol/L Final    Chloride 01/14/2023 103  98 - 107 mmol/L Final    CO2 01/14/2023 25  20 - 31 mmol/L Final    Anion Gap 01/14/2023 10  9 - 17 mmol/L Final    Alkaline Phosphatase 01/14/2023 81  40 - 129 U/L Final    ALT 01/14/2023 39  5 - 41 U/L Final    AST 01/14/2023 31  <40 U/L Final    Total Bilirubin 01/14/2023 0.4  0.3 - 1.2 mg/dL Final    Total Protein 01/14/2023 7.5  6.4 - 8.3 g/dL Final    Albumin 01/14/2023 4.3  3.5 - 5.2 g/dL Final    Ethanol 01/14/2023 <10  <10 mg/dL Final    Ethanol percent 01/14/2023 <0.010  % Final    Magnesium 01/14/2023 1.8  1.6 - 2.6 mg/dL Final    Color, UA 01/14/2023 Yellow  Yellow Final    Turbidity UA 01/14/2023 Clear  Clear Final    Glucose, Ur 01/14/2023 NEGATIVE  NEGATIVE Final    Bilirubin Urine 01/14/2023 NEGATIVE  NEGATIVE Final    Ketones, Urine 01/14/2023 TRACE (A)  NEGATIVE Final    Specific Gravity, UA 01/14/2023 1.029  1.000 - 1.030 Final    Urine Hgb 01/14/2023 NEGATIVE  NEGATIVE Final    pH, UA 01/14/2023 8.0  5.0 - 8.0 Final    Protein, UA 01/14/2023 NEGATIVE  Verified by sulfosalicylic acid test. (A)  NEGATIVE Final    Urobilinogen, Urine 01/14/2023 Normal  Normal Final    Nitrite, Urine 01/14/2023 NEGATIVE  NEGATIVE Final    Leukocyte Esterase, Urine 01/14/2023 TRACE (A)  NEGATIVE Final    Amphetamine Screen, Ur 01/14/2023 NEGATIVE  NEGATIVE Final    Comment:       (Positive cutoff 1000 ng/mL)                  Barbiturate Screen, Ur 01/14/2023 NEGATIVE  NEGATIVE Final    Comment:       (Positive cutoff 200 ng/mL)                  Benzodiazepine Screen, Urine 01/14/2023 NEGATIVE  NEGATIVE Final    Comment:       (Positive cutoff 200 ng/mL)                  Cocaine Metabolite, Urine 01/14/2023 NEGATIVE  NEGATIVE Final    Comment:       (Positive cutoff 300 ng/mL)                  Methadone Screen, Urine 01/14/2023 NEGATIVE  NEGATIVE Final    Comment:       (Positive cutoff 300 ng/mL)                  Opiates, Urine 01/14/2023 NEGATIVE  NEGATIVE Final    Comment:       (Positive cutoff 300 ng/mL)                  Phencyclidine, Urine 01/14/2023 NEGATIVE  NEGATIVE Final    Comment:       (Positive cutoff 25 ng/mL)                  Cannabinoid Scrn, Ur 01/14/2023 NEGATIVE  NEGATIVE Final    Comment:       (Positive cutoff 50 ng/mL)                  Oxycodone Screen, Ur 01/14/2023 NEGATIVE  NEGATIVE Final    Comment:       (Positive cutoff 100 ng/mL)                  Fentanyl, Ur 01/14/2023 NEGATIVE  NEGATIVE Final    Comment:       (Positive cutoff  5 ng/ml)            Test Information 01/14/2023 Assay provides medical screening only. The absence of expected drug(s) and/or metabolite(s) may indicate diluted or adulterated urine, limitations of testing or timing of collection. Final    Comment: Testing for legal purposes should be confirmed by another method. To request confirmation   of test result, please call the lab within 7 days of sample submission.       WBC, UA 01/14/2023 0 TO 2  /HPF Final    RBC, UA 01/14/2023 0 TO 2  /HPF Final    Casts UA 01/14/2023 0 TO 2  /LPF Final    Epithelial Cells UA 01/14/2023 0 TO 2  /HPF Final    Bacteria, UA 01/14/2023 None None Final    Cholesterol 01/16/2023 172  <200 mg/dL Final    Comment:    Cholesterol Guidelines:      <200  Desirable   200-240  Borderline      >240  Undesirable         HDL 01/16/2023 26 (A)  >40 mg/dL Final    Comment:    HDL Guidelines:    <40     Undesirable   40-59    Borderline    >59     Desirable         LDL Cholesterol 01/16/2023 108  0 - 130 mg/dL Final    Comment:    LDL Guidelines:     <100    Desirable   100-129   Near to/above Desirable   130-159   Borderline      >159   Undesirable     Direct (measured) LDL and calculated LDL are not interchangeable tests. Chol/HDL Ratio 01/16/2023 6.6 (A)  <5 Final            Triglycerides 01/16/2023 188 (A)  <150 mg/dL Final    Comment:    Triglyceride Guidelines:     <150   Desirable   150-199  Borderline   200-499  High     >499   Very high   Based on AHA Guidelines for fasting triglyceride, October 2012. Hemoglobin A1C 01/16/2023 5.6  4.0 - 6.0 % Final    Estimated Avg Glucose 01/16/2023 114  mg/dL Final    Comment: The ADA and AACC recommend providing the estimated average glucose result to permit better   patient understanding of their HBA1c result. Reviewed patient's current plan of care and vital signs with nursing staff.     Labs reviewed: [x] Yes    Medications  Current Facility-Administered Medications: acetaminophen (TYLENOL) tablet 650 mg, 650 mg, Oral, Q4H PRN  aluminum & magnesium hydroxide-simethicone (MAALOX) 200-200-20 MG/5ML suspension 30 mL, 30 mL, Oral, Q6H PRN  hydrOXYzine HCl (ATARAX) tablet 50 mg, 50 mg, Oral, TID PRN  ibuprofen (ADVIL;MOTRIN) tablet 400 mg, 400 mg, Oral, Q6H PRN  nicotine (NICODERM CQ) 14 MG/24HR 1 patch, 1 patch, TransDERmal, Daily  polyethylene glycol (GLYCOLAX) packet 17 g, 17 g, Oral, Daily PRN  LORazepam (ATIVAN) injection 2 mg, 2 mg, IntraMUSCular, Q4H PRN **AND** haloperidol lactate (HALDOL) injection 5 mg, 5 mg, IntraMUSCular, Q4H PRN **AND** diphenhydrAMINE (BENADRYL) injection 50 mg, 50 mg, IntraMUSCular, Q4H PRN  LORazepam (ATIVAN) tablet 2 mg, 2 mg, Oral, Q4H PRN **AND** haloperidol (HALDOL) tablet 5 mg, 5 mg, Oral, Q4H PRN  traZODone (DESYREL) tablet 50 mg, 50 mg, Oral, Nightly PRN  fluticasone (FLOVENT HFA) 110 MCG/ACT inhaler 1 puff, 1 puff, Inhalation, BID  paliperidone (INVEGA) extended release tablet 6 mg, 6 mg, Oral, Daily  escitalopram (LEXAPRO) tablet 15 mg, 15 mg, Oral, Daily  busPIRone (BUSPAR) tablet 10 mg, 10 mg, Oral, TID  pantoprazole (PROTONIX) tablet 40 mg, 40 mg, Oral, Daily    ASSESSMENT  Major depressive disorder, recurrent, severe with psychotic features ([facility] [address]         PLAN  Patient symptoms are: modestly improving but remain unstable for discharge  No Medication Changes Today  Monitor need and frequency of PRN medications. Encourage participation in groups and milieu. Attempt to develop insight. Psycho-education conducted. Probable discharge is 2-3 days with stability  Follow-up daily while inpatient. Patient continues to be monitored in the inpatient psychiatric facility at [facility] for safety and stabilization. Patient continues to need, on a daily basis, active treatment furnished directly by or requiring the supervision of inpatient psychiatric personnel. Electronically signed by LITO Leslie CNP on 1/16/2023 at 5:00 PM    **This report has been created using voice recognition software. It may contain minor errors which are inherent in voice recognition technology. **

## 2023-01-16 NOTE — PLAN OF CARE
Problem: Depression/Self Harm  Goal: Effect of psychiatric condition will be minimized and patient will be protected from self harm  Description: INTERVENTIONS:  1. Assess impact of patient's symptoms on level of functioning, self care needs and offer support as indicated  2. Assess patient/family knowledge of depression, impact on illness and need for teaching  3. Provide emotional support, presence and reassurance  4. Assess for possible suicidal thoughts or ideation. If patient expresses suicidal thoughts or statements do not leave alone, initiate Suicide Precautions, move to a room close to the nursing station and obtain sitter  5. Initiate consults as appropriate with Mental Health Professional, Spiritual Care, Psychosocial CNS, and consider a recommendation to the [facility]IP for a Psychiatric Consultation  1/16/2023 1347 by Tyrell Lopes LPN  Outcome: Progressing  Flowsheets (Taken 1/16/2023 1312)  Effect of psychiatric condition will be minimized and patient will be protected from self harm:   Assess impact of patients symptoms on level of functioning, self care needs and offer support as indicated   Assess patient/family knowledge of depression, impact on illness and need for teaching   Provide emotional support, presence and reassurance   Assess for suicidal thoughts or ideation. If patient expresses suicidal thoughts or statements do not leave alone, initiate Suicide Precautions, move near nurse station, obtain sitter  Note: Pt denies thoughts of self harm and is agreeable to seeking out should thoughts of self harm arise. Safe environment maintained. Every 15 minute checks for safety cont per unit policy. Will cont to monitor for safety and provides support and reassurance as needed.

## 2023-01-16 NOTE — PLAN OF CARE
Problem: Depression/Self Harm  Goal: Effect of psychiatric condition will be minimized and patient will be protected from self harm  Description: INTERVENTIONS:    1/16/2023 0033 by Andree Marques LPN  Outcome: Progressing     Problem: Depression  Goal: Will be euthymic at discharge  Description: INTERVENTIONS:    1/16/2023 0033 by Andree Marques LPN  Outcome: Progressing   Patient denies suicidal ideas at this time. Patient denies homicidal ideas at this time. Patient verbalized depressive symptoms at this time. Patient has been out in day room watching tv and socializing with peers. Patient is free of self harm at this time. Patient agrees to seek out staff if thoughts to harm self arise. Staff will provide support and reassurance as needed. Safety checks maintained every 15 minutes.

## 2023-01-16 NOTE — GROUP NOTE
Group Therapy Note    Date: 1/16/2023    Group Start Time: 1000  Group End Time: 8387  Group Topic: Psychotherapy    [facility] ROXANNA Palomo        Group Therapy Note    Attendees: 10/23       Patient refused to attend psychotherapy group after encouragement from staff. 1:1 talk time offered but refused. Signature:   Ericka Palomo

## 2023-01-16 NOTE — GROUP NOTE
Group Therapy Note    Date: 1/16/2023    Group Start Time: 1100  Group End Time: 1953  Group Topic: Cognitive Skills    [facility]    SHIRIN Oakley        Group Therapy Note    Attendees: 12/23           Patient's Goal:  To improve interpersonal skills and memory recall through collaborating with peers and concentrating on a presented task. Notes:  Patient attended and participated in the last 30 minutes of group. Patient was able to collaborate with peers and concentrate on a presented task. Patient was asked to leave group due to patient's gown not fitting properly, exposing himself to peers. Patient responded to redirection. Patient returned to group after changing into appropriate clothing. Status After Intervention:  Improved    Participation Level: Active Listener and Interactive    Participation Quality: Attentive and Sharing to task.  Inappropriate appearance - responded to redirection       Speech:  normal      Thought Process/Content: Logical      Affective Functioning: Blunted, brightened       Mood: euthymic      Level of consciousness:  Alert and Attentive      Response to Learning: Able to verbalize current knowledge/experience, Able to retain information, and Progressing to goal      Endings: None Reported    Modes of Intervention: Socialization, Exploration, and Activity      Discipline Responsible: Psychoeducational Specialist      Signature:  Wilfredo Zhou, [address]

## 2023-01-16 NOTE — GROUP NOTE
Group Therapy Note    Date: 1/16/2023    Group Start Time: 1430  Group End Time: 1500  Group Topic: Psychoeducation    [facility]    SHIRIN Gates        Group Therapy Note    Attendees: 6/22    Psych-Ed/Relapse Prevention Group Note        Date: January 16, 2023 Start Time: 2:30pm  End Time: 3pm      Number of Participants in Group & Unit Census:  6/22    Topic:  coping skills, self-expression, interpersonal skills    Goal of Group: To improve coping skills awareness and interpersonal skills through collaborating with peers and demonstrating self-expression. Comments:     Patient did not participate in Psych-Ed/Relapse Prevention group, despite staff encouragement and explanation of benefits. Patient remain seclusive to self. Q15 minute safety checks maintained for patient safety and will continue to encourage patient to attend unit programming.         Signature:  SHIRIN Gates

## 2023-01-16 NOTE — PROGRESS NOTES
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 Pt states he does not eat breakfast. For Lunch/dinner he would like Chicken fingers, fries, pizza (cheese) or a peanut butter sandwich. He requests lemonade all trays.     Electronically signed by Lisa Sy RD, CHEN on 1/16/23 at 3:22 PM EST    Contact: [phone]

## 2023-01-16 NOTE — GROUP NOTE
Group Therapy Note    Date: 2023    Group Start Time: 0900  Group End Time:   Group Topic: Group Documentation    NATALIE Drew RN        Group Therapy Note    Attendees:          Patient's Goal:  ***    Notes:  ***    Status After Intervention:  {Status After Intervention:191738042}    Participation Level: {Participation Level:067289535}    Participation Quality: {[facility] PARTICIPATION QUALITY:402974558}      Speech:  {ED  CD_SPEECH:83802}      Thought Process/Content: {Thought Process/Content:614491910}      Affective Functioning: {Affective Functionin}      Mood: {Mood:714250835}      Level of consciousness:  {Level of consciousness:178106103}      Response to Learnin Sima [facility] Responses to Learnin}      Endings: {[facility] Endings:76165}    Modes of Intervention: {[facility] Modes of Intervention:265823478}      Discipline Responsible: Brie PAGAN Multidisciplinary:[id number]}      Signature:  Jose Drew RN

## 2023-01-17 PROCEDURE — 99232 SBSQ HOSP IP/OBS MODERATE 35: CPT | Performed by: NURSE PRACTITIONER

## 2023-01-17 PROCEDURE — 6370000000 HC RX 637 (ALT 250 FOR IP): Performed by: INTERNAL MEDICINE

## 2023-01-17 PROCEDURE — 1240000000 HC EMOTIONAL WELLNESS R&B

## 2023-01-17 PROCEDURE — 6370000000 HC RX 637 (ALT 250 FOR IP): Performed by: PSYCHIATRY & NEUROLOGY

## 2023-01-17 PROCEDURE — 6370000000 HC RX 637 (ALT 250 FOR IP): Performed by: NURSE PRACTITIONER

## 2023-01-17 RX ADMIN — HYDROXYZINE HYDROCHLORIDE 50 MG: 50 TABLET, FILM COATED ORAL at 20:34

## 2023-01-17 RX ADMIN — PANTOPRAZOLE SODIUM 40 MG: 40 TABLET, DELAYED RELEASE ORAL at 08:52

## 2023-01-17 RX ADMIN — BUSPIRONE HYDROCHLORIDE 10 MG: 10 TABLET ORAL at 20:34

## 2023-01-17 RX ADMIN — FLUTICASONE PROPIONATE 1 PUFF: 110 AEROSOL, METERED RESPIRATORY (INHALATION) at 08:55

## 2023-01-17 RX ADMIN — TRAZODONE HYDROCHLORIDE 50 MG: 50 TABLET ORAL at 20:34

## 2023-01-17 RX ADMIN — ESCITALOPRAM OXALATE 15 MG: 10 TABLET ORAL at 08:52

## 2023-01-17 RX ADMIN — BUSPIRONE HYDROCHLORIDE 10 MG: 10 TABLET ORAL at 08:52

## 2023-01-17 RX ADMIN — BUSPIRONE HYDROCHLORIDE 10 MG: 10 TABLET ORAL at 15:12

## 2023-01-17 RX ADMIN — PALIPERIDONE 6 MG: 6 TABLET, EXTENDED RELEASE ORAL at 08:52

## 2023-01-17 NOTE — GROUP NOTE
Group Therapy Note    Date: 1/17/2023    Group Start Time: 1100  Group End Time: 0855  Group Topic: Cognitive Skills    SHIRIN Rolon        Group Therapy Note    Attendees: 10/22     Topic: Decision making, and communication skills. Goal of Group: To increase social interaction and to improve/practice decision making, and communication skills. Comments:      Patient did not participate Cognitive Skills Group at 11:00am, despite staff encouragement and explanation of benefits. Patient remains seclusive to self, resting in room during group time. Q15 minute safety checks maintained for patient safety and will continue to encourage patient to attend unit programming.             Discipline Responsible: Psychoeducational Specialist        Signature:  Carmen Cardoso

## 2023-01-17 NOTE — PLAN OF CARE
[address] [place] [address]  Day 3 Interdisciplinary Treatment Plan NOTE    Review Date & Time: 1/17/2023   1:21 pm    Admission Type:   Admission Type: Voluntary    Reason for admission:  Reason for Admission: Suicidal/ increased depression  Estimated Length of Stay: 5-7 days  Estimated Discharge Date Update: to be determined by physician    PATIENT STRENGTHS:  Patient Strengths    Patient Strengths and Limitations:Limitations: Difficult relationships / poor social skills, Multiple barriers to leisure interests, Difficulty problem solving/relies on others to help solve problems, External locus of control  Addictive Behavior:Addictive Behavior  In the Past 3 Months, Have You Felt or Has Someone Told You That You Have a Problem With  : None  Medical Problems:  Past Medical History:   Diagnosis Date    Autistic disorder     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    Injury of frontal lobe (HCC)     Multiple sensory deficit syndrome        Risk:  Fall Risk   Bairon Scale Bairon Scale Score: 21  BVC    Change in scores no Changes to plan of Care no    Status EXAM:   Mental Status and Behavioral Exam  Normal: No  Level of Assistance: Independent/Self  Facial Expression: Flat  Affect: Appropriate  Level of Consciousness: Alert  Frequency of Checks: 4 times per hour, close  Mood:Normal: No  Mood: Anxious  Motor Activity:Normal: Yes  Motor Activity: Increased  Eye Contact: Fair  Observed Behavior: Cooperative  Sexual Misconduct History: Current - no  Preception: [place] to person, [place] to place, [place] to time, [place] to situation  Attention:Normal: No  Attention: Distractible  Thought Processes: Circumstantial  Thought Content:Normal: No  Thought Content: Preoccupations  Depression Symptoms: No problems reported or observed. Anxiety Symptoms: Generalized  Milly Symptoms: No problems reported or observed.   Hallucinations: None  Delusions: No  Memory:Normal: No  Memory: Poor recent  Insight and Judgment: No  Insight and Judgment: Poor judgment, Poor insight    Daily Assessment Last Entry:                Daily Nutrition (WDL): Within Defined Limits  Level of Assistance: Independent/Self    Patient Monitoring:  Frequency of Checks: 4 times per hour, close    Psychiatric Symptoms:   Depression Symptoms  Depression Symptoms: No problems reported or observed. Anxiety Symptoms  Anxiety Symptoms: Generalized  Milly Symptoms  Milly Symptoms: No problems reported or observed. Suicide Risk CSSR-S:  1) Within the past month, have you wished you were dead or wished you could go to sleep and not wake up? : Yes  2) Have you actually had any thoughts of killing yourself? : Yes  3) Have you been thinking about how you might kill yourself? : Yes  5) Have you started to work out or worked out the details of how to kill yourself?  Do you intend to carry out this plan? : Yes  6) Have you ever done anything, started to do anything, or prepared to do anything to end your life?: Yes  Change in Result no Change in Plan of care no      EDUCATION:   EDUCATION:   Learner Progress Toward Treatment Goals: Reviewed results and recommendations of this team, Reviewed group plan and strategies, Reviewed signs, symptoms and risk of self harm and violent behavior, Reviewed goals and plan of care    Method:small group, individual verbal education    Outcome:verbalized by patient, but needs reinforcement to obtain goals    PATIENT GOALS:  Short term: \"From what I was told, I'm leaving first thing in the morning\"  Long term: \"Work on having more coping mechanisms\"    PLAN/TREATMENT RECOMMENDATIONS UPDATE: continue with group therapies, increased socialization, continue planning for after discharge goals, continue with medication compliance    SHORT-TERM GOALS UPDATE:   Time frame for Short-Term Goals: 5-7 days    LONG-TERM GOALS UPDATE:   Time frame for Long-Term Goals: 6 months  Members Present in Team Meeting: See Signature Sheet    Gregorio Reyes RN

## 2023-01-17 NOTE — GROUP NOTE
Group Therapy Note    Date: 1/17/2023    Group Start Time: 1330  Group End Time: 1083  Group Topic: Cognitive Skills    [facility] SHIRIN Carvalho        Group Therapy Note    Attendees: 7/21     Patient's Goal: To increase social interaction and to improve/practice problem solving, and communication skills. Notes: Pt participated in group task for 15 mins. Pt was able to improve/practice problem solving, and communication skills. Pt left group early after asking to use restroom and did not return to group. Status After Intervention:  IUnchanged     Participation Level:  Active Listener,sharing, and supportive briefly     Participation Quality: Attentive, sharing, and supportive briefly     Speech:  Normal     Thought Process/Content : Logical, linear     Affective Functioning: Congruent     Mood: Euthymic    Level of consciousness:  Alert     Response to Learning: Able to verbalize some current knowledge/experience, able to verbalize/acknowledge new learning , and Progressing to goal        Endings: None Reported     Modes of Intervention: Education, Support, Socialization, Exploration, Clarifying and Problem-solving        Discipline Responsible: Psychoeducational Specialist        Signature:  SHIRIN Mckeon

## 2023-01-17 NOTE — PROGRESS NOTES
Pharmacy Med Education Group Note    Date: 1/17/23  Start Time: 1430  End Time: 1500    Number Participants in Group:  4/21    Goal:  Patient will demonstrate an understanding of the medications intended purpose and possible adverse effects  Topic: [place] for Pharmacy Med Ed Group    Discipline Responsible:     OT  AT  [facility].  RT     X Other       Participation Level:     None  Minimal      X Active Listener    X Interactive    Monopolizing         Participation Quality:    X Appropriate  Inappropriate     X       Attentive        Intrusive          Sharing        Resistant          Supportive        Lethargic       Affective:     X Congruent  Incongruent  Blunted  Flat    Constricted  Anxious  Elated  Angry    Labile  Depressed  Other         Cognitive:    X Alert  Oriented PPTP     Concentration   X G  F  P   Attention Span   X G  F  P   Short-Term Memory   X G  F  P   Long-Term Memory  G  F  P   ProblemSolving/  Decision Making  G  F  P   Ability to Process  Information   X G  F  P      Contributing Factors             Delusional             Hallucinating             Flight of Ideas             Other:       Modes of Intervention:    X Education   X Support  Exploration    Clarifying  Problem Solving  Confrontation    Socialization  Limit Setting  Reality Testing    Activity  Movement  Media    Other:            Response to Learning:    X Able to verbalize current knowledge/experience    Able to verbalize/acknowledge new learning    Able to retain information    Capable of insight    Able to change behavior    Progressing to goal    Other:        Navjot Miranda, PharmD, [address]  PGY-1 Pharmacy Resident  1/17/2023 3:31 PM

## 2023-01-17 NOTE — PLAN OF CARE
Patient denies thoughts of harming self/others, denies suicidal/homicidal ideations. Denies all hallucinations. Reports that he is ready for discharge. Patient has been medication and behavioral compliant. Every 15 minute safety checks maintained. Problem: Depression/Self Harm  Goal: Effect of psychiatric condition will be minimized and patient will be protected from self harm  Description: INTERVENTIONS:  1. Assess impact of patient's symptoms on level of functioning, self care needs and offer support as indicated  2. Assess patient/family knowledge of depression, impact on illness and need for teaching  3. Provide emotional support, presence and reassurance  4. Assess for possible suicidal thoughts or ideation. If patient expresses suicidal thoughts or statements do not leave alone, initiate Suicide Precautions, move to a room close to the nursing station and obtain sitter  5. Initiate consults as appropriate with Mental Health Professional, Spiritual Care, Psychosocial CNS, and consider a recommendation to the LIP for a Psychiatric Consultation  1/17/2023 1524 by Jenny Lamas LPN  Outcome: Progressing     Problem: Depression  Goal: Will be euthymic at discharge  Description: INTERVENTIONS:  1. Administer medication as ordered  2. Provide emotional support via 1:1 interaction with staff  3. Encourage involvement in milieu/groups/activities  4.  Monitor for social isolation  1/17/2023 1524 by Jenny Lamas LPN  Outcome: Progressing

## 2023-01-18 VITALS
DIASTOLIC BLOOD PRESSURE: 64 MMHG | RESPIRATION RATE: 14 BRPM | WEIGHT: 315 LBS | BODY MASS INDEX: 40.43 KG/M2 | HEIGHT: 74 IN | OXYGEN SATURATION: 99 % | HEART RATE: 63 BPM | TEMPERATURE: 97.3 F | SYSTOLIC BLOOD PRESSURE: 121 MMHG

## 2023-01-18 PROCEDURE — 6370000000 HC RX 637 (ALT 250 FOR IP): Performed by: INTERNAL MEDICINE

## 2023-01-18 PROCEDURE — 99239 HOSP IP/OBS DSCHRG MGMT >30: CPT | Performed by: NURSE PRACTITIONER

## 2023-01-18 PROCEDURE — 6370000000 HC RX 637 (ALT 250 FOR IP): Performed by: NURSE PRACTITIONER

## 2023-01-18 RX ORDER — BUSPIRONE HYDROCHLORIDE 10 MG/1
10 TABLET ORAL 3 TIMES DAILY
Qty: 90 TABLET | Refills: 0 | Status: SHIPPED | OUTPATIENT
Start: 2023-01-18

## 2023-01-18 RX ORDER — TRAZODONE HYDROCHLORIDE 50 MG/1
50 TABLET ORAL NIGHTLY PRN
Qty: 30 TABLET | Refills: 0 | Status: SHIPPED | OUTPATIENT
Start: 2023-01-18

## 2023-01-18 RX ORDER — PALIPERIDONE 6 MG/1
6 TABLET, EXTENDED RELEASE ORAL DAILY
Qty: 30 TABLET | Refills: 0 | Status: SHIPPED | OUTPATIENT
Start: 2023-01-18

## 2023-01-18 RX ORDER — ESCITALOPRAM OXALATE 5 MG/1
15 TABLET ORAL DAILY
Qty: 90 TABLET | Refills: 0 | Status: SHIPPED | OUTPATIENT
Start: 2023-01-18

## 2023-01-18 RX ADMIN — ESCITALOPRAM OXALATE 15 MG: 10 TABLET ORAL at 09:31

## 2023-01-18 RX ADMIN — BUSPIRONE HYDROCHLORIDE 10 MG: 10 TABLET ORAL at 14:10

## 2023-01-18 RX ADMIN — PALIPERIDONE 6 MG: 6 TABLET, EXTENDED RELEASE ORAL at 09:32

## 2023-01-18 RX ADMIN — PANTOPRAZOLE SODIUM 40 MG: 40 TABLET, DELAYED RELEASE ORAL at 09:32

## 2023-01-18 RX ADMIN — BUSPIRONE HYDROCHLORIDE 10 MG: 10 TABLET ORAL at 09:32

## 2023-01-18 ASSESSMENT — LIFESTYLE VARIABLES: HOW OFTEN DO YOU HAVE A DRINK CONTAINING ALCOHOL: NEVER

## 2023-01-18 NOTE — DISCHARGE INSTR - DIET

## 2023-01-18 NOTE — PLAN OF CARE
Problem: Depression/Self Harm  Goal: Effect of psychiatric condition will be minimized and patient will be protected from self harm  Description: INTERVENTIONS:  1. Assess impact of patient's symptoms on level of functioning, self care needs and offer support as indicated  2. Assess patient/family knowledge of depression, impact on illness and need for teaching  3. Provide emotional support, presence and reassurance  4. Assess for possible suicidal thoughts or ideation. If patient expresses suicidal thoughts or statements do not leave alone, initiate Suicide Precautions, move to a room close to the nursing station and obtain sitter  5. Initiate consults as appropriate with Mental Health Professional, Spiritual Care, Psychosocial CNS, and consider a recommendation to the LIP for a Psychiatric Consultation  1/17/2023 2103 by Eros Youngblood RN  Outcome: Progressing  Patient has made no attempt to harm self at this time. Problem: Depression  Goal: Will be euthymic at discharge  Description: INTERVENTIONS:  1. Administer medication as ordered  2. Provide emotional support via 1:1 interaction with staff  3. Encourage involvement in milieu/groups/activities  4. Monitor for social isolation  1/17/2023 2103 by Eros Youngblood RN  Outcome: Progressing  Patient denies suicidal ideation, homicidal ideation and hallucinations at this time. He is social with peers. He reports some anxiety but feels ready for discharge. Patient denies suicidal ideation, homicidal ideation and hallucinations at this time. Safety plan reviewed with patient, agrees to approach staff when feeling upset. 15 minute and random checks maintained for safety. No violent or escalating behaviors noted during this shift. Patient is currently calm, controlled and medication-compliant.

## 2023-01-18 NOTE — BH NOTE
[facility]  Discharge Note    Pt discharged with followings belongings:   Dental Appliances: None  Vision - Corrective Lenses: None  Hearing Aid: None  Jewelry: None  Body Piercings Removed: No  Clothing: Footwear, Pants, Shirt, Socks (sweat pants with string/ jacket with jimenez)  Other Valuables: Other (Comment) (none)   Valuables returned to patient. Patient educated on aftercare instructions: yes  Information faxed to [facility] by staff  at 3:04 PM .Patient verbalize understanding of AVS:  yes. Status EXAM upon discharge:  Mental Status and Behavioral Exam  Normal: No  Level of Assistance: Independent/Self  Facial Expression: Brightened, Exaggerated  Affect: Appropriate  Level of Consciousness: Alert  Frequency of Checks: 4 times per hour, close  Mood:Normal: No  Mood: Anxious  Motor Activity:Normal: Yes  Motor Activity: Increased  Eye Contact: Fair  Observed Behavior: Cooperative, Preoccupied, Friendly  Sexual Misconduct History: Current - no  Preception: Anderson to person, Anderson to time, Anderson to place, Anderson to situation  Attention:Normal: No  Attention: Distractible  Thought Processes: Circumstantial  Thought Content:Normal: No  Thought Content: Preoccupations  Depression Symptoms: Impaired concentration  Anxiety Symptoms: Generalized  Milly Symptoms: No problems reported or observed.   Hallucinations: None  Delusions: No  Memory:Normal: Yes  Memory: Poor recent  Insight and Judgment: No  Insight and Judgment: Poor judgment, Poor insight    Tobacco Screening:  Practical Counseling, on admission, garrett X, if applicable and completed (first 3 are required if patient doesn't refuse) refused :            ( ) Recognizing danger situations (included triggers and roadblocks)                    ( ) Coping skills (new ways to manage stress,relaxation techniques, changing routine, distraction)                                                           ( ) Basic information about quitting (benefits of quitting, techniques in how to quit, available resources  ( ) Referral for counseling faxed to [facility]                                                                                                                   ( ) Patient refused counseling  ( ) Patient refused referral  ( ) Patient refused prescription upon discharge  ( ) Patient has not smoked in the last 30 days    Metabolic Screening:    Lab Results   Component Value Date    LABA1C 5.6 01/16/2023       Lab Results   Component Value Date    CHOL 172 01/16/2023    CHOL 169 11/23/2021    CHOL 161 06/11/2019    CHOL 150 11/03/2012     Lab Results   Component Value Date    TRIG 188 (H) 01/16/2023    TRIG 161 (H) 11/23/2021    TRIG 210 (H) 06/11/2019    TRIG 197 (H) 11/03/2012     Lab Results   Component Value Date    HDL 26 (L) 01/16/2023    HDL 28 (L) 11/23/2021    HDL 25 (L) 06/11/2019    HDL 32 (L) 11/03/2012     No components found for: LDLCAL  No results found for: LABVLDL      Patient discharged home via cab by self    Carole Cuellar LPN

## 2023-01-18 NOTE — GROUP NOTE
Group Therapy Note    Date: 1/18/2023    Group Start Time: 0900  Group End Time: 0935  Group Topic: Community Meeting    [facility]    Marisa Bay LPN         Community Meeting Group Note        Date: 01/18/23 Start Time: 9am  End Time:  935      Number of Participants in Group & Unit Census:  9    Topic: goals          Comments:     Patient did not participate in Community Meeting group, despite staff encouragement and explanation of benefits.  Patient remain seclusive to self.  Q15 minute safety checks maintained for patient safety and will continue to encourage patient to attend unit programming.       Signature:  Marisa Bay LPN

## 2023-01-18 NOTE — CARE COORDINATION
[facility] and  Luisana Cruz [phone], advised her of potential discharge today. Luisana Cruz states pt home provider is [employer], Sahil is coordinator [phone] or residential phone number of [phone], Carla called Sahil and left voice mail requesting return call to make arrangements to discharge home, await return phone call.

## 2023-01-18 NOTE — DISCHARGE INSTRUCTIONS
Information:  Medications:   Medication summary provided   I understand that I should take only the medications on my list.     -why and when I need to take each medicine.     -which side effects to watch for.     -that I should carry my medication information at all times in case of     Emergency situations. I will take all of my medicines to follow up appointments.     -check with my physician or pharmacist before taking any new    Medication, over the counter product or drink alcohol.    -Ask about food, drug or dietary supplement interactions.    -discard old lists and update records with medication providers. Notify Physician:  Notify physician if you notice:   Always call 911 if you feel your life is in danger  In case of an emergency call 911 immediately! If 911 is not available call your local emergency medical system for help    Behavioral Health Follow Up:  Original Referral Source: [facility]  Discharge Diagnosis: Suicidal ideation [R45.851]  Depression with suicidal ideation [F32. A, R45.851]  Recommendations for Level of Care: Follow up and take medications as prescribed   Patient status at discharge: Alert and orientated   [facility]   Aftercare plan faxed: yes   -faxed by: staff   -date: 1/18/23   -time: 2346  Prescriptions: Sent to patients pharmacy    Smoking: Quit Smoking. Call the [employer]'s smoking quitline at 6-465-94U-QUIT  Know the signs of a heart attack   If you have any of the following symptoms call 911 immediately, do not wait more    Than five minutes. 1. Pressure, fullness and/ or squeezing in the center of the chest spreading to    The jaw, neck or shoulder. 2. Chest discomfort with light headedness, fainting, sweating, nausea or    Shortness of breath. 3. Upper abdominal pressure or discomfort. 4. Lower chest pain, back pain, unusual fatigue, shortness of breath, nausea   Or dizziness.      General Information:   Questions regarding your bill: Call HELP program [phone]     Suicide Hotline ([place]antz )  ([phone] 964-3172      Recovery Help line- 419.961.8842      To obtain results of pending studies call Medical Records at: [phone]     For emergencies and 24 hour/7 days a week contact information:  [phone]

## 2023-01-18 NOTE — PROGRESS NOTES
Daily Progress Note  1/17/2023    Patient Name: Sheela Braun    CHIEF COMPLAINT: Suicidal ideation with a plan to cut self, shoot self or light self on fire. Command auditory hallucinations         SUBJECTIVE:      Patient seen face-to-face for follow-up assessment. He has been compliant with his scheduled psychotropic medications and is denying any side effects. Patient reporting improvement in suicidal and homicidal ideation. He reports feeling safe from self here on unit. He denies any auditory hallucinations. He has remained in behavioral control and has not required any emergency medications. Staff report that he has been engaged in treatment and is attending group programming. Patient symptoms are improving. Plan to discharge soon with stability. Appetite:  [x] Adequate/Unchanged  [] Increased  [] Decreased      Sleep:       [x] Adequate/Unchanged  [] Fair  [] Poor      Group Attendance on Unit:   [x] Yes   [] Selectively    [] No    Compliant with scheduled medications: [x] Yes  [] No    Received emergency medications in past 24 hrs: [] Yes   [x] No    Medication Side Effects: Denies         Mental Status Exam  Level of consciousness: Alert and awake   Appearance: Appropriate attire for setting, standing, with poor grooming and hygiene, disheveled  Behavior/Motor: Approachable, engages with interviewer, no psychomotor abnormalities   Attitude toward examiner: Cooperative, easily distracted, good eye contact  Speech: spontaneous, normal rate, normal volume, and well articulated   Mood: Patient reports \"fine\"  Affect: Anxious  Thought processes: linear and goal directed   Thought content:  Denies homicidal ideation  Suicidal Ideation: Reports improvement in suicidal ideations, able to contract for safety on unit  Delusions: No evidence of delusions. Perceptual Disturbance: Denies perceptual disturbances. Does not appear to be responding to to internal stimuli.   Cognition: Oriented to self, location, time, and situation  Memory: intact  Insight: poor   Judgement: poor       Data   height is 6' 2\" (1.88 m) and weight is 429 lb (194.6 kg) (abnormal). His tympanic temperature is 97.6 °F (36.4 °C). His blood pressure is 121/64 and his pulse is 63. His respiration is 14 and oxygen saturation is 99%.   Labs:   Admission on 01/14/2023   Component Date Value Ref Range Status    WBC 01/14/2023 10.9  3.5 - 11.0 k/uL Final    RBC 01/14/2023 4.56  4.5 - 5.9 m/uL Final    Hemoglobin 01/14/2023 12.5 (A)  13.5 - 17.5 g/dL Final    Hematocrit 01/14/2023 38.3 (A)  41 - 53 % Final    MCV 01/14/2023 84.0  80 - 100 fL Final    MCH 01/14/2023 27.4  26 - 34 pg Final    MCHC 01/14/2023 32.6  31 - 37 g/dL Final    RDW 01/14/2023 14.5  11.5 - 14.9 % Final    Platelets 01/14/2023 251  150 - 450 k/uL Final    MPV 01/14/2023 8.7  6.0 - 12.0 fL Final    Seg Neutrophils 01/14/2023 47  36 - 66 % Final    Lymphocytes 01/14/2023 42  24 - 44 % Final    Monocytes 01/14/2023 9 (A)  1 - 7 % Final    Eosinophils % 01/14/2023 1  0 - 4 % Final    Basophils 01/14/2023 1  0 - 2 % Final    Segs Absolute 01/14/2023 5.20  1.3 - 9.1 k/uL Final    Absolute Lymph # 01/14/2023 4.60  1.0 - 4.8 k/uL Final    Absolute Mono # 01/14/2023 0.90  0.1 - 1.3 k/uL Final    Absolute Eos # 01/14/2023 0.10  0.0 - 0.4 k/uL Final    Basophils Absolute 01/14/2023 0.10  0.0 - 0.2 k/uL Final    Glucose 01/14/2023 105 (A)  70 - 99 mg/dL Final    BUN 01/14/2023 9  6 - 20 mg/dL Final    Creatinine 01/14/2023 0.84  0.70 - 1.20 mg/dL Final    Est, Glom Filt Rate 01/14/2023 >60  >60 mL/min/1.73m2 Final    Comment:       Effective Oct 3, 2022        These results are not intended for use in patients <18 years of age.        eGFR results are calculated without a race factor using the 2021 CKD-EPI equation.  Careful clinical correlation is recommended, particularly when comparing to results   calculated using previous equations.  The CKD-EPI equation is less accurate in  patients with extremes of muscle mass, extra-renal   metabolism of creatine, excessive creatine ingestion, or following therapy that affects   renal tubular secretion.       Calcium 01/14/2023 9.7  8.6 - 10.4 mg/dL Final    Sodium 01/14/2023 138  135 - 144 mmol/L Final    Potassium 01/14/2023 4.0  3.7 - 5.3 mmol/L Final    Chloride 01/14/2023 103  98 - 107 mmol/L Final    CO2 01/14/2023 25  20 - 31 mmol/L Final    Anion Gap 01/14/2023 10  9 - 17 mmol/L Final    Alkaline Phosphatase 01/14/2023 81  40 - 129 U/L Final    ALT 01/14/2023 39  5 - 41 U/L Final    AST 01/14/2023 31  <40 U/L Final    Total Bilirubin 01/14/2023 0.4  0.3 - 1.2 mg/dL Final    Total Protein 01/14/2023 7.5  6.4 - 8.3 g/dL Final    Albumin 01/14/2023 4.3  3.5 - 5.2 g/dL Final    Ethanol 01/14/2023 <10  <10 mg/dL Final    Ethanol percent 01/14/2023 <0.010  % Final    Magnesium 01/14/2023 1.8  1.6 - 2.6 mg/dL Final    Color, UA 01/14/2023 Yellow  Yellow Final    Turbidity UA 01/14/2023 Clear  Clear Final    Glucose, Ur 01/14/2023 NEGATIVE  NEGATIVE Final    Bilirubin Urine 01/14/2023 NEGATIVE  NEGATIVE Final    Ketones, Urine 01/14/2023 TRACE (A)  NEGATIVE Final    Specific Gravity, UA 01/14/2023 1.029  1.000 - 1.030 Final    Urine Hgb 01/14/2023 NEGATIVE  NEGATIVE Final    pH, UA 01/14/2023 8.0  5.0 - 8.0 Final    Protein, UA 01/14/2023 NEGATIVE  Verified by sulfosalicylic acid test. (A)  NEGATIVE Final    Urobilinogen, Urine 01/14/2023 Normal  Normal Final    Nitrite, Urine 01/14/2023 NEGATIVE  NEGATIVE Final    Leukocyte Esterase, Urine 01/14/2023 TRACE (A)  NEGATIVE Final    Amphetamine Screen, Ur 01/14/2023 NEGATIVE  NEGATIVE Final    Comment:       (Positive cutoff 1000 ng/mL)                  Barbiturate Screen, Ur 01/14/2023 NEGATIVE  NEGATIVE Final    Comment:       (Positive cutoff 200 ng/mL)                  Benzodiazepine Screen, Urine 01/14/2023 NEGATIVE  NEGATIVE Final    Comment:       (Positive cutoff 200 ng/mL) Cocaine Metabolite, Urine 01/14/2023 NEGATIVE  NEGATIVE Final    Comment:       (Positive cutoff 300 ng/mL)                  Methadone Screen, Urine 01/14/2023 NEGATIVE  NEGATIVE Final    Comment:       (Positive cutoff 300 ng/mL)                  Opiates, Urine 01/14/2023 NEGATIVE  NEGATIVE Final    Comment:       (Positive cutoff 300 ng/mL)                  Phencyclidine, Urine 01/14/2023 NEGATIVE  NEGATIVE Final    Comment:       (Positive cutoff 25 ng/mL)                  Cannabinoid Scrn, Ur 01/14/2023 NEGATIVE  NEGATIVE Final    Comment:       (Positive cutoff 50 ng/mL)                  Oxycodone Screen, Ur 01/14/2023 NEGATIVE  NEGATIVE Final    Comment:       (Positive cutoff 100 ng/mL)                  Fentanyl, Ur 01/14/2023 NEGATIVE  NEGATIVE Final    Comment:       (Positive cutoff  5 ng/ml)            Test Information 01/14/2023 Assay provides medical screening only.  The absence of expected drug(s) and/or metabolite(s) may indicate diluted or adulterated urine, limitations of testing or timing of collection.   Final    Comment: Testing for legal purposes should be confirmed by another method.  To request confirmation   of test result, please call the lab within 7 days of sample submission.      WBC, UA 01/14/2023 0 TO 2  /HPF Final    RBC, UA 01/14/2023 0 TO 2  /HPF Final    Casts UA 01/14/2023 0 TO 2  /LPF Final    Epithelial Cells UA 01/14/2023 0 TO 2  /HPF Final    Bacteria, UA 01/14/2023 None  None Final    Cholesterol 01/16/2023 172  <200 mg/dL Final    Comment:    Cholesterol Guidelines:      <200  Desirable   200-240  Borderline      >240  Undesirable         HDL 01/16/2023 26 (A)  >40 mg/dL Final    Comment:    HDL Guidelines:    <40     Undesirable   40-59    Borderline    >59     Desirable         LDL Cholesterol 01/16/2023 108  0 - 130 mg/dL Final    Comment:    LDL Guidelines:     <100    Desirable   100-129   Near to/above Desirable   130-159   Borderline      >159   Undesirable     Direct  (measured) LDL and calculated LDL are not interchangeable tests. Chol/HDL Ratio 01/16/2023 6.6 (A)  <5 Final            Triglycerides 01/16/2023 188 (A)  <150 mg/dL Final    Comment:    Triglyceride Guidelines:     <150   Desirable   150-199  Borderline   200-499  High     >499   Very high   Based on AHA Guidelines for fasting triglyceride, October 2012. Hemoglobin A1C 01/16/2023 5.6  4.0 - 6.0 % Final    Estimated Avg Glucose 01/16/2023 114  mg/dL Final    Comment: The ADA and AACC recommend providing the estimated average glucose result to permit better   patient understanding of their HBA1c result. Reviewed patient's current plan of care and vital signs with nursing staff.     Labs reviewed: [x] Yes    Medications  Current Facility-Administered Medications: acetaminophen (TYLENOL) tablet 650 mg, 650 mg, Oral, Q4H PRN  aluminum & magnesium hydroxide-simethicone (MAALOX) 200-200-20 MG/5ML suspension 30 mL, 30 mL, Oral, Q6H PRN  hydrOXYzine HCl (ATARAX) tablet 50 mg, 50 mg, Oral, TID PRN  ibuprofen (ADVIL;MOTRIN) tablet 400 mg, 400 mg, Oral, Q6H PRN  polyethylene glycol (GLYCOLAX) packet 17 g, 17 g, Oral, Daily PRN  LORazepam (ATIVAN) injection 2 mg, 2 mg, IntraMUSCular, Q4H PRN **AND** haloperidol lactate (HALDOL) injection 5 mg, 5 mg, IntraMUSCular, Q4H PRN **AND** diphenhydrAMINE (BENADRYL) injection 50 mg, 50 mg, IntraMUSCular, Q4H PRN  LORazepam (ATIVAN) tablet 2 mg, 2 mg, Oral, Q4H PRN **AND** haloperidol (HALDOL) tablet 5 mg, 5 mg, Oral, Q4H PRN  traZODone (DESYREL) tablet 50 mg, 50 mg, Oral, Nightly PRN  fluticasone (FLOVENT HFA) 110 MCG/ACT inhaler 1 puff, 1 puff, Inhalation, BID  paliperidone (INVEGA) extended release tablet 6 mg, 6 mg, Oral, Daily  escitalopram (LEXAPRO) tablet 15 mg, 15 mg, Oral, Daily  busPIRone (BUSPAR) tablet 10 mg, 10 mg, Oral, TID  pantoprazole (PROTONIX) tablet 40 mg, 40 mg, Oral, Daily    ASSESSMENT  Major depressive disorder, recurrent, severe with psychotic features [facility])         PLAN  Patient symptoms are: modestly improving   No Medication Changes Today  Monitor need and frequency of PRN medications. Encourage participation in groups and milieu. Attempt to develop insight. Psycho-education conducted. Probable discharge is 1-2 days with stability  Follow-up daily while inpatient. Patient continues to be monitored in the inpatient psychiatric facility at [facility] for safety and stabilization. Patient continues to need, on a daily basis, active treatment furnished directly by or requiring the supervision of inpatient psychiatric personnel. Electronically signed by LITO Matthew CNP on 1/17/2023 at 9:35 PM    **This report has been created using voice recognition software. It may contain minor errors which are inherent in voice recognition technology. **

## 2023-01-18 NOTE — DISCHARGE SUMMARY
Provider Discharge Summary     Patient ID:  Cookie Yates  [id number]  89 y.o.  1995    Admit date: 2023    Discharge date and time: 2023  2:58 PM     Admitting Physician: Altamese Apgar, MD     Discharge Provider: LITO Garcia CNP    Admission Diagnoses: Suicidal ideation [R45.851]  Depression with suicidal ideation [F32. A, R45.851]    Discharge Diagnoses:      Major depressive disorder, recurrent, severe with psychotic features [facility])     Patient Active Problem List   Diagnosis Code    Autism spectrum disorder F84.0    Acute non-recurrent maxillary sinusitis J01.00    Attention deficit disorder F98.8    Blood in the stool K92.1    Body mass index 45.0-49.9, adult ([address] Z68.42    Elevated LFTs R79.89    GERD (gastroesophageal reflux disease) K21.9    History of pneumonia Z87.01    Other constipation K59.09    Risk for sexually transmitted disease Z72.51    Major depressive disorder, recurrent, severe with psychotic features ([address] F33.3    Skin excoriation T14. 8XXA    Suicidal ideation R45.851    Major depressive disorder with psychotic features ([address] F32.3    MDD (major depressive disorder), recurrent episode ([address] F33.9    Depression with suicidal ideation F32. A, R45.851    Schizoaffective disorder, bipolar type ([address] F25.0    Major depressive disorder, recurrent severe without psychotic features ([address] F33.2    PTSD (post-traumatic stress disorder) F43.10    NATALYA (generalized anxiety disorder) F41.1    TBI (traumatic brain injury) S06. 9XAA        Admission Condition: poor    Discharged Condition: stable    Indication for Admission: threat to self    History of Present Illnes (present tense wording is of findings from admission exam and are not necessarily indicative of current findings):   Cookie Yates is a 24 y.o. male who has a past medical history of autism spectrum disorder, major depressive disorder, ADD PTSD, GERD, and obesity.  Patient presented to the ED with TPD after endorsing suicidal ideation at home. He told his girlfriend that he was going to light himself on fire. Also expressing command auditory hallucinations. Patient is admitted voluntarily to [facility]. On assessment, patient is standing in the day area. He has green hair and his fingernails are also painted green. His hands are covered in green marker which it appears that patient did purposely. He is disheveled. Has many scabs on his upper and lower extremities which are in various levels of healing. Patient states that he does not like the sensation of anything on his skin and will pick whenever he itches or feels something uncomfortable on his skin. Patient speech is extremely loud, bordering on shouting, and rapid at times. Thought processes linear, but he is extremely focused on his autism diagnosis and persecutory of staff expressing that they do not believe that he has autism. Requires frequent redirection to provide appropriate responses to questions as his focus is on the autism diagnosis and needing specific diet secondary to sensory issues. Will attempt to provide patient with food textures that are not bothersome and will have dietary on board. We discussed events that led to admission. Patient states that he has become increasingly more agitated lately and recently lost his job as a  a [address] [place] [address] [employer], saying that he had problems with his boss. He had his PlayStation taken away secondary to using inappropriate language and calling one of his caretakers a racial slur, which he denies feeling any guilt about. Patient also verbalizing that he has not had any money for food as his girlfriend spent all of the food stamp money on \"candy\". He states he is having difficulty in his relationship as he is bisexual and considers himself a \"Satanist\" and his girlfriend is not accepting of either of these things.        It is difficult to have patient confirm symptoms for any specific mental health diagnoses that she remains focused on his stressors. He does endorse almost daily suicidal and homicidal ideation which he states he has not acted on secondary to [employer]". He states that his biological family are homicidal and have murdered people and that he is not able to control these thoughts as it is in his genetics. Patient smiling and affect and appropriate when discussing homicidal ideation. He denies having any specific target or any plans. Does express that when he is angry he likes to play video games where he blows up with many people and shoots everyone. He states that he has a history of violence and aggression and has 3 charges against him for these types of behaviors and is currently on probation. Patient endorsing  command auditory hallucinations that he describes as present all the time. He does not appear to be responding to internal stimuli and is not internally preoccupied throughout our discussion. Patient focused on discharge and displays little insight into the fact that he is expressing active suicidal and homicidal ideation. He is currently following up with an outpatient provider at [facility], Dr. Carlitos Berrios. He could not identify his current psychotropic medication and states that he has a robot that organizes his medication and dispenses it for him. Patient denies any recent recreational substance and etoh use. Has a history of cannabis use but reports not smoking for the last month secondary to being on probation. Patient requires inpatient hospitalization for the above-noted psychiatric concerns. He is actively suicidal and also expressing homicidal ideation as well. Hospital Course:   Upon admission, Sonia Washington was provided a safe secure environment, introduced to unit milieu. Patient participated in groups and individual therapies. Meds were adjusted as noted below. After few days of hospital care, patient began to feel improvement.  Depression lifted, thoughts to harm self ceased. Sleep improved, appetite was good. On morning rounds 1/18/2023, Blade Boone endorses feeling ready for discharge. Patient denies suicidal or homicidal ideations, denies hallucinations or delusions. Denies SE's from meds. It was decided that maximum benefit from hospital care had been achieved and patient can be discharged. Consults:   Internal medicine    Significant Diagnostic Studies: Routine labs and diagnostics    Treatments: Psychotropic medications, therapy with group, milieu, and 1:1 with nurses, social workers, PA-C/CNP, and Attending physician.       Discharge Medications:  Current Discharge Medication List        CONTINUE these medications which have CHANGED    Details   busPIRone (BUSPAR) 10 MG tablet Take 1 tablet by mouth 3 times daily  Qty: 90 tablet, Refills: 0      traZODone (DESYREL) 50 MG tablet Take 1 tablet by mouth nightly as needed for Sleep  Qty: 30 tablet, Refills: 0      escitalopram (LEXAPRO) 5 MG tablet Take 3 tablets by mouth daily  Qty: 90 tablet, Refills: 0      paliperidone (INVEGA) 6 MG extended release tablet Take 1 tablet by mouth daily  Qty: 30 tablet, Refills: 0           CONTINUE these medications which have NOT CHANGED    Details   ibuprofen (ADVIL;MOTRIN) 800 MG tablet Take 1 tablet by mouth every 8 hours as needed for Pain  Qty: 30 tablet, Refills: 0      acetaminophen (APAP EXTRA STRENGTH) 500 MG tablet Take 2 tablets by mouth every 6 hours as needed for Pain  Qty: 90 tablet, Refills: 0      budesonide (PULMICORT FLEXHALER) 180 MCG/ACT AEPB inhaler Inhale 1 puff into the lungs 2 times daily  Qty: 1 each, Refills: 0      omeprazole (PRILOSEC) 20 MG delayed release capsule Take 1 capsule by mouth 2 times daily  Qty: 30 capsule, Refills: 0           STOP taking these medications       cetirizine (ZYRTEC) 10 MG tablet Comments:   Reason for Stopping:                Core Measures statement:   Not applicable    Discharge Exam:  Level of consciousness: Within normal limits  Appearance: Street clothes, seated, with good grooming  Behavior/Motor: No abnormalities noted  Attitude toward examiner:  Cooperative, attentive, good eye contact  Speech:  spontaneous, normal rate, normal volume and well articulated  Mood:  euthymic  Affect:  Full range  Thought processes:  linear, goal directed and coherent  Thought content:  denies homicidal ideation  Suicidal Ideation:  denies suicidal ideation  Delusions:  no evidence of delusions  Perceptual Disturbance:  denies any perceptual disturbance  Cognition:  Intact  Memory: age appropriate  Insight & Judgement: fair  Medication side effects: denies     Disposition: home    Patient Instructions: Activity: activity as tolerated  1. Patient instructed to take medications regularly and follow up with outpatient appointments. Follow-up scheduled with Malia. Linked with [facility]. Signed:    Electronically signed by LITO Lawson CNP on 1/18/23 at 2:58 PM EST    Time Spent on discharge is more than 30 minutes in the examination, evaluation, counseling and review of medications and discharge plan. An electronic signature was used to authenticate this note. **This report has been created using voice recognition software. It may contain minor errors which are inherent in voice recognition technology. **

## 2023-01-19 NOTE — CARE COORDINATION
Name: Bethany Adamson    : 1995    Discharge Date: 2023    Primary Auth/Cert #: [id number]    Destination: home via cab    Discharge Medications:      Medication List        CONTINUE taking these medications      acetaminophen 500 MG tablet  Commonly known as: APAP Extra Strength  Take 2 tablets by mouth every 6 hours as needed for Pain  Notes to patient: Pain     budesonide 180 MCG/ACT Aepb inhaler  Commonly known as: Pulmicort Flexhaler  Inhale 1 puff into the lungs 2 times daily  Notes to patient: inhaler     busPIRone 10 MG tablet  Commonly known as: BUSPAR  Take 1 tablet by mouth 3 times daily  Notes to patient: anxiety     escitalopram 5 MG tablet  Commonly known as: Lexapro  Take 3 tablets by mouth daily  Notes to patient: Mood stabilizer      ibuprofen 800 MG tablet  Commonly known as: ADVIL;MOTRIN  Take 1 tablet by mouth every 8 hours as needed for Pain  Notes to patient: pain     omeprazole 20 MG delayed release capsule  Commonly known as: PRILOSEC  Take 1 capsule by mouth 2 times daily  Notes to patient: Heart burn     paliperidone 6 MG extended release tablet  Commonly known as: INVEGA  Take 1 tablet by mouth daily  Notes to patient: Mood stabilizer      traZODone 50 MG tablet  Commonly known as: DESYREL  Take 1 tablet by mouth nightly as needed for Sleep  Notes to patient: sleep            STOP taking these medications      cetirizine 10 MG tablet  Commonly known as: ZYRTEC               Where to Get Your Medications        These medications were [facility] [address] [place] [address] [facility] [address]      Phone: [phone]   busPIRone 10 MG tablet  escitalopram 5 MG tablet  paliperidone 6 MG extended release tablet  traZODone 50 MG tablet         Follow Up Appointment: THE [facility]  C/Willian Vazquez  [address] [place] [address]    Follow up on 2023  Pt has appointment at 1:15 pm

## 2023-05-07 ENCOUNTER — APPOINTMENT (OUTPATIENT)
Dept: CT IMAGING | Age: 28
End: 2023-05-07
Payer: MEDICARE

## 2023-05-07 ENCOUNTER — HOSPITAL ENCOUNTER (EMERGENCY)
Age: 28
Discharge: HOME | End: 2023-05-08
Attending: EMERGENCY MEDICINE
Payer: MEDICARE

## 2023-05-07 VITALS
OXYGEN SATURATION: 94 % | HEART RATE: 90 BPM | WEIGHT: 315 LBS | RESPIRATION RATE: 20 BRPM | BODY MASS INDEX: 39.17 KG/M2 | TEMPERATURE: 98.5 F | HEIGHT: 75 IN | DIASTOLIC BLOOD PRESSURE: 87 MMHG | SYSTOLIC BLOOD PRESSURE: 155 MMHG
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
## 2023-05-07 DIAGNOSIS — S09.90XA INJURY OF HEAD, INITIAL ENCOUNTER: Primary | ICD-10-CM

## 2023-05-07 PROCEDURE — 70450 CT HEAD/BRAIN W/O DYE: CPT

## 2023-05-07 PROCEDURE — 99284 EMERGENCY DEPT VISIT MOD MDM: CPT

## 2023-05-07 PROCEDURE — 6370000000 HC RX 637 (ALT 250 FOR IP): Performed by: STUDENT IN AN ORGANIZED HEALTH CARE EDUCATION/TRAINING PROGRAM

## 2023-05-07 RX ORDER — ACETAMINOPHEN 500 MG
1000 TABLET ORAL ONCE
Status: COMPLETED | OUTPATIENT
Start: 2023-05-07 | End: 2023-05-07

## 2023-05-07 RX ADMIN — ACETAMINOPHEN 1000 MG: 500 TABLET ORAL at 22:59

## 2023-05-07 ASSESSMENT — PAIN SCALES - GENERAL
PAINLEVEL_OUTOF10: 10
PAINLEVEL_OUTOF10: 0

## 2023-05-07 ASSESSMENT — PAIN - FUNCTIONAL ASSESSMENT: PAIN_FUNCTIONAL_ASSESSMENT: 0-10

## 2023-05-07 ASSESSMENT — PAIN DESCRIPTION - LOCATION: LOCATION: HEAD
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
## 2023-05-08 NOTE — ED TRIAGE NOTES
Mode of arrival (squad #, walk in, police, etc) : no        Chief complaint(s): no        Arrival Note (brief scenario, treatment PTA, etc). : no        C= \"Have you ever felt that you should Cut down on your drinking? \"  No  A= \"Have people Annoyed you by criticizing your drinking? \"  No  G= \"Have you ever felt bad or Guilty about your drinking? \"  No  E= \"Have you ever had a drink as an Eye-opener first thing in the morning to steady your nerves or to help a hangover? \"  No      Deferred []      Reason for deferring: N/A    *If yes to two or more: probable alcohol abuse. *
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
## 2023-05-14 ENCOUNTER — HOSPITAL ENCOUNTER (EMERGENCY)
Age: 28
Discharge: HOME | End: 2023-05-14
Attending: EMERGENCY MEDICINE
Payer: MEDICARE

## 2023-05-14 VITALS
SYSTOLIC BLOOD PRESSURE: 112 MMHG | DIASTOLIC BLOOD PRESSURE: 79 MMHG | HEART RATE: 78 BPM | OXYGEN SATURATION: 98 % | TEMPERATURE: 98 F | RESPIRATION RATE: 16 BRPM

## 2023-05-14 DIAGNOSIS — M54.50 CHRONIC MIDLINE LOW BACK PAIN, UNSPECIFIED WHETHER SCIATICA PRESENT: Primary | ICD-10-CM

## 2023-05-14 DIAGNOSIS — G89.29 CHRONIC MIDLINE LOW BACK PAIN, UNSPECIFIED WHETHER SCIATICA PRESENT: Primary | ICD-10-CM

## 2023-05-14 PROCEDURE — 6370000000 HC RX 637 (ALT 250 FOR IP)

## 2023-05-14 PROCEDURE — 82947 ASSAY GLUCOSE BLOOD QUANT: CPT

## 2023-05-14 PROCEDURE — 99283 EMERGENCY DEPT VISIT LOW MDM: CPT

## 2023-05-14 RX ORDER — IBUPROFEN 800 MG/1
800 TABLET ORAL ONCE
Status: COMPLETED | OUTPATIENT
Start: 2023-05-14 | End: 2023-05-14

## 2023-05-14 RX ORDER — LIDOCAINE 4 G/G
1 PATCH TOPICAL DAILY
Status: DISCONTINUED | OUTPATIENT
Start: 2023-05-14 | End: 2023-05-14 | Stop reason: HOSPADM

## 2023-05-14 RX ADMIN — IBUPROFEN 800 MG: 800 TABLET, FILM COATED ORAL at 20:27

## 2023-05-14 ASSESSMENT — ENCOUNTER SYMPTOMS
CHEST TIGHTNESS: 0
WHEEZING: 0
SORE THROAT: 0
TROUBLE SWALLOWING: 0
SHORTNESS OF BREATH: 0
NAUSEA: 0
BACK PAIN: 1
ABDOMINAL PAIN: 0
VOMITING: 0

## 2023-05-14 ASSESSMENT — PAIN DESCRIPTION - ONSET: ONSET: ON-GOING

## 2023-05-14 ASSESSMENT — PAIN DESCRIPTION - LOCATION: LOCATION: BACK

## 2023-05-14 ASSESSMENT — PAIN DESCRIPTION - FREQUENCY: FREQUENCY: INTERMITTENT

## 2023-05-14 ASSESSMENT — PAIN DESCRIPTION - PAIN TYPE: TYPE: CHRONIC PAIN

## 2023-05-14 ASSESSMENT — PAIN DESCRIPTION - ORIENTATION: ORIENTATION: LOWER;MID

## 2023-05-14 ASSESSMENT — PAIN - FUNCTIONAL ASSESSMENT: PAIN_FUNCTIONAL_ASSESSMENT: 0-10

## 2023-05-14 ASSESSMENT — PAIN SCALES - GENERAL: PAINLEVEL_OUTOF10: 8

## 2023-05-15 LAB — GLUCOSE BLD-MCNC: 97 MG/DL (ref 75–110)

## 2023-05-15 NOTE — ED NOTES
Patient drank juice and is taking turkey sandwich with him  Patient asking for pop and for alba crackers  Discharge instructions given. Verbalized understanding. All questions answered.            Zev White RN  05/14/23 2041
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
## 2023-05-15 NOTE — DISCHARGE INSTRUCTIONS
Seen in the emergency department for back pain and requesting a turkey sandwich. We gave you ibuprofen and lidocaine patch. At this time you are stable for discharge. Please follow-up with your primary care provider within a few days of discharge. Please return to the emergency department experience any new or worsening symptoms occluding increased pain in your back, numbness tingling in your leg, fever, chills or any other concerning symptoms.
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
## 2023-05-15 NOTE — ED NOTES
Patient told this caregiver he was here because he wanted something to eat  That his girlfriend's nurse said he couldn't get a turkey sandwich unless he was a patient  Patient states he hasn't ate in 2 days but when girlfriend in room she states differently     Remberto Beth RN  05/14/23 2015

## 2023-05-15 NOTE — ED PROVIDER NOTES
[facility]     Emergency Department     Faculty Attestation    I performed a history and physical examination of the patient and discussed management with the resident. I reviewed the resident´s note and agree with the documented findings and plan of care. Any areas of disagreement are noted on the chart. I was personally present for the key portions of any procedures. I have documented in the chart those procedures where I was not present during the key portions. I have reviewed the emergency nurses triage note. I agree with the chief complaint, past medical history, past surgical history, allergies, medications, social and family history as documented unless otherwise noted below. For Physician Assistant/ Nurse Practitioner cases/documentation I have personally evaluated this patient and have completed at least one if not all key elements of the E/M (history, physical exam, and MDM). Additional findings are as noted. Abdomen soft and nontender, normal sensation in the groin.      Estee Frias MD  05/14/23 2014

## 2023-05-17 ENCOUNTER — HOSPITAL ENCOUNTER (EMERGENCY)
Age: 28
Discharge: HOME | End: 2023-05-17
Attending: EMERGENCY MEDICINE
Payer: MEDICARE

## 2023-05-17 ENCOUNTER — APPOINTMENT (OUTPATIENT)
Dept: GENERAL RADIOLOGY | Age: 28
End: 2023-05-17
Payer: MEDICARE

## 2023-05-17 VITALS
HEIGHT: 75 IN | OXYGEN SATURATION: 98 % | BODY MASS INDEX: 39.17 KG/M2 | SYSTOLIC BLOOD PRESSURE: 157 MMHG | WEIGHT: 315 LBS | TEMPERATURE: 98.2 F | DIASTOLIC BLOOD PRESSURE: 97 MMHG | HEART RATE: 87 BPM | RESPIRATION RATE: 18 BRPM

## 2023-05-17 DIAGNOSIS — J18.9 PNEUMONIA OF LEFT LUNG DUE TO INFECTIOUS ORGANISM, UNSPECIFIED PART OF LUNG: Primary | ICD-10-CM

## 2023-05-17 LAB
FLUAV RNA RESP QL NAA+PROBE: NOT DETECTED
FLUBV RNA RESP QL NAA+PROBE: NOT DETECTED
S PYO AG THROAT QL: NEGATIVE
SARS-COV-2 RNA RESP QL NAA+PROBE: NOT DETECTED
SOURCE: NORMAL
SPECIMEN DESCRIPTION: NORMAL
SPECIMEN SOURCE: NORMAL

## 2023-05-17 PROCEDURE — 71045 X-RAY EXAM CHEST 1 VIEW: CPT

## 2023-05-17 PROCEDURE — 87880 STREP A ASSAY W/OPTIC: CPT

## 2023-05-17 PROCEDURE — 6370000000 HC RX 637 (ALT 250 FOR IP): Performed by: EMERGENCY MEDICINE

## 2023-05-17 PROCEDURE — 87636 SARSCOV2 & INF A&B AMP PRB: CPT

## 2023-05-17 PROCEDURE — 99284 EMERGENCY DEPT VISIT MOD MDM: CPT

## 2023-05-17 RX ORDER — ACETAMINOPHEN 500 MG
1000 TABLET ORAL EVERY 6 HOURS PRN
Qty: 60 TABLET | Refills: 0 | Status: SHIPPED | OUTPATIENT
Start: 2023-05-17

## 2023-05-17 RX ORDER — BENZONATATE 100 MG/1
100 CAPSULE ORAL 3 TIMES DAILY PRN
Qty: 30 CAPSULE | Refills: 0 | Status: SHIPPED | OUTPATIENT
Start: 2023-05-17 | End: 2023-05-24

## 2023-05-17 RX ORDER — DOXYCYCLINE HYCLATE 100 MG
100 TABLET ORAL 2 TIMES DAILY
Qty: 10 TABLET | Refills: 0 | Status: SHIPPED | OUTPATIENT
Start: 2023-05-17 | End: 2023-05-22

## 2023-05-17 RX ORDER — DOXYCYCLINE 100 MG/1
100 CAPSULE ORAL ONCE
Status: COMPLETED | OUTPATIENT
Start: 2023-05-17 | End: 2023-05-17

## 2023-05-17 RX ORDER — AMOXICILLIN 250 MG/1
1000 CAPSULE ORAL ONCE
Status: COMPLETED | OUTPATIENT
Start: 2023-05-17 | End: 2023-05-17

## 2023-05-17 RX ORDER — AMOXICILLIN 500 MG/1
1000 CAPSULE ORAL 3 TIMES DAILY
Qty: 30 CAPSULE | Refills: 0 | Status: SHIPPED | OUTPATIENT
Start: 2023-05-17 | End: 2023-05-22

## 2023-05-17 RX ADMIN — DOXYCYCLINE 100 MG: 100 CAPSULE ORAL at 23:09

## 2023-05-17 RX ADMIN — AMOXICILLIN 1000 MG: 250 CAPSULE ORAL at 23:09

## 2023-05-17 ASSESSMENT — PAIN SCALES - GENERAL: PAINLEVEL_OUTOF10: 5

## 2023-05-17 ASSESSMENT — PAIN - FUNCTIONAL ASSESSMENT: PAIN_FUNCTIONAL_ASSESSMENT: 0-10

## 2023-05-17 ASSESSMENT — PAIN DESCRIPTION - LOCATION: LOCATION: GENERALIZED

## 2023-05-18 NOTE — ED PROVIDER NOTES
EMERGENCY DEPARTMENT ENCOUNTER    Pt Name: Artem Fernando  MRN: [id number]  [place] 1995  Date of evaluation: 5/18/23  CHIEF COMPLAINT       Chief Complaint   Patient presents with    Nasal Congestion    Cough    Generalized Body Aches     HISTORY OF PRESENT ILLNESS   HPI  Rhinorrhea, cough, congestion, myalgias, fatigue. 2 days of symptoms. Cough meds not working. Over-the-counter medications not working. Sick contacts at home. Denies any fevers. No nausea vomiting or diarrhea. REVIEW OF SYSTEMS     Review of Systems   All other systems reviewed and are negative. 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    Injury of frontal lobe ([address]     Multiple sensory deficit syndrome      Past Problem List  Patient Active Problem List   Diagnosis Code    Autism spectrum disorder F84.0    Acute non-recurrent maxillary sinusitis J01.00    Attention deficit disorder F98.8    Blood in the stool K92.1    Body mass index 45.0-49.9, adult (HCC) Z68.42    Elevated LFTs R79.89    GERD (gastroesophageal reflux disease) K21.9    History of pneumonia Z87.01    Other constipation K59.09    Risk for sexually transmitted disease Z72.51    Major depressive disorder, recurrent, severe with psychotic features ([address] F33.3    Skin excoriation T14. 8XXA    Suicidal ideation R45.851    Major depressive disorder with psychotic features ([address] F32.3    MDD (major depressive disorder), recurrent episode ([address] F33.9    Depression with suicidal ideation F32. A, R45.851    Schizoaffective disorder, bipolar type ([address] F25.0    Major depressive disorder, recurrent severe without psychotic features ([address] F33.2    PTSD (post-traumatic stress disorder) F43.10    NATALYA (generalized anxiety disorder) F41.1    TBI (traumatic brain injury) ([address] S06. 9XAA     SURGICAL HISTORY       Past Surgical History:   Procedure Laterality Date    BLADDER SURGERY      FRACTURE

## 2023-05-29 ENCOUNTER — HOSPITAL ENCOUNTER (INPATIENT)
Age: 28
LOS: 3 days | Discharge: HOME | End: 2023-06-02
Attending: EMERGENCY MEDICINE | Admitting: PSYCHIATRY & NEUROLOGY
Payer: MEDICARE

## 2023-05-29 ENCOUNTER — APPOINTMENT (OUTPATIENT)
Dept: CT IMAGING | Age: 28
End: 2023-05-29
Payer: MEDICARE

## 2023-05-29 ENCOUNTER — APPOINTMENT (OUTPATIENT)
Dept: GENERAL RADIOLOGY | Age: 28
End: 2023-05-29
Payer: MEDICARE

## 2023-05-29 DIAGNOSIS — R53.83 OTHER FATIGUE: ICD-10-CM

## 2023-05-29 DIAGNOSIS — R45.851 DEPRESSION WITH SUICIDAL IDEATION: Primary | ICD-10-CM

## 2023-05-29 DIAGNOSIS — F32.A DEPRESSION WITH SUICIDAL IDEATION: Primary | ICD-10-CM

## 2023-05-29 LAB
ALBUMIN SERPL-MCNC: 4.1 G/DL (ref 3.5–5.2)
ALP SERPL-CCNC: 75 U/L (ref 40–129)
ALT SERPL-CCNC: 16 U/L (ref 5–41)
AMPHET UR QL SCN: NEGATIVE
ANION GAP SERPL CALCULATED.3IONS-SCNC: 14 MMOL/L (ref 9–17)
AST SERPL-CCNC: 14 U/L
BARBITURATES UR QL SCN: NEGATIVE
BASOPHILS # BLD: 0.1 K/UL (ref 0–0.2)
BASOPHILS NFR BLD: 1 % (ref 0–2)
BENZODIAZ UR QL: NEGATIVE
BILIRUB SERPL-MCNC: 0.2 MG/DL (ref 0.3–1.2)
BUN SERPL-MCNC: 8 MG/DL (ref 6–20)
CALCIUM SERPL-MCNC: 9.4 MG/DL (ref 8.6–10.4)
CANNABINOID SCREEN URINE: NEGATIVE
CHLORIDE SERPL-SCNC: 103 MMOL/L (ref 98–107)
CO2 SERPL-SCNC: 25 MMOL/L (ref 20–31)
COCAINE UR QL SCN: NEGATIVE
CREAT SERPL-MCNC: 0.88 MG/DL (ref 0.7–1.2)
DATE LAST DOSE: ABNORMAL
EOSINOPHIL # BLD: 0.1 K/UL (ref 0–0.4)
EOSINOPHILS RELATIVE PERCENT: 1 % (ref 0–4)
ERYTHROCYTE [DISTWIDTH] IN BLOOD BY AUTOMATED COUNT: 15.7 % (ref 11.5–14.9)
ETHANOL PERCENT: <0.01 %
ETHANOLAMINE SERPL-MCNC: <10 MG/DL
FENTANYL URINE: NEGATIVE
FLUAV RNA RESP QL NAA+PROBE: NOT DETECTED
FLUBV RNA RESP QL NAA+PROBE: NOT DETECTED
GFR SERPL CREATININE-BSD FRML MDRD: >60 ML/MIN/1.73M2
GLUCOSE SERPL-MCNC: 118 MG/DL (ref 70–99)
HCT VFR BLD AUTO: 36.7 % (ref 41–53)
HGB BLD-MCNC: 11.8 G/DL (ref 13.5–17.5)
LIPASE SERPL-CCNC: 23 U/L (ref 13–60)
LYMPHOCYTES # BLD: 28 % (ref 24–44)
LYMPHOCYTES NFR BLD: 4.3 K/UL (ref 1–4.8)
MAGNESIUM SERPL-MCNC: 1.8 MG/DL (ref 1.6–2.6)
MCH RBC QN AUTO: 27.1 PG (ref 26–34)
MCHC RBC AUTO-ENTMCNC: 32.1 G/DL (ref 31–37)
MCV RBC AUTO: 84.6 FL (ref 80–100)
METHADONE SCREEN, URINE: NEGATIVE
MONOCYTES NFR BLD: 1.3 K/UL (ref 0.1–1.3)
MONOCYTES NFR BLD: 9 % (ref 1–7)
NEUTROPHILS NFR BLD: 61 % (ref 36–66)
NEUTS SEG NFR BLD: 9.4 K/UL (ref 1.3–9.1)
OPIATES UR QL SCN: NEGATIVE
OXYCODONE SCREEN URINE: NEGATIVE
PCP UR QL SCN: NEGATIVE
PLATELET # BLD AUTO: 300 K/UL (ref 150–450)
PMV BLD AUTO: 7.8 FL (ref 6–12)
POTASSIUM SERPL-SCNC: 4.3 MMOL/L (ref 3.7–5.3)
PROT SERPL-MCNC: 7.4 G/DL (ref 6.4–8.3)
RBC # BLD AUTO: 4.34 M/UL (ref 4.5–5.9)
SARS-COV-2 RNA RESP QL NAA+PROBE: NOT DETECTED
SODIUM SERPL-SCNC: 142 MMOL/L (ref 135–144)
SOURCE: NORMAL
SPECIMEN DESCRIPTION: NORMAL
TEST INFORMATION: NORMAL
TME LAST DOSE: ABNORMAL H
TROPONIN I SERPL HS-MCNC: <6 NG/L (ref 0–22)
VALPROATE SERPL-MCNC: 20 UG/ML (ref 50–125)
VANCOMYCIN DOSE: ABNORMAL MG
WBC OTHER # BLD: 15.2 K/UL (ref 3.5–11)

## 2023-05-29 PROCEDURE — 96361 HYDRATE IV INFUSION ADD-ON: CPT

## 2023-05-29 PROCEDURE — 80307 DRUG TEST PRSMV CHEM ANLYZR: CPT

## 2023-05-29 PROCEDURE — 36415 COLL VENOUS BLD VENIPUNCTURE: CPT

## 2023-05-29 PROCEDURE — 83690 ASSAY OF LIPASE: CPT

## 2023-05-29 PROCEDURE — 70450 CT HEAD/BRAIN W/O DYE: CPT

## 2023-05-29 PROCEDURE — 96360 HYDRATION IV INFUSION INIT: CPT

## 2023-05-29 PROCEDURE — 85025 COMPLETE CBC W/AUTO DIFF WBC: CPT

## 2023-05-29 PROCEDURE — 83735 ASSAY OF MAGNESIUM: CPT

## 2023-05-29 PROCEDURE — 87636 SARSCOV2 & INF A&B AMP PRB: CPT

## 2023-05-29 PROCEDURE — 99285 EMERGENCY DEPT VISIT HI MDM: CPT

## 2023-05-29 PROCEDURE — 2580000003 HC RX 258: Performed by: EMERGENCY MEDICINE

## 2023-05-29 PROCEDURE — 84484 ASSAY OF TROPONIN QUANT: CPT

## 2023-05-29 PROCEDURE — 80053 COMPREHEN METABOLIC PANEL: CPT

## 2023-05-29 PROCEDURE — 93005 ELECTROCARDIOGRAM TRACING: CPT | Performed by: EMERGENCY MEDICINE

## 2023-05-29 PROCEDURE — G0480 DRUG TEST DEF 1-7 CLASSES: HCPCS

## 2023-05-29 PROCEDURE — 71045 X-RAY EXAM CHEST 1 VIEW: CPT

## 2023-05-29 PROCEDURE — 80164 ASSAY DIPROPYLACETIC ACD TOT: CPT

## 2023-05-29 RX ORDER — 0.9 % SODIUM CHLORIDE 0.9 %
1000 INTRAVENOUS SOLUTION INTRAVENOUS ONCE
Status: COMPLETED | OUTPATIENT
Start: 2023-05-29 | End: 2023-05-29

## 2023-05-29 RX ADMIN — SODIUM CHLORIDE 1000 ML: 9 INJECTION, SOLUTION INTRAVENOUS at 21:32

## 2023-05-29 ASSESSMENT — PAIN SCALES - GENERAL: PAINLEVEL_OUTOF10: 7

## 2023-05-29 ASSESSMENT — ENCOUNTER SYMPTOMS
COLOR CHANGE: 0
BACK PAIN: 0
ABDOMINAL PAIN: 0
COUGH: 1
EYE PAIN: 0
SHORTNESS OF BREATH: 1

## 2023-05-29 ASSESSMENT — PAIN - FUNCTIONAL ASSESSMENT: PAIN_FUNCTIONAL_ASSESSMENT: 0-10

## 2023-05-29 ASSESSMENT — LIFESTYLE VARIABLES
HOW MANY STANDARD DRINKS CONTAINING ALCOHOL DO YOU HAVE ON A TYPICAL DAY: PATIENT DOES NOT DRINK
HOW OFTEN DO YOU HAVE A DRINK CONTAINING ALCOHOL: NEVER

## 2023-05-29 NOTE — ED TRIAGE NOTES
Mode of arrival (squad #, walk in, police, etc) : walk in        Chief complaint(s): flu-like symptos        Arrival Note (brief scenario, treatment PTA, etc). : Pt reports fatigue, cough, nasal congestion. Pt states he hasn't improved since his visit here a week ago. C= \"Have you ever felt that you should Cut down on your drinking? \"  No  A= \"Have people Annoyed you by criticizing your drinking? \"  No  G= \"Have you ever felt bad or Guilty about your drinking? \"  No  E= \"Have you ever had a drink as an Eye-opener first thing in the morning to steady your nerves or to help a hangover? \"  No      Deferred []      Reason for deferring: N/A    *If yes to two or more: probable alcohol abuse. *

## 2023-05-30 LAB
EKG ATRIAL RATE: 91 BPM
EKG P AXIS: 41 DEGREES
EKG P-R INTERVAL: 152 MS
EKG Q-T INTERVAL: 344 MS
EKG QRS DURATION: 86 MS
EKG QTC CALCULATION (BAZETT): 423 MS
EKG R AXIS: -13 DEGREES
EKG T AXIS: 60 DEGREES
EKG VENTRICULAR RATE: 91 BPM
GLUCOSE BLD-MCNC: 106 MG/DL (ref 75–110)
GLUCOSE BLD-MCNC: 129 MG/DL (ref 75–110)

## 2023-05-30 PROCEDURE — 93010 ELECTROCARDIOGRAM REPORT: CPT | Performed by: INTERNAL MEDICINE

## 2023-05-30 PROCEDURE — 82947 ASSAY GLUCOSE BLOOD QUANT: CPT

## 2023-05-30 PROCEDURE — 99223 1ST HOSP IP/OBS HIGH 75: CPT | Performed by: INTERNAL MEDICINE

## 2023-05-30 PROCEDURE — 6370000000 HC RX 637 (ALT 250 FOR IP): Performed by: PSYCHIATRY & NEUROLOGY

## 2023-05-30 PROCEDURE — 99223 1ST HOSP IP/OBS HIGH 75: CPT | Performed by: NURSE PRACTITIONER

## 2023-05-30 PROCEDURE — 1240000000 HC EMOTIONAL WELLNESS R&B

## 2023-05-30 PROCEDURE — 6370000000 HC RX 637 (ALT 250 FOR IP): Performed by: INTERNAL MEDICINE

## 2023-05-30 RX ORDER — BENZONATATE 100 MG/1
100 CAPSULE ORAL 3 TIMES DAILY PRN
Status: DISCONTINUED | OUTPATIENT
Start: 2023-05-30 | End: 2023-06-02 | Stop reason: HOSPADM

## 2023-05-30 RX ORDER — HALOPERIDOL 5 MG/1
5 TABLET ORAL EVERY 4 HOURS PRN
Status: DISCONTINUED | OUTPATIENT
Start: 2023-05-30 | End: 2023-06-02 | Stop reason: HOSPADM

## 2023-05-30 RX ORDER — MAGNESIUM HYDROXIDE/ALUMINUM HYDROXICE/SIMETHICONE 120; 1200; 1200 MG/30ML; MG/30ML; MG/30ML
30 SUSPENSION ORAL EVERY 6 HOURS PRN
Status: DISCONTINUED | OUTPATIENT
Start: 2023-05-30 | End: 2023-06-02 | Stop reason: HOSPADM

## 2023-05-30 RX ORDER — DIVALPROEX SODIUM 500 MG/1
500 TABLET, DELAYED RELEASE ORAL 2 TIMES DAILY
COMMUNITY
Start: 2023-04-11

## 2023-05-30 RX ORDER — HALOPERIDOL 5 MG/ML
5 INJECTION INTRAMUSCULAR EVERY 4 HOURS PRN
Status: DISCONTINUED | OUTPATIENT
Start: 2023-05-30 | End: 2023-06-02 | Stop reason: HOSPADM

## 2023-05-30 RX ORDER — PROPRANOLOL HYDROCHLORIDE 20 MG/1
20 TABLET ORAL 3 TIMES DAILY
COMMUNITY

## 2023-05-30 RX ORDER — IBUPROFEN 400 MG/1
400 TABLET ORAL EVERY 6 HOURS PRN
Status: DISCONTINUED | OUTPATIENT
Start: 2023-05-30 | End: 2023-06-02 | Stop reason: HOSPADM

## 2023-05-30 RX ORDER — TRAZODONE HYDROCHLORIDE 50 MG/1
50 TABLET ORAL NIGHTLY PRN
Status: DISCONTINUED | OUTPATIENT
Start: 2023-05-30 | End: 2023-05-30

## 2023-05-30 RX ORDER — PANTOPRAZOLE SODIUM 40 MG/1
40 TABLET, DELAYED RELEASE ORAL
Status: DISCONTINUED | OUTPATIENT
Start: 2023-05-31 | End: 2023-06-02 | Stop reason: HOSPADM

## 2023-05-30 RX ORDER — PALIPERIDONE PALMITATE 234 MG/1.5ML
234 INJECTION INTRAMUSCULAR
Status: ON HOLD | COMMUNITY
Start: 2023-05-10 | End: 2023-06-02 | Stop reason: SDUPTHER

## 2023-05-30 RX ORDER — INSULIN LISPRO 100 [IU]/ML
0-4 INJECTION, SOLUTION INTRAVENOUS; SUBCUTANEOUS
Status: DISCONTINUED | OUTPATIENT
Start: 2023-05-30 | End: 2023-05-31

## 2023-05-30 RX ORDER — PROPRANOLOL HYDROCHLORIDE 20 MG/1
20 TABLET ORAL 3 TIMES DAILY
Status: DISCONTINUED | OUTPATIENT
Start: 2023-05-30 | End: 2023-06-02 | Stop reason: HOSPADM

## 2023-05-30 RX ORDER — TRAZODONE HYDROCHLORIDE 100 MG/1
100 TABLET ORAL NIGHTLY
Status: ON HOLD | COMMUNITY
Start: 2023-04-11 | End: 2023-06-02 | Stop reason: HOSPADM

## 2023-05-30 RX ORDER — ESCITALOPRAM OXALATE 20 MG/1
20 TABLET ORAL DAILY
COMMUNITY
Start: 2023-04-11

## 2023-05-30 RX ORDER — DIPHENHYDRAMINE HYDROCHLORIDE 50 MG/ML
50 INJECTION INTRAMUSCULAR; INTRAVENOUS EVERY 4 HOURS PRN
Status: DISCONTINUED | OUTPATIENT
Start: 2023-05-30 | End: 2023-06-02 | Stop reason: HOSPADM

## 2023-05-30 RX ORDER — CETIRIZINE HYDROCHLORIDE 10 MG/1
10 TABLET ORAL DAILY
Status: DISCONTINUED | OUTPATIENT
Start: 2023-05-30 | End: 2023-06-02 | Stop reason: HOSPADM

## 2023-05-30 RX ORDER — FLUTICASONE PROPIONATE 50 MCG
1 SPRAY, SUSPENSION (ML) NASAL DAILY
Status: DISCONTINUED | OUTPATIENT
Start: 2023-05-30 | End: 2023-06-02 | Stop reason: HOSPADM

## 2023-05-30 RX ORDER — OXCARBAZEPINE 300 MG/1
300 TABLET, FILM COATED ORAL 2 TIMES DAILY
COMMUNITY
Start: 2023-05-18

## 2023-05-30 RX ORDER — TRAZODONE HYDROCHLORIDE 100 MG/1
100 TABLET ORAL NIGHTLY PRN
Status: DISCONTINUED | OUTPATIENT
Start: 2023-05-31 | End: 2023-05-31

## 2023-05-30 RX ORDER — DEXTROSE MONOHYDRATE 100 MG/ML
INJECTION, SOLUTION INTRAVENOUS CONTINUOUS PRN
Status: DISCONTINUED | OUTPATIENT
Start: 2023-05-30 | End: 2023-06-02 | Stop reason: HOSPADM

## 2023-05-30 RX ORDER — FAMOTIDINE 20 MG/1
20 TABLET, FILM COATED ORAL 2 TIMES DAILY
Status: ON HOLD | COMMUNITY
Start: 2023-05-26 | End: 2023-06-02 | Stop reason: HOSPADM

## 2023-05-30 RX ORDER — TRAZODONE HYDROCHLORIDE 50 MG/1
50 TABLET ORAL ONCE
Status: DISCONTINUED | OUTPATIENT
Start: 2023-05-30 | End: 2023-05-31

## 2023-05-30 RX ORDER — INSULIN LISPRO 100 [IU]/ML
0-4 INJECTION, SOLUTION INTRAVENOUS; SUBCUTANEOUS NIGHTLY
Status: DISCONTINUED | OUTPATIENT
Start: 2023-05-30 | End: 2023-05-31

## 2023-05-30 RX ORDER — FLUTICASONE PROPIONATE 110 UG/1
1 AEROSOL, METERED RESPIRATORY (INHALATION) 2 TIMES DAILY
Status: DISCONTINUED | OUTPATIENT
Start: 2023-05-30 | End: 2023-06-02 | Stop reason: HOSPADM

## 2023-05-30 RX ORDER — ACETAMINOPHEN 325 MG/1
650 TABLET ORAL EVERY 4 HOURS PRN
Status: DISCONTINUED | OUTPATIENT
Start: 2023-05-30 | End: 2023-06-02 | Stop reason: HOSPADM

## 2023-05-30 RX ORDER — POLYETHYLENE GLYCOL 3350 17 G/17G
17 POWDER, FOR SOLUTION ORAL DAILY PRN
Status: DISCONTINUED | OUTPATIENT
Start: 2023-05-30 | End: 2023-06-02 | Stop reason: HOSPADM

## 2023-05-30 RX ORDER — HYDROXYZINE 50 MG/1
50 TABLET, FILM COATED ORAL 3 TIMES DAILY PRN
Status: DISCONTINUED | OUTPATIENT
Start: 2023-05-30 | End: 2023-06-02 | Stop reason: HOSPADM

## 2023-05-30 RX ADMIN — METFORMIN HYDROCHLORIDE 500 MG: 500 TABLET ORAL at 17:39

## 2023-05-30 RX ADMIN — TRAZODONE HYDROCHLORIDE 50 MG: 50 TABLET ORAL at 20:48

## 2023-05-30 RX ADMIN — CETIRIZINE HYDROCHLORIDE 10 MG: 10 TABLET, FILM COATED ORAL at 15:34

## 2023-05-30 RX ADMIN — HYDROXYZINE HYDROCHLORIDE 50 MG: 50 TABLET, FILM COATED ORAL at 20:46

## 2023-05-30 RX ADMIN — TRAZODONE HYDROCHLORIDE 50 MG: 50 TABLET ORAL at 01:00

## 2023-05-30 RX ADMIN — PROPRANOLOL HYDROCHLORIDE 20 MG: 20 TABLET ORAL at 15:19

## 2023-05-30 RX ADMIN — HYDROXYZINE HYDROCHLORIDE 50 MG: 50 TABLET, FILM COATED ORAL at 01:00

## 2023-05-30 RX ADMIN — PROPRANOLOL HYDROCHLORIDE 20 MG: 20 TABLET ORAL at 20:45

## 2023-05-30 ASSESSMENT — SLEEP AND FATIGUE QUESTIONNAIRES
DO YOU HAVE DIFFICULTY SLEEPING: YES
DO YOU USE A SLEEP AID: YES
AVERAGE NUMBER OF SLEEP HOURS: 5
SLEEP PATTERN: DIFFICULTY FALLING ASLEEP;DISTURBED/INTERRUPTED SLEEP

## 2023-05-30 ASSESSMENT — PATIENT HEALTH QUESTIONNAIRE - PHQ9: SUM OF ALL RESPONSES TO PHQ QUESTIONS 1-9: 16

## 2023-05-30 NOTE — PROGRESS NOTES
Behavioral Services  Medicare Certification Upon Admission    I certify that this patient's inpatient psychiatric hospital admission is medically necessary for:    [x] (1) Treatment which could reasonably be expected to improve this patient's condition,       [x] (2) Or for diagnostic study;     AND     [x](2) The inpatient psychiatric services are provided while the individual is under the care of a physician and are included in the individualized plan of care.     Estimated length of stay/service 2-9 days    Plan for post-hospital care -outpatient care    Electronically signed by Coby Sanchez MD on 5/30/2023 at 10:26 AM

## 2023-05-30 NOTE — GROUP NOTE
Group Therapy Note    Date: 5/30/2023    Group Start Time: 7672  Group End Time: 1130  Group Topic: Psychoeducation    [facility]    SHIRIN Banda        Group Therapy Note    Attendees: 8/16     Psych-Ed/Relapse Prevention         Date: May 30, 2023 Start Time: 1045am  End Time: 11:30am      Number of Participants in Group & Unit Census:  8/16    Topic: psych education group    Goal of Group:pt will demonstrate improved coping skills and improved communication skills       Comments:     Patient did not participate in Psych-Ed/Relapse Prevention group, despite staff encouragement and explanation of benefits. Patient remain seclusive to self. Q15 minute safety checks maintained for patient safety and will continue to encourage patient to attend unit programming.          Signature:  Lina Tierney

## 2023-05-30 NOTE — ED NOTES
Disposition: This writer consulted with Dr Kelli Veloz who recommended inpatient hospitalization for safety and stabilization. Patient signed application for voluntary admission to [facility].

## 2023-05-30 NOTE — BH NOTE
[facility]  Admission Note     Admission Type:   Admission Type: Voluntary    Reason for admission:  Reason for Admission: Patient came ED complaining of cold symptoms. Patient reports increase in depression and anxiety with no apparent trigger. He states he has fleeting suicidal ideation with a plan to jump off a bridge. 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    Injury of frontal lobe (HCC)     Multiple sensory deficit syndrome        Status EXAM:  Mental Status and Behavioral Exam  Normal: No  Level of Assistance: Independent/Self  Facial Expression: Flat  Affect: Appropriate  Level of Consciousness: Alert  Frequency of Checks: 4 times per hour, close  Mood:Normal: No  Mood: Depressed  Motor Activity:Normal: Yes  Eye Contact: Good  Observed Behavior: Cooperative, Friendly  Sexual Misconduct History: Current - no  Preception: [place] to person, [place] to time, [place] to place, [place] to situation  Attention:Normal: Yes  Thought Processes: Other (comment) (linear and coherent)  Thought Content:Normal: Yes  Depression Symptoms: Change in energy level, Feelings of helplessness, Feelings of hopelessess, Loss of interest  Anxiety Symptoms: No problems reported or observed. Milly Symptoms: No problems reported or observed.   Hallucinations: None  Delusions: No  Memory:Normal: Yes  Insight and Judgment: No  Insight and Judgment: Poor judgment, Poor insight    Tobacco Screening:  Practical Counseling, on admission, garrett X, if applicable and completed (first 3 are required if patient doesn't refuse):            ( ) Recognizing danger situations (included triggers and roadblocks)                    ( ) Coping skills (new ways to manage stress,relaxation techniques, changing

## 2023-05-30 NOTE — ED PROVIDER NOTES
tablet Take 1 tablet by mouth daily  Qty: 30 tablet, Refills: 0      ibuprofen (ADVIL;MOTRIN) 800 MG tablet Take 1 tablet by mouth every 8 hours as needed for Pain  Qty: 30 tablet, Refills: 0      budesonide (PULMICORT FLEXHALER) 180 MCG/ACT AEPB inhaler Inhale 1 puff into the lungs 2 times daily  Qty: 1 each, Refills: 0      omeprazole (PRILOSEC) 20 MG delayed release capsule Take 1 capsule by mouth 2 times daily  Qty: 30 capsule, Refills: 0           ALLERGIES     is allergic to hydromorphone. FAMILY HISTORY     is adopted. SOCIAL HISTORY       Social History     Tobacco Use    Smoking status: Former     Packs/day: 0.00     Types: Cigarettes    Smokeless tobacco: Current     Types: Chew   Vaping Use    Vaping Use: Never used   Substance Use Topics    Alcohol use: No    Drug use: Yes     Types: Marijuana (Gilma Dangelo), Cocaine     Comment: Last use 2 weeks before [place], not since being put on probation     PHYSICAL EXAM     INITIAL VITALS: /85   Pulse 91   Temp 97.3 °F (36.3 °C) (Oral)   Resp 18   Ht 6' 3\" (1.905 m)   Wt (!) 420 lb (190.5 kg)   SpO2 98%   BMI 52.50 kg/m²    Physical Exam  Vitals and nursing note reviewed. Constitutional:       General: He is not in acute distress. Appearance: Normal appearance. He is not ill-appearing. HENT:      Head: Normocephalic and atraumatic. Right Ear: External ear normal.      Left Ear: External ear normal.      Nose: Nose normal.      Mouth/Throat:      Mouth: Mucous membranes are moist.   Eyes:      General: No visual field deficit. Extraocular Movements: Extraocular movements intact. Pupils: Pupils are equal, round, and reactive to light. Neck:      Meningeal: Brudzinski's sign and Kernig's sign absent. Cardiovascular:      Rate and Rhythm: Normal rate and regular rhythm. Pulses: Normal pulses. Heart sounds: Normal heart sounds.    Pulmonary:      Effort: Pulmonary effort is normal.      Breath sounds: Normal breath

## 2023-05-30 NOTE — H&P
234 MG/1.5ML DENNIS IM injection Inject 234 mg into the muscle every 28 days 5/10/23   Historical Provider, MD   OXcarbazepine (TRILEPTAL) 300 MG tablet Take 1 tablet by mouth 2 times daily 5/18/23   Historical Provider, MD   metFORMIN (GLUCOPHAGE) 500 MG tablet Take 1 tablet by mouth 2 times daily 5/25/23   Historical Provider, MD   famotidine (PEPCID) 20 MG tablet Take 1 tablet by mouth 2 times daily 5/26/23   Historical Provider, MD   divalproex (DEPAKOTE) 500 MG DR tablet Take 1 tablet by mouth 2 times daily 4/11/23   Historical Provider, MD   escitalopram (LEXAPRO) 20 MG tablet Take 1 tablet by mouth daily 4/11/23   Historical Provider, MD   traZODone (DESYREL) 100 MG tablet Take 1 tablet by mouth at bedtime 4/11/23   Historical Provider, MD   acetaminophen (TYLENOL) 500 MG tablet Take 2 tablets by mouth every 6 hours as needed for Pain 5/17/23   Luis Enrique Apple MD   busPIRone (BUSPAR) 10 MG tablet Take 1 tablet by mouth 3 times daily  Patient not taking: Reported on 5/30/2023 1/18/23   LITO Piedra CNP   paliperidone (INVEGA) 6 MG extended release tablet Take 1 tablet by mouth daily 1/18/23   LITO Piedra CNP   budesonide (PULMICORT FLEXHALER) 180 MCG/ACT AEPB inhaler Inhale 1 puff into the lungs 2 times daily 6/14/19   Zonia Gu MD   omeprazole (PRILOSEC) 20 MG delayed release capsule Take 1 capsule by mouth 2 times daily 6/14/19   Zonia Gu MD        Allergies:     Hydromorphone    Social History:     Tobacco:    reports that he has quit smoking. His smoking use included cigarettes. His smokeless tobacco use includes chew. Alcohol:      reports no history of alcohol use. Drug Use:  reports current drug use. Drugs: Marijuana (Weed) and Cocaine. Family History:     Family History   Adopted: Yes       Review of Systems:     Positive and Negative as described in HPI.     CONSTITUTIONAL:  negative for fevers, chills, sweats, fatigue, weight loss  HEENT:  negative
engages with interviewer, no psychomotor abnormalities  Attitude toward examiner:  Cooperative, distracted, poor eye contact  Speech: Increased latency, low volume, mumbled  Mood: Patient reports \"depressed\"  Affect: Mood congruent  Thought processes:  Goal directed, linear, slow  Thought content: Endorses active suicidal ideation with a plan the jump off a building or bridge, unable to contract for safety off unit              Denies homicidal ideations               Endorses command auditory hallucinations              Denies delusions              Denies paranoia  Cognition:  Oriented to self, location, time, situation  Concentration: Clinically adequate  Memory: Intact  Insight &Judgment: Poor         DSM-5 Diagnosis    Principal Problem: Major depressive disorder, recurrent, severe with psychotic features ([address]    Autism spectrum disorder    Psychosocial and Contextual factors:  Financial x  Occupational   Relationship   Legal   Living situation   Educational     Past Medical History:   Diagnosis Date    Autistic disorder     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 Home medications reviewed. Trini Juaquin Sustenna 234 mg IM injection last administered 5/10/2023  Continue medications and titrate to therapeutic effect  Trazodone 100 mg nightly  Escitalopram 20 mg daily  Divalproex 500 mg twice daily  Oxcarbazepine 300 mg twice daily  Propranolol 20 mg 3 times daily   Monitor need and frequency of PRN medications. Attempt to develop insight. Follow-up daily while inpatient. Reviewed medications risks and benefits as well as potential side effects with patient.     CONSULT:  [x] Yes [] No  Internal medicine for medical management/medical H&P      Risk Management: close watch per standard protocol      Psychotherapy: participation in milieu and group and individual sessions with Attending

## 2023-05-30 NOTE — PROGRESS NOTES
Pharmacy Medication History Note      List of current medications patient is taking is In progress. Source of information: [employer], [address] Records, [place], [address] [place] [address] Place made to medication list:  Medications removed (include reason, ex. therapy complete or physician discontinued, noncompliance):  Trazodone 50 mg (dose adjustment), Escitalopram 5 mg (dose adjustment), Ibuprofen (list clean up)  Flagged Pulmicort inhaler for provider review as the patient has not filled this inhaler this year    Medications added/doses adjusted: Added Propranolol 20 mg three times daily  Added Invega sustenna every 28 days  Added Oxcarbazepine 300 mg twice daily  Added Metformin 500 mg twice daily  Added Famotidine 20 mg twice daily  Added Divalproex 500 mg twice daily  Adjusted Escitalopram to 20 mg daily (dose increase)  Adjusted Trazodone to 100 mg nightly (dose increase)    Other notes (ex. Recent course of antibiotics, Coumadin dosing):  Per Raven Pedersen at [facility], patient received Invega sustenna 234 mg on 5/10/23.  Left message for [employer] line to confirm date and medication list.      Current Home Medication List at Time of Admission:  Prior to Admission medications    Medication Sig   propranolol (INDERAL) 20 MG tablet Take 1 tablet by mouth 3 times daily   INVEGA SUSTENNA 234 MG/1.5ML DENNIS IM injection Inject 234 mg into the muscle every 28 days   OXcarbazepine (TRILEPTAL) 300 MG tablet Take 1 tablet by mouth 2 times daily   metFORMIN (GLUCOPHAGE) 500 MG tablet Take 1 tablet by mouth 2 times daily   famotidine (PEPCID) 20 MG tablet Take 1 tablet by mouth 2 times daily   divalproex (DEPAKOTE) 500 MG DR tablet Take 1 tablet by mouth 2 times daily   escitalopram (LEXAPRO) 20 MG tablet Take 1 tablet by mouth daily   traZODone (DESYREL) 100 MG tablet Take 1 tablet by mouth at bedtime   acetaminophen (TYLENOL) 500 MG tablet Take 2 tablets by mouth every 6

## 2023-05-30 NOTE — GROUP NOTE
Group Therapy Note    Date: 5/30/2023    Group Start Time: 0930  Group End Time: 1000  Group Topic: Community Meeting    [facility]    Pepe Degroot RN        Group Therapy Note    Attendees: 8       Patient's Goal:  Stay active and go to groups    Notes:  Stay active and go to groups    Status After Intervention:  Unchanged    Participation Level:  Active Listener and Interactive    Participation Quality: Appropriate      Speech:  normal      Thought Process/Content: Logical      Affective Functioning: Congruent      Mood:  stable      Level of consciousness:  Oriented x4      Response to Learning: Able to verbalize current knowledge/experience      Endings: None Reported    Modes of Intervention: Education and Support      Discipline Responsible: Registered Nurse      Signature:  Pepe Degroot RN

## 2023-05-30 NOTE — GROUP NOTE
Group Therapy Note    Date: 5/30/2023    Group Start Time: 1330  Group End Time: 4571  Group Topic: Cognitive Skills    NATALIE CUEVASCUCO Rodriguez Patient, CTRS    Cognitive Skills Group Note        Date: May 30, 2023 Start Time: 1:30pm  End Time:  210pm      Number of Participants in Group & Unit Census:  7/15    Topic: cognitive skills    Goal of Group:pt will demonstrate improved social skills and improved coping skills       Comments:     Patient did not participate in Cognitive Skills group, despite staff encouragement and explanation of benefits. Patient remain seclusive to self. Q15 minute safety checks maintained for patient safety and will continue to encourage patient to attend unit programming.               Signature:  Naomi Willams

## 2023-05-30 NOTE — BH NOTE
Patient given tobacco quitline number [phone] at this time, refusing to call at this time, states \" I just dont want to quit now\"- patient given information as to the dangers of long term tobacco use. Continue to reinforce the importance of tobacco cessation.

## 2023-05-31 LAB
GLUCOSE BLD-MCNC: 101 MG/DL (ref 75–110)
GLUCOSE BLD-MCNC: 109 MG/DL (ref 75–110)
GLUCOSE BLD-MCNC: 117 MG/DL (ref 75–110)
GLUCOSE BLD-MCNC: 91 MG/DL (ref 75–110)

## 2023-05-31 PROCEDURE — 82947 ASSAY GLUCOSE BLOOD QUANT: CPT

## 2023-05-31 PROCEDURE — 6370000000 HC RX 637 (ALT 250 FOR IP): Performed by: NURSE PRACTITIONER

## 2023-05-31 PROCEDURE — 6370000000 HC RX 637 (ALT 250 FOR IP): Performed by: PSYCHIATRY & NEUROLOGY

## 2023-05-31 PROCEDURE — 99232 SBSQ HOSP IP/OBS MODERATE 35: CPT | Performed by: INTERNAL MEDICINE

## 2023-05-31 PROCEDURE — 6370000000 HC RX 637 (ALT 250 FOR IP): Performed by: INTERNAL MEDICINE

## 2023-05-31 PROCEDURE — 1240000000 HC EMOTIONAL WELLNESS R&B

## 2023-05-31 RX ORDER — OXCARBAZEPINE 300 MG/1
300 TABLET, FILM COATED ORAL 2 TIMES DAILY
Status: DISCONTINUED | OUTPATIENT
Start: 2023-06-01 | End: 2023-06-02 | Stop reason: HOSPADM

## 2023-05-31 RX ORDER — ESCITALOPRAM OXALATE 20 MG/1
20 TABLET ORAL DAILY
Status: DISCONTINUED | OUTPATIENT
Start: 2023-05-31 | End: 2023-06-02 | Stop reason: HOSPADM

## 2023-05-31 RX ORDER — DIVALPROEX SODIUM 500 MG/1
500 TABLET, DELAYED RELEASE ORAL 2 TIMES DAILY
Status: DISCONTINUED | OUTPATIENT
Start: 2023-06-01 | End: 2023-06-02 | Stop reason: HOSPADM

## 2023-05-31 RX ADMIN — ESCITALOPRAM 20 MG: 20 TABLET, FILM COATED ORAL at 11:39

## 2023-05-31 RX ADMIN — PROPRANOLOL HYDROCHLORIDE 20 MG: 20 TABLET ORAL at 16:07

## 2023-05-31 RX ADMIN — IBUPROFEN 400 MG: 400 TABLET ORAL at 11:34

## 2023-05-31 RX ADMIN — PROPRANOLOL HYDROCHLORIDE 20 MG: 20 TABLET ORAL at 21:23

## 2023-05-31 RX ADMIN — FLUTICASONE PROPIONATE 1 PUFF: 110 AEROSOL, METERED RESPIRATORY (INHALATION) at 11:34

## 2023-05-31 RX ADMIN — TRAZODONE HYDROCHLORIDE 125 MG: 100 TABLET ORAL at 21:23

## 2023-05-31 RX ADMIN — PANTOPRAZOLE SODIUM 40 MG: 40 TABLET, DELAYED RELEASE ORAL at 11:34

## 2023-05-31 RX ADMIN — METFORMIN HYDROCHLORIDE 500 MG: 500 TABLET ORAL at 19:01

## 2023-05-31 RX ADMIN — CETIRIZINE HYDROCHLORIDE 10 MG: 10 TABLET, FILM COATED ORAL at 11:34

## 2023-05-31 RX ADMIN — ACETAMINOPHEN 650 MG: 325 TABLET ORAL at 11:34

## 2023-05-31 RX ADMIN — HYDROXYZINE HYDROCHLORIDE 50 MG: 50 TABLET, FILM COATED ORAL at 21:23

## 2023-05-31 RX ADMIN — FLUTICASONE PROPIONATE 1 PUFF: 110 AEROSOL, METERED RESPIRATORY (INHALATION) at 21:23

## 2023-05-31 RX ADMIN — PROPRANOLOL HYDROCHLORIDE 20 MG: 20 TABLET ORAL at 11:34

## 2023-05-31 RX ADMIN — METFORMIN HYDROCHLORIDE 500 MG: 500 TABLET ORAL at 11:34

## 2023-05-31 ASSESSMENT — PAIN SCALES - GENERAL
PAINLEVEL_OUTOF10: 0
PAINLEVEL_OUTOF10: 6

## 2023-05-31 ASSESSMENT — PAIN DESCRIPTION - ORIENTATION: ORIENTATION: MID;LOWER

## 2023-05-31 ASSESSMENT — PAIN DESCRIPTION - LOCATION: LOCATION: BACK

## 2023-05-31 ASSESSMENT — PAIN DESCRIPTION - DESCRIPTORS: DESCRIPTORS: ACHING

## 2023-05-31 ASSESSMENT — PAIN - FUNCTIONAL ASSESSMENT
PAIN_FUNCTIONAL_ASSESSMENT: 0-10
PAIN_FUNCTIONAL_ASSESSMENT: 0-10

## 2023-05-31 NOTE — PROGRESS NOTES
[facility] Internal Medicine    CONSULTATION / HISTORY AND PHYSICAL EXAMINATION            Date:   5/31/2023  Patient name:  Telma Cobb  Date of admission:  5/29/2023  7:57 PM  MRN:   [id number]  Account:  [de-identified]  YOB: 1995  PCP:    LITO Felipe CNP  Room:   0224/0224-01  Code Status:    Full Code    Physician Requesting Consult: Frankey Nigh, MD    Reason for Consult:  medical management    Chief Complaint:     Chief Complaint   Patient presents with    Sinusitis    Cough    Fatigue              History Obtained From:     Patient medical record nursing staff    History of Present Illness:   Patient is a poor historian  Patient, has past medical history of major depression, morbid obesity, obstructive sleep apnea, not on CPAP, diabetes, GERD, traumatic brain injury, history of seizures patient, presented to emergency room with complaints of cough, sinus congestion, patient also told that, he has multiple episodes of witnessed seizures,by his girlfriend  Patient, underwent CT head , which was negative , CXR was negative for PNA   Of note , patient was in ED on 5/17 , was found to have pneumonia, was treated with antibiotics for 10 days      Past Medical History:     Past Medical History:   Diagnosis Date    Autistic disorder     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  Authorizing Provider   propranolol (INDERAL) 20 MG tablet Take 1 tablet by mouth 3 times daily   Yes Historical Provider, MD Claudene Distel

## 2023-05-31 NOTE — PROGRESS NOTES
Esthela Altamirano RN from [facility] called and reported patient is on Invega sustenna 234 mg every 3 weeks with next injection due today (5/31/23).

## 2023-05-31 NOTE — PROGRESS NOTES
Left message for Cynthia Hernandez RN at [facility] on [address] regarding confirming patient's Sharin Nodaway sustenna regimen.

## 2023-05-31 NOTE — GROUP NOTE
Group Therapy Note    Date: 5/31/2023    Group Start Time: 5410  Group End Time: 1130  Group Topic: Cognitive Skills    SHIRIN Penny        Group Therapy Note    Attendees: 5/13       Patient's Goal:  pt will demonstrate improved communication and improved decision making skills     Notes:   pt was pleasant and participated well    Status After Intervention:  Improved    Participation Level:  Active Listener and Interactive    Participation Quality: Appropriate, Sharing, and Supportive      Speech:  normal      Thought Process/Content: Logical      Affective Functioning: Congruent      Mood: euthymic      Level of consciousness:  Alert      Response to Learning: Able to verbalize current knowledge/experience, Able to retain information, and Progressing to goal      Endings: None Reported    Modes of Intervention: Support, Socialization, and Activity      Discipline Responsible: Psychoeducational Specialist      Signature:  Clarence Muller

## 2023-06-01 LAB
GLUCOSE BLD-MCNC: 129 MG/DL (ref 75–110)
GLUCOSE BLD-MCNC: 96 MG/DL (ref 75–110)

## 2023-06-01 PROCEDURE — 82947 ASSAY GLUCOSE BLOOD QUANT: CPT

## 2023-06-01 PROCEDURE — 6370000000 HC RX 637 (ALT 250 FOR IP): Performed by: PSYCHIATRY & NEUROLOGY

## 2023-06-01 PROCEDURE — 6370000000 HC RX 637 (ALT 250 FOR IP): Performed by: INTERNAL MEDICINE

## 2023-06-01 PROCEDURE — 1240000000 HC EMOTIONAL WELLNESS R&B

## 2023-06-01 PROCEDURE — 6370000000 HC RX 637 (ALT 250 FOR IP): Performed by: NURSE PRACTITIONER

## 2023-06-01 RX ADMIN — OXCARBAZEPINE 300 MG: 300 TABLET, FILM COATED ORAL at 20:43

## 2023-06-01 RX ADMIN — METFORMIN HYDROCHLORIDE 500 MG: 500 TABLET ORAL at 07:48

## 2023-06-01 RX ADMIN — PANTOPRAZOLE SODIUM 40 MG: 40 TABLET, DELAYED RELEASE ORAL at 06:38

## 2023-06-01 RX ADMIN — TRAZODONE HYDROCHLORIDE 125 MG: 100 TABLET ORAL at 20:42

## 2023-06-01 RX ADMIN — PROPRANOLOL HYDROCHLORIDE 20 MG: 20 TABLET ORAL at 16:30

## 2023-06-01 RX ADMIN — DIVALPROEX SODIUM 500 MG: 500 TABLET, DELAYED RELEASE ORAL at 07:48

## 2023-06-01 RX ADMIN — METFORMIN HYDROCHLORIDE 500 MG: 500 TABLET ORAL at 16:30

## 2023-06-01 RX ADMIN — IBUPROFEN 400 MG: 400 TABLET ORAL at 06:48

## 2023-06-01 RX ADMIN — CETIRIZINE HYDROCHLORIDE 10 MG: 10 TABLET, FILM COATED ORAL at 07:48

## 2023-06-01 RX ADMIN — PROPRANOLOL HYDROCHLORIDE 20 MG: 20 TABLET ORAL at 20:42

## 2023-06-01 RX ADMIN — DIVALPROEX SODIUM 500 MG: 500 TABLET, DELAYED RELEASE ORAL at 20:43

## 2023-06-01 RX ADMIN — HYDROXYZINE HYDROCHLORIDE 50 MG: 50 TABLET, FILM COATED ORAL at 20:42

## 2023-06-01 RX ADMIN — FLUTICASONE PROPIONATE 1 PUFF: 110 AEROSOL, METERED RESPIRATORY (INHALATION) at 07:50

## 2023-06-01 RX ADMIN — OXCARBAZEPINE 300 MG: 300 TABLET, FILM COATED ORAL at 07:48

## 2023-06-01 RX ADMIN — ESCITALOPRAM 20 MG: 20 TABLET, FILM COATED ORAL at 07:48

## 2023-06-01 RX ADMIN — PROPRANOLOL HYDROCHLORIDE 20 MG: 20 TABLET ORAL at 07:48

## 2023-06-01 ASSESSMENT — PAIN DESCRIPTION - LOCATION: LOCATION: BACK

## 2023-06-01 ASSESSMENT — PAIN SCALES - GENERAL
PAINLEVEL_OUTOF10: 1
PAINLEVEL_OUTOF10: 7
PAINLEVEL_OUTOF10: 0

## 2023-06-01 NOTE — PROGRESS NOTES
Daily Progress Note  5/31/2023    Patient Name: Diana Woods    CHIEF COMPLAINT: Suicidal ideation with plan         SUBJECTIVE:      Patient is seen today for a follow up assessment. He is interviewed today in the presence of his fiancée, he requested to participate in the evaluation with her present after approach. He reports that he is feeling a little better, pharmacy was able to confirm that he is due for his [place] 234 mg today. He verbalized understanding and is agreeable to the follow-up injection. After confirmation he has been receiving this injection every 3 weeks. He reports that he is having difficulty sleeping, trazodone 150 mg was too intense, he felt groggy in the morning after. He reports that 100 mg has not been sufficient enough to help him fall asleep. We mutually agreed today to trial 125 mg tonight at bedtime. In addition his Trileptal and Depakote will be restarted, his fiancée confirms that he has a history of seizure activity as well as utilizing the Depakote for mood stabilization. Dejan Dayna believes that the incident that led to hospitalization was triggered by the large amount of cannabis that he consumed. He did verbalize understanding that we like to see 2 to 3 days stability and symptoms prior to discharging home. We will target the end of the week for discharge as long as he is able to maintain behavioral control and feels that he is able to contract for safety with no thoughts of self-harm. His fiancée is concerned, she does not want him released too soon without the medication adjustments. Both patient and fiancé are agreeable with the plan of care and medication adjustments.     Appetite:  [x] Adequate/Unchanged  [] Increased  [] Decreased      Sleep:       [] Adequate/Unchanged  [] Fair  [x] Poor      Group Attendance on Unit:   [x] Yes   [] Selectively    [] No    Compliant with scheduled medications: [x] Yes  [] No    Received emergency medications in

## 2023-06-01 NOTE — GROUP NOTE
Group Therapy Note    Date: 6/1/2023    Group Start Time: 6924  Group End Time: 1120  Group Topic: Cognitive Skills    [facility]    SHIRIN Salcedo    Cognitive Skills Group Note        Date: June 1, 2023 Start Time:  1045am   End Time:  1120am      Number of Participants in Group & Unit Census:  4/15    Topic: cognitive skills     Goal of Group: pt will identify the benefits of using leisure as a coping skill, pt will demonstrate improved concentration      Comments:     Patient did not participate in Cognitive Skills group, despite staff encouragement and explanation of benefits. Patient remain seclusive to self. Q15 minute safety checks maintained for patient safety and will continue to encourage patient to attend unit programming.               Signature:  Roxann Ramsey

## 2023-06-01 NOTE — GROUP NOTE
Group Therapy Note    Date: 6/1/2023    Group Start Time: 1330  Group End Time: 5290  Group Topic: Cognitive Skills    [facility] SHIRIN Valiente    Cognitive Skills Group Note        Date: June 1, 2023 Start Time: 1:30pm  End Time:  205pm      Number of Participants in Group & Unit Census:  4/14    Topic: recreation therapy group     Goal of Group:pt will demonstrate improved social skills and improved concentration       Comments:     Patient did not participate in Recreation group, despite staff encouragement and explanation of benefits. Patient remain seclusive to self. Q15 minute safety checks maintained for patient safety and will continue to encourage patient to attend unit programming.               Signature:  Kashif Smith

## 2023-06-01 NOTE — PROGRESS NOTES
Daily Progress Note  6/1/2023    Patient Name: Yuliet Huang    CHIEF COMPLAINT: Suicidal ideation with plan         SUBJECTIVE:      Patient is seen today for a follow up assessment. He is interviewed today in the presence of his fiancée, he requested to participate in the evaluation with her present after approach. He reports that he is feeling a little better, pharmacy was able to confirm that he is due for his [place] 234 mg today. He verbalized understanding and is agreeable to the follow-up injection. After confirmation he has been receiving this injection every 3 weeks. He reports that he is having difficulty sleeping, trazodone 150 mg was too intense, he felt groggy in the morning after. He reports that 100 mg has not been sufficient enough to help him fall asleep. We mutually agreed today to trial 125 mg tonight at bedtime. In addition his Trileptal and Depakote will be restarted, his fiancée confirms that he has a history of seizure activity as well as utilizing the Depakote for mood stabilization. Shala Rangel believes that the incident that led to hospitalization was triggered by the large amount of cannabis that he consumed. He did verbalize understanding that we like to see 2 to 3 days stability and symptoms prior to discharging home. We will target the end of the week for discharge as long as he is able to maintain behavioral control and feels that he is able to contract for safety with no thoughts of self-harm. His fiancée is concerned, she does not want him released too soon without the medication adjustments. Both patient and fiancé are agreeable with the plan of care and medication adjustments.     Appetite:  [x] Adequate/Unchanged  [] Increased  [] Decreased      Sleep:       [] Adequate/Unchanged  [] Fair  [x] Poor      Group Attendance on Unit:   [x] Yes   [] Selectively    [] No    Compliant with scheduled medications: [x] Yes  [] No    Received emergency medications in

## 2023-06-01 NOTE — GROUP NOTE
Community Meeting Group Note        Date: June 1, 2023 Start Time: 9am  End Time: 10am      Number of Participants in Group & Unit Census:  10/15    Topic: Goals    Goal of Group:Daily goals      Comments:     Patient did not participate in [employer] group, despite staff encouragement and explanation of benefits. Patient remain seclusive to self. Q15 minute safety checks maintained for patient safety and will continue to encourage patient to attend unit programming.

## 2023-06-02 VITALS
RESPIRATION RATE: 14 BRPM | OXYGEN SATURATION: 97 % | SYSTOLIC BLOOD PRESSURE: 134 MMHG | BODY MASS INDEX: 39.17 KG/M2 | HEIGHT: 75 IN | WEIGHT: 315 LBS | TEMPERATURE: 97.9 F | HEART RATE: 82 BPM | DIASTOLIC BLOOD PRESSURE: 76 MMHG

## 2023-06-02 LAB — GLUCOSE BLD-MCNC: 112 MG/DL (ref 75–110)

## 2023-06-02 PROCEDURE — 6370000000 HC RX 637 (ALT 250 FOR IP): Performed by: INTERNAL MEDICINE

## 2023-06-02 PROCEDURE — 82947 ASSAY GLUCOSE BLOOD QUANT: CPT

## 2023-06-02 PROCEDURE — 6370000000 HC RX 637 (ALT 250 FOR IP): Performed by: NURSE PRACTITIONER

## 2023-06-02 RX ORDER — PALIPERIDONE PALMITATE 234 MG/1.5ML
234 INJECTION INTRAMUSCULAR
Qty: 1 EACH | Refills: 0
Start: 2023-06-21

## 2023-06-02 RX ORDER — TRAZODONE HYDROCHLORIDE 150 MG/1
125 TABLET ORAL NIGHTLY PRN
Qty: 30 TABLET | Refills: 0 | Status: SHIPPED | OUTPATIENT
Start: 2023-06-02

## 2023-06-02 RX ADMIN — OXCARBAZEPINE 300 MG: 300 TABLET, FILM COATED ORAL at 08:13

## 2023-06-02 RX ADMIN — PANTOPRAZOLE SODIUM 40 MG: 40 TABLET, DELAYED RELEASE ORAL at 06:28

## 2023-06-02 RX ADMIN — PROPRANOLOL HYDROCHLORIDE 20 MG: 20 TABLET ORAL at 08:13

## 2023-06-02 RX ADMIN — DIVALPROEX SODIUM 500 MG: 500 TABLET, DELAYED RELEASE ORAL at 08:14

## 2023-06-02 RX ADMIN — METFORMIN HYDROCHLORIDE 500 MG: 500 TABLET ORAL at 08:14

## 2023-06-02 RX ADMIN — ESCITALOPRAM 20 MG: 20 TABLET, FILM COATED ORAL at 08:14

## 2023-06-02 RX ADMIN — CETIRIZINE HYDROCHLORIDE 10 MG: 10 TABLET, FILM COATED ORAL at 08:13

## 2023-06-02 NOTE — PLAN OF CARE
5 [facility]  Initial Interdisciplinary Treatment Plan NO      Original treatment plan Date & Time: 5/30/2023   1300    Admission Type:  Admission Type: Voluntary    Reason for admission:   Reason for Admission: Patient came ED complaining of cold symptoms. Patient reports increase in depression and anxiety with no apparent trigger. He states he has fleeting suicidal ideation with a plan to jump off a bridge. 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    Injury of frontal lobe (HCC)     Multiple sensory deficit syndrome      Status EXAM:Mental Status and Behavioral Exam  Normal: No  Level of Assistance: Independent/Self  Facial Expression: Flat  Affect: Appropriate  Level of Consciousness: Alert  Frequency of Checks: 4 times per hour, close  Mood:Normal: No  Mood: Depressed  Motor Activity:Normal: Yes  Eye Contact: Fair  Observed Behavior: Cooperative  Sexual Misconduct History: Current - no  Preception: [place] to person, [place] to time, [place] to place, [place] to situation  Attention:Normal: No  Attention: Distractible  Thought Processes: Other (comment) (linear and coherent)  Thought Content:Normal: No  Thought Content: Preoccupations  Depression Symptoms: Feelings of hopelessess, Feelings of helplessness  Anxiety Symptoms: No problems reported or observed. Milly Symptoms: No problems reported or observed.   Hallucinations: None  Delusions: No  Memory:Normal:
Problem: Depression  Goal: Will be euthymic at discharge  Description: INTERVENTIONS:  1. Administer medication as ordered  2. Provide emotional support via 1:1 interaction with staff  3. Encourage involvement in milieu/groups/activities  4. Monitor for social isolation  6/1/2023 1822 by Zohreh Choudhary RN  Outcome: Progressing   1:1 with pt x ten minutes. Pt encouraged to attend unit programming and interact with peers and staff. Pt also encouraged to tend to hygiene and ADLs. Pt encouraged to discuss feelings with staff and feedback and reassurance provided. Pt denies thoughts of self harm and is agreeable to seeking out should thoughts of self harm arise. Safe environment maintained. Q15 minute checks for safety cont per unit policy. Will cont to monitor for safety and provides support and reassurance as needed. Pt hopeful for discharge in AM. Controlled et cooperative.
Problem: Depression  Goal: Will be euthymic at discharge  Description: INTERVENTIONS:  1. Administer medication as ordered  2. Provide emotional support via 1:1 interaction with staff  3. Encourage involvement in milieu/groups/activities  4. Monitor for social isolation  Outcome: Progressing   1:1 with pt x ten minutes. Pt encouraged to attend unit programming and interact with peers and staff. Pt also encouraged to tend to hygiene and ADLs. Pt encouraged to discuss feelings with staff and feedback and reassurance provided. Pt denies thoughts of self harm and is agreeable to seeking out should thoughts of self harm arise. Safe environment maintained. Q15 minute checks for safety cont per unit policy. Will cont to monitor for safety and provides support and reassurance as needed. Pt is controlled in behaviors. Preoccupied with own thoughts. Pt is selectively social with peers. Hopeless et not attending to adl's et states he is tired.
Problem: Pain  Goal: Verbalizes/displays adequate comfort level or baseline comfort level  5/30/2023 2358 by Landon Perez RN  Outcome: Progressing     Problem: Self Harm/Suicidality  Goal: Will have no self-injury during hospital stay  Description: INTERVENTIONS:  1. Ensure constant observer at bedside with Q15M safety checks  2. Maintain a safe environment  3. Secure patient belongings  4. Ensure family/visitors adhere to safety recommendations  5. Ensure safety tray has been added to patient's diet order  6. Every shift and PRN: Re-assess suicidal risk via Frequent Screener    5/30/2023 2358 by Landon Perez RN  Outcome: Progressing     Problem: Depression  Goal: Will be euthymic at discharge  Description: INTERVENTIONS:  1. Administer medication as ordered  2. Provide emotional support via 1:1 interaction with staff  3. Encourage involvement in milieu/groups/activities  4. Monitor for social isolation  5/30/2023 2358 by Landon Perez RN  Outcome: Progressing   Patient denies suicidal or homicidal ideations during this shift. Patient denies auditory or visual hallucinations. He reports adequate sleep and appetite. Patient has been taking medications as prescribed, and has been in behavior control during this shift. Patient is provided with safe environment, and q15 minute checks are maintained.
Problem: Pain  Goal: Verbalizes/displays adequate comfort level or baseline comfort level  Note: Mr. Farrukh Bell reports back pain during this shift. PRN Tylenol administered. Problem: Depression  Goal: Will be euthymic at discharge  Description: INTERVENTIONS:  1. Administer medication as ordered  2. Provide emotional support via 1:1 interaction with staff  3. Encourage involvement in milieu/groups/activities  4. Monitor for social isolation  Note: Mr. Farrukh Bell denies feeling depressed and anxious. He joshua suicidal ideation and thoughts of harm to self and others. He reports readiness to go home.
Problem: Pain  Goal: Verbalizes/displays adequate comfort level or baseline comfort level  Problem: Self Harm/Suicidality  Goal: Will have no self-injury during hospital stay  Problem: Depression  Goal: Will be euthymic at discharge  Outcome: Progressing          Patient is cooperative and guarded. Patient remains social in the day room, out in day area, selectively social.  is social with staff regarding his personal needs, but has not been motivated to follow through. Patient is compliant with scheduled medications and has remained in behavioral control thus far. Patient's mood is brightened, affect is flat. Patient's thought process is logical, however, mostly monosyllabic. Patient states denies hallucinations, denies delusions. Patient denies suicidal and homicidal ideation, plan or intent, and remains free of self harm. Patient remains free from physical injuries thus far and remains free from falls. Patient's nutrition is being monitored and patient has not ate snacks this evening. Patient's pain is currently WDL per patient and will continue to be assessed. Safety maintained with every 15 minute checks. Comfort measures not established by patient.
Problem: Self Harm/Suicidality  Goal: Will have no self-injury during hospital stay  Description: INTERVENTIONS:  1. Ensure constant observer at bedside with Q15M safety checks  2. Maintain a safe environment  3. Secure patient belongings  4. Ensure family/visitors adhere to safety recommendations  5. Ensure safety tray has been added to patient's diet order  6. Every shift and PRN: Re-assess suicidal risk via Frequent Screener    Outcome: Progressing  Patient denies harm to self or others every 15 minute checks continue per facility protocol safe environment maintained     Problem: Depression  Goal: Will be euthymic at discharge  Description: INTERVENTIONS:  1. Administer medication as ordered  2. Provide emotional support via 1:1 interaction with staff  3. Encourage involvement in milieu/groups/activities  4.  Monitor for social isolation  5/31/2023 2219 by Maria Eugenia Linares LPN  Outcome: Progressing   Patient denies depression out in day room social with select peers, medication complaint
problems reported or observed. Hallucinations: None  Delusions: No  Memory:Normal: Yes  Insight and Judgment: No  Insight and Judgment: Poor insight, Poor judgment    Daily Assessment Last Entry:   Daily Sleep (WDL): Within Defined Limits            Daily Nutrition (WDL): Within Defined Limits  Level of Assistance: Independent/Self    Patient Monitoring:  Frequency of Checks: 4 times per hour, close    Psychiatric Symptoms:   Depression Symptoms  Depression Symptoms: No problems reported or observed. Anxiety Symptoms  Anxiety Symptoms: No problems reported or observed. Milly Symptoms  Milly Symptoms: No problems reported or observed. Suicide Risk CSSR-S:  1) Within the past month, have you wished you were dead or wished you could go to sleep and not wake up? : Yes  2) Have you actually had any thoughts of killing yourself? : Yes  3) Have you been thinking about how you might kill yourself? : Yes  5) Have you started to work out or worked out the details of how to kill yourself?  Do you intend to carry out this plan? : No  6) Have you ever done anything, started to do anything, or prepared to do anything to end your life?: No  Change in Result:  no Change in Plan of care:  no      EDUCATION:   Learner Progress Toward Treatment Goals:   Reviewed results and recommendations of this team, Reviewed group plan and strategies, Reviewed signs, symptoms and risk of self harm and violent behavior, Reviewed goals and plan of care    Method:  small group, individual verbal education    Outcome:   Verbalized by patient but needs reinforcement to obtain goals    PATIENT GOALS:  Short term: Stabilization on medications, feel better   Long term: Get a job, follow up outpatient, stay on medications    PLAN/TREATMENT RECOMMENDATIONS UPDATE:  continue with group therapies, increased socialization, continue planning for after discharge goals, continue with medication compliance    SHORT-TERM GOALS UPDATE:   Time frame for

## 2023-06-02 NOTE — DISCHARGE SUMMARY
(TRILEPTAL) 300 MG tablet Take 1 tablet by mouth 2 times dailyHistorical Med      metFORMIN (GLUCOPHAGE) 500 MG tablet Take 1 tablet by mouth 2 times dailyHistorical Med      divalproex (DEPAKOTE) 500 MG DR tablet Take 1 tablet by mouth 2 times dailyHistorical Med      escitalopram (LEXAPRO) 20 MG tablet Take 1 tablet by mouth dailyHistorical Med      propranolol (INDERAL) 20 MG tablet Take 1 tablet by mouth 3 times dailyHistorical Med      acetaminophen (TYLENOL) 500 MG tablet Take 2 tablets by mouth every 6 hours as needed for Pain, Disp-60 tablet, R-0Normal      budesonide (PULMICORT FLEXHALER) 180 MCG/ACT AEPB inhaler Inhale 1 puff into the lungs 2 times daily, Disp-1 each, R-0Normal      omeprazole (PRILOSEC) 20 MG delayed release capsule Take 1 capsule by mouth 2 times daily, Disp-30 capsule, R-0Normal           STOP taking these medications       famotidine (PEPCID) 20 MG tablet Comments:   Reason for Stopping:         busPIRone (BUSPAR) 10 MG tablet Comments:   Reason for Stopping:         paliperidone (INVEGA) 6 MG extended release tablet Comments:   Reason for Stopping:         ibuprofen (ADVIL;MOTRIN) 800 MG tablet Comments:   Reason for Stopping:                Core Measures statement:   Not applicable    Discharge Exam:  Level of consciousness: Resting, responds to verbal stimuli  Appearance: Appropriate attire for setting, laying in bed with fair  grooming and hygiene   Behavior/Motor: Approachable, engages with interviewer, no psychomotor abnormalities noted  attitude toward examiner: cooperative, difficulty being attentive, fair eye contact  Speech: Delayed rate, low volume and monotone  Mood: \"Better\"  Affect: Mood congruent  Thought processes: Slow, linear and coherent  Thought content: Discharge focused  Denies homicidal ideation  Suicidal Ideation: Denies suicidal ideations, contracts for safety on the unit. Delusions: No evidence of delusions.    Perceptual Disturbance: Denies auditory

## 2023-06-02 NOTE — DISCHARGE INSTRUCTIONS
Information:  Medications:   Medication summary provided   I understand that I should take only the medications on my list.     -why and when I need to take each medicine.     -which side effects to watch for.     -that I should carry my medication information at all times in case of     Emergency situations. I will take all of my medicines to follow up appointments.     -check with my physician or pharmacist before taking any new    Medication, over the counter product or drink alcohol.    -Ask about food, drug or dietary supplement interactions.    -discard old lists and update records with medication providers. Notify Physician:  Notify physician if you notice:   Always call 911 if you feel your life is in danger  In case of an emergency call 911 immediately! If 911 is not available call your local emergency medical system for help    Behavioral Health Follow Up:  Original Referral Source:er    Discharge Diagnosis: Depression with suicidal ideation [F32. A, R45.851]  Recommendations for Level of Care: follow up  Patient status at discharge: stable  [facility] hospital  [facility]: [facility]  Aftercare plan faxed: [phone]   -faxed by: Parviz Prakash   -date: 6/2/2023   -time: 1200  Prescriptions: filled and sent with pt. Smoking: Quit Smoking. Call the [employer]'s smoking quitline at 7-364-06P-QUIT  Know the signs of a heart attack   If you have any of the following symptoms call 911 immediately, do not wait more    Than five minutes. 1. Pressure, fullness and/ or squeezing in the center of the chest spreading to    The jaw, neck or shoulder. 2. Chest discomfort with light headedness, fainting, sweating, nausea or    Shortness of breath. 3. Upper abdominal pressure or discomfort. 4. Lower chest pain, back pain, unusual fatigue, shortness of breath, nausea   Or dizziness.      General Information:   Questions regarding your bill: Call HELP program [phone]     Suicide Hotline ([address] 25)

## 2023-06-02 NOTE — CARE COORDINATION
Name: Khadijah Petit    : 1995    Discharge Date: 2023    Primary Auth/Cert #: [id number]    Destination: home    Discharge Medications:      Medication List        CHANGE how you take these medications      Invega Sustenna 234 MG/1.5ML Vy IM injection  Generic drug: paliperidone palmitate ER  Inject 234 mg into the muscle every 21 days Received 2023 at [facility] Due every 21 days with next dose 234 mg on 2023  Start taking on: 2023  What changed: additional instructions  Notes to patient: Clear thoughts     traZODone 150 MG tablet  Commonly known as: DESYREL  Take 1 tablet by mouth nightly as needed for Sleep  What changed:   medication strength  how much to take  when to take this  reasons to take this  Notes to patient: Sleep aid            CONTINUE taking these medications      acetaminophen 500 MG tablet  Commonly known as: TYLENOL  Take 2 tablets by mouth every 6 hours as needed for Pain  Notes to patient: pain     budesonide 180 MCG/ACT Aepb inhaler  Commonly known as: Pulmicort Flexhaler  Inhale 1 puff into the lungs 2 times daily  Notes to patient: Breathing aid     divalproex 500 MG DR tablet  Commonly known as: DEPAKOTE  Notes to patient: Mood stabilizer     escitalopram 20 MG tablet  Commonly known as: LEXAPRO  Notes to patient: depression     metFORMIN 500 MG tablet  Commonly known as: GLUCOPHAGE  Notes to patient: Control diabetes     omeprazole 20 MG delayed release capsule  Commonly known as: PRILOSEC  Take 1 capsule by mouth 2 times daily  Notes to patient: Stomach health     OXcarbazepine 300 MG tablet  Commonly known as: TRILEPTAL  Notes to patient: Mood stabilizer     propranolol 20 MG tablet  Commonly known as: INDERAL  Notes to patient: Anxiety/PTSD            STOP taking these medications      busPIRone 10 MG tablet  Commonly known as: BUSPAR     famotidine 20 MG tablet  Commonly known as: PEPCID     paliperidone 6 MG extended release tablet  Commonly
Voicemail left for [facility], notified of patient being discharged back home on 6/2.
staff.     Mavis Akhtar will continue to engage patient in treatment and discharge planning as symptoms improve.

## 2023-06-02 NOTE — GROUP NOTE
Group Therapy Note    Date: 6/2/2023    Group Start Time: 5766  Group End Time: 1000  Group Topic: Group Documentation    [facility]    Veda Tran RN        Group Therapy Note    Attendees: 4/19    Community Meeting Group Note        Date: June 2, 2023 Start Time:  9:15am   End Time: 10am      Number of Participants in Group & Unit Census:  4/19    Topic: Goals    Goal of Group: To establish attainable daily goal(s)      Comments:     Patient did not participate in [employer] group, despite staff encouragement and explanation of benefits. Patient remain seclusive to self. Q15 minute safety checks maintained for patient safety and will continue to encourage patient to attend unit programming.

## 2023-06-02 NOTE — PROGRESS NOTES
[facility]  Discharge Note    Pt discharged with followings belongings:   Dental Appliances: None  Vision - Corrective Lenses: None  Hearing Aid: None  Jewelry: None  Body Piercings Removed: N/A  Clothing: Footwear, Pants, Shirt  Other Valuables: At home   Valuables sent home with pt or returned to patient. Patient educated on aftercare instructions: yes  Information faxed [place] [facility] by writer  at 12:22 PM .Patient verbalize understanding of AVS:  yes. Status EXAM upon discharge:  Mental Status and Behavioral Exam  Normal: Yes  Level of Assistance: Independent/Self  Facial Expression: Brightened  Affect: Appropriate  Level of Consciousness: Alert  Frequency of Checks: 4 times per hour, close  Mood:Normal: Yes  Mood: Other (comment) (denies anxiety+depression)  Motor Activity:Normal: Yes  Eye Contact: Fair  Observed Behavior: Cooperative  Sexual Misconduct History: Current - no  Preception: [place] to person, [place] to time, [place] to place, [place] to situation  Attention:Normal: Yes  Attention: Distractible  Thought Processes: Other (comment) (logical+linear)  Thought Content:Normal: Yes  Thought Content: Preoccupations  Depression Symptoms: No problems reported or observed. Anxiety Symptoms: No problems reported or observed. Milly Symptoms: No problems reported or observed.   Hallucinations: None  Delusions: No  Memory:Normal: Yes  Insight and Judgment: No  Insight and Judgment: Poor insight    Tobacco Screening:  Practical Counseling, on admission, garrett X, if applicable and completed (first 3 are required if patient doesn't refuse):            ( ) Recognizing danger situations (included triggers and roadblocks)                    ( ) Coping skills (new ways to manage stress,relaxation techniques, changing routine, distraction)                                                           ( ) Basic information about quitting (benefits of quitting, techniques in how to quit, available resources  ( )

## 2023-06-30 ENCOUNTER — HOSPITAL ENCOUNTER (EMERGENCY)
Age: 28
Discharge: HOME | End: 2023-06-30
Attending: EMERGENCY MEDICINE
Payer: MEDICARE

## 2023-06-30 VITALS
DIASTOLIC BLOOD PRESSURE: 113 MMHG | TEMPERATURE: 98 F | BODY MASS INDEX: 53.75 KG/M2 | WEIGHT: 315 LBS | RESPIRATION RATE: 20 BRPM | SYSTOLIC BLOOD PRESSURE: 139 MMHG | OXYGEN SATURATION: 97 % | HEART RATE: 100 BPM

## 2023-06-30 DIAGNOSIS — R45.4 ANGER REACTION: Primary | ICD-10-CM

## 2023-06-30 PROCEDURE — 99285 EMERGENCY DEPT VISIT HI MDM: CPT

## 2023-06-30 ASSESSMENT — ENCOUNTER SYMPTOMS
EYE PAIN: 0
ABDOMINAL PAIN: 0
SHORTNESS OF BREATH: 0
COLOR CHANGE: 0
BACK PAIN: 0

## 2023-06-30 ASSESSMENT — PAIN - FUNCTIONAL ASSESSMENT: PAIN_FUNCTIONAL_ASSESSMENT: NONE - DENIES PAIN

## 2023-09-10 ENCOUNTER — HOSPITAL ENCOUNTER (EMERGENCY)
Age: 28
Discharge: HOME | End: 2023-09-11
Attending: STUDENT IN AN ORGANIZED HEALTH CARE EDUCATION/TRAINING PROGRAM
Payer: MEDICARE

## 2023-09-10 DIAGNOSIS — R45.4 ANGER REACTION: Primary | ICD-10-CM

## 2023-09-10 PROCEDURE — 99285 EMERGENCY DEPT VISIT HI MDM: CPT

## 2023-09-11 VITALS
WEIGHT: 315 LBS | DIASTOLIC BLOOD PRESSURE: 68 MMHG | HEIGHT: 74 IN | TEMPERATURE: 98.3 F | HEART RATE: 63 BPM | BODY MASS INDEX: 40.43 KG/M2 | RESPIRATION RATE: 16 BRPM | SYSTOLIC BLOOD PRESSURE: 119 MMHG | OXYGEN SATURATION: 100 %

## 2023-09-11 ASSESSMENT — ENCOUNTER SYMPTOMS
EYE DISCHARGE: 0
NAUSEA: 0
RHINORRHEA: 0
VOMITING: 0
CHEST TIGHTNESS: 0
SHORTNESS OF BREATH: 0
DIARRHEA: 0
ABDOMINAL PAIN: 0
SORE THROAT: 0
EYE REDNESS: 0

## 2023-09-11 ASSESSMENT — PAIN - FUNCTIONAL ASSESSMENT
PAIN_FUNCTIONAL_ASSESSMENT: NONE - DENIES PAIN
PAIN_FUNCTIONAL_ASSESSMENT: NONE - DENIES PAIN
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
## 2023-09-11 NOTE — ED NOTES
Clinical Summary:  Sumaya Crane is a 34year old male who present to the ED via a cab. Pt is suicidal with a plan to jump off of the bridge. Pt states these thoughts were triggered by an argument with pt's girlfriend. Pt denies HI/AH/VH. Pt follows up with [facility]. Pt has not been compliant with medications. Pt smokes marijuana. Pt denies the use of alcohol. Pt lives in a home with pt's girlfriend and has nursling aide staff in the home. Pt frequents the ED often when arguing with pt's girlfriend. After much discussion with girlfriend over the phone pt is stating pt is no longer suicidal and wants to return home. Level of Care Disposition:.SW consulted with ED Dr. ED Dr and SW agree pt is safe to be discharged home. Pt is not a risk to self or others at this time. Pt denies SI/HI. Pt agreeable to be discharged home and follow up outpatient services.

## 2023-09-11 NOTE — ED NOTES
SW encouraged pt to return to the hospital or call 911 if pt's symptoms worsen or pt is having thoughts of wanting to harm self/others. Pt verbalized pt's understanding. SW provided pt with pt's belongings and discharge paperwork. Pt cooperatively exited the [facility]. Pt to be picked up from the ED by one of pt's aides.

## 2023-09-11 NOTE — ED NOTES
Pt told staff, after talking to his girlfriend on the phone, that \"we are going to be mad, but that he is no longer suicidal and that he wants to go home with his girlfriend. \"     Michelle Michaud, [place]  72/17/16 [id number]

## 2024-01-01 ENCOUNTER — HOSPITAL ENCOUNTER (EMERGENCY)
Age: 29
Discharge: LEFT BEFORE DISCHARGE | End: 2024-01-01

## 2024-01-19 ENCOUNTER — HOSPITAL ENCOUNTER (EMERGENCY)
Age: 29
Discharge: HOME | End: 2024-01-20
Attending: EMERGENCY MEDICINE
Payer: MEDICARE

## 2024-01-19 DIAGNOSIS — S09.90XA INJURY OF HEAD, INITIAL ENCOUNTER: Primary | ICD-10-CM

## 2024-01-19 PROCEDURE — 96372 THER/PROPH/DIAG INJ SC/IM: CPT

## 2024-01-19 PROCEDURE — 99285 EMERGENCY DEPT VISIT HI MDM: CPT

## 2024-01-19 ASSESSMENT — PAIN DESCRIPTION - PAIN TYPE: TYPE: ACUTE PAIN

## 2024-01-19 ASSESSMENT — PAIN DESCRIPTION - LOCATION: LOCATION: HEAD

## 2024-01-19 ASSESSMENT — PAIN DESCRIPTION - FREQUENCY: FREQUENCY: CONTINUOUS

## 2024-01-19 ASSESSMENT — PAIN DESCRIPTION - DESCRIPTORS: DESCRIPTORS: ACHING

## 2024-01-19 ASSESSMENT — PAIN SCALES - GENERAL: PAINLEVEL_OUTOF10: 10

## 2024-01-19 ASSESSMENT — PAIN - FUNCTIONAL ASSESSMENT: PAIN_FUNCTIONAL_ASSESSMENT: 0-10

## 2024-01-20 ENCOUNTER — APPOINTMENT (OUTPATIENT)
Dept: CT IMAGING | Age: 29
End: 2024-01-20
Payer: MEDICARE

## 2024-01-20 VITALS
HEART RATE: 109 BPM | RESPIRATION RATE: 18 BRPM | TEMPERATURE: 98.2 F | SYSTOLIC BLOOD PRESSURE: 132 MMHG | HEIGHT: 74 IN | BODY MASS INDEX: 40.43 KG/M2 | WEIGHT: 315 LBS | DIASTOLIC BLOOD PRESSURE: 84 MMHG | OXYGEN SATURATION: 96 %

## 2024-01-20 PROCEDURE — 6360000002 HC RX W HCPCS

## 2024-01-20 PROCEDURE — 96372 THER/PROPH/DIAG INJ SC/IM: CPT

## 2024-01-20 PROCEDURE — 70450 CT HEAD/BRAIN W/O DYE: CPT

## 2024-01-20 PROCEDURE — 6370000000 HC RX 637 (ALT 250 FOR IP)

## 2024-01-20 PROCEDURE — 72125 CT NECK SPINE W/O DYE: CPT

## 2024-01-20 RX ORDER — ACETAMINOPHEN 500 MG
1000 TABLET ORAL 3 TIMES DAILY
Qty: 42 TABLET | Refills: 0 | Status: SHIPPED | OUTPATIENT
Start: 2024-01-20 | End: 2024-01-27

## 2024-01-20 RX ORDER — ORPHENADRINE CITRATE 30 MG/ML
60 INJECTION INTRAMUSCULAR; INTRAVENOUS ONCE
Status: COMPLETED | OUTPATIENT
Start: 2024-01-20 | End: 2024-01-20

## 2024-01-20 RX ORDER — ACETAMINOPHEN 325 MG/1
650 TABLET ORAL ONCE
Status: COMPLETED | OUTPATIENT
Start: 2024-01-20 | End: 2024-01-20

## 2024-01-20 RX ORDER — LIDOCAINE 4 G/G
1 PATCH TOPICAL ONCE
Status: DISCONTINUED | OUTPATIENT
Start: 2024-01-20 | End: 2024-01-20 | Stop reason: HOSPADM

## 2024-01-20 RX ADMIN — ACETAMINOPHEN 650 MG: 325 TABLET ORAL at 00:55

## 2024-01-20 RX ADMIN — ORPHENADRINE CITRATE 60 MG: 60 INJECTION INTRAMUSCULAR; INTRAVENOUS at 00:52

## 2024-01-20 ASSESSMENT — PAIN SCALES - GENERAL
PAINLEVEL_OUTOF10: 10
PAINLEVEL_OUTOF10: 10

## 2024-01-20 ASSESSMENT — ENCOUNTER SYMPTOMS: SHORTNESS OF BREATH: 0
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
## 2024-02-15 ENCOUNTER — APPOINTMENT (OUTPATIENT)
Dept: GENERAL RADIOLOGY | Age: 29
End: 2024-02-15
Payer: MEDICARE

## 2024-02-15 ENCOUNTER — HOSPITAL ENCOUNTER (EMERGENCY)
Age: 29
Discharge: HOME | End: 2024-02-15
Attending: EMERGENCY MEDICINE
Payer: MEDICARE

## 2024-02-15 VITALS
RESPIRATION RATE: 20 BRPM | HEART RATE: 82 BPM | WEIGHT: 315 LBS | BODY MASS INDEX: 39.17 KG/M2 | SYSTOLIC BLOOD PRESSURE: 134 MMHG | OXYGEN SATURATION: 98 % | DIASTOLIC BLOOD PRESSURE: 88 MMHG | HEIGHT: 75 IN | TEMPERATURE: 98.2 F

## 2024-02-15 DIAGNOSIS — S93.401A SPRAIN OF RIGHT ANKLE, UNSPECIFIED LIGAMENT, INITIAL ENCOUNTER: Primary | ICD-10-CM

## 2024-02-15 DIAGNOSIS — S92.254A CLOSED NONDISPLACED FRACTURE OF NAVICULAR BONE OF RIGHT FOOT, INITIAL ENCOUNTER: ICD-10-CM

## 2024-02-15 PROCEDURE — 99283 EMERGENCY DEPT VISIT LOW MDM: CPT

## 2024-02-15 PROCEDURE — 6370000000 HC RX 637 (ALT 250 FOR IP): Performed by: PHYSICIAN ASSISTANT

## 2024-02-15 PROCEDURE — 73630 X-RAY EXAM OF FOOT: CPT

## 2024-02-15 PROCEDURE — 29515 APPLICATION SHORT LEG SPLINT: CPT

## 2024-02-15 PROCEDURE — 73610 X-RAY EXAM OF ANKLE: CPT

## 2024-02-15 RX ORDER — IBUPROFEN 800 MG/1
800 TABLET ORAL ONCE
Status: COMPLETED | OUTPATIENT
Start: 2024-02-15 | End: 2024-02-15

## 2024-02-15 RX ORDER — IBUPROFEN 800 MG/1
800 TABLET ORAL EVERY 8 HOURS PRN
Qty: 20 TABLET | Refills: 0 | Status: SHIPPED | OUTPATIENT
Start: 2024-02-15

## 2024-02-15 RX ADMIN — IBUPROFEN 800 MG: 800 TABLET, FILM COATED ORAL at 19:54

## 2024-02-16 NOTE — DISCHARGE INSTRUCTIONS
Keep splint on and dry.  Do not bear weight.  Follow up with podiatry. Return to the ED if you develop worsening pain, swelling, numbness or any other concerning symptoms.

## 2024-02-16 NOTE — ED PROVIDER NOTES
eMERGENCY dEPARTMENT eNCOUnter   Independent Attestation     Pt Name: Chino Carrillo  MRN: [id number]  Birthdate 1995  Date of evaluation: 2/15/24     Chino Carrillo is a 28 y.o. male with CC: Fall and Foot Pain      Based on the medical record the care appears appropriate.  I was personally available for consultation in the Emergency Department.    Agus Goldsmith DO  Attending Emergency Physician                  Agus Goldsmith DO  02/15/24 1958

## 2024-02-16 NOTE — ED TRIAGE NOTES
Mode of arrival (squad #, walk in, police, etc) : walk-in        Chief complaint(s): foot pain        Arrival Note (brief scenario, treatment PTA, etc).: pt reports right ankle/foot pain after falling through a porch.        C= \"Have you ever felt that you should Cut down on your drinking?\"  No  A= \"Have people Annoyed you by criticizing your drinking?\"  No  G= \"Have you ever felt bad or Guilty about your drinking?\"  No  E= \"Have you ever had a drink as an Eye-opener first thing in the morning to steady your nerves or to help a hangover?\"  No      Deferred []      Reason for deferring: N/A    *If yes to two or more: probable alcohol abuse.*
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
## 2024-02-21 ENCOUNTER — OFFICE VISIT (OUTPATIENT)
Dept: ORTHOPEDIC SURGERY | Age: 29
End: 2024-02-21

## 2024-02-21 VITALS — HEIGHT: 75 IN | BODY MASS INDEX: 39.17 KG/M2 | WEIGHT: 315 LBS

## 2024-02-21 DIAGNOSIS — R52 PAIN: Primary | ICD-10-CM

## 2024-02-21 RX ORDER — IBUPROFEN 800 MG/1
800 TABLET ORAL 2 TIMES DAILY PRN
Qty: 180 TABLET | Refills: 1 | Status: ON HOLD | OUTPATIENT
Start: 2024-02-21

## 2024-02-21 NOTE — PROGRESS NOTES
sprain with no acute fracture.  At this time is recommend the patient utilize a cam boot as needed with weightbearing as tolerated for the right ankle and crutches as needed for assistance.  Patient may wean from crutches and Cam boot as able. Patient also provided with referral to physical therapy to work on proprioception and ankle range of motion.  Patient also had ibuprofen sent to his pharmacy and encourage patient to ice regularly multiple times per day for 20 minutes each.  Educated patient on working on home exercises to spell the alphabet with his range of motion.  Patient may follow-up as needed and possibly in 6 weeks for repeat evaluation with x-rays. Patient understood and agreed with the plan with all questions being answered to the patient's satisfaction at the time of visit.      Return Folllow-up as needed or in 6 weeks for reevaluation.    No orders of the defined types were placed in this encounter.      Orders Placed This Encounter   Procedures    XR FOOT RIGHT (MIN 3 VIEWS)     Standing Status:   Future     Number of Occurrences:   1     Standing Expiration Date:   3/21/2024     Order Specific Question:   Reason for exam:     Answer:   dx        Electronically signed by Roc Randall DO PGY-3 on 2/21/2024 at 11:07 AM    This note is created with the assistance of a speech recognition program.  While intending to generate a document that actually reflects the content of the visit, the document can still have some errors including those of syntax and sound a like substitutions which may escape proof reading.  In such instances, actual meaning can be extrapolated by contextual diversion

## 2024-02-22 ENCOUNTER — HOSPITAL ENCOUNTER (INPATIENT)
Age: 29
LOS: 5 days | Discharge: HOME | DRG: 885 | End: 2024-02-27
Attending: EMERGENCY MEDICINE | Admitting: PSYCHIATRY & NEUROLOGY
Payer: MEDICARE

## 2024-02-22 DIAGNOSIS — T14.91XA SUICIDE ATTEMPT (HCC): Primary | ICD-10-CM

## 2024-02-22 DIAGNOSIS — R45.851 SUICIDAL IDEATION: ICD-10-CM

## 2024-02-22 LAB
AMPHET UR QL SCN: NEGATIVE
ANION GAP SERPL CALCULATED.3IONS-SCNC: 12 MMOL/L (ref 9–17)
BACTERIA URNS QL MICRO: ABNORMAL
BARBITURATES UR QL SCN: NEGATIVE
BASOPHILS # BLD: 0 K/UL (ref 0–0.2)
BASOPHILS NFR BLD: 1 % (ref 0–2)
BENZODIAZ UR QL: NEGATIVE
BILIRUB UR QL STRIP: NEGATIVE
BUN SERPL-MCNC: 5 MG/DL (ref 6–20)
CALCIUM SERPL-MCNC: 9.7 MG/DL (ref 8.6–10.4)
CANNABINOIDS UR QL SCN: NEGATIVE
CASTS #/AREA URNS LPF: ABNORMAL /LPF
CHLORIDE SERPL-SCNC: 102 MMOL/L (ref 98–107)
CLARITY UR: ABNORMAL
CO2 SERPL-SCNC: 25 MMOL/L (ref 20–31)
COCAINE UR QL SCN: NEGATIVE
COLOR UR: YELLOW
CREAT SERPL-MCNC: 0.9 MG/DL (ref 0.7–1.2)
EOSINOPHIL # BLD: 0.1 K/UL (ref 0–0.4)
EOSINOPHILS RELATIVE PERCENT: 1 % (ref 0–4)
EPI CELLS #/AREA URNS HPF: ABNORMAL /HPF
ERYTHROCYTE [DISTWIDTH] IN BLOOD BY AUTOMATED COUNT: 14.7 % (ref 11.5–14.9)
ETHANOL PERCENT: <0.01 %
ETHANOLAMINE SERPL-MCNC: <10 MG/DL
FENTANYL UR QL: NEGATIVE
GFR SERPL CREATININE-BSD FRML MDRD: >60 ML/MIN/1.73M2
GLUCOSE BLD-MCNC: 122 MG/DL (ref 75–110)
GLUCOSE SERPL-MCNC: 103 MG/DL (ref 70–99)
GLUCOSE UR STRIP-MCNC: NEGATIVE MG/DL
HCT VFR BLD AUTO: 38.1 % (ref 41–53)
HGB BLD-MCNC: 12.3 G/DL (ref 13.5–17.5)
HGB UR QL STRIP.AUTO: NEGATIVE
KETONES UR STRIP-MCNC: NEGATIVE MG/DL
LEUKOCYTE ESTERASE UR QL STRIP: ABNORMAL
LYMPHOCYTES NFR BLD: 3.4 K/UL (ref 1–4.8)
LYMPHOCYTES RELATIVE PERCENT: 39 % (ref 24–44)
MCH RBC QN AUTO: 26.7 PG (ref 26–34)
MCHC RBC AUTO-ENTMCNC: 32.3 G/DL (ref 31–37)
MCV RBC AUTO: 82.5 FL (ref 80–100)
METHADONE UR QL: NEGATIVE
MONOCYTES NFR BLD: 0.7 K/UL (ref 0.1–1.3)
MONOCYTES NFR BLD: 8 % (ref 1–7)
NEUTROPHILS NFR BLD: 51 % (ref 36–66)
NEUTS SEG NFR BLD: 4.3 K/UL (ref 1.3–9.1)
NITRITE UR QL STRIP: NEGATIVE
OPIATES UR QL SCN: NEGATIVE
OXYCODONE UR QL SCN: NEGATIVE
PCP UR QL SCN: NEGATIVE
PH UR STRIP: 7 [PH] (ref 5–8)
PLATELET # BLD AUTO: 304 K/UL (ref 150–450)
PMV BLD AUTO: 8.3 FL (ref 6–12)
POTASSIUM SERPL-SCNC: 3.8 MMOL/L (ref 3.7–5.3)
PROT UR STRIP-MCNC: NEGATIVE MG/DL
RBC # BLD AUTO: 4.62 M/UL (ref 4.5–5.9)
RBC #/AREA URNS HPF: ABNORMAL /HPF
SODIUM SERPL-SCNC: 139 MMOL/L (ref 135–144)
SP GR UR STRIP: 1.02 (ref 1–1.03)
TEST INFORMATION: NORMAL
UROBILINOGEN UR STRIP-ACNC: NORMAL EU/DL (ref 0–1)
WBC #/AREA URNS HPF: ABNORMAL /HPF
WBC OTHER # BLD: 8.5 K/UL (ref 3.5–11)

## 2024-02-22 PROCEDURE — 36415 COLL VENOUS BLD VENIPUNCTURE: CPT

## 2024-02-22 PROCEDURE — 99285 EMERGENCY DEPT VISIT HI MDM: CPT

## 2024-02-22 PROCEDURE — 1240000000 HC EMOTIONAL WELLNESS R&B

## 2024-02-22 PROCEDURE — 83036 HEMOGLOBIN GLYCOSYLATED A1C: CPT

## 2024-02-22 PROCEDURE — 81001 URINALYSIS AUTO W/SCOPE: CPT

## 2024-02-22 PROCEDURE — 80307 DRUG TEST PRSMV CHEM ANLYZR: CPT

## 2024-02-22 PROCEDURE — G0480 DRUG TEST DEF 1-7 CLASSES: HCPCS

## 2024-02-22 PROCEDURE — 85025 COMPLETE CBC W/AUTO DIFF WBC: CPT

## 2024-02-22 PROCEDURE — 82947 ASSAY GLUCOSE BLOOD QUANT: CPT

## 2024-02-22 PROCEDURE — 6370000000 HC RX 637 (ALT 250 FOR IP): Performed by: PSYCHIATRY & NEUROLOGY

## 2024-02-22 PROCEDURE — 80048 BASIC METABOLIC PNL TOTAL CA: CPT

## 2024-02-22 RX ORDER — LOPERAMIDE HYDROCHLORIDE 2 MG/1
2 CAPSULE ORAL 4 TIMES DAILY PRN
Status: ON HOLD | COMMUNITY
End: 2024-02-26 | Stop reason: HOSPADM

## 2024-02-22 RX ORDER — POLYETHYLENE GLYCOL 3350 17 G/17G
17 POWDER, FOR SOLUTION ORAL DAILY PRN
Status: DISCONTINUED | OUTPATIENT
Start: 2024-02-22 | End: 2024-02-27 | Stop reason: HOSPADM

## 2024-02-22 RX ORDER — HALOPERIDOL 5 MG/1
5 TABLET ORAL EVERY 6 HOURS PRN
Status: DISCONTINUED | OUTPATIENT
Start: 2024-02-22 | End: 2024-02-27 | Stop reason: HOSPADM

## 2024-02-22 RX ORDER — TRAZODONE HYDROCHLORIDE 100 MG/1
200 TABLET ORAL NIGHTLY
Status: ON HOLD | COMMUNITY
End: 2024-02-26 | Stop reason: HOSPADM

## 2024-02-22 RX ORDER — POLYETHYLENE GLYCOL 3350 17 G
2 POWDER IN PACKET (EA) ORAL
Status: DISCONTINUED | OUTPATIENT
Start: 2024-02-22 | End: 2024-02-27 | Stop reason: HOSPADM

## 2024-02-22 RX ORDER — TRAZODONE HYDROCHLORIDE 50 MG/1
50 TABLET ORAL NIGHTLY PRN
Status: DISCONTINUED | OUTPATIENT
Start: 2024-02-22 | End: 2024-02-27 | Stop reason: HOSPADM

## 2024-02-22 RX ORDER — OXCARBAZEPINE 300 MG/1
300 TABLET, FILM COATED ORAL 2 TIMES DAILY
Status: DISCONTINUED | OUTPATIENT
Start: 2024-02-22 | End: 2024-02-27 | Stop reason: HOSPADM

## 2024-02-22 RX ORDER — CALCIUM CARBONATE 500 MG/1
1 TABLET, CHEWABLE ORAL 3 TIMES DAILY PRN
Status: ON HOLD | COMMUNITY
End: 2024-02-26

## 2024-02-22 RX ORDER — ACETAMINOPHEN 325 MG/1
650 TABLET ORAL EVERY 6 HOURS PRN
Status: DISCONTINUED | OUTPATIENT
Start: 2024-02-22 | End: 2024-02-27 | Stop reason: HOSPADM

## 2024-02-22 RX ORDER — NYSTATIN 100000 U/G
CREAM TOPICAL 2 TIMES DAILY
COMMUNITY

## 2024-02-22 RX ORDER — HYDROXYZINE 50 MG/1
50 TABLET, FILM COATED ORAL 3 TIMES DAILY PRN
Status: DISCONTINUED | OUTPATIENT
Start: 2024-02-22 | End: 2024-02-27 | Stop reason: HOSPADM

## 2024-02-22 RX ORDER — SEMAGLUTIDE 1.34 MG/ML
0.5 INJECTION, SOLUTION SUBCUTANEOUS WEEKLY
COMMUNITY

## 2024-02-22 RX ORDER — FAMOTIDINE 20 MG/1
20 TABLET, FILM COATED ORAL 2 TIMES DAILY
Status: ON HOLD | COMMUNITY
End: 2024-02-26

## 2024-02-22 RX ORDER — LORAZEPAM 2 MG/ML
2 INJECTION INTRAMUSCULAR EVERY 6 HOURS PRN
Status: DISCONTINUED | OUTPATIENT
Start: 2024-02-22 | End: 2024-02-27 | Stop reason: HOSPADM

## 2024-02-22 RX ORDER — DIVALPROEX SODIUM 500 MG/1
500 TABLET, DELAYED RELEASE ORAL 2 TIMES DAILY
Status: DISCONTINUED | OUTPATIENT
Start: 2024-02-22 | End: 2024-02-27 | Stop reason: HOSPADM

## 2024-02-22 RX ORDER — LORAZEPAM 1 MG/1
2 TABLET ORAL EVERY 6 HOURS PRN
Status: DISCONTINUED | OUTPATIENT
Start: 2024-02-22 | End: 2024-02-27 | Stop reason: HOSPADM

## 2024-02-22 RX ORDER — FLUTICASONE PROPIONATE 110 UG/1
2 AEROSOL, METERED RESPIRATORY (INHALATION)
Status: DISCONTINUED | OUTPATIENT
Start: 2024-02-22 | End: 2024-02-27 | Stop reason: HOSPADM

## 2024-02-22 RX ORDER — HALOPERIDOL 5 MG/ML
5 INJECTION INTRAMUSCULAR EVERY 6 HOURS PRN
Status: DISCONTINUED | OUTPATIENT
Start: 2024-02-22 | End: 2024-02-27 | Stop reason: HOSPADM

## 2024-02-22 RX ORDER — IBUPROFEN 400 MG/1
400 TABLET ORAL EVERY 6 HOURS PRN
Status: DISCONTINUED | OUTPATIENT
Start: 2024-02-22 | End: 2024-02-27 | Stop reason: HOSPADM

## 2024-02-22 RX ORDER — MAGNESIUM HYDROXIDE/ALUMINUM HYDROXICE/SIMETHICONE 120; 1200; 1200 MG/30ML; MG/30ML; MG/30ML
30 SUSPENSION ORAL EVERY 6 HOURS PRN
Status: DISCONTINUED | OUTPATIENT
Start: 2024-02-22 | End: 2024-02-27 | Stop reason: HOSPADM

## 2024-02-22 RX ORDER — DIPHENHYDRAMINE HYDROCHLORIDE 50 MG/ML
50 INJECTION INTRAMUSCULAR; INTRAVENOUS EVERY 6 HOURS PRN
Status: DISCONTINUED | OUTPATIENT
Start: 2024-02-22 | End: 2024-02-27 | Stop reason: HOSPADM

## 2024-02-22 RX ADMIN — DIVALPROEX SODIUM 500 MG: 500 TABLET, DELAYED RELEASE ORAL at 22:17

## 2024-02-22 RX ADMIN — HYDROXYZINE HYDROCHLORIDE 50 MG: 50 TABLET, FILM COATED ORAL at 22:15

## 2024-02-22 RX ADMIN — Medication 2 PUFF: at 22:15

## 2024-02-22 RX ADMIN — OXCARBAZEPINE 300 MG: 300 TABLET, FILM COATED ORAL at 22:15

## 2024-02-22 RX ADMIN — METFORMIN HYDROCHLORIDE 500 MG: 500 TABLET ORAL at 22:15

## 2024-02-22 RX ADMIN — ACETAMINOPHEN 650 MG: 325 TABLET ORAL at 22:15

## 2024-02-22 ASSESSMENT — PATIENT HEALTH QUESTIONNAIRE - PHQ9
3. TROUBLE FALLING OR STAYING ASLEEP: 1
4. FEELING TIRED OR HAVING LITTLE ENERGY: 1
8. MOVING OR SPEAKING SO SLOWLY THAT OTHER PEOPLE COULD HAVE NOTICED. OR THE OPPOSITE, BEING SO FIGETY OR RESTLESS THAT YOU HAVE BEEN MOVING AROUND A LOT MORE THAN USUAL: 0
6. FEELING BAD ABOUT YOURSELF - OR THAT YOU ARE A FAILURE OR HAVE LET YOURSELF OR YOUR FAMILY DOWN: 3
SUM OF ALL RESPONSES TO PHQ QUESTIONS 1-9: 16
SUM OF ALL RESPONSES TO PHQ QUESTIONS 1-9: 16
2. FEELING DOWN, DEPRESSED OR HOPELESS: 1
10. IF YOU CHECKED OFF ANY PROBLEMS, HOW DIFFICULT HAVE THESE PROBLEMS MADE IT FOR YOU TO DO YOUR WORK, TAKE CARE OF THINGS AT HOME, OR GET ALONG WITH OTHER PEOPLE: 2
7. TROUBLE CONCENTRATING ON THINGS, SUCH AS READING THE NEWSPAPER OR WATCHING TELEVISION: 2
5. POOR APPETITE OR OVEREATING: 3
SUM OF ALL RESPONSES TO PHQ QUESTIONS 1-9: 16
9. THOUGHTS THAT YOU WOULD BE BETTER OFF DEAD, OR OF HURTING YOURSELF: 2
SUM OF ALL RESPONSES TO PHQ9 QUESTIONS 1 & 2: 4
SUM OF ALL RESPONSES TO PHQ QUESTIONS 1-9: 14
1. LITTLE INTEREST OR PLEASURE IN DOING THINGS: 3

## 2024-02-22 ASSESSMENT — PAIN DESCRIPTION - LOCATION
LOCATION: FOOT
LOCATION: FOOT

## 2024-02-22 ASSESSMENT — LIFESTYLE VARIABLES
HOW OFTEN DO YOU HAVE A DRINK CONTAINING ALCOHOL: NEVER
HOW MANY STANDARD DRINKS CONTAINING ALCOHOL DO YOU HAVE ON A TYPICAL DAY: PATIENT DOES NOT DRINK
HOW MANY STANDARD DRINKS CONTAINING ALCOHOL DO YOU HAVE ON A TYPICAL DAY: PATIENT DOES NOT DRINK
HOW OFTEN DO YOU HAVE A DRINK CONTAINING ALCOHOL: NEVER

## 2024-02-22 ASSESSMENT — SLEEP AND FATIGUE QUESTIONNAIRES
DO YOU HAVE DIFFICULTY SLEEPING: YES
AVERAGE NUMBER OF SLEEP HOURS: 3
SLEEP PATTERN: DIFFICULTY FALLING ASLEEP;RESTFUL
DO YOU USE A SLEEP AID: YES

## 2024-02-22 ASSESSMENT — PAIN DESCRIPTION - DESCRIPTORS
DESCRIPTORS: ACHING
DESCRIPTORS: ACHING

## 2024-02-22 ASSESSMENT — PAIN SCALES - GENERAL
PAINLEVEL_OUTOF10: 5
PAINLEVEL_OUTOF10: 5

## 2024-02-22 ASSESSMENT — PAIN - FUNCTIONAL ASSESSMENT: PAIN_FUNCTIONAL_ASSESSMENT: NONE - DENIES PAIN

## 2024-02-22 ASSESSMENT — PAIN DESCRIPTION - ORIENTATION
ORIENTATION: RIGHT
ORIENTATION: RIGHT
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
## 2024-02-22 NOTE — PROGRESS NOTES
Pharmacy Medication History Note      List of current medications patient is taking is complete.     Source of information: patient, dispense report, OARRS    Changes made to medication list:  Medications flagged for removal (include reason, ex. noncompliance):  Pulmicort - patient reports not taking  Divalproex - patient reports not taking   Escitalopram - patient reports not taking     Medications removed (include reason, ex. therapy complete or physician discontinued):  Duplicate acetaminophen and ibuprofen     Medications added/doses adjusted:  Calcium carbonate 500 mg chews three times daily as needed  Famotidine 20 mg twice daily   Imodium 2 mg four times daily as needed  Caplyta 42 mg daily   Nystatin cream twice daily to areas of yeast infection  Ozempic 0.5 mg weekly   Trazodone increased to 200 mg (two 100 mg tabs) nightly    Other notes (ex. Recent course of antibiotics, Coumadin dosing):  The patient reports that he takes his Ozempic whichever day he remembers to take it. He does not have a consistent day of the week he uses.   Per JUSTIN Eldridge at [facility], the patient last received Invega Sustenna 234 mg on 1/18/24. He is supposed to receive this medication every 3 weeks. He missed his appointment on 2/9/24 when he was due to receive the next dose.   OARRS negative  Patient reports cannabis use, but states he has not used cannabis in a few weeks.     Denies use of other OTC or herbal medications.      Allergies clarified    Medication list provided to the patient: no  Medication education provided to the patient: none    Prior to Admission Medications   Prescriptions Last Dose Informant Patient Reported? Taking?   INVEGA SUSTENNA 234 MG/1.5ML DENNIS IM injection 1/18/2024  No No   Sig: Inject 234 mg into the muscle every 21 days Received 5/31/2023 at [facility] Due every 21 days with next dose 234 mg on 6/21/2023   Lumateperone Tosylate (CAPLYTA) 42 MG capsule   Yes Yes   Sig: Take 1 capsule by mouth
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
## 2024-02-23 PROBLEM — R31.29 OTHER MICROSCOPIC HEMATURIA: Status: ACTIVE | Noted: 2024-02-23

## 2024-02-23 PROBLEM — T14.91XA SUICIDE ATTEMPT (HCC): Status: ACTIVE | Noted: 2024-02-23

## 2024-02-23 LAB
EST. AVERAGE GLUCOSE BLD GHB EST-MCNC: 105 MG/DL
GLUCOSE BLD-MCNC: 108 MG/DL (ref 75–110)
HBA1C MFR BLD: 5.3 % (ref 4–6)

## 2024-02-23 PROCEDURE — 6370000000 HC RX 637 (ALT 250 FOR IP): Performed by: PSYCHIATRY & NEUROLOGY

## 2024-02-23 PROCEDURE — 1240000000 HC EMOTIONAL WELLNESS R&B

## 2024-02-23 PROCEDURE — 82947 ASSAY GLUCOSE BLOOD QUANT: CPT

## 2024-02-23 PROCEDURE — 90792 PSYCH DIAG EVAL W/MED SRVCS: CPT | Performed by: PSYCHIATRY & NEUROLOGY

## 2024-02-23 PROCEDURE — 99223 1ST HOSP IP/OBS HIGH 75: CPT | Performed by: INTERNAL MEDICINE

## 2024-02-23 RX ORDER — FAMOTIDINE 20 MG/1
20 TABLET, FILM COATED ORAL 2 TIMES DAILY
Status: DISCONTINUED | OUTPATIENT
Start: 2024-02-23 | End: 2024-02-27 | Stop reason: HOSPADM

## 2024-02-23 RX ORDER — CALCIUM CARBONATE 500 MG/1
1 TABLET, CHEWABLE ORAL 3 TIMES DAILY PRN
Status: DISCONTINUED | OUTPATIENT
Start: 2024-02-23 | End: 2024-02-27 | Stop reason: HOSPADM

## 2024-02-23 RX ORDER — PROPRANOLOL HYDROCHLORIDE 20 MG/1
20 TABLET ORAL 3 TIMES DAILY
Status: DISCONTINUED | OUTPATIENT
Start: 2024-02-23 | End: 2024-02-27 | Stop reason: HOSPADM

## 2024-02-23 RX ORDER — PALIPERIDONE 6 MG/1
6 TABLET, EXTENDED RELEASE ORAL DAILY
Status: DISCONTINUED | OUTPATIENT
Start: 2024-02-23 | End: 2024-02-24

## 2024-02-23 RX ADMIN — PROPRANOLOL HYDROCHLORIDE 20 MG: 20 TABLET ORAL at 21:37

## 2024-02-23 RX ADMIN — DIVALPROEX SODIUM 500 MG: 500 TABLET, DELAYED RELEASE ORAL at 09:40

## 2024-02-23 RX ADMIN — METFORMIN HYDROCHLORIDE 500 MG: 500 TABLET ORAL at 21:37

## 2024-02-23 RX ADMIN — TRAZODONE HYDROCHLORIDE 50 MG: 50 TABLET ORAL at 21:37

## 2024-02-23 RX ADMIN — OXCARBAZEPINE 300 MG: 300 TABLET, FILM COATED ORAL at 21:37

## 2024-02-23 RX ADMIN — Medication 2 PUFF: at 21:36

## 2024-02-23 RX ADMIN — ACETAMINOPHEN 650 MG: 325 TABLET ORAL at 21:41

## 2024-02-23 RX ADMIN — DIVALPROEX SODIUM 500 MG: 500 TABLET, DELAYED RELEASE ORAL at 21:36

## 2024-02-23 RX ADMIN — Medication 2 PUFF: at 09:40

## 2024-02-23 RX ADMIN — PALIPERIDONE 6 MG: 6 TABLET, EXTENDED RELEASE ORAL at 13:48

## 2024-02-23 RX ADMIN — HYDROXYZINE HYDROCHLORIDE 50 MG: 50 TABLET, FILM COATED ORAL at 21:37

## 2024-02-23 RX ADMIN — PROPRANOLOL HYDROCHLORIDE 20 MG: 20 TABLET ORAL at 13:48

## 2024-02-23 RX ADMIN — FAMOTIDINE 20 MG: 20 TABLET, FILM COATED ORAL at 13:48

## 2024-02-23 RX ADMIN — METFORMIN HYDROCHLORIDE 500 MG: 500 TABLET ORAL at 09:40

## 2024-02-23 RX ADMIN — FAMOTIDINE 20 MG: 20 TABLET, FILM COATED ORAL at 21:37

## 2024-02-23 RX ADMIN — OXCARBAZEPINE 300 MG: 300 TABLET, FILM COATED ORAL at 09:40

## 2024-02-23 ASSESSMENT — PAIN DESCRIPTION - LOCATION: LOCATION: FOOT

## 2024-02-23 ASSESSMENT — PAIN SCALES - GENERAL: PAINLEVEL_OUTOF10: 5

## 2024-02-23 NOTE — FLOWSHEET NOTE
Patient given tobacco quitline number 26694605713 at this time, refusing to call at this time, states \" I just dont want to quit now\"- patient given information as to the dangers of long term tobacco use. Continue to reinforce the importance of tobacco cessation.

## 2024-02-23 NOTE — PROGRESS NOTES
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
## 2024-02-23 NOTE — GROUP NOTE
Group Therapy Note    Date: 2/23/2024    Group Start Time: 1330  Group End Time: 1425  Group Topic: Cognitive Skills    Loan Watson CTRS        Group Therapy Note    Attendees: 1/8    Cognitive Skills Group Note        Date: February 23, 2024 Start Time: 1:30pm  End Time: 2:25pm      Number of Participants in Group & Unit Census:  1/8    Topic:  interpersonal skills, concentration, creative expression    Goal of Group: To improve interpersonal skills and concentration through collaborating with peers and demonstrating creative expression.      Comments:     Patient did not participate in Cognitive Skills group, despite staff encouragement and explanation of benefits.  Patient remain seclusive to self.  Q15 minute safety checks maintained for patient safety and will continue to encourage patient to attend unit programming.        Signature:  SHIRIN Cota

## 2024-02-23 NOTE — GROUP NOTE
Group Therapy Note    Date: 2/23/2024    Group Start Time: 1100  Group End Time: 1140  Group Topic: Psychoeducation    Loan Watson CTRS        Group Therapy Note    Attendees: 3/8    Psych-Ed/Relapse Prevention Group Note        Date: February 23, 2024 Start Time: 11am  End Time: 11:40am      Number of Participants in Group & Unit Census:  3/8    Topic:  interpersonal skills, coping skills, self-expression    Goal of Group: To improve interpersonal skills and coping skills awareness through collaborating with peers and demonstrating self-expression.      Comments:     Patient did not participate in Psych-Ed/Relapse Prevention group, despite staff encouragement and explanation of benefits.  Patient remain seclusive to self.  Q15 minute safety checks maintained for patient safety and will continue to encourage patient to attend unit programming.        Signature:  SHIRIN Cota

## 2024-02-23 NOTE — PROGRESS NOTES
Behavioral Services  Medicare Certification Upon Admission    I certify that this patient's inpatient psychiatric hospital admission is medically necessary for:    [x] (1) Treatment which could reasonably be expected to improve this patient's condition,       [x] (2) Or for diagnostic study;     AND     [x](2) The inpatient psychiatric services are provided while the individual is under the care of a physician and are included in the individualized plan of care.    Estimated length of stay/service 4 to 7 days    Plan for post-hospital care home with outpatient community mental health follow-up    Electronically signed by CHIVO CHOWDHURY MD on 2/23/2024 at 1:18 PM

## 2024-02-24 PROCEDURE — 6360000002 HC RX W HCPCS: Performed by: PSYCHIATRY & NEUROLOGY

## 2024-02-24 PROCEDURE — 1240000000 HC EMOTIONAL WELLNESS R&B

## 2024-02-24 PROCEDURE — 6370000000 HC RX 637 (ALT 250 FOR IP): Performed by: PSYCHIATRY & NEUROLOGY

## 2024-02-24 PROCEDURE — 99232 SBSQ HOSP IP/OBS MODERATE 35: CPT | Performed by: PSYCHIATRY & NEUROLOGY

## 2024-02-24 RX ORDER — PALIPERIDONE 9 MG/1
9 TABLET, EXTENDED RELEASE ORAL DAILY
Status: DISCONTINUED | OUTPATIENT
Start: 2024-02-25 | End: 2024-02-27 | Stop reason: HOSPADM

## 2024-02-24 RX ADMIN — OXCARBAZEPINE 300 MG: 300 TABLET, FILM COATED ORAL at 09:30

## 2024-02-24 RX ADMIN — PROPRANOLOL HYDROCHLORIDE 20 MG: 20 TABLET ORAL at 09:29

## 2024-02-24 RX ADMIN — FAMOTIDINE 20 MG: 20 TABLET, FILM COATED ORAL at 09:30

## 2024-02-24 RX ADMIN — DIPHENHYDRAMINE HYDROCHLORIDE 50 MG: 50 INJECTION INTRAMUSCULAR; INTRAVENOUS at 16:41

## 2024-02-24 RX ADMIN — PROPRANOLOL HYDROCHLORIDE 20 MG: 20 TABLET ORAL at 20:52

## 2024-02-24 RX ADMIN — LORAZEPAM 2 MG: 2 INJECTION INTRAMUSCULAR; INTRAVENOUS at 16:42

## 2024-02-24 RX ADMIN — METFORMIN HYDROCHLORIDE 500 MG: 500 TABLET ORAL at 09:29

## 2024-02-24 RX ADMIN — Medication 2 PUFF: at 20:55

## 2024-02-24 RX ADMIN — PROPRANOLOL HYDROCHLORIDE 20 MG: 20 TABLET ORAL at 14:12

## 2024-02-24 RX ADMIN — HALOPERIDOL LACTATE 5 MG: 5 INJECTION, SOLUTION INTRAMUSCULAR at 16:41

## 2024-02-24 RX ADMIN — DIVALPROEX SODIUM 500 MG: 500 TABLET, DELAYED RELEASE ORAL at 20:52

## 2024-02-24 RX ADMIN — TRAZODONE HYDROCHLORIDE 50 MG: 50 TABLET ORAL at 20:52

## 2024-02-24 RX ADMIN — OXCARBAZEPINE 300 MG: 300 TABLET, FILM COATED ORAL at 20:52

## 2024-02-24 RX ADMIN — MICONAZOLE NITRATE: 2 CREAM TOPICAL at 09:45

## 2024-02-24 RX ADMIN — METFORMIN HYDROCHLORIDE 500 MG: 500 TABLET ORAL at 20:52

## 2024-02-24 RX ADMIN — HYDROXYZINE HYDROCHLORIDE 50 MG: 50 TABLET, FILM COATED ORAL at 20:52

## 2024-02-24 RX ADMIN — DIVALPROEX SODIUM 500 MG: 500 TABLET, DELAYED RELEASE ORAL at 09:29

## 2024-02-24 RX ADMIN — PALIPERIDONE 6 MG: 6 TABLET, EXTENDED RELEASE ORAL at 09:29

## 2024-02-24 RX ADMIN — Medication 2 PUFF: at 09:30

## 2024-02-24 RX ADMIN — FAMOTIDINE 20 MG: 20 TABLET, FILM COATED ORAL at 20:52

## 2024-02-24 NOTE — GROUP NOTE
Group Therapy Note    Date: 2/24/2024    Group Start Time: 1320  Group End Time: 1400  Group Topic: Group Therapy    Genna Holcomb, RN        Group Therapy Note    Attendees: 5/12   Community Meeting Group Note        Date: February 24, 2024 Start Time:  1:20pm   End Time:  2:00pm      Number of Participants in Group & Unit Census:  5/12    Topic: Music group     Goal of Group: Identify names and/or artist(s) of popular songs      Comments:     Patient did not participate in Community Meeting group, despite staff encouragement and explanation of benefits.  Patient remain seclusive to self.  Q15 minute safety checks maintained for patient safety and will continue to encourage patient to attend unit programming.

## 2024-02-24 NOTE — GROUP NOTE
Group Therapy Note    Date: 2/24/2024    Group Start Time: 0900  Group End Time: 0920  Group Topic: Group Documentation    Genna Holcomb, RN        Group Therapy Note    Attendees: 5/12  Community Meeting Group Note        Date: February 24, 2024 Start Time: 9am  End Time:  9:20 am      Number of Participants in Group & Unit Census:  5/12    Topic: Goals group    Goal of Group:To establish attainable daily goal(s)      Comments:     Patient did not participate in Community Meeting group, despite staff encouragement and explanation of benefits.  Patient remain seclusive to self.  Q15 minute safety checks maintained for patient safety and will continue to encourage patient to attend unit programming.

## 2024-02-25 PROCEDURE — 99232 SBSQ HOSP IP/OBS MODERATE 35: CPT | Performed by: PSYCHIATRY & NEUROLOGY

## 2024-02-25 PROCEDURE — 1240000000 HC EMOTIONAL WELLNESS R&B

## 2024-02-25 PROCEDURE — 6370000000 HC RX 637 (ALT 250 FOR IP): Performed by: PSYCHIATRY & NEUROLOGY

## 2024-02-25 RX ADMIN — OXCARBAZEPINE 300 MG: 300 TABLET, FILM COATED ORAL at 20:54

## 2024-02-25 RX ADMIN — METFORMIN HYDROCHLORIDE 500 MG: 500 TABLET ORAL at 12:06

## 2024-02-25 RX ADMIN — PALIPERIDONE 9 MG: 9 TABLET, EXTENDED RELEASE ORAL at 12:05

## 2024-02-25 RX ADMIN — DIVALPROEX SODIUM 500 MG: 500 TABLET, DELAYED RELEASE ORAL at 12:05

## 2024-02-25 RX ADMIN — Medication 2 PUFF: at 21:01

## 2024-02-25 RX ADMIN — METFORMIN HYDROCHLORIDE 500 MG: 500 TABLET ORAL at 20:54

## 2024-02-25 RX ADMIN — TRAZODONE HYDROCHLORIDE 50 MG: 50 TABLET ORAL at 20:54

## 2024-02-25 RX ADMIN — IBUPROFEN 400 MG: 400 TABLET, FILM COATED ORAL at 16:56

## 2024-02-25 RX ADMIN — ACETAMINOPHEN 650 MG: 325 TABLET ORAL at 20:54

## 2024-02-25 RX ADMIN — DIVALPROEX SODIUM 500 MG: 500 TABLET, DELAYED RELEASE ORAL at 20:54

## 2024-02-25 RX ADMIN — Medication 2 PUFF: at 12:07

## 2024-02-25 RX ADMIN — MICONAZOLE NITRATE: 2 CREAM TOPICAL at 20:57

## 2024-02-25 RX ADMIN — FAMOTIDINE 20 MG: 20 TABLET, FILM COATED ORAL at 12:05

## 2024-02-25 RX ADMIN — PROPRANOLOL HYDROCHLORIDE 20 MG: 20 TABLET ORAL at 20:54

## 2024-02-25 RX ADMIN — OXCARBAZEPINE 300 MG: 300 TABLET, FILM COATED ORAL at 12:06

## 2024-02-25 RX ADMIN — HYDROXYZINE HYDROCHLORIDE 50 MG: 50 TABLET, FILM COATED ORAL at 20:54

## 2024-02-25 RX ADMIN — FAMOTIDINE 20 MG: 20 TABLET, FILM COATED ORAL at 20:54

## 2024-02-25 RX ADMIN — PROPRANOLOL HYDROCHLORIDE 20 MG: 20 TABLET ORAL at 16:08

## 2024-02-25 ASSESSMENT — PAIN SCALES - GENERAL: PAINLEVEL_OUTOF10: 0

## 2024-02-25 ASSESSMENT — PAIN DESCRIPTION - ORIENTATION: ORIENTATION: RIGHT

## 2024-02-25 ASSESSMENT — PAIN DESCRIPTION - LOCATION: LOCATION: FOOT

## 2024-02-25 NOTE — GROUP NOTE
Group Therapy Note    Date: 2/25/2024    Group Start Time: 1400  Group End Time: 1445  Group Topic: Psychoeducation    [facility]    Amy Haque CTRS    Group Therapy Note    Attendees: 5/12     Patient's Goal:  Patient will demonstrate improved interpersonal skills    Notes:  Patient attended group and participated    Status After Intervention:  Improved    Participation Level: Active Listener and Interactive    Participation Quality: Appropriate, Attentive, Sharing, and Supportive      Speech:  normal      Thought Process/Content: Logical  Linear      Affective Functioning: Congruent      Mood: euthymic      Level of consciousness:  Alert, Oriented x4, and Attentive      Response to Learning: Able to verbalize current knowledge/experience, Able to verbalize/acknowledge new learning, Able to retain information      Endings: None Reported    Modes of Intervention: Education, Support, Socialization, and Exploration      Discipline Responsible: Psychoeducational Specialist      Signature:  SHIRIN Yip
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
## 2024-02-26 PROCEDURE — 99231 SBSQ HOSP IP/OBS SF/LOW 25: CPT | Performed by: INTERNAL MEDICINE

## 2024-02-26 PROCEDURE — 6370000000 HC RX 637 (ALT 250 FOR IP): Performed by: PSYCHIATRY & NEUROLOGY

## 2024-02-26 PROCEDURE — 99232 SBSQ HOSP IP/OBS MODERATE 35: CPT | Performed by: PSYCHIATRY & NEUROLOGY

## 2024-02-26 PROCEDURE — 90833 PSYTX W PT W E/M 30 MIN: CPT | Performed by: PSYCHIATRY & NEUROLOGY

## 2024-02-26 PROCEDURE — 1240000000 HC EMOTIONAL WELLNESS R&B

## 2024-02-26 PROCEDURE — APPSS30 APP SPLIT SHARED TIME 16-30 MINUTES

## 2024-02-26 RX ORDER — OXCARBAZEPINE 300 MG/1
300 TABLET, FILM COATED ORAL 2 TIMES DAILY
Qty: 60 TABLET | Refills: 0 | Status: SHIPPED | OUTPATIENT
Start: 2024-02-26

## 2024-02-26 RX ORDER — PALIPERIDONE 9 MG/1
9 TABLET, EXTENDED RELEASE ORAL DAILY
Qty: 30 TABLET | Refills: 0 | Status: SHIPPED | OUTPATIENT
Start: 2024-02-27

## 2024-02-26 RX ORDER — HYDROXYZINE 50 MG/1
50 TABLET, FILM COATED ORAL 3 TIMES DAILY PRN
Qty: 30 TABLET | Refills: 0 | Status: SHIPPED | OUTPATIENT
Start: 2024-02-26 | End: 2024-03-07

## 2024-02-26 RX ORDER — FAMOTIDINE 20 MG/1
20 TABLET, FILM COATED ORAL 2 TIMES DAILY
Qty: 60 TABLET | Refills: 0 | Status: SHIPPED | OUTPATIENT
Start: 2024-02-26

## 2024-02-26 RX ORDER — DIVALPROEX SODIUM 500 MG/1
500 TABLET, DELAYED RELEASE ORAL 2 TIMES DAILY
Qty: 60 TABLET | Refills: 0 | Status: SHIPPED | OUTPATIENT
Start: 2024-02-26

## 2024-02-26 RX ORDER — CALCIUM CARBONATE 500 MG/1
1 TABLET, CHEWABLE ORAL 3 TIMES DAILY PRN
Qty: 60 TABLET | Refills: 0 | Status: SHIPPED | OUTPATIENT
Start: 2024-02-26

## 2024-02-26 RX ORDER — FLUTICASONE PROPIONATE 110 UG/1
2 AEROSOL, METERED RESPIRATORY (INHALATION)
Qty: 12 G | Refills: 3 | Status: SHIPPED | OUTPATIENT
Start: 2024-02-26

## 2024-02-26 RX ORDER — TRAZODONE HYDROCHLORIDE 50 MG/1
50 TABLET ORAL NIGHTLY PRN
Qty: 30 TABLET | Refills: 0 | Status: SHIPPED | OUTPATIENT
Start: 2024-02-26

## 2024-02-26 RX ORDER — PROPRANOLOL HYDROCHLORIDE 20 MG/1
20 TABLET ORAL 3 TIMES DAILY
Qty: 90 TABLET | Refills: 0 | Status: SHIPPED | OUTPATIENT
Start: 2024-02-26

## 2024-02-26 RX ADMIN — DIVALPROEX SODIUM 500 MG: 500 TABLET, DELAYED RELEASE ORAL at 09:29

## 2024-02-26 RX ADMIN — METFORMIN HYDROCHLORIDE 500 MG: 500 TABLET ORAL at 20:53

## 2024-02-26 RX ADMIN — TRAZODONE HYDROCHLORIDE 50 MG: 50 TABLET ORAL at 20:53

## 2024-02-26 RX ADMIN — Medication 2 PUFF: at 09:28

## 2024-02-26 RX ADMIN — ACETAMINOPHEN 650 MG: 325 TABLET ORAL at 16:20

## 2024-02-26 RX ADMIN — OXCARBAZEPINE 300 MG: 300 TABLET, FILM COATED ORAL at 09:28

## 2024-02-26 RX ADMIN — PROPRANOLOL HYDROCHLORIDE 20 MG: 20 TABLET ORAL at 09:29

## 2024-02-26 RX ADMIN — PROPRANOLOL HYDROCHLORIDE 20 MG: 20 TABLET ORAL at 14:25

## 2024-02-26 RX ADMIN — FAMOTIDINE 20 MG: 20 TABLET, FILM COATED ORAL at 20:54

## 2024-02-26 RX ADMIN — DIVALPROEX SODIUM 500 MG: 500 TABLET, DELAYED RELEASE ORAL at 20:53

## 2024-02-26 RX ADMIN — OXCARBAZEPINE 300 MG: 300 TABLET, FILM COATED ORAL at 20:54

## 2024-02-26 RX ADMIN — FAMOTIDINE 20 MG: 20 TABLET, FILM COATED ORAL at 09:29

## 2024-02-26 RX ADMIN — METFORMIN HYDROCHLORIDE 500 MG: 500 TABLET ORAL at 09:29

## 2024-02-26 RX ADMIN — PROPRANOLOL HYDROCHLORIDE 20 MG: 20 TABLET ORAL at 20:54

## 2024-02-26 RX ADMIN — IBUPROFEN 400 MG: 400 TABLET, FILM COATED ORAL at 20:57

## 2024-02-26 RX ADMIN — PALIPERIDONE 9 MG: 9 TABLET, EXTENDED RELEASE ORAL at 09:28

## 2024-02-26 RX ADMIN — Medication 2 PUFF: at 20:53

## 2024-02-26 ASSESSMENT — PAIN SCALES - GENERAL
PAINLEVEL_OUTOF10: 0
PAINLEVEL_OUTOF10: 7

## 2024-02-26 ASSESSMENT — PAIN DESCRIPTION - LOCATION: LOCATION: FOOT

## 2024-02-26 ASSESSMENT — PAIN DESCRIPTION - ORIENTATION: ORIENTATION: RIGHT

## 2024-02-26 ASSESSMENT — PAIN DESCRIPTION - DESCRIPTORS: DESCRIPTORS: ACHING;THROBBING;DISCOMFORT

## 2024-02-26 NOTE — DISCHARGE INSTRUCTIONS
Information:  Medications:   Medication summary provided   I understand that I should take only the medications on my list.     -why and when I need to take each medicine.     -which side effects to watch for.     -that I should carry my medication information at all times in case of     Emergency situations.    I will take all of my medicines to follow up appointments.     -check with my physician or pharmacist before taking any new    Medication, over the counter product or drink alcohol.    -Ask about food, drug or dietary supplement interactions.    -discard old lists and update records with medication providers.    Notify Physician:  Notify physician if you notice:   Always call 911 if you feel your life is in danger  In case of an emergency call 911 immediately!  If 911 is not available call your local emergency medical system for help    Behavioral Health Follow Up:  Original Referral Source: ERICH Tariq Course/Progress toward goals: medications, stabilization, group therapy  Recommendations for Level of Care: continue to take medications as ordered, follow-up with [facility] on [facility], [place], [place]  Patient status at discharge: denies suicidal/homicidal ideations, denies thoughts of harming self/others.   My hospital  was: [facility]  Aftercare plan faxed:    -faxed by: nurse    -date: 2/27/24   -time: 1500  Prescriptions: filled with [facility] outpatient pharmacy    Smoking: Quit Smoking.   Call the [employer]'s smoking quitline at 3-913-29G-QUIT  Know the signs of a heart attack   If you have any of the following symptoms call 911 immediately, do not wait more    Than five minutes.    1. Pressure, fullness and/ or squeezing in the center of the chest spreading to    The jaw, neck or shoulder.    2. Chest discomfort with light headedness, fainting, sweating, nausea or    Shortness of breath.   3. Upper abdominal pressure or discomfort.   4. Lower chest pain, back pain, unusual fatigue,

## 2024-02-26 NOTE — PROGRESS NOTES
[facility] Internal Medicine  Rico Galvin MD; Krishna Cat MD, Manjit Collins MD, Alisson Waldrop MD, Hemal Escalante MD; Demarco Varma MD    [facility] Internal Medicine   IN-PATIENT SERVICE   [facility]    Progress note            Date:   2/26/2024  Patient name:  Chino Carrillo  Date of admission:  2/22/2024 12:56 PM  MRN:   [id number]  Account:  [id number]  YOB: 1995  PCP:    Ernesto Mayo APRN - CNP  Room:   [address] [place] [address]  Code Status:    Full Code      Chief Complaint:     Suicidal     History Obtained From:     Patient/EMR/bedside RN     History of Present Illness:   28 gentleman with underlying history of anxiety, depression, morbid obesity, metabolic syndrome, admitted to inpatient psych for suicidal ideations      Past Medical History:     Past Medical History:   Diagnosis Date    Autistic disorder     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 Authorizing Provider   famotidine (PEPCID) 20 MG tablet Take 1 tablet by mouth 2 times daily   Yes Xavier Monge MD   Lumateperone Tosylate (CAPLYTA) 42 MG capsule Take 1 capsule by mouth daily   Yes Xavier Monge MD   nystatin (MYCOSTATIN) 263897 UNIT/GM cream Apply topically 2 times daily Apply topically 2 times daily.   Yes Xavier Monge MD   traZODone (DESYREL) 100 MG tablet Take 2 tablets by mouth nightly   Yes Xavier Monge MD   Semaglutide,0.25 or 0.5MG/DOS, (OZEMPIC, 0.25 OR 0.5 MG/DOSE,) 2 MG/1.5ML SOPN Inject

## 2024-02-26 NOTE — PROGRESS NOTES
Daily Progress Note  2/26/2024    Patient Name: Chino Carrillo    CHIEF COMPLAINT:  Depression and psychosis           SUBJECTIVE:      Patient is seen today for a follow up assessment. Labs and vitals reviewed and appear to be within normal limits. Chino is compliant with scheduled medications. He is in control of his behavior and has not required emergency medications within 24 hours. Chino is agreeable to assessment in his room today. He reports improvement in his depression. When asked about suicidal and homicidal ideations, he stated that they are beginning to improve but that \"they will always be there no mater what.\" Chino reports a good appetite and denies difficulty sleeping. He denies visual or auditory hallucinations, delusions or paranoia. He reports improvement with Depakote and Invega and is tolerating them well. He is not interested in Invega Sustenna at this time.     Appetite:  [x] Normal/Adequate/Unchanged  [] Increased  [] Decreased      Sleep:       [x] Normal/Adequate/Unchanged  [] Fair  [] Poor      Group Attendance on Unit:   [] Yes  [x] Selectively    [] No    Medication Side Effects:  Patient denies any medication side effects at the time of assessment.         Mental Status Exam  Level of consciousness: Alert and awake.   Appearance: Appropriate attire for setting, resting in bed, with poor grooming and hygiene.   Behavior/Motor: Approachable, calm  Attitude toward examiner: Cooperative, attentive, fair eye contact.  Speech: Normal rate, normal volume, normal tone.  Mood:  Patient reports \"doing good today\".   Affect: Mood congruent   Thought processes: Linear and coherent.   Thought content: Reports improvement in homicidal ideation.  Suicidal Ideation: Fleeting suicidal ideations  Delusions: No evidence of delusions. Denies paranoia.  Perceptual Disturbance: Patient does not appear to be responding to internal stimuli. Denies auditory hallucinations. Denies visual hallucinations.

## 2024-02-26 NOTE — GROUP NOTE
Group Therapy Note    Date: 2/26/2024    Group Start Time: 0900  Group End Time: 0930  Group Topic: Community Meeting    Marina Valdovinos LPN        Group Therapy Note    Attendees: 7/12       Patient's Goal:  Discharge      Status After Intervention:  Improved    Participation Level: Active Listener and Interactive    Participation Quality: Appropriate and Attentive      Speech:  normal      Thought Process/Content: Logical  Linear      Affective Functioning: Congruent      Mood:  Good       Level of consciousness:  Alert, Oriented x4, and Attentive      Response to Learning: Able to verbalize current knowledge/experience and Capable of insight      Endings: None Reported    Modes of Intervention: Education and Support      Discipline Responsible: Licensed Practical Nurse      Signature:  Marina Gorman LPN

## 2024-02-26 NOTE — GROUP NOTE
Group Therapy Note    Date: 2/26/2024    Group Start Time: 1100  Group End Time: 1145  Group Topic: Psychotherapy     Ira Randhawa MSW        Group Therapy Note    Attendees: 3/12     Patient was offered group therapy today but declined to participate despite encouragement from staff.  1:1 was offered.      Discipline Responsible: /Counselor      Signature:  RAJ Powers

## 2024-02-26 NOTE — GROUP NOTE
Group Therapy Note    Date: 2/26/2024    Group Start Time: 1330  Group End Time: 1400  Group Topic: Recreation group     [facility]    Stephanie iRvers CTRS        Group Therapy Note    Attendees: 4/12       Patient's Goal:  pt will demonstrate improved social skills and improved interpersonal relationships.     Notes:   pt was pleasant and participated well. Pt adls are poor. Pt has strong body odor    Status After Intervention:  Improved    Participation Level: Active Listener and Interactive    Participation Quality: Appropriate, Sharing, and Supportive      Speech:  normal      Thought Process/Content: Logical      Affective Functioning: Congruent      Mood: euthymic      Level of consciousness:  Alert      Response to Learning: Able to verbalize current knowledge/experience, Capable of insight, and Progressing to goal      Endings: None Reported    Modes of Intervention: Support, Socialization, and Activity      Discipline Responsible: Psychoeducational Specialist      Signature:  SHIRIN MCGEE

## 2024-02-26 NOTE — PROGRESS NOTES
02/26/24 1139   Encounter Summary   Encounter Overview/Reason   Encounter   Service Provided For: Patient   Referral/Consult From: Arthur   Last Encounter  02/26/24   Complexity of Encounter Low   Begin Time 1000   End Time  1015   Total Time Calculated 15 min   Spiritual/Emotional needs   Type Spiritual Support   Rituals, Rites and Sacraments   Type Sacrament of Sick

## 2024-02-26 NOTE — PROGRESS NOTES
Daily Progress Note  2/26/2024    Patient Name: Chino Carrillo    CHIEF COMPLAINT:  Depression and psychosis           SUBJECTIVE:      Patient is seen today for a follow up assessment. Labs and vitals reviewed and appear to be within normal limits. Chino is compliant with scheduled medications. He is in control of his behavior and has not required emergency medications within 24 hours. Chino is agreeable to assessment in his room today. He reports improvement in his depression. When asked about suicidal and homicidal ideations, he stated that they are beginning to improve but that \"they will always be there no mater what.\" He states that he is better able to cope with the thoughts today and they are less intense and severe. Chino reports a good appetite and denies difficulty sleeping. He denies visual or auditory hallucinations, delusions or paranoia. He reports improvement with Depakote and Invega and is tolerating them well. He is not interested in Invega Sustenna at this time stating he would rather get it with his outpatient provider at [facility].     Appetite:  [x] Normal/Adequate/Unchanged  [] Increased  [] Decreased      Sleep:       [x] Normal/Adequate/Unchanged  [] Fair  [] Poor      Group Attendance on Unit:   [] Yes  [x] Selectively    [] No    Medication Side Effects:  Patient denies any medication side effects at the time of assessment.         Mental Status Exam  Level of consciousness: Alert and awake.   Appearance: Appropriate attire for setting, resting in bed, with poor grooming and hygiene, malodorous.   Behavior/Motor: Approachable, calm  Attitude toward examiner: Cooperative, attentive, fair eye contact.  Speech: Normal rate, normal volume, normal tone.  Mood:  Patient reports \"doing good today\".   Affect: Blunted  Thought processes: Linear and coherent.   Thought content: Reports improvement in homicidal ideation.  Suicidal Ideation: Fleeting suicidal ideations  Delusions: No evidence of

## 2024-02-26 NOTE — PROGRESS NOTES
Daily Progress Note  Rafat Acosta MD  2/25/24  CHIEF COMPLAINT: Depression and psychosis    Reviewed patient's current plan of care and vital signs with nursing staff.  Sleep:  8 hours last night  Attending groups: No:     SUBJECTIVE:    Patient denies side effects to increase Invega.  No emergency medications required overnight.  He is denying auditory visual hallucinations at present time.  Still struggling with sleep a bit.  Current medication with recent medication adjustment      Mental Status Exam  Level of consciousness:  Within normal limits  Appearance: Hospital attire, seated in chair, with good grooming and hygiene   Behavior/Motor: No abnormalities noted  Attitude toward examiner:  Cooperative, attentive, good eye contact  Speech:  spontaneous, normal rate, normal volume and well articulated  Mood: Anxious and depressed  Affect: Congruent  Thought processes:  linear, goal directed and coherent  Thought content:  denies homicidal ideation  Suicidal Ideation: Endorses suicidal ideation but improving  Delusions:  no evidence of delusions  Perceptual Disturbance: Denies today  Cognition:  Oriented to self, location, time, and situation  Memory: age appropriate  Insight & Judgement: improving  Medication side effects:  denies       Data   height is 1.88 m (6' 2\") and weight is 204.1 kg (450 lb) (abnormal). His temporal temperature is 97.5 °F (36.4 °C). His blood pressure is 95/45 (abnormal) and his pulse is 63. His respiration is 16 and oxygen saturation is 95%.   Labs:   Admission on 02/22/2024   Component Date Value Ref Range Status    Color, UA 02/22/2024 Yellow  Yellow Final    Turbidity UA 02/22/2024 Cloudy (A)  Clear Final    Glucose, Ur 02/22/2024 NEGATIVE  NEGATIVE mg/dL Final    Bilirubin Urine 02/22/2024 NEGATIVE  NEGATIVE Final    Ketones, Urine 02/22/2024 NEGATIVE  NEGATIVE mg/dL Final    Specific Gravity, UA 02/22/2024 1.019  1.000 - 1.030 Final    Urine Hgb 02/22/2024 NEGATIVE  NEGATIVE Final

## 2024-02-26 NOTE — GROUP NOTE
Group Therapy Note    Date: 2/26/2024    Group Start Time: 1000  Group End Time: 1030  Group Topic: Cognitive Skills    Stephanie Muller CTRS    Cognitive Skills Group Note        Date: February 26, 2024 Start Time: 10am  End Time:  1030am      Number of Participants in Group & Unit Census:  6/12    Topic: cognitive skills     Goal of Group:pt will demonstrate improved communication skills and improved interpersonal relatiionships       Comments:     Patient did not participate in Cognitive Skills group, despite staff encouragement and explanation of benefits.  Patient remain seclusive to self.  Q15 minute safety checks maintained for patient safety and will continue to encourage patient to attend unit programming.              Signature:  SHIRIN MCGEE

## 2024-02-27 VITALS
RESPIRATION RATE: 14 BRPM | HEART RATE: 68 BPM | BODY MASS INDEX: 40.43 KG/M2 | TEMPERATURE: 97.8 F | OXYGEN SATURATION: 100 % | DIASTOLIC BLOOD PRESSURE: 88 MMHG | WEIGHT: 315 LBS | HEIGHT: 74 IN | SYSTOLIC BLOOD PRESSURE: 134 MMHG

## 2024-02-27 PROCEDURE — 6370000000 HC RX 637 (ALT 250 FOR IP): Performed by: PSYCHIATRY & NEUROLOGY

## 2024-02-27 PROCEDURE — 99238 HOSP IP/OBS DSCHRG MGMT 30/<: CPT | Performed by: PSYCHIATRY & NEUROLOGY

## 2024-02-27 RX ADMIN — PROPRANOLOL HYDROCHLORIDE 20 MG: 20 TABLET ORAL at 08:16

## 2024-02-27 RX ADMIN — PALIPERIDONE 9 MG: 9 TABLET, EXTENDED RELEASE ORAL at 08:16

## 2024-02-27 RX ADMIN — Medication 2 PUFF: at 08:16

## 2024-02-27 RX ADMIN — FAMOTIDINE 20 MG: 20 TABLET, FILM COATED ORAL at 08:16

## 2024-02-27 RX ADMIN — OXCARBAZEPINE 300 MG: 300 TABLET, FILM COATED ORAL at 08:16

## 2024-02-27 RX ADMIN — DIVALPROEX SODIUM 500 MG: 500 TABLET, DELAYED RELEASE ORAL at 08:16

## 2024-02-27 RX ADMIN — METFORMIN HYDROCHLORIDE 500 MG: 500 TABLET ORAL at 08:16

## 2024-02-27 NOTE — PLAN OF CARE
Problem: ABCDS Injury Assessment  Goal: Absence of physical injury  Outcome: Progressing     Problem: Self Harm/Suicidality  Goal: Will have no self-injury during hospital stay  Description: INTERVENTIONS:  1.  Ensure constant observer at bedside with Q15M safety checks  2.  Maintain a safe environment  3.  Secure patient belongings  4.  Ensure family/visitors adhere to safety recommendations  5.  Ensure safety tray has been added to patient's diet order  6.  Every shift and PRN: Re-assess suicidal risk via Frequent Screener    Note: Patient is free from self related injuries at this time. Patient denies any ideas of harm to himself or others at this time. 15 minute safety checks continued.      Problem: Pain  Goal: Verbalizes/displays adequate comfort level or baseline comfort level  Note: Patient is utilizing PRN medications for ankle pain with fair results.     Problem: Depression  Goal: Will be euthymic at discharge  Description: INTERVENTIONS:  1. Administer medication as ordered  2. Provide emotional support via 1:1 interaction with staff  3. Encourage involvement in milieu/groups/activities  4. Monitor for social isolation  Note: Patient reports depression has improved and is discharge focused. Patient is compliant with medications this shift. Patient reports sleep and appetite are adequate. Patient has been isolative to room out for needs only.     
  Problem: Self Harm/Suicidality  Goal: Will have no self-injury during hospital stay    2/24/2024 2126 by Deya Bennett LPN  Outcome: Progressing     Problem: Depression  Goal: Will be euthymic at discharge  2/24/2024 2126 by Deya Bennett LPN  Outcome: Progressing   Patient denies suicidal ideas at this time. Patient denies homicidal ideas at this time. Patient verbalized depressive symptoms at this time. Patient has been out using the phone. Patient is free of self harm at this time.  Patient agrees to seek out staff if thoughts to harm self arise.  Staff will provide support and reassurance as needed.  Safety checks maintained every 15 minutes.      
  Problem: Self Harm/Suicidality  Goal: Will have no self-injury during hospital stay  Description: INTERVENTIONS:  1.  Ensure constant observer at bedside with Q15M safety checks  2.  Maintain a safe environment  3.  Secure patient belongings  4.  Ensure family/visitors adhere to safety recommendations  5.  Ensure safety tray has been added to patient's diet order  6.  Every shift and PRN: Re-assess suicidal risk via Frequent Screener    2/24/2024 0011 by Shreya Spain LPN  Outcome: Progressing     Problem: Depression  Goal: Will be euthymic at discharge  Description: INTERVENTIONS:  1. Administer medication as ordered  2. Provide emotional support via 1:1 interaction with staff  3. Encourage involvement in milieu/groups/activities  4. Monitor for social isolation  2/24/2024 0011 by Shreya Spain LPN  Outcome: Progressing  Patient denies harm to self, denies hallucinations and delusions. Patient positive for anxiety and depression related to home life stressors. Patient medication complaint, engages in 1:1 talk with staff. Every 15 minute checks continue per unit protocol, safe environment maintained.  
  Problem: Self Harm/Suicidality  Goal: Will have no self-injury during hospital stay  Description: INTERVENTIONS:  1.  Ensure constant observer at bedside with Q15M safety checks  2.  Maintain a safe environment  3.  Secure patient belongings  4.  Ensure family/visitors adhere to safety recommendations  5.  Ensure safety tray has been added to patient's diet order  6.  Every shift and PRN: Re-assess suicidal risk via Frequent Screener    2/27/2024 0854 by Gwen Evans RN  Outcome: Adequate for Discharge - patient voices readiness for discharge, denies thoughts of harming self or others and remains free from self-inflicted injuries; safety checks maintained and continue every 15 minutes and at random intervals     Problem: Pain  Goal: Verbalizes/displays adequate comfort level or baseline comfort level  2/27/2024 0854 by Gwen Evans, RN  Outcome: Adequate for Discharge - patient denies pain at this time, medications available if needed     Problem: ABCDS Injury Assessment  Goal: Absence of physical injury  2/27/2024 0854 by Gwen Evans, RN  Outcome: Adequate for Discharge - patient remains free from injuries; safety checks maintained and continue every 15 minutes and at random intervals     Problem: Depression  Goal: Will be euthymic at discharge  Description: INTERVENTIONS:  1. Administer medication as ordered  2. Provide emotional support via 1:1 interaction with staff  3. Encourage involvement in milieu/groups/activities  4. Monitor for social isolation  2/27/2024 0854 by Gwen Evans, RN  Outcome: Adequate for Discharge - patient voices readiness for discharge     
  Problem: Self Harm/Suicidality  Goal: Will have no self-injury during hospital stay  Description: INTERVENTIONS:  1.  Ensure constant observer at bedside with Q15M safety checks  2.  Maintain a safe environment  3.  Secure patient belongings  4.  Ensure family/visitors adhere to safety recommendations  5.  Ensure safety tray has been added to patient's diet order  6.  Every shift and PRN: Re-assess suicidal risk via Frequent Screener    Outcome: Progressing     Problem: Pain  Goal: Verbalizes/displays adequate comfort level or baseline comfort level  Outcome: Progressing     Problem: ABCDS Injury Assessment  Goal: Absence of physical injury  Outcome: Progressing     Problem: Depression  Goal: Will be euthymic at discharge  Description: INTERVENTIONS:  1. Administer medication as ordered  2. Provide emotional support via 1:1 interaction with staff  3. Encourage involvement in milieu/groups/activities  4. Monitor for social isolation  Outcome: Progressing       Patient denies thoughts of self harm during this shift. Patient is mostly isolative to room during this shift. Patient is cooperative with staff during shift assessment and compliant with scheduled medications. Patient reports he is ready for discharge and states he feels safe at home. Patient verbalized wanting to stay in behavioral control so he can be discharged. Patient attends groups. Q15 minute and random safety checks maintained.  
  Problem: Self Harm/Suicidality  Goal: Will have no self-injury during hospital stay  Description: INTERVENTIONS:  1.  Ensure constant observer at bedside with Q15M safety checks  2.  Maintain a safe environment  3.  Secure patient belongings  4.  Ensure family/visitors adhere to safety recommendations  5.  Ensure safety tray has been added to patient's diet order  6.  Every shift and PRN: Re-assess suicidal risk via Frequent Screener    Outcome: Progressing - patient currently denies thoughts of harming self or others and remains free from self-inflicted injuries; safety checks maintained and continue every 15 minutes and at random intervals     Problem: Pain  Goal: Verbalizes/displays adequate comfort level or baseline comfort level  Outcome: Progressing - patient currently denies pain, medications available if needed     Problem: ABCDS Injury Assessment  Goal: Absence of physical injury  Outcome: Progressing - patient remains free from physical injuries; safety checks maintained and continue every 15 minutes and at random intervals     Problem: Depression  Goal: Will be euthymic at discharge  Description: INTERVENTIONS:  1. Administer medication as ordered  2. Provide emotional support via 1:1 interaction with staff  3. Encourage involvement in milieu/groups/activities  4. Monitor for social isolation  Outcome: Progressing - patient reports depression and anxiety, relates it to family stressors; patient isolates to his room, compliant with medications     
Problem: Self Harm/Suicidality  Goal: Will have no self-injury during hospital stay  Description: INTERVENTIONS:  1.  Ensure constant observer at bedside with Q15M safety checks  2.  Maintain a safe environment  3.  Secure patient belongings  4.  Ensure family/visitors adhere to safety recommendations  5.  Ensure safety tray has been added to patient's diet order  6.  Every shift and PRN: Re-assess suicidal risk via Frequent Screener  2/24/2024 1102 by Balbina Ruelas, RN  Outcome: Progressing   Patient denied suicidal ideations. Safe environment maintained. Safety checks every 15 minutes continued per facility.     Problem: Pain  Goal: Verbalizes/displays adequate comfort level or baseline comfort level  Outcome: Progressing   Patient denied pain during assessment.     Problem: ABCDS Injury Assessment  Goal: Absence of physical injury  Outcome: Progressing   No new evidence of physical injury, patient did physical injury ankle prior to admission to [facility].    Problem: Depression  Goal: Will be euthymic at discharge  Description: INTERVENTIONS:  1. Administer medication as ordered  2. Provide emotional support via 1:1 interaction with staff  3. Encourage involvement in milieu/groups/activities  4. Monitor for social isolation  2/24/2024 1102 by Balbina Ruelas, RN  Outcome: Progressing   Patient reports that his depression is improving, however he is isolative to room and aloof with peers. Patient is minimal throughout talk, is very focused on returning to bed and \"sleep more\". Patient encouraged to attend group activities.   
provide a safe environment. Patient is encouraged to notify staff if anxiety and depression occurs.

## 2024-02-27 NOTE — CARE COORDINATION
Name: Chino Carrillo    : 1995    Auth number: [id number]     Discharge Date: 24    Destination: Private residenc    Discharge Medications:      Medication List        START taking these medications      fluticasone 110 MCG/ACT inhaler  Commonly known as: FLOVENT HFA  Inhale 2 puffs into the lungs in the morning and 2 puffs in the evening.  Replaces: budesonide 180 MCG/ACT Aepb inhaler  Notes to patient: To prevent wheezing and shortness of breath     hydrOXYzine HCl 50 MG tablet  Commonly known as: ATARAX  Take 1 tablet by mouth 3 times daily as needed for Anxiety  Notes to patient: For anxiety     paliperidone 9 MG extended release tablet  Commonly known as: INVEGA  Take 1 tablet by mouth daily  Notes to patient: To improve thoughts/mood            CHANGE how you take these medications      traZODone 50 MG tablet  Commonly known as: DESYREL  Take 1 tablet by mouth nightly as needed for Sleep  What changed:   medication strength  how much to take  when to take this  reasons to take this  Notes to patient: For sleep            CONTINUE taking these medications      calcium carbonate 500 MG chewable tablet  Commonly known as: TUMS  Take 1 tablet by mouth 3 times daily as needed for Heartburn  Notes to patient: For heartburn     divalproex 500 MG DR tablet  Commonly known as: DEPAKOTE  Take 1 tablet by mouth 2 times daily  Notes to patient: To improve thoughts/mood     famotidine 20 MG tablet  Commonly known as: PEPCID  Take 1 tablet by mouth 2 times daily  Notes to patient: For acid reflux     ibuprofen 800 MG tablet  Commonly known as: ADVIL;MOTRIN  Take 1 tablet by mouth 2 times daily as needed for Pain  Notes to patient: For pain     metFORMIN 500 MG tablet  Commonly known as: GLUCOPHAGE  Take 1 tablet by mouth 2 times daily  Notes to patient: To control blood glucose levels     nystatin 153904 UNIT/GM cream  Commonly known as: MYCOSTATIN  Notes to patient: Antifungal     OXcarbazepine 300 MG

## 2024-02-27 NOTE — DISCHARGE SUMMARY
using voice recognition software. It may contain minor errors which are inherent in voice recognition technology.**

## 2024-02-27 NOTE — BH NOTE
[facility]  Day 3 Interdisciplinary Treatment Plan NOTE    Review Date & Time: 2/25/24 1300      Admission Type:   Admission Type: Voluntary    Reason for admission:  Reason for Admission: Suicidal ideation to electrocute himself with wifi  Estimated Length of Stay: 5-7 days  Estimated Discharge Date Update: to be determined by physician    PATIENT STRENGTHS:  Patient Strengths    Patient Strengths and Limitations:Limitations: Multiple barriers to leisure interests, Difficult relationships / poor social skills, External locus of control, Difficulty problem solving/relies on others to help solve problems  Addictive Behavior:Addictive Behavior  In the Past 3 Months, Have You Felt or Has Someone Told You That You Have a Problem With  : Eating (too much/too little)  Medical Problems:  Past Medical History:   Diagnosis Date    Autistic disorder     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    Injury of frontal lobe (HCC)     Multiple sensory deficit syndrome        Risk:  Fall Risk   Bairon Scale Bairon Scale Score: 22  BVC    Change in scores no Changes to plan of Care no    Status EXAM:   Mental Status and Behavioral Exam  Normal: No  Level of Assistance: Independent/Self  Facial Expression: Brightened  Affect: Appropriate  Level of Consciousness: Alert  Frequency of Checks: 4 times per hour, close  Mood:Normal: No  Mood: Depressed, Anxious  Motor Activity:Normal: Yes  Motor Activity: Decreased  Eye Contact: Good  Observed Behavior: Cooperative, Preoccupied  Sexual Misconduct History: Current - no  Preception: [place] to person, [place] to time, [place] to place, [place] to situation  Attention:Normal: No  Attention: Distractible  Thought Processes: Circumstantial  Thought Content:Normal: No  Thought Content: Preoccupations  Depression Symptoms: Impaired concentration, Isolative  Anxiety Symptoms: Generalized  Milly Symptoms: Poor judgment  Hallucinations: None  Delusions: No  Memory:Normal: Yes  Insight and 
Emergency Medication Follow-Up Note:    PRN medication of Haldol 5mg, Ativan 2 mg and Benadryl 50mg in Left Dorsogluteal was ineffective as patient is currently exhibiting Disruptive  behavior as evidence by continuing to yell out in day area of \"staff raping him\", upset that boot is not being provided due to patient not being able to use his from home due to bed bugs. Staff has attempted to find and relieve the distress by Talking to patient, Refocusing on new activity, Offering suggestions, and Administer PRN medications , with no success. Staff will provide quiet time.   
Emergency PRN Medication Administration Note:      Patient is Agitated, Disruptive, and Dangerous as evidence by yelling out in day area, hitting self in side of head, stating \"If she leaves I will fucking choke myself\". Patient visitor attempted to calm patient down, however unsuccessful. Staff attempted to find and relieve the distress by Talking to patient, Refocusing on new activity, and Offering suggestions Patient is currently accepted PRN medications. Medication Administered as prescribed: Haldol 5mg, Ativan 2 mg and Benadryl 50mg. Patient Tolerated medication administration. Patient then reports to staff that he will file \"rape charges\" and that staff can look forward to seeing it in the mail.   Will continue to monitor, offer support, and reassess.   
Patient at this time and date signed an FATOUMATA for winifred Eddy.   
Patient given tobacco quitline number 99165007078 at this time, refusing to call at this time, states \"I just dont want to quit now\"- patient given information as to the dangers of long term tobacco use. Continue to reinforce the importance of tobacco cessation.  
Provider notified of best practice advisory suggesting to place patient on suicide precautions. Provider to discontinue the order as patient does not meet criteria for suicide precautions at this time. Continue to observe patient on every 15 minute checks.    
Pt  has been seen in the dayroom with shirt pulled up above abdomen and pants hanging below buttocks- exposing both abdomen and buttocks. Pt given 2 gowns and is instructed to to wear gowns in the dayroom.    
Pt awake, eyes open, resting in  bed. Apical pulse 50. Pt given water and is instructed to hydrate. Will recheck prior to giving Pt scheduled  medications.   
Pt discharge medication placed in Safe in the HUB  
Pt pulse rechecked- 87 beats per minute.   
Writer was told to contact Sarah Espinal @ [phone] (staff for [facility]) once Patient arrived to the unit. Writer called and there was no answer and the voicemail box was full.   
[facility]                                                                                                                   (X) Patient refused counseling  (X) Patient refused referral  (X) Patient refused prescription upon discharge  ( ) Patient has not smoked in the last 30 days    Metabolic Screening:    Lab Results   Component Value Date    LABA1C 5.3 02/22/2024       Lab Results   Component Value Date    CHOL 172 01/16/2023    CHOL 169 11/23/2021    CHOL 161 06/11/2019    CHOL 150 11/03/2012     Lab Results   Component Value Date    TRIG 188 (H) 01/16/2023    TRIG 161 (H) 11/23/2021    TRIG 210 (H) 06/11/2019    TRIG 197 (H) 11/03/2012     Lab Results   Component Value Date    HDL 26 (L) 01/16/2023    HDL 28 (L) 11/23/2021    HDL 25 (L) 06/11/2019    HDL 32 (L) 11/03/2012     No components found for: \"LDLCAL\"  No components found for: \"LABVLDL\"    Patient escorted to lobby via wheelchair and discharged to home via private vehicle. Patient alert and oriented X4. Patient denies thoughts of harm to self or others. Patient discharged with all belongings. Prescriptions filled with [facility] outpatient pharmacy and sent home with patient.    Gwen Evans RN    
quitting, techniques in how to quit, available resources  ( ) [facility]erral for [facility]                                                                                                                   ( ) Patient refused counseling  (x ) Patient has not smoked in the last 30 days    Metabolic Screening:    Lab Results   Component Value Date    LABA1C 5.6 01/16/2023       Lab Results   Component Value Date    CHOL 172 01/16/2023    CHOL 169 11/23/2021    CHOL 161 06/11/2019    CHOL 150 11/03/2012     Lab Results   Component Value Date    TRIG 188 (H) 01/16/2023    TRIG 161 (H) 11/23/2021    TRIG 210 (H) 06/11/2019    TRIG 197 (H) 11/03/2012     Lab Results   Component Value Date    HDL 26 (L) 01/16/2023    HDL 28 (L) 11/23/2021    HDL 25 (L) 06/11/2019    HDL 32 (L) 11/03/2012     No components found for: \"LDLCAL\"  No components found for: \"LABVLDL\"      Body mass index is 57.78 kg/m².    BP Readings from Last 2 Encounters:   02/22/24 (!) 106/51   02/15/24 134/88         Pt admitted with followings belongings:  Dental Appliances: None  Vision - Corrective Lenses: Eyeglasses  Hearing Aid: None  Jewelry: None  Body Piercings Removed: No  Clothing: Other (Comment) (in bag from PPU bed bugs)  Other Valuables: Other (Comment) (in bag for P[facility] bed bugs)  The following personal items were collected during admission. Items secured in locker/safe. Items will be returned to patient at discharge.     Demi Lombardi RN

## 2024-02-27 NOTE — TRANSITION OF CARE
110 mg/dL       Immunizations administered during this encounter:   Immunization History   Administered Date(s) Administered    COVID-19, MODERNA Booster BLUE border, (age 18y+), IM, 50mcg/0.25mL 01/31/2022     None of the above/Not documented/Unable to determine from medical record documentation    Screening for Metabolic Disorders for Patients on Antipsychotic Medications  (Data obtained from the EMR)    Estimated Body Mass Index  Body mass index is 57.78 kg/m².      Vital Signs/Blood Pressure  /77   Pulse 69   Temp 97.5 °F (36.4 °C) (Temporal)   Resp 14   Ht 1.88 m (6' 2\")   Wt (!) 204.1 kg (450 lb)   SpO2 100%   BMI 57.78 kg/m²      Fasting Blood Glucose or Hemoglobin A1c  No results found for: \"GLU\", \"GLUCPOC\"    Hemoglobin A1C   Date Value Ref Range Status   02/22/2024 5.3 4.0 - 6.0 % Final       Discharge Diagnosis: Severe recurrent major depression    Discharge Plan/Destination: home    Discharge Medication List and Instructions:      Medication List        START taking these medications      fluticasone 110 MCG/ACT inhaler  Commonly known as: FLOVENT HFA  Inhale 2 puffs into the lungs in the morning and 2 puffs in the evening.  Replaces: budesonide 180 MCG/ACT Aepb inhaler  Notes to patient: To prevent wheezing and shortness of breath     hydrOXYzine HCl 50 MG tablet  Commonly known as: ATARAX  Take 1 tablet by mouth 3 times daily as needed for Anxiety  Notes to patient: For anxiety     paliperidone 9 MG extended release tablet  Commonly known as: INVEGA  Take 1 tablet by mouth daily  Notes to patient: To improve thoughts/mood            CHANGE how you take these medications      traZODone 50 MG tablet  Commonly known as: DESYREL  Take 1 tablet by mouth nightly as needed for Sleep  What changed:   medication strength  how much to take  when to take this  reasons to take this  Notes to patient: For sleep            CONTINUE taking these medications      calcium carbonate 500 MG chewable

## 2024-03-02 ENCOUNTER — HOSPITAL ENCOUNTER (EMERGENCY)
Age: 29
Discharge: HOME | End: 2024-03-02
Attending: EMERGENCY MEDICINE
Payer: MEDICARE

## 2024-03-02 VITALS
SYSTOLIC BLOOD PRESSURE: 148 MMHG | DIASTOLIC BLOOD PRESSURE: 97 MMHG | BODY MASS INDEX: 32.1 KG/M2 | OXYGEN SATURATION: 96 % | WEIGHT: 250 LBS | RESPIRATION RATE: 18 BRPM | TEMPERATURE: 98.3 F | HEART RATE: 94 BPM

## 2024-03-02 DIAGNOSIS — S39.012A STRAIN OF LUMBAR REGION, INITIAL ENCOUNTER: Primary | ICD-10-CM

## 2024-03-02 PROCEDURE — 99283 EMERGENCY DEPT VISIT LOW MDM: CPT

## 2024-03-02 PROCEDURE — 6370000000 HC RX 637 (ALT 250 FOR IP): Performed by: EMERGENCY MEDICINE

## 2024-03-02 RX ORDER — CYCLOBENZAPRINE HCL 10 MG
10 TABLET ORAL ONCE
Status: COMPLETED | OUTPATIENT
Start: 2024-03-02 | End: 2024-03-02

## 2024-03-02 RX ORDER — CYCLOBENZAPRINE HCL 10 MG
10 TABLET ORAL NIGHTLY PRN
Qty: 10 TABLET | Refills: 0 | Status: SHIPPED | OUTPATIENT
Start: 2024-03-02 | End: 2024-03-12

## 2024-03-02 RX ADMIN — CYCLOBENZAPRINE 10 MG: 10 TABLET, FILM COATED ORAL at 22:00

## 2024-03-02 ASSESSMENT — PAIN SCALES - GENERAL: PAINLEVEL_OUTOF10: 10

## 2024-03-03 NOTE — ED PROVIDER NOTES
[facility] ED  EMERGENCY DEPARTMENT ENCOUNTER      Pt Name: Chino Carrillo  MRN: [id number]  Birthdate 1995  Date of evaluation: 3/2/24      CHIEF COMPLAINT       Chief Complaint   Patient presents with    Back Pain         HISTORY OF PRESENT ILLNESS   28 y.o. male presents with c/o low back pain.  Symptoms started earlier in the day.  Patient reports pain in bilateral lower back.  He says he was doing some lifting and he thinks that he threw his back out again.  He reports that he has chronic back pain and this happens to him intermittently.  He says that in the past a course of muscle relaxers is really helped him out a lot..  The patient denies any fevers, chills, weight loss, bowel/bladder incontinence, lower extremity weakness, or midline tenderness.    Patient also reports that he has a cold.  Doesn't want to get tested for covid.  Cough and congestion.  GF sick with the same.  No difficulty breathing no chest pain no vomiting..      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    Injury of frontal lobe (HCC)     Multiple sensory deficit syndrome        SURGICAL HISTORY       Past Surgical History:   Procedure Laterality Date    BLADDER SURGERY      FRACTURE SURGERY  4/6/2012    Lt hip    INNER EAR SURGERY Left     MOUTH SURGERY      PELVIC FRACTURE SURGERY Right     WISDOM TOOTH EXTRACTION         CURRENT MEDICATIONS       Discharge Medication List as of 3/2/2024  9:57 PM        CONTINUE these medications which have NOT CHANGED    Details   fluticasone (FLOVENT HFA) 110 MCG/ACT inhaler Inhale 2 puffs into the lungs in the morning and 2 puffs in the evening., Disp-12 g, R-3Normal      calcium carbonate (TUMS) 500 MG chewable tablet Take 1 tablet by mouth 3 times daily as needed for Heartburn, Disp-60 tablet,

## 2024-04-22 ENCOUNTER — HOSPITAL ENCOUNTER (EMERGENCY)
Age: 29
Discharge: HOME | End: 2024-04-22
Attending: STUDENT IN AN ORGANIZED HEALTH CARE EDUCATION/TRAINING PROGRAM
Payer: MEDICARE

## 2024-04-22 VITALS
WEIGHT: 250 LBS | HEIGHT: 74 IN | BODY MASS INDEX: 32.08 KG/M2 | DIASTOLIC BLOOD PRESSURE: 79 MMHG | SYSTOLIC BLOOD PRESSURE: 146 MMHG | RESPIRATION RATE: 18 BRPM | HEART RATE: 84 BPM | TEMPERATURE: 98 F | OXYGEN SATURATION: 98 %

## 2024-04-22 DIAGNOSIS — R45.4 ANGER REACTION: Primary | ICD-10-CM

## 2024-04-22 DIAGNOSIS — R45.851 SUICIDAL THOUGHTS: ICD-10-CM

## 2024-04-22 PROCEDURE — 99285 EMERGENCY DEPT VISIT HI MDM: CPT

## 2024-04-22 ASSESSMENT — ENCOUNTER SYMPTOMS
SORE THROAT: 0
EYE DISCHARGE: 0
EYE REDNESS: 0
DIARRHEA: 0
VOMITING: 0
ABDOMINAL PAIN: 0
NAUSEA: 0
RHINORRHEA: 0
SHORTNESS OF BREATH: 0
CHEST TIGHTNESS: 0

## 2024-04-22 ASSESSMENT — PAIN - FUNCTIONAL ASSESSMENT: PAIN_FUNCTIONAL_ASSESSMENT: NONE - DENIES PAIN
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
## 2024-04-22 NOTE — ED TRIAGE NOTES
Mode of arrival (squad #, walk in, police, etc) : Police        Chief complaint(s): Suicidal       Arrival Note (brief scenario, treatment PTA, etc).: Pt brought by police from home with complains of  Suicidal   Ideation  , patient report that  he is upset of his grandparents deaths , has suicidal thoughts and tried to kill himself by run into car but his friends  stop them  from doing that and  called the police . Patient report he is calming down now and just want to talk to someone  and then want to go home  back . Denies any homcidal thoughts . Pt  seen in Triage room by Dr Pham and  escorted to PPU .       C= \"Have you ever felt that you should Cut down on your drinking?\"  No  A= \"Have people Annoyed you by criticizing your drinking?\"  No  G= \"Have you ever felt bad or Guilty about your drinking?\"  No  E= \"Have you ever had a drink as an Eye-opener first thing in the morning to steady your nerves or to help a hangover?\"  No      Deferred []      Reason for deferring: N/A    *If yes to two or more: probable alcohol abuse.*

## 2024-04-22 NOTE — ED PROVIDER NOTES
EMERGENCY DEPARTMENT ENCOUNTER    Pt Name: Chino Carrillo  MRN: [id number]  Birthdate 1995  Date of evaluation: 4/22/24  CHIEF COMPLAINT       Chief Complaint   Patient presents with    Suicidal     HISTORY OF PRESENT ILLNESS   This is a 28-year-old male has got a history of autism    He is here quite frequently    Is brought in by police he is not under any involuntary hold    He states earlier he was upset because he was having an argument with his girlfriend    He was briefly feeling suicidal like he might run in front of traffic    He states actually after being here he is calm down quite a bit and he would like to go home.  He denies being suicidal.  States he would be safe at home has no plans to harm himself    No thoughts of hurting anyone else.  He is not hearing voices or seeing things              REVIEW OF SYSTEMS     Review of Systems   Constitutional:  Negative for chills and fever.   HENT:  Negative for rhinorrhea and sore throat.    Eyes:  Negative for discharge and redness.   Respiratory:  Negative for chest tightness and shortness of breath.    Cardiovascular:  Negative for chest pain.   Gastrointestinal:  Negative for abdominal pain, diarrhea, nausea and vomiting.   Genitourinary:  Negative for dysuria and frequency.   Musculoskeletal:  Negative for arthralgias and myalgias.   Skin:  Negative for rash.   Neurological:  Negative for weakness and numbness.   Psychiatric/Behavioral:  Negative for suicidal ideas.    All other systems reviewed and are negative.    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

## 2024-05-01 ENCOUNTER — HOSPITAL ENCOUNTER (EMERGENCY)
Age: 29
Discharge: HOME | End: 2024-05-02
Attending: EMERGENCY MEDICINE
Payer: MEDICARE

## 2024-05-01 DIAGNOSIS — R46.89 BEHAVIORAL CHANGE: ICD-10-CM

## 2024-05-01 DIAGNOSIS — F99 MENTAL HEALTH DISORDER: Primary | ICD-10-CM

## 2024-05-01 PROCEDURE — 99282 EMERGENCY DEPT VISIT SF MDM: CPT

## 2024-05-01 ASSESSMENT — PAIN - FUNCTIONAL ASSESSMENT: PAIN_FUNCTIONAL_ASSESSMENT: NONE - DENIES PAIN

## 2024-05-02 VITALS
TEMPERATURE: 98.6 F | HEART RATE: 96 BPM | HEIGHT: 74 IN | SYSTOLIC BLOOD PRESSURE: 129 MMHG | DIASTOLIC BLOOD PRESSURE: 66 MMHG | RESPIRATION RATE: 18 BRPM | OXYGEN SATURATION: 96 % | BODY MASS INDEX: 40.43 KG/M2 | WEIGHT: 315 LBS

## 2024-05-02 ASSESSMENT — PAIN - FUNCTIONAL ASSESSMENT: PAIN_FUNCTIONAL_ASSESSMENT: NONE - DENIES PAIN
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
## 2024-05-02 NOTE — ED NOTES
Mode of arrival (squad #, walk in, police, etc) : [employer]        Chief complaint(s): Mental Health Evaluation        Arrival Note (brief scenario, treatment PTA, etc).: Pt was brought in to the ED by [employer] after getting into a verbal argument with his staff. Pt states that particular staff member triggers him and makes him upset. Pt states during the argument he started hitting himself in the head out of anger and saying he was suicidal. Pt upon arrival denies SI pt states \"Now you know I'm not suicidal you've had me enough I was just angry and mad but I'm fine now\". Pt denies any plan or intent. Pt states he yells that he is suicidal during his anger spells and arguments but never has any intent to. Pt denies HI. Pt denies the use of drugs and alcohol. Pt denies visual and audio hallucinations.        C= \"Have you ever felt that you should Cut down on your drinking?\"  No  A= \"Have people Annoyed you by criticizing your drinking?\"  No  G= \"Have you ever felt bad or Guilty about your drinking?\"  No  E= \"Have you ever had a drink as an Eye-opener first thing in the morning to steady your nerves or to help a hangover?\"  No      Deferred []      Reason for deferring: N/A    *If yes to two or more: probable alcohol abuse.*

## 2024-05-02 NOTE — ED PROVIDER NOTES
EMERGENCY DEPARTMENT ENCOUNTER    Pt Name: Chino Carrillo  MRN: [id number]  Birthdate 1995  Date of evaluation: 5/2/24  CHIEF COMPLAINT       Chief Complaint   Patient presents with    Mental Health Problem     HISTORY OF PRESENT ILLNESS   HPI  Patient got an argument in his group home.  He started hitting his head with his hands and stating he was suicidal.  He states he was not actually suicidal.  He states he does this about once a month and it is usually when he is having an argument or is upset or something.  He denies any plan to harm himself.  Denies any suicidal thoughts.  He wants to go home he is calling his ride to come pick him up.  Denies any homicidal ideations.  He is calm and cooperative.  Compliant with medications.  No drug or alcohol use.      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    Injury of frontal lobe (HCC)     Multiple sensory deficit syndrome      Past Problem List  Patient Active Problem List   Diagnosis Code    Autism spectrum disorder F84.0    Acute non-recurrent maxillary sinusitis J01.00    Attention deficit disorder F98.8    Blood in the stool K92.1    Body mass index 45.0-49.9, adult (HCC) Z68.42    Elevated LFTs R79.89    GERD (gastroesophageal reflux disease) K21.9    History of pneumonia Z87.01    Other constipation K59.09    Risk for sexually transmitted disease Z72.51    Severe recurrent major depression with psychotic features ([facility]) F33.3    Skin excoriation T14.8XXA    Suicidal ideation R45.851    Major depressive disorder with psychotic features (HCC) F32.3    MDD (major depressive disorder), recurrent episode ([facility]) F33.9    Depression with suicidal ideation F32.A, R45.851    Schizoaffective disorder, bipolar type ([facility]) F25.0    Major depressive disorder, recurrent severe without psychotic features (HCC) F33.2    PTSD (post-traumatic stress disorder) F43.10    NATALYA (generalized anxiety

## 2024-05-26 ENCOUNTER — HOSPITAL ENCOUNTER (EMERGENCY)
Age: 29
Discharge: HOME | End: 2024-05-26
Attending: STUDENT IN AN ORGANIZED HEALTH CARE EDUCATION/TRAINING PROGRAM
Payer: MEDICARE

## 2024-05-26 VITALS
TEMPERATURE: 98 F | WEIGHT: 315 LBS | OXYGEN SATURATION: 100 % | SYSTOLIC BLOOD PRESSURE: 142 MMHG | BODY MASS INDEX: 39.17 KG/M2 | HEART RATE: 86 BPM | HEIGHT: 75 IN | DIASTOLIC BLOOD PRESSURE: 101 MMHG | RESPIRATION RATE: 18 BRPM

## 2024-05-26 DIAGNOSIS — R45.4 ANGER REACTION: Primary | ICD-10-CM

## 2024-05-26 PROCEDURE — 99282 EMERGENCY DEPT VISIT SF MDM: CPT

## 2024-05-26 ASSESSMENT — PAIN - FUNCTIONAL ASSESSMENT: PAIN_FUNCTIONAL_ASSESSMENT: NONE - DENIES PAIN
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
## 2024-05-27 NOTE — ED NOTES
Pt said he was just upset and wants to go back to his group home and be with his girlfriend. Writer spoke with group home staff, Will, who said that it was fine to send the patient back to the group home via cab,

## 2024-06-07 ENCOUNTER — HOSPITAL ENCOUNTER (INPATIENT)
Age: 29
LOS: 3 days | Discharge: HOME | DRG: 881 | End: 2024-06-10
Attending: STUDENT IN AN ORGANIZED HEALTH CARE EDUCATION/TRAINING PROGRAM | Admitting: PSYCHIATRY & NEUROLOGY
Payer: MEDICARE

## 2024-06-07 DIAGNOSIS — R45.851 SUICIDAL IDEATION: Primary | ICD-10-CM

## 2024-06-07 LAB
ALBUMIN SERPL-MCNC: 4 G/DL (ref 3.5–5.2)
ALP SERPL-CCNC: 101 U/L (ref 40–129)
ALT SERPL-CCNC: 30 U/L (ref 5–41)
ANION GAP SERPL CALCULATED.3IONS-SCNC: 11 MMOL/L (ref 9–17)
AST SERPL-CCNC: 28 U/L
BASOPHILS # BLD: 0.1 K/UL (ref 0–0.2)
BASOPHILS NFR BLD: 1 % (ref 0–2)
BILIRUB SERPL-MCNC: 0.3 MG/DL (ref 0.3–1.2)
BUN SERPL-MCNC: 6 MG/DL (ref 6–20)
CALCIUM SERPL-MCNC: 9.3 MG/DL (ref 8.6–10.4)
CHLORIDE SERPL-SCNC: 105 MMOL/L (ref 98–107)
CO2 SERPL-SCNC: 24 MMOL/L (ref 20–31)
CREAT SERPL-MCNC: 0.7 MG/DL (ref 0.7–1.2)
EOSINOPHIL # BLD: 0.2 K/UL (ref 0–0.4)
EOSINOPHILS RELATIVE PERCENT: 2 % (ref 0–4)
ERYTHROCYTE [DISTWIDTH] IN BLOOD BY AUTOMATED COUNT: 14.7 % (ref 11.5–14.9)
ETHANOL PERCENT: <0.01 %
ETHANOLAMINE SERPL-MCNC: <10 MG/DL (ref 0–0.08)
GFR, ESTIMATED: >90 ML/MIN/1.73M2
GLUCOSE BLD-MCNC: 117 MG/DL (ref 75–110)
GLUCOSE BLD-MCNC: 126 MG/DL (ref 75–110)
GLUCOSE BLD-MCNC: 130 MG/DL (ref 75–110)
GLUCOSE SERPL-MCNC: 165 MG/DL (ref 70–99)
HCT VFR BLD AUTO: 36.7 % (ref 41–53)
HGB BLD-MCNC: 12.4 G/DL (ref 13.5–17.5)
LYMPHOCYTES NFR BLD: 3 K/UL (ref 1–4.8)
LYMPHOCYTES RELATIVE PERCENT: 28 % (ref 24–44)
MAGNESIUM SERPL-MCNC: 1.6 MG/DL (ref 1.6–2.6)
MCH RBC QN AUTO: 27.8 PG (ref 26–34)
MCHC RBC AUTO-ENTMCNC: 33.8 G/DL (ref 31–37)
MCV RBC AUTO: 82 FL (ref 80–100)
MONOCYTES NFR BLD: 1.2 K/UL (ref 0.1–1.3)
MONOCYTES NFR BLD: 11 % (ref 1–7)
NEUTROPHILS NFR BLD: 58 % (ref 36–66)
NEUTS SEG NFR BLD: 6.2 K/UL (ref 1.3–9.1)
PLATELET # BLD AUTO: 147 K/UL (ref 150–450)
PMV BLD AUTO: 8.2 FL (ref 6–12)
POTASSIUM SERPL-SCNC: 4.1 MMOL/L (ref 3.7–5.3)
PROT SERPL-MCNC: 6.7 G/DL (ref 6.4–8.3)
RBC # BLD AUTO: 4.47 M/UL (ref 4.5–5.9)
SODIUM SERPL-SCNC: 140 MMOL/L (ref 135–144)
WBC OTHER # BLD: 10.6 K/UL (ref 3.5–11)

## 2024-06-07 PROCEDURE — 6370000000 HC RX 637 (ALT 250 FOR IP): Performed by: NURSE PRACTITIONER

## 2024-06-07 PROCEDURE — G0480 DRUG TEST DEF 1-7 CLASSES: HCPCS

## 2024-06-07 PROCEDURE — 2500000003 HC RX 250 WO HCPCS: Performed by: NURSE PRACTITIONER

## 2024-06-07 PROCEDURE — 85025 COMPLETE CBC W/AUTO DIFF WBC: CPT

## 2024-06-07 PROCEDURE — 80053 COMPREHEN METABOLIC PANEL: CPT

## 2024-06-07 PROCEDURE — 6370000000 HC RX 637 (ALT 250 FOR IP): Performed by: PSYCHIATRY & NEUROLOGY

## 2024-06-07 PROCEDURE — 99222 1ST HOSP IP/OBS MODERATE 55: CPT | Performed by: INTERNAL MEDICINE

## 2024-06-07 PROCEDURE — 1240000000 HC EMOTIONAL WELLNESS R&B

## 2024-06-07 PROCEDURE — 36415 COLL VENOUS BLD VENIPUNCTURE: CPT

## 2024-06-07 PROCEDURE — 99285 EMERGENCY DEPT VISIT HI MDM: CPT

## 2024-06-07 PROCEDURE — 99223 1ST HOSP IP/OBS HIGH 75: CPT | Performed by: PSYCHIATRY & NEUROLOGY

## 2024-06-07 PROCEDURE — 82947 ASSAY GLUCOSE BLOOD QUANT: CPT

## 2024-06-07 PROCEDURE — 83735 ASSAY OF MAGNESIUM: CPT

## 2024-06-07 RX ORDER — FLUTICASONE PROPIONATE 110 UG/1
2 AEROSOL, METERED RESPIRATORY (INHALATION)
Status: DISCONTINUED | OUTPATIENT
Start: 2024-06-07 | End: 2024-06-10 | Stop reason: HOSPADM

## 2024-06-07 RX ORDER — CALCIUM CARBONATE 500 MG/1
1 TABLET, CHEWABLE ORAL 3 TIMES DAILY PRN
Status: DISCONTINUED | OUTPATIENT
Start: 2024-06-07 | End: 2024-06-10 | Stop reason: HOSPADM

## 2024-06-07 RX ORDER — DEXTROSE MONOHYDRATE 100 MG/ML
INJECTION, SOLUTION INTRAVENOUS CONTINUOUS PRN
Status: DISCONTINUED | OUTPATIENT
Start: 2024-06-07 | End: 2024-06-10 | Stop reason: HOSPADM

## 2024-06-07 RX ORDER — ACETAMINOPHEN 325 MG/1
650 TABLET ORAL EVERY 4 HOURS PRN
Status: DISCONTINUED | OUTPATIENT
Start: 2024-06-07 | End: 2024-06-10 | Stop reason: HOSPADM

## 2024-06-07 RX ORDER — TRAZODONE HYDROCHLORIDE 50 MG/1
50 TABLET ORAL NIGHTLY PRN
Status: DISCONTINUED | OUTPATIENT
Start: 2024-06-07 | End: 2024-06-07

## 2024-06-07 RX ORDER — POLYETHYLENE GLYCOL 3350 17 G/17G
17 POWDER, FOR SOLUTION ORAL DAILY PRN
Status: DISCONTINUED | OUTPATIENT
Start: 2024-06-07 | End: 2024-06-10 | Stop reason: HOSPADM

## 2024-06-07 RX ORDER — GUAIFENESIN 200 MG/10ML
200 LIQUID ORAL EVERY 4 HOURS PRN
Status: DISCONTINUED | OUTPATIENT
Start: 2024-06-07 | End: 2024-06-10 | Stop reason: HOSPADM

## 2024-06-07 RX ORDER — TRAZODONE HYDROCHLORIDE 50 MG/1
50 TABLET ORAL NIGHTLY PRN
Status: DISCONTINUED | OUTPATIENT
Start: 2024-06-07 | End: 2024-06-10 | Stop reason: HOSPADM

## 2024-06-07 RX ORDER — IBUPROFEN 400 MG/1
400 TABLET ORAL EVERY 6 HOURS PRN
Status: DISCONTINUED | OUTPATIENT
Start: 2024-06-07 | End: 2024-06-10 | Stop reason: HOSPADM

## 2024-06-07 RX ORDER — MAGNESIUM HYDROXIDE/ALUMINUM HYDROXICE/SIMETHICONE 120; 1200; 1200 MG/30ML; MG/30ML; MG/30ML
30 SUSPENSION ORAL EVERY 6 HOURS PRN
Status: DISCONTINUED | OUTPATIENT
Start: 2024-06-07 | End: 2024-06-10 | Stop reason: HOSPADM

## 2024-06-07 RX ORDER — HYDROXYZINE 50 MG/1
50 TABLET, FILM COATED ORAL 3 TIMES DAILY PRN
Status: DISCONTINUED | OUTPATIENT
Start: 2024-06-07 | End: 2024-06-10 | Stop reason: HOSPADM

## 2024-06-07 RX ORDER — PERMETHRIN 50 MG/G
CREAM TOPICAL ONCE
Status: COMPLETED | OUTPATIENT
Start: 2024-06-07 | End: 2024-06-07

## 2024-06-07 RX ORDER — PALIPERIDONE 9 MG/1
9 TABLET, EXTENDED RELEASE ORAL DAILY
Status: DISCONTINUED | OUTPATIENT
Start: 2024-06-07 | End: 2024-06-10 | Stop reason: HOSPADM

## 2024-06-07 RX ORDER — FAMOTIDINE 20 MG/1
20 TABLET, FILM COATED ORAL 2 TIMES DAILY
Status: DISCONTINUED | OUTPATIENT
Start: 2024-06-07 | End: 2024-06-10 | Stop reason: HOSPADM

## 2024-06-07 RX ORDER — DIVALPROEX SODIUM 500 MG/1
500 TABLET, DELAYED RELEASE ORAL 2 TIMES DAILY
Status: DISCONTINUED | OUTPATIENT
Start: 2024-06-07 | End: 2024-06-10 | Stop reason: HOSPADM

## 2024-06-07 RX ORDER — PROPRANOLOL HYDROCHLORIDE 20 MG/1
20 TABLET ORAL 3 TIMES DAILY
Status: DISCONTINUED | OUTPATIENT
Start: 2024-06-07 | End: 2024-06-10 | Stop reason: HOSPADM

## 2024-06-07 RX ORDER — OXCARBAZEPINE 300 MG/1
300 TABLET, FILM COATED ORAL 2 TIMES DAILY
Status: DISCONTINUED | OUTPATIENT
Start: 2024-06-07 | End: 2024-06-10 | Stop reason: HOSPADM

## 2024-06-07 RX ORDER — NICOTINE 21 MG/24HR
1 PATCH, TRANSDERMAL 24 HOURS TRANSDERMAL DAILY
Status: DISCONTINUED | OUTPATIENT
Start: 2024-06-07 | End: 2024-06-10 | Stop reason: HOSPADM

## 2024-06-07 RX ADMIN — HYDROXYZINE HYDROCHLORIDE 50 MG: 50 TABLET, FILM COATED ORAL at 04:05

## 2024-06-07 RX ADMIN — FLUTICASONE PROPIONATE 2 PUFF: 110 AEROSOL, METERED RESPIRATORY (INHALATION) at 22:53

## 2024-06-07 RX ADMIN — DIVALPROEX SODIUM 500 MG: 500 TABLET, DELAYED RELEASE ORAL at 22:43

## 2024-06-07 RX ADMIN — OXCARBAZEPINE 300 MG: 300 TABLET, FILM COATED ORAL at 22:43

## 2024-06-07 RX ADMIN — IBUPROFEN 400 MG: 400 TABLET, FILM COATED ORAL at 04:08

## 2024-06-07 RX ADMIN — TRAZODONE HYDROCHLORIDE 50 MG: 50 TABLET ORAL at 22:40

## 2024-06-07 RX ADMIN — PROPRANOLOL HYDROCHLORIDE 20 MG: 20 TABLET ORAL at 22:43

## 2024-06-07 RX ADMIN — FAMOTIDINE 20 MG: 20 TABLET, FILM COATED ORAL at 22:44

## 2024-06-07 RX ADMIN — GUAIFENESIN 200 MG: 200 SOLUTION ORAL at 22:40

## 2024-06-07 RX ADMIN — PERMETHRIN CREAM 5% W/W: 50 CREAM TOPICAL at 22:58

## 2024-06-07 RX ADMIN — BENZOCAINE 6 MG-MENTHOL 10 MG LOZENGES 1 LOZENGE: at 22:58

## 2024-06-07 RX ADMIN — MICONAZOLE NITRATE: 2 CREAM TOPICAL at 22:44

## 2024-06-07 RX ADMIN — ALUMINUM HYDROXIDE, MAGNESIUM HYDROXIDE, AND SIMETHICONE 30 ML: 1200; 120; 1200 SUSPENSION ORAL at 05:13

## 2024-06-07 ASSESSMENT — SLEEP AND FATIGUE QUESTIONNAIRES
AVERAGE NUMBER OF SLEEP HOURS: 6
DO YOU HAVE DIFFICULTY SLEEPING: YES
SLEEP PATTERN: DIFFICULTY FALLING ASLEEP;DISTURBED/INTERRUPTED SLEEP
DO YOU USE A SLEEP AID: YES

## 2024-06-07 ASSESSMENT — PATIENT HEALTH QUESTIONNAIRE - PHQ9
SUM OF ALL RESPONSES TO PHQ QUESTIONS 1-9: 17
1. LITTLE INTEREST OR PLEASURE IN DOING THINGS: NEARLY EVERY DAY
10. IF YOU CHECKED OFF ANY PROBLEMS, HOW DIFFICULT HAVE THESE PROBLEMS MADE IT FOR YOU TO DO YOUR WORK, TAKE CARE OF THINGS AT HOME, OR GET ALONG WITH OTHER PEOPLE: VERY DIFFICULT
8. MOVING OR SPEAKING SO SLOWLY THAT OTHER PEOPLE COULD HAVE NOTICED. OR THE OPPOSITE, BEING SO FIGETY OR RESTLESS THAT YOU HAVE BEEN MOVING AROUND A LOT MORE THAN USUAL: MORE THAN HALF THE DAYS
4. FEELING TIRED OR HAVING LITTLE ENERGY: MORE THAN HALF THE DAYS
9. THOUGHTS THAT YOU WOULD BE BETTER OFF DEAD, OR OF HURTING YOURSELF: MORE THAN HALF THE DAYS
2. FEELING DOWN, DEPRESSED OR HOPELESS: MORE THAN HALF THE DAYS
SUM OF ALL RESPONSES TO PHQ9 QUESTIONS 1 & 2: 5
3. TROUBLE FALLING OR STAYING ASLEEP: MORE THAN HALF THE DAYS
SUM OF ALL RESPONSES TO PHQ QUESTIONS 1-9: 19
SUM OF ALL RESPONSES TO PHQ QUESTIONS 1-9: 19
7. TROUBLE CONCENTRATING ON THINGS, SUCH AS READING THE NEWSPAPER OR WATCHING TELEVISION: MORE THAN HALF THE DAYS
6. FEELING BAD ABOUT YOURSELF - OR THAT YOU ARE A FAILURE OR HAVE LET YOURSELF OR YOUR FAMILY DOWN: MORE THAN HALF THE DAYS
SUM OF ALL RESPONSES TO PHQ QUESTIONS 1-9: 19
5. POOR APPETITE OR OVEREATING: MORE THAN HALF THE DAYS

## 2024-06-07 ASSESSMENT — PAIN SCALES - GENERAL
PAINLEVEL_OUTOF10: 5
PAINLEVEL_OUTOF10: 6
PAINLEVEL_OUTOF10: 7

## 2024-06-07 ASSESSMENT — LIFESTYLE VARIABLES
HOW OFTEN DO YOU HAVE A DRINK CONTAINING ALCOHOL: NEVER
HOW MANY STANDARD DRINKS CONTAINING ALCOHOL DO YOU HAVE ON A TYPICAL DAY: PATIENT DOES NOT DRINK
HOW MANY STANDARD DRINKS CONTAINING ALCOHOL DO YOU HAVE ON A TYPICAL DAY: 1 OR 2
HOW OFTEN DO YOU HAVE A DRINK CONTAINING ALCOHOL: MONTHLY OR LESS

## 2024-06-07 ASSESSMENT — PAIN - FUNCTIONAL ASSESSMENT
PAIN_FUNCTIONAL_ASSESSMENT: ACTIVITIES ARE NOT PREVENTED
PAIN_FUNCTIONAL_ASSESSMENT: ACTIVITIES ARE NOT PREVENTED

## 2024-06-07 ASSESSMENT — PAIN DESCRIPTION - LOCATION
LOCATION: THROAT
LOCATION: GENERALIZED

## 2024-06-07 ASSESSMENT — PAIN DESCRIPTION - DESCRIPTORS
DESCRIPTORS: ACHING
DESCRIPTORS: SORE
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
## 2024-06-07 NOTE — ED PROVIDER NOTES
HISTORY     is adopted.      SOCIAL HISTORY       Social History     Tobacco Use    Smoking status: Former     Types: Cigarettes    Smokeless tobacco: Current     Types: Chew   Vaping Use    Vaping Use: Never used   Substance Use Topics    Alcohol use: No    Drug use: Yes     Types: Marijuana (Weed), Cocaine     Comment: used a couple weeks ago     PHYSICAL EXAM     INITIAL VITALS: BP (!) 125/53   Pulse 86   Temp 98.6 °F (37 °C) (Oral)   Resp 18   SpO2 98%    Physical Exam  Vitals and nursing note reviewed.   Constitutional:       Appearance: Normal appearance.   HENT:      Head: Normocephalic and atraumatic.      Nose: Nose normal.      Mouth/Throat:      Mouth: Mucous membranes are moist.   Eyes:      Conjunctiva/sclera: Conjunctivae normal.      Pupils: Pupils are equal, round, and reactive to light.   Cardiovascular:      Rate and Rhythm: Normal rate and regular rhythm.      Pulses: Normal pulses.      Heart sounds: Normal heart sounds.   Pulmonary:      Effort: Pulmonary effort is normal.      Breath sounds: Normal breath sounds.   Abdominal:      Palpations: Abdomen is soft.      Tenderness: There is no abdominal tenderness.   Musculoskeletal:         General: No swelling or deformity.      Cervical back: Normal range of motion.   Skin:     General: Skin is warm.      Findings: No rash.   Neurological:      General: No focal deficit present.      Mental Status: He is alert and oriented to person, place, and time.   Psychiatric:         Mood and Affect: Mood normal.         MEDICAL DECISION MAKING / ED COURSE:     29-year-old suicidal with plan    Will obtain medical clearance and discussed with psychiatry    1)  Number and Complexity of Problems Addressed at this Encounter  Problem List This Visit: Suicide    Differential Diagnosis: Depression, suicidal, grieving reaction    Diagnoses Considered but Do Not Suspect:  medical derangement    Pertinent Comorbid Conditions:  see history    2)  Data Reviewed and

## 2024-06-07 NOTE — GROUP NOTE
Group Therapy Note    Date: 6/7/2024    Group Start Time: 1000  Group End Time: 1025  Group Topic: Psychotherapy    [facility]    Adelia Lance MSW, DANNY        Group Therapy Note    Attendees: 3/14       Patient refused to attend psychotherapy group at 10:00 am after encouragement from staff. 1:1 talk was offered as alternative to group; patient declined.    Discipline Responsible: /Counselor      Signature:  RAJ Benavidez, DANNY

## 2024-06-07 NOTE — H&P
Psychiatric Review of Systems    *limited due to pt not being able/willing to talk.       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   Born in:   Family:   Highest Level of Education:   Occupation:   Marital Status: has a girlfriend  Children:   Residence: lives in a group home in [place]  Stressors:   Patient Assets/Supportive Factors:          DRUG USE HISTORY  Social History     Tobacco Use   Smoking Status Former    Types: Cigarettes   Smokeless Tobacco Former    Types: Chew     Social History     Substance and Sexual Activity   Alcohol Use No     Social History     Substance and Sexual Activity   Drug Use Yes    Types: Marijuana (Weed)    Comment: occasionally             LEGAL HISTORY:   HISTORY OF INCARCERATION: [] Yes [x] No    Family History:       Adopted: Yes       Psychiatric Family History  Patient unknown psychiatric family history.     Suicides in family: [] Yes [x] No    Substance use in family: [] Yes [x] No         PHYSICAL EXAM:  Vitals:  /77   Pulse 73   Temp 96.9 °F (36.1 °C) (Temporal)   Resp 14   Ht 1.905 m (6' 3\")   Wt (!) 202.8 kg (447 lb)   SpO2 98%   BMI 55.87 kg/m²     LABS:  Labs reviewed: [x] Yes  Metabolic Screening:  [] Yes [x] 
17.5 g/dL    Hematocrit 36.7 (L) 41 - 53 %    MCV 82.0 80 - 100 fL    MCH 27.8 26 - 34 pg    MCHC 33.8 31 - 37 g/dL    RDW 14.7 11.5 - 14.9 %    Platelets 147 (L) 150 - 450 k/uL    MPV 8.2 6.0 - 12.0 fL    Neutrophils % 58 36 - 66 %    Lymphocytes % 28 24 - 44 %    Monocytes % 11 (H) 1 - 7 %    Eosinophils % 2 0 - 4 %    Basophils % 1 0 - 2 %    Neutrophils Absolute 6.20 1.3 - 9.1 k/uL    Lymphocytes Absolute 3.00 1.0 - 4.8 k/uL    Monocytes Absolute 1.20 0.1 - 1.3 k/uL    Eosinophils Absolute 0.20 0.0 - 0.4 k/uL    Basophils Absolute 0.10 0.0 - 0.2 k/uL   CMP    Collection Time: 06/07/24  1:54 AM   Result Value Ref Range    Sodium 140 135 - 144 mmol/L    Potassium 4.1 3.7 - 5.3 mmol/L    Chloride 105 98 - 107 mmol/L    CO2 24 20 - 31 mmol/L    Anion Gap 11 9 - 17 mmol/L    Glucose 165 (H) 70 - 99 mg/dL    BUN 6 6 - 20 mg/dL    Creatinine 0.7 0.7 - 1.2 mg/dL    Est, Glom Filt Rate >90 >60 mL/min/1.73m2    Calcium 9.3 8.6 - 10.4 mg/dL    Total Protein 6.7 6.4 - 8.3 g/dL    Albumin 4.0 3.5 - 5.2 g/dL    Total Bilirubin 0.3 0.3 - 1.2 mg/dL    Alkaline Phosphatase 101 40 - 129 U/L    ALT 30 5 - 41 U/L    AST 28 <40 U/L   Magnesium    Collection Time: 06/07/24  1:54 AM   Result Value Ref Range    Magnesium 1.6 1.6 - 2.6 mg/dL   ETOH    Collection Time: 06/07/24  1:54 AM   Result Value Ref Range    Ethanol Lvl <10 <10 mg/dL    Ethanol percent <0.010 %   POC Glucose Fingerstick    Collection Time: 06/07/24  3:59 AM   Result Value Ref Range    POC Glucose 130 (H) 75 - 110 mg/dL   POC Glucose Fingerstick    Collection Time: 06/07/24  8:08 AM   Result Value Ref Range    POC Glucose 117 (H) 75 - 110 mg/dL       Imaging/Diagonstics:  No results found.    Assessment :      Hospital Problems             Last Modified POA    * (Principal) Depression with suicidal ideation (Chronic) 6/7/2024 Yes       Plan:     Admitted to inpatient psych  29-year-old  gentleman morbidly obese BMI 55.9 weighs 447 pound history of autistic

## 2024-06-07 NOTE — GROUP NOTE
Group Therapy Note    Date: 6/7/2024    Group Start Time: 1430  Group End Time: 1515  Group Topic: Cognitive Skills    [facility] Marlene Navarro CTRS        Group Therapy Note    Attendees: 3/10     Topic: To increase socialization and practice problem solving, finding connections,  and communication skills        Patient did not participate in Cognitive Skills Group at 14:30, despite staff invitation and   explanation of benefits.   Pt was seclusive to self and room during group. Pt was provided with word searches as requested.   Pt was polite and appreciative. Pt has been on phone much of day, expresses concern to his home   supports that he has not heard form his girlfriend and expressed concern he thinks she may be cheating on him  , pt was calm and in control after phone call and cooperative when informed that phones would be turned off for group.        Q15 minute safety checks maintained for patient safety and will continue to encourage   patient to attend unit programming.       Discipline Responsible: Psychoeducational Specialist  Signature:  SHIRIN GROVES

## 2024-06-07 NOTE — ED TRIAGE NOTES
Mode of arrival (squad #, walk in, police, etc) : police        Chief complaint(s): suicidal        Arrival Note (brief scenario, treatment PTA, etc).: suicidal with plan to cut himself with a knife due to uncle passing away today. Patient says he does not feel safe going home.        C= \"Have you ever felt that you should Cut down on your drinking?\"  No  A= \"Have people Annoyed you by criticizing your drinking?\"  No  G= \"Have you ever felt bad or Guilty about your drinking?\"  No  E= \"Have you ever had a drink as an Eye-opener first thing in the morning to steady your nerves or to help a hangover?\"  No      Deferred []      Reason for deferring: N/A    *If yes to two or more: probable alcohol abuse.*

## 2024-06-07 NOTE — GROUP NOTE
Group Therapy Note    Date: 6/7/2024    Group Start Time: 1105  Group End Time: 1155  Group Topic: Cognitive Skills    Marlene Carmen CTRS        Group Therapy Note    Attendees: 3/14     Topic: To increase socialization and practice problem solving, and communication skills      Patient did not participate in Cognitive Skills Group at 11:05, despite staff invitation and explanation of benefits.   Pt was seclusive to self and room during group        Q15 minute safety checks maintained for patient safety and will continue to encourage   patient to attend unit programming.       Discipline Responsible: Psychoeducational Specialist  Signature:  SHIRIN GROVES

## 2024-06-08 PROCEDURE — 1240000000 HC EMOTIONAL WELLNESS R&B

## 2024-06-08 PROCEDURE — 6370000000 HC RX 637 (ALT 250 FOR IP): Performed by: PSYCHIATRY & NEUROLOGY

## 2024-06-08 PROCEDURE — 99232 SBSQ HOSP IP/OBS MODERATE 35: CPT | Performed by: PSYCHIATRY & NEUROLOGY

## 2024-06-08 RX ORDER — PANTOPRAZOLE SODIUM 40 MG/1
40 TABLET, DELAYED RELEASE ORAL
Status: DISCONTINUED | OUTPATIENT
Start: 2024-06-09 | End: 2024-06-08

## 2024-06-08 RX ORDER — PANTOPRAZOLE SODIUM 40 MG/1
40 TABLET, DELAYED RELEASE ORAL
Status: DISCONTINUED | OUTPATIENT
Start: 2024-06-08 | End: 2024-06-10 | Stop reason: HOSPADM

## 2024-06-08 RX ADMIN — FLUTICASONE PROPIONATE 2 PUFF: 110 AEROSOL, METERED RESPIRATORY (INHALATION) at 20:45

## 2024-06-08 RX ADMIN — PROPRANOLOL HYDROCHLORIDE 20 MG: 20 TABLET ORAL at 20:46

## 2024-06-08 RX ADMIN — PROPRANOLOL HYDROCHLORIDE 20 MG: 20 TABLET ORAL at 08:36

## 2024-06-08 RX ADMIN — PALIPERIDONE 9 MG: 9 TABLET, EXTENDED RELEASE ORAL at 08:35

## 2024-06-08 RX ADMIN — FAMOTIDINE 20 MG: 20 TABLET, FILM COATED ORAL at 08:36

## 2024-06-08 RX ADMIN — MICONAZOLE NITRATE: 2 CREAM TOPICAL at 20:45

## 2024-06-08 RX ADMIN — DIVALPROEX SODIUM 500 MG: 500 TABLET, DELAYED RELEASE ORAL at 08:36

## 2024-06-08 RX ADMIN — METFORMIN HYDROCHLORIDE 500 MG: 500 TABLET ORAL at 17:06

## 2024-06-08 RX ADMIN — FLUTICASONE PROPIONATE 2 PUFF: 110 AEROSOL, METERED RESPIRATORY (INHALATION) at 08:35

## 2024-06-08 RX ADMIN — HYDROXYZINE HYDROCHLORIDE 50 MG: 50 TABLET, FILM COATED ORAL at 14:55

## 2024-06-08 RX ADMIN — PROPRANOLOL HYDROCHLORIDE 20 MG: 20 TABLET ORAL at 14:07

## 2024-06-08 RX ADMIN — HYDROXYZINE HYDROCHLORIDE 50 MG: 50 TABLET, FILM COATED ORAL at 20:46

## 2024-06-08 RX ADMIN — TRAZODONE HYDROCHLORIDE 50 MG: 50 TABLET ORAL at 20:46

## 2024-06-08 RX ADMIN — PANTOPRAZOLE SODIUM 40 MG: 40 TABLET, DELAYED RELEASE ORAL at 16:15

## 2024-06-08 RX ADMIN — OXCARBAZEPINE 300 MG: 300 TABLET, FILM COATED ORAL at 08:36

## 2024-06-08 RX ADMIN — OXCARBAZEPINE 300 MG: 300 TABLET, FILM COATED ORAL at 20:46

## 2024-06-08 RX ADMIN — METFORMIN HYDROCHLORIDE 500 MG: 500 TABLET ORAL at 08:35

## 2024-06-08 RX ADMIN — DIVALPROEX SODIUM 500 MG: 500 TABLET, DELAYED RELEASE ORAL at 20:46

## 2024-06-08 RX ADMIN — MICONAZOLE NITRATE: 2 CREAM TOPICAL at 08:34

## 2024-06-08 RX ADMIN — FAMOTIDINE 20 MG: 20 TABLET, FILM COATED ORAL at 20:46

## 2024-06-08 ASSESSMENT — LIFESTYLE VARIABLES: HOW OFTEN DO YOU HAVE A DRINK CONTAINING ALCOHOL: NEVER

## 2024-06-08 NOTE — GROUP NOTE
Group Therapy Note    Date: 6/8/2024    Group Start Time: 0900  Group End Time: 0915  Group Topic: Community Meeting    Marisa Villalpando LPN  Patient refused to attend community meeting due to resting in bed, refused 1;1 talk time               Signature:  Marisa Bay LPN

## 2024-06-08 NOTE — GROUP NOTE
Group Therapy Note    Date: 6/8/2024    Group Start Time: 1300  Group End Time: 1335  Group Topic: Cognitive Skills    Stephanie William CTRS     Cognitive Skills Group Note        Date: June 8, 2024 Start Time: 1:30pm  End Time:  135pm      Number of Participants in Group & Unit Census:  4/12    Topic: cognitive skills     Goal of Group: pt will demonstrate improved coping skills and improved interpersonal relationships      Comments:     Patient did not participate in Cognitive Skills group, despite staff encouragement and explanation of benefits.  Patient remain seclusive to self.  Q15 minute safety checks maintained for patient safety and will continue to encourage patient to attend unit programming.              Signature:  SHIRIN MCGEE

## 2024-06-09 LAB — GLUCOSE BLD-MCNC: 131 MG/DL (ref 75–110)

## 2024-06-09 PROCEDURE — 1240000000 HC EMOTIONAL WELLNESS R&B

## 2024-06-09 PROCEDURE — 6370000000 HC RX 637 (ALT 250 FOR IP): Performed by: PSYCHIATRY & NEUROLOGY

## 2024-06-09 PROCEDURE — 99232 SBSQ HOSP IP/OBS MODERATE 35: CPT | Performed by: PSYCHIATRY & NEUROLOGY

## 2024-06-09 PROCEDURE — 82947 ASSAY GLUCOSE BLOOD QUANT: CPT

## 2024-06-09 RX ADMIN — PROPRANOLOL HYDROCHLORIDE 20 MG: 20 TABLET ORAL at 08:31

## 2024-06-09 RX ADMIN — HYDROXYZINE HYDROCHLORIDE 50 MG: 50 TABLET, FILM COATED ORAL at 20:40

## 2024-06-09 RX ADMIN — MICONAZOLE NITRATE: 2 CREAM TOPICAL at 10:48

## 2024-06-09 RX ADMIN — FAMOTIDINE 20 MG: 20 TABLET, FILM COATED ORAL at 08:31

## 2024-06-09 RX ADMIN — FLUTICASONE PROPIONATE 2 PUFF: 110 AEROSOL, METERED RESPIRATORY (INHALATION) at 08:38

## 2024-06-09 RX ADMIN — PANTOPRAZOLE SODIUM 40 MG: 40 TABLET, DELAYED RELEASE ORAL at 08:31

## 2024-06-09 RX ADMIN — DIVALPROEX SODIUM 500 MG: 500 TABLET, DELAYED RELEASE ORAL at 20:41

## 2024-06-09 RX ADMIN — METFORMIN HYDROCHLORIDE 500 MG: 500 TABLET ORAL at 18:04

## 2024-06-09 RX ADMIN — DIVALPROEX SODIUM 500 MG: 500 TABLET, DELAYED RELEASE ORAL at 08:31

## 2024-06-09 RX ADMIN — FAMOTIDINE 20 MG: 20 TABLET, FILM COATED ORAL at 20:40

## 2024-06-09 RX ADMIN — METFORMIN HYDROCHLORIDE 500 MG: 500 TABLET ORAL at 08:31

## 2024-06-09 RX ADMIN — OXCARBAZEPINE 300 MG: 300 TABLET, FILM COATED ORAL at 20:41

## 2024-06-09 RX ADMIN — PROPRANOLOL HYDROCHLORIDE 20 MG: 20 TABLET ORAL at 20:40

## 2024-06-09 RX ADMIN — PROPRANOLOL HYDROCHLORIDE 20 MG: 20 TABLET ORAL at 14:47

## 2024-06-09 RX ADMIN — OXCARBAZEPINE 300 MG: 300 TABLET, FILM COATED ORAL at 08:31

## 2024-06-09 RX ADMIN — PALIPERIDONE 9 MG: 9 TABLET, EXTENDED RELEASE ORAL at 08:31

## 2024-06-10 ENCOUNTER — HOSPITAL ENCOUNTER (EMERGENCY)
Age: 29
Discharge: LEFT BEFORE DISCHARGE | End: 2024-06-11

## 2024-06-10 VITALS
RESPIRATION RATE: 14 BRPM | SYSTOLIC BLOOD PRESSURE: 140 MMHG | OXYGEN SATURATION: 98 % | BODY MASS INDEX: 39.17 KG/M2 | HEART RATE: 55 BPM | TEMPERATURE: 97.4 F | HEIGHT: 75 IN | WEIGHT: 315 LBS | DIASTOLIC BLOOD PRESSURE: 61 MMHG

## 2024-06-10 VITALS
HEIGHT: 75 IN | HEART RATE: 103 BPM | TEMPERATURE: 98.1 F | RESPIRATION RATE: 18 BRPM | OXYGEN SATURATION: 98 % | DIASTOLIC BLOOD PRESSURE: 87 MMHG | SYSTOLIC BLOOD PRESSURE: 132 MMHG | WEIGHT: 315 LBS | BODY MASS INDEX: 39.17 KG/M2

## 2024-06-10 PROCEDURE — 99239 HOSP IP/OBS DSCHRG MGMT >30: CPT | Performed by: PSYCHIATRY & NEUROLOGY

## 2024-06-10 PROCEDURE — 6370000000 HC RX 637 (ALT 250 FOR IP): Performed by: PSYCHIATRY & NEUROLOGY

## 2024-06-10 RX ADMIN — DIVALPROEX SODIUM 500 MG: 500 TABLET, DELAYED RELEASE ORAL at 08:25

## 2024-06-10 RX ADMIN — PANTOPRAZOLE SODIUM 40 MG: 40 TABLET, DELAYED RELEASE ORAL at 06:24

## 2024-06-10 RX ADMIN — PROPRANOLOL HYDROCHLORIDE 20 MG: 20 TABLET ORAL at 08:24

## 2024-06-10 RX ADMIN — MICONAZOLE NITRATE: 2 CREAM TOPICAL at 08:26

## 2024-06-10 RX ADMIN — FLUTICASONE PROPIONATE 2 PUFF: 110 AEROSOL, METERED RESPIRATORY (INHALATION) at 08:25

## 2024-06-10 RX ADMIN — OXCARBAZEPINE 300 MG: 300 TABLET, FILM COATED ORAL at 08:25

## 2024-06-10 RX ADMIN — PALIPERIDONE 9 MG: 9 TABLET, EXTENDED RELEASE ORAL at 08:25

## 2024-06-10 RX ADMIN — METFORMIN HYDROCHLORIDE 500 MG: 500 TABLET ORAL at 08:24

## 2024-06-10 RX ADMIN — FAMOTIDINE 20 MG: 20 TABLET, FILM COATED ORAL at 08:25

## 2024-06-10 NOTE — DISCHARGE SUMMARY
DISCHARGE SUMMARY      Patient ID:  Chino Carrillo  [id number]  29 y.o.  1995    Admit date: 2024    Discharge date and time: 6/10/2024    Disposition: Home     Admitting Physician: Finn Lennon MD     Discharge Physician: Dr JULIO Lennon MD    Admission Diagnoses: Suicidal ideation [R45.851]  Depression with suicidal ideation [F32.A, R45.851]    Admission Condition: poor    Discharged Condition: stable    Admission Circumstance: Chino Carrillo is a 29 y.o. male who has a past medical history of autism, brain injury, recurrent MDD w/psychotic features, and SI. Pt presented to the ED with SI. He states that his uncle  today and wants to kill himself by \"slitting his wrists\".      Patient presented last week to the ED after a fight with his girlfriend. Its noted he presents to the ED often with this complaint. He stated that due to the fight, he was planning on jumping off of a bridge, so his girlfriend called the police. In the ED, he claimed to have no SI or HI and was not experiencing hallucinations. He simply wanted to return his group home to be with his girlfriend. After further evaluation, pt was discharged and returned to group home.      Pt was seen at bedside today. Pt did not attend group this morning. Pt was found sleeping in room; would not awaken after multiple attempts.       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

## 2024-06-10 NOTE — PLAN OF CARE
Problem: Depression  Goal: Will be euthymic at discharge  Description: INTERVENTIONS:  1. Administer medication as ordered  2. Provide emotional support via 1:1 interaction with staff  3. Encourage involvement in milieu/groups/activities  4. Monitor for social isolation  6/7/2024 1342 by Jerson Nelson LPN  Outcome: Not Progressing     Problem: Self Harm/Suicidality  Goal: Will have no self-injury during hospital stay  Description: INTERVENTIONS:  1.  Ensure constant observer at bedside with Q15M safety checks  2.  Maintain a safe environment  3.  Secure patient belongings  4.  Ensure family/visitors adhere to safety recommendations  5.  Ensure safety tray has been added to patient's diet order  6.  Every shift and PRN: Re-assess suicidal risk via Frequent Screener    6/7/2024 1342 by Jerson Nelson LPN  Outcome: Progressing     Problem: Depression  Goal: Will be euthymic at discharge  Description: INTERVENTIONS:  1. Administer medication as ordered  2. Provide emotional support via 1:1 interaction with staff  3. Encourage involvement in milieu/groups/activities  4. Monitor for social isolation  6/7/2024 1342 by Jerson Nelson LPN  Outcome: Not Progressing  6/7/2024 1243 by Louisa Guerrero, RN  Outcome: Progressing     
  Problem: Depression  Goal: Will be euthymic at discharge  Description: INTERVENTIONS:  1. Administer medication as ordered  2. Provide emotional support via 1:1 interaction with staff  3. Encourage involvement in milieu/groups/activities  4. Monitor for social isolation  Outcome: Progressing  Note: Out in the milieu on the television, cooperative. Improved mood. Compliant with all prescribed medications for this shift. Safety checks maintained q15min and irregular rounding.      Problem: Self Harm/Suicidality  Goal: Will have no self-injury during hospital stay  Description: INTERVENTIONS:  1.  Ensure constant observer at bedside with Q15M safety checks  2.  Maintain a safe environment  3.  Secure patient belongings  4.  Ensure family/visitors adhere to safety recommendations  5.  Ensure safety tray has been added to patient's diet order  6.  Every shift and PRN: Re-assess suicidal risk via Frequent Screener    6/8/2024 2118 by Fanny Severino  Outcome: Progressing  Note: Remains free from self harm at this time. Patient denies suicidal and homicidal thoughts.  Patient denies auditory and visual hallucinations.  Patient is taking meds and eating well.       
  Problem: Self Harm/Suicidality  Goal: Will have no self-injury during hospital stay  Description: INTERVENTIONS:  1.  Ensure constant observer at bedside with Q15M safety checks  2.  Maintain a safe environment  3.  Secure patient belongings  4.  Ensure family/visitors adhere to safety recommendations  5.  Ensure safety tray has been added to patient's diet order  6.  Every shift and PRN: Re-assess suicidal risk via Frequent Screener    6/8/2024 1007 by Lakeisha Lara LPN  Outcome: Progressing  Note: Patient denies any current suicidal thoughts and agrees to seek out staff if thoughts arise. Endorses depression and anxiety. Denies any hallucinations.  Preoccupied and flat during talk time. Has remained isolative to self and is out of room for needs. Patient compliant with staff and medications.      
  Problem: Self Harm/Suicidality  Goal: Will have no self-injury during hospital stay  Description: INTERVENTIONS:  1.  Ensure constant observer at bedside with Q15M safety checks  2.  Maintain a safe environment  3.  Secure patient belongings  4.  Ensure family/visitors adhere to safety recommendations  5.  Ensure safety tray has been added to patient's diet order  6.  Every shift and PRN: Re-assess suicidal risk via Frequent Screener    6/9/2024 1034 by Arpit Christie  Outcome: Progressing     Problem: Depression  Goal: Will be euthymic at discharge  Description: INTERVENTIONS:  1. Administer medication as ordered  2. Provide emotional support via 1:1 interaction with staff  3. Encourage involvement in milieu/groups/activities  4. Monitor for social isolation  6/9/2024 1034 by Arpit Christie  Outcome: Progressing  Pt currently denies any thoughts to harm self or others denies any hallucinations reports normal sleep and appetite cooperative with treatment.       
  Problem: Self Harm/Suicidality  Goal: Will have no self-injury during hospital stay  Description: INTERVENTIONS:  1.  Ensure constant observer at bedside with Q15M safety checks  2.  Maintain a safe environment  3.  Secure patient belongings  4.  Ensure family/visitors adhere to safety recommendations  5.  Ensure safety tray has been added to patient's diet order  6.  Every shift and PRN: Re-assess suicidal risk via Frequent Screener    6/9/2024 2300 by Jer Castillo RN  Outcome: Progressing     Problem: Depression  Goal: Will be euthymic at discharge  Description: INTERVENTIONS:  1. Administer medication as ordered  2. Provide emotional support via 1:1 interaction with staff  3. Encourage involvement in milieu/groups/activities  4. Monitor for social isolation  6/9/2024 2300 by Jer Castillo, RN  Outcome: Progressing     Patient is cooperative and guarded. Patient remains isolated, selectively social.  is social with staff regarding  personal needs, but has not been motivated to follow through. Patient is compliant with scheduled medications and has remained in behavioral control thus far. Patient denies anxiety and depression,  affect is flat. Patient's thought process is logical, however, mostly monosyllabic. Patient denies audio hallucinations. Patient denies suicidal ideation, ,  denies homicidal ideation, plan or intent, and remains free of self harm. Patient remains free from physical injuries thus far and remains free from falls. Patient's nutrition is being monitored and patient has  ate snacks this evening. Patient states adequate/increased intake today.  Patient's pain is currently WDL per patient and will continue to be assessed. Safety maintained with every 15 minute checks. Patient reports adequate sleeping pattern. Under behavioral control, follows doctor's orders, staff requests. Has not exhibited exit-seeking behaviors. Patient is striving to be euthymic by discharge. Patient 
  Problem: Self Harm/Suicidality  Goal: Will have no self-injury during hospital stay  Description: INTERVENTIONS:  1.  Ensure constant observer at bedside with Q15M safety checks  2.  Maintain a safe environment  3.  Secure patient belongings  4.  Ensure family/visitors adhere to safety recommendations  5.  Ensure safety tray has been added to patient's diet order  6.  Every shift and PRN: Re-assess suicidal risk via Frequent Screener    Outcome: Progressing  Flowsheets (Taken 6/8/2024 0444)  Will have no self-injury during hospital stay:   Maintain a safe environment   Every shift and PRN: Re-assess suicidal risk via Frequent Screener  Note: Pt reports depression and anxiety. Emotional support and reassurance provided as needed. Pt denies any suicidal or homicidal ideations, denies any hallucinations. Pt agreed to seek staff and report any intrusive thoughts of hurting self or others.  Pt remains free from any self-harm, safe environment maintained. Regular rounding 4 times an hour and spontaneous patient checks done for safety and per unit policy.        Problem: Depression  Goal: Will be euthymic at discharge  Description: INTERVENTIONS:  1. Administer medication as ordered  2. Provide emotional support via 1:1 interaction with staff  3. Encourage involvement in milieu/groups/activities  4. Monitor for social isolation  Outcome: Progressing  Note: Patient denies any symptoms of depression this shift, focused on discharge..      
[facility]  Initial Interdisciplinary Treatment Plan NO      Original treatment plan Date & Time: 2024   12:45 pm    Admission Type:  Admission Type: Voluntary    Reason for admission:   Reason for Admission: Patient presents to ED due to having suicidal ideation with plan to slit his wrist with a knife. Patient feels unable to agree to a safety plan. Patient reports his uncle, whom he is very close to,  of a heart attack today. Patient has developmental delay and significant history of psychiatric admission. Patient has history of suicidal attempts.    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    Injury of frontal lobe (HCC)     Multiple sensory deficit syndrome      Status EXAM:Mental Status and Behavioral Exam  Normal: No  Level of Assistance: Partial assist  Facial Expression: Flat, Sad  Affect: Congruent  Level of Consciousness: Alert  Frequency of Checks: Twice per hour, standard  Mood:Normal: No  Mood: Depressed, Anxious, Helpless, Sad  Motor Activity:Normal: Yes  Eye Contact: Good  Observed Behavior: Cooperative, Preoccupied  Sexual Misconduct History: Current - no  Preception: Pollock to person, Pollock to time, Pollock to place, Pollock to situation  Attention:Normal: No  Attention: Distractible  Thought Processes: Circumstantial  Thought Content:Normal: No  Thought Content: Preoccupations  Depression Symptoms: Change in energy level, Feelings of helplessness, Feelings of hopelessess, Impaired concentration  Anxiety Symptoms: Generalized  Milly Symptoms: No problems reported or 
RECOMMENDATIONS UPDATE:  continue with group therapies, increased socialization, continue planning for after discharge goals, continue with medication compliance    SHORT-TERM GOALS UPDATE:   Time frame for Short-Term Goals:  5-7 days    LONG-TERM GOALS UPDATE:   Time frame for Long-Term Goals:  6 months    Members Present in Team Meeting:   See signature sheet     NICHOLE MTZ RN

## 2024-06-10 NOTE — TRANSITION OF CARE
Behavioral Health Transition Record    Patient Name: Chino Carrillo  YOB: 1995   Medical Record Number: [id number]  Date of Admission: 2024 12:20 AM   Date of Discharge: 6/10/24    Attending Provider: Finn Lennon MD   Discharging Provider: Finn Lennon MD  To contact this individual call [phone] and ask the  to page.  If unavailable, ask to be transferred to Behavioral Health Provider on call.  A Behavioral Health Provider will be available on call  and during holidays.    Primary Care Provider: Ernesto Mayo APRN - CNP    Allergies   Allergen Reactions    Latex Itching    Hydromorphone Itching     Other reaction(s): ITCH       Reason for Admission: Pt presents to ED d/to SI plan to slit his wrist. Patient reports his uncle  of a heart attack today. Patient has developmental delay and significant history of psychiatric admission. Patient has history of suicide attempts.     Admission Diagnosis: Suicidal ideation [R45.851]  Depression with suicidal ideation [F32.A, R45.851]    * No surgery found *    Results for orders placed or performed during the hospital encounter of 24   CBC with Auto Differential   Result Value Ref Range    WBC 10.6 3.5 - 11.0 k/uL    RBC 4.47 (L) 4.5 - 5.9 m/uL    Hemoglobin 12.4 (L) 13.5 - 17.5 g/dL    Hematocrit 36.7 (L) 41 - 53 %    MCV 82.0 80 - 100 fL    MCH 27.8 26 - 34 pg    MCHC 33.8 31 - 37 g/dL    RDW 14.7 11.5 - 14.9 %    Platelets 147 (L) 150 - 450 k/uL    MPV 8.2 6.0 - 12.0 fL    Neutrophils % 58 36 - 66 %    Lymphocytes % 28 24 - 44 %    Monocytes % 11 (H) 1 - 7 %    Eosinophils % 2 0 - 4 %    Basophils % 1 0 - 2 %    Neutrophils Absolute 6.20 1.3 - 9.1 k/uL    Lymphocytes Absolute 3.00 1.0 - 4.8 k/uL    Monocytes Absolute 1.20 0.1 - 1.3 k/uL    Eosinophils Absolute 0.20 0.0 - 0.4 k/uL    Basophils Absolute 0.10 0.0 - 0.2 k/uL   CMP   Result Value Ref Range    Sodium 140 135 - 144 mmol/L    Potassium 4.1 3.7 - 5.3 mmol/L

## 2024-06-10 NOTE — DISCHARGE INSTRUCTIONS
Information:  Medications:   Medication summary provided   I understand that I should take only the medications on my list.     -why and when I need to take each medicine.     -which side effects to watch for.     -that I should carry my medication information at all times in case of     Emergency situations.    I will take all of my medicines to follow up appointments.     -check with my physician or pharmacist before taking any new    Medication, over the counter product or drink alcohol.    -Ask about food, drug or dietary supplement interactions.    -discard old lists and update records with medication providers.    Notify Physician:  Notify physician if you notice:   Always call 911 if you feel your life is in danger  In case of an emergency call 911 immediately!  If 911 is not available call your local emergency medical system for help    Behavioral Health Follow Up:  Original Referral Source:ED  Discharge Diagnosis: Suicidal ideation [R45.851]  Depression with suicidal ideation [F32.A, R45.851]  Recommendations for Level of Care: Follow up appointment @ [facility]  Patient status at discharge: stabilized on meds  My hospital  was: [facility]  Aftercare plan faxed: [facility]   -faxed by: nursing staff   -date: 6/10/24   -time: 1500  Prescriptions: N/A    Smoking: Quit Smoking.   Call the [employer]'s smoking quitline at 1-726-30T-QUIT  Know the signs of a heart attack   If you have any of the following symptoms call 911 immediately, do not wait more    Than five minutes.    1. Pressure, fullness and/ or squeezing in the center of the chest spreading to    The jaw, neck or shoulder.    2. Chest discomfort with light headedness, fainting, sweating, nausea or    Shortness of breath.   3. Upper abdominal pressure or discomfort.   4. Lower chest pain, back pain, unusual fatigue, shortness of breath, nausea   Or dizziness.     General Information:   Questions regarding your bill: Call HELP program ([phone])

## 2024-06-10 NOTE — PROGRESS NOTES
Behavioral Services  Medicare Certification Upon Admission    I certify that this patient's inpatient psychiatric hospital admission is medically necessary for:    [x] (1) Treatment which could reasonably be expected to improve this patient's condition,       [x] (2) Or for diagnostic study;     AND     [x](2) The inpatient psychiatric services are provided while the individual is under the care of a physician and are included in the individualized plan of care.    Estimated length of stay/service 2-9 days    Plan for post-hospital care -outpatient care    Electronically signed by JOSE LUIS TERESA MD on 6/7/2024 at 6:08 PM      
  Daily Progress Note  6/10/2024    Patient Name: Chino Carrillo    CHIEF COMPLAINT:  Depression with suicidal ideation           SUBJECTIVE:      Patient is seen today for a follow up assessment. Pt was seated in bed. Pt was reported to be taking meds but has not been attending group. Pt comes out of room to eat but mostly is isolated to room and sleeps for most the day. He reports good appetite and feels ready for discharge. He states that he wants to return home to be with his girlfriend. When asked about the arguments that they have, he says its always just little things but they always work it out. He denies SI/HI and reports not hallucinations/delusions.       Appetite:  [x] Adequate/Unchanged  [] Increased  [] Decreased      Sleep:       [x] Adequate/Unchanged  [] Fair  [] Poor      Group Attendance on Unit:   [] Yes   [] Selectively    [x] No    Compliant with scheduled medications: [x] Yes  [] No    Received emergency medications in past 24 hrs: [] Yes   [x] No    Medication Side Effects: Denies         Mental Status Exam  Level of consciousness: Alert and awake   Appearance: Appropriate attire for setting, seated on bed, with poor grooming and hygiene   Behavior/Motor: Approachable, engages with interviewer, no psychomotor abnormalities   Attitude toward examiner: Cooperative, attentive, good eye contact  Speech: spontaneous, normal rate, normal volume, and slow   Mood:  positive  Affect: neutral  Thought processes: linear, goal directed, and coherent   Thought content:  Denies homicidal ideation  Suicidal Ideation: Denies suicidal ideations, contracts for safety on the unit.   Delusions: No evidence of delusions.   Perceptual Disturbance:  Patient not to be responding to internal stimuli.   Cognition: Oriented to self, location, time, and situation  Memory: intact  Insight: fair   Judgement: fair       Data   height is 1.905 m (6' 3\") and weight is 202.8 kg (447 lb) (abnormal). His temperature is 97.4 
RT ASSESSMENT TREATMENT GOALS    [x]Pt Goal: Pt will identify 1-2 positive coping skills by time of discharge.    []Pt Goal: Pt will identify 1-2 positive aspects of self by time of discharge.    [x]Pt Goal: Pt will remain on task/topic for 15-30 minutes during group by time of discharge.    []Pt Goal: Pt will identify 1-2 aspects of relapse prevention plan by time of discharge.    []Pt Goal: Pt will join in conversation with peers 1-2 times per group by time of discharge.    []Pt Goal: Pt will identify 1-2 new leisure interests by time of discharge.    []Pt Goal: Pt will not voice any delusional content by time of discharge.    
Received return call from JUSTIN Parker at [facility] ([phone]), who reports patient last received Invega sustenna 234 mg on 5/21/24 in the right hip. His next injection is due 6/11/24 as he receives it every 3 weeks.  
Rafat Acosta MD        aluminum & magnesium hydroxide-simethicone (MAALOX) 200-200-20 MG/5ML suspension 30 mL  30 mL Oral Q6H PRN Rafat Acosta MD   30 mL at 06/07/24 0513    hydrOXYzine HCl (ATARAX) tablet 50 mg  50 mg Oral TID PRN Rafat Acsota MD   50 mg at 06/09/24 2040    ibuprofen (ADVIL;MOTRIN) tablet 400 mg  400 mg Oral Q6H PRN Rafat Acosta MD   400 mg at 06/07/24 0408    nicotine (NICODERM CQ) 14 MG/24HR 1 patch  1 patch TransDERmal Daily Rafat Acosta MD        polyethylene glycol (GLYCOLAX) packet 17 g  17 g Oral Daily PRN Rafat Acosta MD        glucose chewable tablet 16 g  4 tablet Oral PRN Sarah Leyva APRN - NP        dextrose bolus 10% 125 mL  125 mL IntraVENous PRN Sarah Leyva APRN - NP        Or    dextrose bolus 10% 250 mL  250 mL IntraVENous PRN Sarah Leyva APRN - NP        glucagon injection 1 mg  1 mg IntraMUSCular PRN Sarah Leyva APRN - NP        dextrose 10 % infusion   IntraVENous Continuous PRN Sarah Leyva APRN - NP        fluticasone (FLOVENT HFA) 110 MCG/ACT inhaler 2 puff  2 puff Inhalation BID RT Finn Lennon MD   2 puff at 06/09/24 0838    calcium carbonate (TUMS) chewable tablet 500 mg  1 tablet Oral TID PRN Finn Lennon MD        divalproex (DEPAKOTE) DR tablet 500 mg  500 mg Oral BID Finn Lennon MD   500 mg at 06/09/24 2041    famotidine (PEPCID) tablet 20 mg  20 mg Oral BID Finn Lennon MD   20 mg at 06/09/24 2040    OXcarbazepine (TRILEPTAL) tablet 300 mg  300 mg Oral BID Finn Lennon MD   300 mg at 06/09/24 2041    paliperidone (INVEGA) extended release tablet 9 mg  9 mg Oral Daily Finn Lennon MD   9 mg at 06/09/24 0831    propranolol (INDERAL) tablet 20 mg  20 mg Oral TID Finn Lennon MD   20 mg at 06/09/24 2040    traZODone (DESYREL) tablet 50 mg  50 mg Oral Nightly PRN Finn Lennon MD   50 mg at 06/08/24 2046    miconazole (MICOTIN) 2 % cream   Topical BID Finn Lennon MD   Given at 06/09/24 1048 
tablet 650 mg  650 mg Oral Q4H PRN Rafat Acosta MD        aluminum & magnesium hydroxide-simethicone (MAALOX) 200-200-20 MG/5ML suspension 30 mL  30 mL Oral Q6H PRN Rafat Acosta MD   30 mL at 06/07/24 0513    hydrOXYzine HCl (ATARAX) tablet 50 mg  50 mg Oral TID PRN Rafat Acosta MD   50 mg at 06/08/24 2046    ibuprofen (ADVIL;MOTRIN) tablet 400 mg  400 mg Oral Q6H PRN Rafat Acosta MD   400 mg at 06/07/24 0408    nicotine (NICODERM CQ) 14 MG/24HR 1 patch  1 patch TransDERmal Daily Rafat Acosta MD        polyethylene glycol (GLYCOLAX) packet 17 g  17 g Oral Daily PRN Rafat Acosta MD        glucose chewable tablet 16 g  4 tablet Oral PRN Sarah Leyva APRN - NP        dextrose bolus 10% 125 mL  125 mL IntraVENous PRN Sarah Leyva APRN - NP        Or    dextrose bolus 10% 250 mL  250 mL IntraVENous PRN Sarah Leyva APRN - NP        glucagon injection 1 mg  1 mg IntraMUSCular PRN Sarah Leyva APRN - NP        dextrose 10 % infusion   IntraVENous Continuous PRN Sarah Leyva APRN - NP        fluticasone (FLOVENT HFA) 110 MCG/ACT inhaler 2 puff  2 puff Inhalation BID RT Finn Lennon MD   2 puff at 06/08/24 2045    calcium carbonate (TUMS) chewable tablet 500 mg  1 tablet Oral TID PRN Finn Lennon MD        divalproex (DEPAKOTE) DR tablet 500 mg  500 mg Oral BID Finn Lennon MD   500 mg at 06/08/24 2046    famotidine (PEPCID) tablet 20 mg  20 mg Oral BID Finn Lennon MD   20 mg at 06/08/24 2046    OXcarbazepine (TRILEPTAL) tablet 300 mg  300 mg Oral BID Finn Lennon MD   300 mg at 06/08/24 2046    paliperidone (INVEGA) extended release tablet 9 mg  9 mg Oral Daily Finn Lennon MD   9 mg at 06/08/24 0835    propranolol (INDERAL) tablet 20 mg  20 mg Oral TID Finn Lennon MD   20 mg at 06/08/24 2046    traZODone (DESYREL) tablet 50 mg  50 mg Oral Nightly PRN Finn Lennon MD   50 mg at 06/08/24 2046    miconazole (MICOTIN) 2 % cream   Topical BID [place]

## 2024-06-10 NOTE — GROUP NOTE
Group Therapy Note    Date: 6/10/2024    Group Start Time: 1050  Group End Time: 1125  Group Topic: Focus Group    Stephanie [facility]    focus  Group Note        Date: Jena 10, 2024 Start Time:  1050 am   End Time:  1125 am      Number of Participants in Group & Unit Census:  6/16    Topic: focus group     Goal of Group: pt will demonstrate improved coping skills and improved communication       Comments:     Patient did not participate in  focus  group, despite staff encouragement and explanation of benefits.  Patient remain seclusive to self.  Q15 minute safety checks maintained for patient safety and will continue to encourage patient to attend unit programming.            Signature:  SHIRIN MCGEE

## 2024-06-10 NOTE — BH NOTE
Patient Education - Tobacco Cessation    Patient currently use vape with nicotine products. Patient given tobacco quitline number 67243800324 at this time, refusing to call at this time, states \" I just dont want to quit now\"- patient given information as to the dangers of long term tobacco use. Continue to reinforce the importance of tobacco cessation.     
[facility]  Discharge Note    Pt discharged with followings belongings:   Dental Appliances: None  Vision - Corrective Lenses: None  Hearing Aid: None  Jewelry: None  Body Piercings Removed: N/A  Clothing: Footwear, Shirt, Shorts  Other Valuables: Other (Comment) (none)   Valuables returned to patient. Patient educated on aftercare instructions: yes, follow up appointment and medication education  Information faxed to [facility] by nursing staff  at 3:58 PM .Patient verbalize understanding of AVS:  yes. Patient discharged to group home. .    Status EXAM upon discharge:  Mental Status and Behavioral Exam  Normal: No  Level of Assistance: Independent/Self  Facial Expression: Exaggerated  Affect: Appropriate  Level of Consciousness: Alert  Frequency of Checks: 4 times per hour, close  Mood:Normal: No  Mood: Anxious  Motor Activity:Normal: Yes  Motor Activity: Decreased  Eye Contact: Good  Observed Behavior: Cooperative, Friendly, Preoccupied  Sexual Misconduct History: Current - no  Preception: [place] to person, [place] to time, [place] to place, [place] to situation  Attention:Normal: No  Attention: Distractible  Thought Processes: Circumstantial  Thought Content:Normal: No  Thought Content: Preoccupations  Depression Symptoms: Impaired concentration  Anxiety Symptoms: Generalized  Milly Symptoms: No problems reported or observed.  Hallucinations: None  Delusions: No  Memory:Normal: Yes  Insight and Judgment: No  Insight and Judgment: Poor judgment, Poor insight, Unmotivated    Tobacco Screening:  Practical Counseling, on admission, garrett X, if applicable and completed (first 3 are required if patient doesn't refuse):            ( ) Recognizing danger situations (included triggers and roadblocks)                    ( ) Coping skills (new ways to manage stress,relaxation techniques, changing routine, distraction)                                                           ( ) Basic information about quitting 
OQ ID: [id number]   
Patient states that he does not use tobacco.   
         ( ) Recognizing danger situations (included triggers and roadblocks)                    ( ) Coping skills (new ways to manage stress,relaxation techniques, changing routine, distraction)                                                           ( ) Basic information about quitting (benefits of quitting, techniques in how to quit, available resources  ( ) [facility]                                                                                                                   ( x) Patient refused counseling  ( ) Patient has not smoked in the last 30 days    Metabolic Screening:    Lab Results   Component Value Date    LABA1C 5.3 02/22/2024       Lab Results   Component Value Date    CHOL 172 01/16/2023    CHOL 169 11/23/2021    CHOL 161 06/11/2019    CHOL 150 11/03/2012     Lab Results   Component Value Date    TRIG 188 (H) 01/16/2023    TRIG 161 (H) 11/23/2021    TRIG 210 (H) 06/11/2019    TRIG 197 (H) 11/03/2012     Lab Results   Component Value Date    HDL 26 (L) 01/16/2023    HDL 28 (L) 11/23/2021    HDL 25 (L) 06/11/2019    HDL 32 (L) 11/03/2012     No components found for: \"LDLCAL\"  No components found for: \"LABVLDL\"      Body mass index is 55.87 kg/m².    BP Readings from Last 2 Encounters:   06/07/24 133/70   05/26/24 (!) 142/101       Pt admitted with followings belongings:  Dental Appliances: None  Vision - Corrective Lenses: None  Hearing Aid: None  Jewelry: None  Body Piercings Removed: N/A  Clothing: Footwear, Shirt, Shorts  The following personal items were collected during admission. Items secured in locker/safe. Items will be returned to patient at discharge.     Carissa Mccord RN

## 2024-06-10 NOTE — GROUP NOTE
Group Therapy Note    Date: 6/10/2024    Group Start Time: 1015  Group End Time: 1045  Group Topic: Psychoeducation    Nicolas Payton        Group Therapy Note    Attendees: 9/17       Patient declined to attend psychotherapy group after encouragement from staff.  1:1 talk time offered but refused.   Signature:  Nicolas Vaca

## 2024-06-10 NOTE — GROUP NOTE
Group Therapy Note    Date: 6/10/2024    Group Start Time: 1330  Group End Time: 1415  Group Topic: recreation group     [facility]    Stephanie Rivers CTRS        Group Therapy Note    Attendees:9/16       Patient's Goal:  pt will demonstrate improved coping skills and improved leisure awareness     Notes:   pt was pleasant and participated well    Status After Intervention:  Improved    Participation Level: Active Listener and Interactive    Participation Quality: Appropriate      Speech:  normal      Thought Process/Content: Logical      Affective Functioning: Congruent      Mood: euthymic      Level of consciousness:  Alert      Response to Learning: Able to verbalize current knowledge/experience, Capable of insight, and Progressing to goal      Endings: None Reported    Modes of Intervention: Support, Socialization, Clarifying, and Activity      Discipline Responsible: Psychoeducational Specialist      Signature:  SHIRIN MCGEE

## 2024-06-11 ENCOUNTER — HOSPITAL ENCOUNTER (EMERGENCY)
Age: 29
Discharge: HOME | End: 2024-06-12
Attending: EMERGENCY MEDICINE
Payer: MEDICARE

## 2024-06-11 DIAGNOSIS — J06.9 UPPER RESPIRATORY TRACT INFECTION, UNSPECIFIED TYPE: Primary | ICD-10-CM

## 2024-06-11 PROCEDURE — 99283 EMERGENCY DEPT VISIT LOW MDM: CPT

## 2024-06-11 ASSESSMENT — PAIN - FUNCTIONAL ASSESSMENT: PAIN_FUNCTIONAL_ASSESSMENT: NONE - DENIES PAIN

## 2024-06-12 VITALS
RESPIRATION RATE: 16 BRPM | WEIGHT: 315 LBS | SYSTOLIC BLOOD PRESSURE: 128 MMHG | BODY MASS INDEX: 39.17 KG/M2 | HEIGHT: 75 IN | TEMPERATURE: 97.3 F | HEART RATE: 82 BPM | OXYGEN SATURATION: 98 % | DIASTOLIC BLOOD PRESSURE: 82 MMHG

## 2024-06-12 LAB — GLUCOSE BLD-MCNC: 157 MG/DL (ref 75–110)

## 2024-06-12 PROCEDURE — 82947 ASSAY GLUCOSE BLOOD QUANT: CPT

## 2024-06-12 ASSESSMENT — ENCOUNTER SYMPTOMS
VOMITING: 0
RHINORRHEA: 1
COUGH: 1

## 2024-06-12 ASSESSMENT — PAIN - FUNCTIONAL ASSESSMENT
PAIN_FUNCTIONAL_ASSESSMENT: NONE - DENIES PAIN
PAIN_FUNCTIONAL_ASSESSMENT: NONE - DENIES PAIN

## 2024-06-12 NOTE — ED PROVIDER NOTES
tablet Take 1 tablet by mouth 3 times daily as needed for Heartburn, Disp-60 tablet, R-0Normal      divalproex (DEPAKOTE) 500 MG DR tablet Take 1 tablet by mouth 2 times daily, Disp-60 tablet, R-0Normal      famotidine (PEPCID) 20 MG tablet Take 1 tablet by mouth 2 times daily, Disp-60 tablet, R-0Normal      metFORMIN (GLUCOPHAGE) 500 MG tablet Take 1 tablet by mouth 2 times daily, Disp-60 tablet, R-0Normal      OXcarbazepine (TRILEPTAL) 300 MG tablet Take 1 tablet by mouth 2 times daily, Disp-60 tablet, R-0Normal      paliperidone (INVEGA) 9 MG extended release tablet Take 1 tablet by mouth daily, Disp-30 tablet, R-0Normal      propranolol (INDERAL) 20 MG tablet Take 1 tablet by mouth 3 times daily, Disp-90 tablet, R-0Normal      traZODone (DESYREL) 50 MG tablet Take 1 tablet by mouth nightly as needed for Sleep, Disp-30 tablet, R-0Normal      nystatin (MYCOSTATIN) 354857 UNIT/GM cream Apply topically 2 times daily Apply topically 2 times daily., Topical, 2 TIMES DAILY, Historical Med      Semaglutide,0.25 or 0.5MG/DOS, (OZEMPIC, 0.25 OR 0.5 MG/DOSE,) 2 MG/1.5ML SOPN Inject 0.5 mg into the skin once a weekHistorical Med      ibuprofen (ADVIL;MOTRIN) 800 MG tablet Take 1 tablet by mouth 2 times daily as needed for Pain, Disp-180 tablet, R-1Normal             ALLERGIES     is allergic to latex and hydromorphone.    FAMILY HISTORY     is adopted.        SOCIAL HISTORY      reports that he has quit smoking. His smoking use included cigarettes. He has quit using smokeless tobacco.  His smokeless tobacco use included chew. He reports current drug use. Drug: Marijuana (Weed). He reports that he does not drink alcohol.    PHYSICAL EXAM     INITIAL VITALS: /82   Pulse 82   Temp 97.3 °F (36.3 °C)   Resp 16   Ht 1.905 m (6' 3\")   Wt (!) 202.8 kg (447 lb)   SpO2 98%   BMI 55.87 kg/m²   Gen: nad  Head: Normocephalic, atraumatic  Eye: Pupils equal round reactive to light, no conjunctivitis  ENT: MMM  Heart: Regular

## 2024-06-12 NOTE — ED NOTES
Mode of arrival (squad #, walk in, police, etc) : Walk-in        Chief complaint(s): Nasal congestions, anxious, hot flashes        Arrival Note (brief scenario, treatment PTA, etc).: Patient arrived to ED stating that he has been feeling ill with hot flashes and nasal congestion. Pt states that he tried to take extra of his trazodone to help.         C= \"Have you ever felt that you should Cut down on your drinking?\"  No  A= \"Have people Annoyed you by criticizing your drinking?\"  No  G= \"Have you ever felt bad or Guilty about your drinking?\"  No  E= \"Have you ever had a drink as an Eye-opener first thing in the morning to steady your nerves or to help a hangover?\"  No      Deferred []      Reason for deferring: N/A    *If yes to two or more: probable alcohol abuse.*

## 2024-06-12 NOTE — ED NOTES
Patient refused for COVID and flu  swabs telling he cannot have anything in his nose or throat. Dr Cohen at bed side and aware  about refusing  Covid swab. Patient advised to follow with his PCP in the morning his routine  medical  care .

## 2024-06-13 LAB
EKG ATRIAL RATE: 91 BPM
EKG P AXIS: 33 DEGREES
EKG P-R INTERVAL: 152 MS
EKG Q-T INTERVAL: 344 MS
EKG QRS DURATION: 82 MS
EKG QTC CALCULATION (BAZETT): 423 MS
EKG T AXIS: 38 DEGREES
EKG VENTRICULAR RATE: 91 BPM

## 2024-07-31 ENCOUNTER — HOSPITAL ENCOUNTER (EMERGENCY)
Age: 29
Discharge: HOME | End: 2024-07-31
Attending: EMERGENCY MEDICINE
Payer: MEDICARE

## 2024-07-31 VITALS
BODY MASS INDEX: 44.1 KG/M2 | RESPIRATION RATE: 17 BRPM | OXYGEN SATURATION: 98 % | HEIGHT: 71 IN | WEIGHT: 315 LBS | SYSTOLIC BLOOD PRESSURE: 116 MMHG | TEMPERATURE: 97.8 F | DIASTOLIC BLOOD PRESSURE: 90 MMHG | HEART RATE: 90 BPM

## 2024-07-31 DIAGNOSIS — F32.A DEPRESSION, UNSPECIFIED DEPRESSION TYPE: Primary | ICD-10-CM

## 2024-07-31 PROCEDURE — 99283 EMERGENCY DEPT VISIT LOW MDM: CPT

## 2024-07-31 ASSESSMENT — ENCOUNTER SYMPTOMS
SHORTNESS OF BREATH: 0
ABDOMINAL PAIN: 0
EYE PAIN: 0
BACK PAIN: 0
COLOR CHANGE: 0

## 2024-07-31 ASSESSMENT — PAIN - FUNCTIONAL ASSESSMENT
PAIN_FUNCTIONAL_ASSESSMENT: NONE - DENIES PAIN
PAIN_FUNCTIONAL_ASSESSMENT: NONE - DENIES PAIN

## 2024-08-01 ENCOUNTER — HOSPITAL ENCOUNTER (EMERGENCY)
Age: 29
Discharge: LEFT BEFORE DISCHARGE | End: 2024-08-01
Attending: EMERGENCY MEDICINE
Payer: MEDICARE

## 2024-08-01 ENCOUNTER — APPOINTMENT (OUTPATIENT)
Dept: GENERAL RADIOLOGY | Age: 29
End: 2024-08-01
Payer: MEDICARE

## 2024-08-01 ENCOUNTER — HOSPITAL ENCOUNTER (EMERGENCY)
Age: 29
Discharge: HOME | End: 2024-08-01
Attending: EMERGENCY MEDICINE
Payer: MEDICARE

## 2024-08-01 VITALS
SYSTOLIC BLOOD PRESSURE: 139 MMHG | BODY MASS INDEX: 39.17 KG/M2 | OXYGEN SATURATION: 95 % | TEMPERATURE: 98.1 F | WEIGHT: 315 LBS | RESPIRATION RATE: 20 BRPM | HEIGHT: 75 IN | HEART RATE: 86 BPM | DIASTOLIC BLOOD PRESSURE: 81 MMHG

## 2024-08-01 VITALS
DIASTOLIC BLOOD PRESSURE: 94 MMHG | SYSTOLIC BLOOD PRESSURE: 146 MMHG | TEMPERATURE: 97.9 F | RESPIRATION RATE: 18 BRPM | HEART RATE: 92 BPM | OXYGEN SATURATION: 98 %

## 2024-08-01 DIAGNOSIS — U07.1 COVID-19: Primary | ICD-10-CM

## 2024-08-01 DIAGNOSIS — U07.1 COVID-19 VIRUS DETECTED: Primary | ICD-10-CM

## 2024-08-01 LAB
BASOPHILS # BLD: 0.1 K/UL (ref 0–0.2)
BASOPHILS NFR BLD: 1 % (ref 0–2)
EOSINOPHIL # BLD: 0.1 K/UL (ref 0–0.4)
EOSINOPHILS RELATIVE PERCENT: 1 % (ref 0–4)
ERYTHROCYTE [DISTWIDTH] IN BLOOD BY AUTOMATED COUNT: 14.9 % (ref 11.5–14.9)
FLUAV RNA RESP QL NAA+PROBE: NOT DETECTED
FLUBV RNA RESP QL NAA+PROBE: NOT DETECTED
HCT VFR BLD AUTO: 39.2 % (ref 41–53)
HGB BLD-MCNC: 12.5 G/DL (ref 13.5–17.5)
LYMPHOCYTES NFR BLD: 1.7 K/UL (ref 1–4.8)
LYMPHOCYTES RELATIVE PERCENT: 18 % (ref 24–44)
MAGNESIUM SERPL-MCNC: 1.9 MG/DL (ref 1.6–2.6)
MCH RBC QN AUTO: 27 PG (ref 26–34)
MCHC RBC AUTO-ENTMCNC: 31.8 G/DL (ref 31–37)
MCV RBC AUTO: 85.1 FL (ref 80–100)
MONOCYTES NFR BLD: 1 K/UL (ref 0.1–1.3)
MONOCYTES NFR BLD: 12 % (ref 1–7)
NEUTROPHILS NFR BLD: 68 % (ref 36–66)
NEUTS SEG NFR BLD: 6.2 K/UL (ref 1.3–9.1)
PLATELET # BLD AUTO: 245 K/UL (ref 150–450)
PMV BLD AUTO: 8.3 FL (ref 6–12)
RBC # BLD AUTO: 4.61 M/UL (ref 4.5–5.9)
SARS-COV-2 RNA RESP QL NAA+PROBE: DETECTED
SOURCE: ABNORMAL
SPECIMEN DESCRIPTION: ABNORMAL
TROPONIN I SERPL HS-MCNC: <6 NG/L (ref 0–22)
WBC OTHER # BLD: 9 K/UL (ref 3.5–11)

## 2024-08-01 PROCEDURE — 85025 COMPLETE CBC W/AUTO DIFF WBC: CPT

## 2024-08-01 PROCEDURE — 71045 X-RAY EXAM CHEST 1 VIEW: CPT

## 2024-08-01 PROCEDURE — 84484 ASSAY OF TROPONIN QUANT: CPT

## 2024-08-01 PROCEDURE — 83735 ASSAY OF MAGNESIUM: CPT

## 2024-08-01 PROCEDURE — 87636 SARSCOV2 & INF A&B AMP PRB: CPT

## 2024-08-01 PROCEDURE — 99285 EMERGENCY DEPT VISIT HI MDM: CPT | Performed by: EMERGENCY MEDICINE

## 2024-08-01 PROCEDURE — 99285 EMERGENCY DEPT VISIT HI MDM: CPT

## 2024-08-01 PROCEDURE — 36415 COLL VENOUS BLD VENIPUNCTURE: CPT

## 2024-08-01 PROCEDURE — 93005 ELECTROCARDIOGRAM TRACING: CPT | Performed by: EMERGENCY MEDICINE

## 2024-08-01 ASSESSMENT — ENCOUNTER SYMPTOMS
SORE THROAT: 1
ABDOMINAL PAIN: 0
NAUSEA: 0
SHORTNESS OF BREATH: 1
VOMITING: 0
COUGH: 1

## 2024-08-01 ASSESSMENT — PAIN SCALES - GENERAL: PAINLEVEL_OUTOF10: 7

## 2024-08-01 NOTE — ED NOTES
Provisional Diagnosis:     Patient presented to ED via squad for a mental health evaluation.  Patient identifying suicidal ideations.    Psychosocial and Contextual Factors:     Patient has intellectual disability.    C-SSRS Summary:    Patient identifying current SI with thoughts to jump off a bridge.    Patient: X  Family:   Agency:     Substance Abuse  Patient reports occasional THC use.    Present Suicidal Behavior:    Patient identifying current SI with thoughts to jump off a bridge.    Verbal:  X    Attempt:    Past Suicidal Behavior:   Patient has a significant history of identifying SI, no history of attempts.    Verbal: X    Attempt:    Self-Injurious/Self-Mutilation:  Patient denies    Violence Current or Past   None documented or identified    Trauma Identified:    Patient reports his girlfriends mother is dying.    Protective Factors:    Patient has insurance.  Patient has [employer] staff.  Patient linked with [employer].  Patient takes medications as prescribed.    Risk Factors:    Patient has intellectual disability.  Patient has poor insight.  Patient has poor coping skills.  Patient has poor judgement.    Clinical Summary:    Patient is a 29 year old  male who presented to ED via squad for a mental health evaluation.  Patient identifying current SI with thoughts to jump off a bridge.  Patient reports his girlfriend's mother is dying and she \"is like a mom to me\".  Patient is linked with [employer] and taking his medications as prescribed.  Patient reports he was \"kicked out of therapy\" because he is dating his girlfriend and he has not followed up with obtaining another therapist yet.  Patient denies any previous suicide attempts, but does have a significant history of identifying suicidal thoughts.  Upon entry to [facility], patient identified that he said he was suicidal \"out of anger\".  Patient denies HI or hallucinations.  Patient does live in a group home and has [employer] staff in the home.  Patient does admit

## 2024-08-01 NOTE — ED PROVIDER NOTES
EMERGENCY DEPARTMENT ENCOUNTER    Pt Name: Chino Carrillo  MRN: [id number]  Birthdate 1995  Date of evaluation: 7/31/24  CHIEF COMPLAINT       Chief Complaint   Patient presents with    Suicidal     HISTORY OF PRESENT ILLNESS   29-year-old male presents for mental health evaluation.  Patient reports that he has been feeling more depressed recently, he states that his girlfriend's mom is terminally ill and this has made him very upset.  He states that today he was being aggressive with his staff because he was upset and they sent him in for evaluation because he reportedly told them he was suicidal.  Patient states that he was not actually suicidal he is just upset.  He denies any current suicidal or homicidal ideation denies any visual or auditory hallucinations denies any recent drug or alcohol use denies any medical complaints currently.    The history is provided by the patient.           REVIEW OF SYSTEMS     Review of Systems   Constitutional:  Negative for chills and fever.   HENT:  Negative for congestion and ear pain.    Eyes:  Negative for pain.   Respiratory:  Negative for shortness of breath.    Cardiovascular:  Negative for chest pain, palpitations and leg swelling.   Gastrointestinal:  Negative for abdominal pain.   Genitourinary:  Negative for dysuria and flank pain.   Musculoskeletal:  Negative for back pain.   Skin:  Negative for color change.   Neurological:  Negative for numbness and headaches.   Psychiatric/Behavioral:  Negative for confusion.    All other systems reviewed and are negative.    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

## 2024-08-01 NOTE — ED NOTES
Pt frequently using call light and asking \"how much longer\". Writer assured pt test are still pending

## 2024-08-01 NOTE — ED PROVIDER NOTES
EMERGENCY DEPARTMENT ENCOUNTER    Pt Name: Chino Carrillo  MRN: [id number]  Birthdate 1995  Date of evaluation: 8/1/24  CHIEF COMPLAINT       Chief Complaint   Patient presents with    Shortness of Breath     SOB, has been sick since 7/31, body aches, chest hurts, was here last night for mental health eval. \"Just took a bunch of nyquill 30mins ago\".Sore throat    Chest Pain     HISTORY OF PRESENT ILLNESS   Presenting for chief complaint of cold-like symptoms.  He is describing a cough as well as rhinorrhea and nasal congestion.  He admits to myalgias.  He said that his friend was ill and did not tell him causing him to be exposed.  He is also saying that the pain is in his chest and he is feeling short of breath.  He said that his symptoms started yesterday morning.    The history is provided by the patient.           REVIEW OF SYSTEMS     Review of Systems   Constitutional:  Negative for chills and fever.   HENT:  Positive for congestion and sore throat.    Eyes:  Negative for visual disturbance.   Respiratory:  Positive for cough and shortness of breath.    Cardiovascular:  Positive for chest pain.   Gastrointestinal:  Negative for abdominal pain, nausea and vomiting.   Genitourinary:  Negative for flank pain.   Musculoskeletal:  Positive for myalgias.   Neurological:  Positive for light-headedness. Negative for headaches.     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

## 2024-08-01 NOTE — ED NOTES
Pt pulled IV out because \"I want to go\". Cath intact. D/c instructions given, pt and visitor given masks and ambulated out without difficulty

## 2024-08-02 NOTE — ED PROVIDER NOTES
[facility]     Emergency Department     Faculty Attestation  9:07 PM EDT      I performed a history and physical examination of the patient and discussed management with the resident. I have reviewed and agree with the resident’s findings including all diagnostic interpretations, and treatment plans as written. Any areas of disagreement are noted on the chart. I was personally present for the key portions of any procedures. I have documented in the chart those procedures where I was not present during the key portions. I have reviewed the emergency nurses triage note. I agree with the chief complaint, past medical history, past surgical history, allergies, medications, social and family history as documented unless otherwise noted below. Documentation of the HPI, Physical Exam and Medical Decision Making performed by scribcollins is based on my personal performance of the HPI, PE and MDM. For Physician Assistant/ Nurse Practitioner cases/documentation I have personally evaluated this patient and have completed at least one if not all key elements of the E/M (history, physical exam, and MDM). Additional findings are as noted.    Patient tested positive for covid at [place] this morning, was told there was nothing to do and then left, and now came to see if can get a second opinion, patient is accompanied by GF and one other female, he lives at a some kind of home where \"staff\" helps him, and he does have psyche medications, but staff lost the key to his medications, so he has not taken him home medications tonight, and reports he is very ansy, and also reports episodes of emesis tonight, last tried to eat a hot dog this morning,   When I went in to evaluated the patient he is asking to leave. He is standing up and ambulating in the room,  Abdomen is soft, no resp distress, his lungs are clear to ausculation bilaterally, and he is speaking in full sentences, and 
respiratory distress at this point though he looks little drowsy but he denies losing consciousness or altered mental status but keeping in mind and his history of obstructive sleep apnea and not using CPAP CO2 narcosis is a possibility.  He was asking for medications for COVID-19 specifically, I told him there is no such medications for COVID-19 but it is a supportive management if it is mild or moderate.  I want to do a blood gas to look for CO2 and plan accordingly.        EKG  Not indicated    All EKG's are interpreted by the Emergency Department Physician who either signs or Co-signs this chart in the absence of a cardiologist.    EMERGENCY DEPARTMENT COURSE:      ED Course as of 08/01/24 2124   Thu Aug 01, 2024   2123 I told him about COVID-19 infection conservative management depending upon his clinical condition.  He wanted to leave, I went to discuss with the attending.  He left his room. [TS]      ED Course User Index  [TS] Greg Foster MD       PROCEDURES:  None    CONSULTS:  None    CRITICAL CARE:  There was significant risk of life threatening deterioration of patient's condition requiring my direct management. Critical care time 0 minutes, excluding any documented procedures.    FINAL IMPRESSION      1. COVID-19          DISPOSITION / PLAN     DISPOSITION Eloped - Left Before Treatment Complete 08/01/2024 09:24:10 PM      PATIENT REFERRED TO:  No follow-up provider specified.    DISCHARGE MEDICATIONS:  Discharge Medication List as of 8/1/2024  8:56 PM          Greg Foster MD  Emergency Medicine Resident    (Please note that portions of this note were completed with a voice recognition program.  Efforts were made to edit the dictations but occasionally words are mis-transcribed.)

## 2024-08-02 NOTE — ED NOTES
Detail Level: Simple Pt presents to ED via walking through triage with two visitors c/o concern for COVID. Pt left [place] today after being COVID tested. Pt tested positive for COVID. Pt reports \"I want to be admitted because I can't breath out of my nose.\"   Pt is Aox4 and ambulatory.   NAD noted.  Call light within reach.  ED resident at bedside to evaluate pt   Detail Level: Zone Detail Level: Detailed

## 2024-08-03 LAB
EKG ATRIAL RATE: 78 BPM
EKG P AXIS: 38 DEGREES
EKG P-R INTERVAL: 164 MS
EKG Q-T INTERVAL: 376 MS
EKG QRS DURATION: 94 MS
EKG QTC CALCULATION (BAZETT): 428 MS
EKG R AXIS: -15 DEGREES
EKG T AXIS: 54 DEGREES
EKG VENTRICULAR RATE: 78 BPM

## 2024-08-03 PROCEDURE — 93010 ELECTROCARDIOGRAM REPORT: CPT | Performed by: INTERNAL MEDICINE

## 2024-08-31 ENCOUNTER — HOSPITAL ENCOUNTER (EMERGENCY)
Age: 29
Discharge: HOME | End: 2024-08-31
Attending: EMERGENCY MEDICINE
Payer: MEDICARE

## 2024-08-31 VITALS
DIASTOLIC BLOOD PRESSURE: 74 MMHG | TEMPERATURE: 98.7 F | OXYGEN SATURATION: 97 % | RESPIRATION RATE: 17 BRPM | HEART RATE: 76 BPM | SYSTOLIC BLOOD PRESSURE: 121 MMHG

## 2024-08-31 DIAGNOSIS — F48.9 MENTAL HEALTH PROBLEM: Primary | ICD-10-CM

## 2024-08-31 PROCEDURE — 99285 EMERGENCY DEPT VISIT HI MDM: CPT

## 2024-08-31 ASSESSMENT — ENCOUNTER SYMPTOMS
PHOTOPHOBIA: 0
COUGH: 0
SHORTNESS OF BREATH: 0
NAUSEA: 0
VOMITING: 0
DIARRHEA: 0
ABDOMINAL PAIN: 0
COLOR CHANGE: 0
TROUBLE SWALLOWING: 0

## 2024-08-31 ASSESSMENT — PAIN - FUNCTIONAL ASSESSMENT: PAIN_FUNCTIONAL_ASSESSMENT: NONE - DENIES PAIN

## 2024-08-31 NOTE — ED NOTES
Mode of arrival (squad #, walk in, police, etc) : Walk-in        Chief complaint(s): Suicidal with a plan        Arrival Note (brief scenario, treatment PTA, etc).: Pt arrived to the ED with originally back pain but changed it to being suicidal with a plan to take a butter knife and stab himself. Pt states he got into an argument with his significant other and family that makes him want to harm himself. Pt denies HI, drug use or alcohol use at this time.          C= \"Have you ever felt that you should Cut down on your drinking?\"  No  A= \"Have people Annoyed you by criticizing your drinking?\"  No  G= \"Have you ever felt bad or Guilty about your drinking?\"  No  E= \"Have you ever had a drink as an Eye-opener first thing in the morning to steady your nerves or to help a hangover?\"  No      Deferred []      Reason for deferring: N/A    *If yes to two or more: probable alcohol abuse.*

## 2024-08-31 NOTE — ED PROVIDER NOTES
EMERGENCY DEPARTMENT ENCOUNTER    Pt Name: Chino Carrillo  MRN: [id number]  Birthdate 1995  Date of evaluation: 8/31/24  CHIEF COMPLAINT       Chief Complaint   Patient presents with    Mental Health Problem     HISTORY OF PRESENT ILLNESS   29-year-old male presenting to the ER complaining of feeling like he wanted to be aggressive towards others.  Patient does not have a plan of how he would do it.  Patient was hoping to have his medications adjusted.  The patient was booted out of [facility] recently and was in the middle of trying to get into the [facility].  Patient denied any suicidal or homicidal ideations.  Patient states he was thinking about cutting his wrist to distract from the emotional pain.    The history is provided by the patient.   Mental Health Problem  Presenting symptoms: self-mutilation (thoughts of doing so)    Presenting symptoms: no agitation, no homicidal ideas and no suicidal thoughts    Associated symptoms: no abdominal pain, no chest pain and no fatigue            REVIEW OF SYSTEMS     Review of Systems   Constitutional:  Negative for activity change, fatigue and fever.   HENT:  Negative for congestion, ear pain and trouble swallowing.    Eyes:  Negative for photophobia and visual disturbance.   Respiratory:  Negative for cough and shortness of breath.    Cardiovascular:  Negative for chest pain and palpitations.   Gastrointestinal:  Negative for abdominal pain, diarrhea, nausea and vomiting.   Genitourinary:  Negative for dysuria, flank pain and urgency.   Musculoskeletal:  Negative for arthralgias and myalgias.   Skin:  Negative for color change and rash.   Neurological:  Negative for dizziness and facial asymmetry.   Psychiatric/Behavioral:  Positive for self-injury (thoughts of doing so). Negative for agitation, behavioral problems, homicidal ideas and suicidal ideas.      PASTMEDICAL HISTORY     Past Medical History:   Diagnosis Date    Autistic disorder     Benign tumor of ear canal   by mouth 2 times daily as needed for Pain    METFORMIN (GLUCOPHAGE) 500 MG TABLET    Take 1 tablet by mouth 2 times daily    NYSTATIN (MYCOSTATIN) 089809 UNIT/GM CREAM    Apply topically 2 times daily Apply topically 2 times daily.    OXCARBAZEPINE (TRILEPTAL) 300 MG TABLET    Take 1 tablet by mouth 2 times daily    PALIPERIDONE (INVEGA) 9 MG EXTENDED RELEASE TABLET    Take 1 tablet by mouth daily    PALIPERIDONE PALMITATE ER (INVEGA SUSTENNA) 234 MG/1.5ML DENNIS IM INJECTION    Inject 234 mg into the muscle every 28 days    PROPRANOLOL (INDERAL) 20 MG TABLET    Take 1 tablet by mouth 3 times daily    SEMAGLUTIDE,0.25 OR 0.5MG/DOS, (OZEMPIC, 0.25 OR 0.5 MG/DOSE,) 2 MG/1.5ML SOPN    Inject 0.5 mg into the skin once a week    TRAZODONE (DESYREL) 50 MG TABLET    Take 1 tablet by mouth nightly as needed for Sleep     ALLERGIES     is allergic to latex and hydromorphone.  FAMILY HISTORY     is adopted.      SOCIAL HISTORY       Social History     Tobacco Use    Smoking status: Former     Types: Cigarettes    Smokeless tobacco: Former     Types: Chew   Vaping Use    Vaping status: Every Day    Substances: Nicotine   Substance Use Topics    Alcohol use: No    Drug use: Yes     Types: Marijuana (Weed)     Comment: occasionally     PHYSICAL EXAM     INITIAL VITALS: /74   Pulse 76   Temp 98.7 °F (37.1 °C) (Oral)   Resp 17   SpO2 97%    Physical Exam  Constitutional:       General: He is not in acute distress.     Appearance: Normal appearance.   HENT:      Head: Normocephalic and atraumatic.      Right Ear: External ear normal.      Left Ear: External ear normal.      Nose: Nose normal. No congestion or rhinorrhea.   Eyes:      Extraocular Movements: Extraocular movements intact.      Pupils: Pupils are equal, round, and reactive to light.   Cardiovascular:      Rate and Rhythm: Normal rate and regular rhythm.      Pulses: Normal pulses.      Heart sounds: Normal heart sounds.   Pulmonary:      Effort: Pulmonary

## 2024-08-31 NOTE — ED NOTES
The following service(s) is/are recommended for behavioral health follow-up:   Contact [facility] line [phone] any time for support.   Medications: Take Medications as prescribed. Let your physician know if you have any concerns.   Recovery Help line for assistance with linkage to mental health and substance use services. Call 211.   Return to Emergency Department if you have any further medical concerns.   Patient given National suicide prevention line at 1-388.708.9748.   Patient is to follow-up with providers at [facility] urgent care.
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
## 2024-10-16 ENCOUNTER — HOSPITAL ENCOUNTER (EMERGENCY)
Age: 29
Discharge: HOME | End: 2024-10-16
Attending: STUDENT IN AN ORGANIZED HEALTH CARE EDUCATION/TRAINING PROGRAM
Payer: MEDICARE

## 2024-10-16 VITALS
BODY MASS INDEX: 55.62 KG/M2 | RESPIRATION RATE: 16 BRPM | WEIGHT: 315 LBS | DIASTOLIC BLOOD PRESSURE: 65 MMHG | HEART RATE: 16 BPM | SYSTOLIC BLOOD PRESSURE: 132 MMHG | OXYGEN SATURATION: 97 % | TEMPERATURE: 98.2 F

## 2024-10-16 DIAGNOSIS — F43.21 GRIEF REACTION: Primary | ICD-10-CM

## 2024-10-16 PROCEDURE — 99282 EMERGENCY DEPT VISIT SF MDM: CPT

## 2024-10-16 ASSESSMENT — ENCOUNTER SYMPTOMS
SORE THROAT: 0
DIARRHEA: 0
EYE DISCHARGE: 0
VOMITING: 0
EYE REDNESS: 0
SHORTNESS OF BREATH: 0
CHEST TIGHTNESS: 0
ABDOMINAL PAIN: 0
NAUSEA: 0
RHINORRHEA: 0

## 2024-10-17 NOTE — ED PROVIDER NOTES
Time-based billing (NON-critical care) Time-based billing (NON-critical care) Time-based billing (NON-critical care)         MEDICAL DECISION MAKING / ED COURSE:     29-year-old presenting with grief     1)  Number and Complexity of Problems Addressed at this Encounter  Problem List This Visit:  grief    Differential Diagnosis: Grief reaction, depression    Diagnoses Considered but Do Not Suspect: Suicidal    Pertinent Comorbid Conditions:  see history    2)  Data Reviewed and Analyzed  (Lab and radiology tests/orders below in next section)    External Documents Reviewed:  multiple ed visits          3)  Treatment and Disposition                 Disposition discussion with patient/family, Shared Decision Making: Develop safety plan    Instructed to call suicide hotline or return if any thoughts of harming himself    Patient agreed        DATA FOR LAB AND RADIOLOGY TESTS ORDERED BELOW ARE REVIEWED BY THE ED CLINICIAN:    RADIOLOGY: All x-rays, CT, MRI, and formal ultrasound images (except ED bedside ultrasound) are read by the radiologist, see reports below, unless otherwise noted in MDM or here.  Reports below are reviewed by myself.  No orders to display       LABS: Lab orders shown below, the results are reviewed by myself, and all abnormals are listed below.  Labs Reviewed - No data to display    Vitals Reviewed:    Vitals:    10/16/24 2227   BP: 132/65   Pulse: (!) 16   Resp: 16   Temp: 98.2 °F (36.8 °C)   TempSrc: Oral   SpO2: 97%     MEDICATIONS GIVEN TO PATIENT THIS ENCOUNTER:  No orders of the defined types were placed in this encounter.    DISCHARGE PRESCRIPTIONS:  New Prescriptions    No medications on file     PHYSICIAN CONSULTS ORDERED THIS ENCOUNTER:  None  FINAL IMPRESSION      1. Grief reaction          DISPOSITION/PLAN   DISPOSITION Decision To Discharge 10/16/2024 10:19:19 PM  Condition at Disposition: Data Unavailable      OUTPATIENT FOLLOW UP THE PATIENT:  [facility] ED  [address] [address]  [phone]    If symptoms worsen      MD Ankit White Jeffrey, MD  10/16/24  Time-based billing (NON-critical care)

## 2024-10-17 NOTE — ED TRIAGE NOTES
Mode of arrival (squad #, walk in, police, etc) : walk in        Chief complaint(s): mental health problem        Arrival Note (brief scenario, treatment PTA, etc).: pt said he made a suicidal statement when thinking of the anniversary of friends death, but that he had no intent and regretting saying. Pt says he is not suicidal, feels safe to go home, and is aware of resources available to him if he feels suicidal again.        C= \"Have you ever felt that you should Cut down on your drinking?\"  No  A= \"Have people Annoyed you by criticizing your drinking?\"  No  G= \"Have you ever felt bad or Guilty about your drinking?\"  No  E= \"Have you ever had a drink as an Eye-opener first thing in the morning to steady your nerves or to help a hangover?\"  No      Deferred []      Reason for deferring: N/A    *If yes to two or more: probable alcohol abuse.*

## 2024-11-12 ENCOUNTER — APPOINTMENT (OUTPATIENT)
Dept: CT IMAGING | Age: 29
End: 2024-11-12
Payer: MEDICARE

## 2024-11-12 ENCOUNTER — HOSPITAL ENCOUNTER (EMERGENCY)
Age: 29
Discharge: HOME | End: 2024-11-12
Attending: EMERGENCY MEDICINE
Payer: MEDICARE

## 2024-11-12 VITALS
DIASTOLIC BLOOD PRESSURE: 62 MMHG | OXYGEN SATURATION: 96 % | SYSTOLIC BLOOD PRESSURE: 116 MMHG | BODY MASS INDEX: 39.17 KG/M2 | TEMPERATURE: 97.3 F | RESPIRATION RATE: 18 BRPM | HEART RATE: 73 BPM | WEIGHT: 315 LBS | HEIGHT: 75 IN

## 2024-11-12 DIAGNOSIS — S09.90XA INJURY OF HEAD, INITIAL ENCOUNTER: Primary | ICD-10-CM

## 2024-11-12 PROCEDURE — 99284 EMERGENCY DEPT VISIT MOD MDM: CPT

## 2024-11-12 PROCEDURE — 6370000000 HC RX 637 (ALT 250 FOR IP): Performed by: EMERGENCY MEDICINE

## 2024-11-12 PROCEDURE — 70450 CT HEAD/BRAIN W/O DYE: CPT

## 2024-11-12 RX ORDER — CYCLOBENZAPRINE HCL 10 MG
10 TABLET ORAL NIGHTLY PRN
Qty: 10 TABLET | Refills: 0 | Status: SHIPPED | OUTPATIENT
Start: 2024-11-12 | End: 2024-11-22

## 2024-11-12 RX ORDER — IBUPROFEN 600 MG/1
600 TABLET, FILM COATED ORAL ONCE
Status: COMPLETED | OUTPATIENT
Start: 2024-11-12 | End: 2024-11-12

## 2024-11-12 RX ADMIN — IBUPROFEN 600 MG: 600 TABLET, FILM COATED ORAL at 02:33

## 2024-11-12 ASSESSMENT — ENCOUNTER SYMPTOMS
ABDOMINAL PAIN: 0
SHORTNESS OF BREATH: 0
COUGH: 0

## 2024-11-12 ASSESSMENT — PAIN SCALES - GENERAL
PAINLEVEL_OUTOF10: 6
PAINLEVEL_OUTOF10: 8

## 2024-11-12 ASSESSMENT — PAIN DESCRIPTION - LOCATION: LOCATION: HEAD

## 2024-11-12 ASSESSMENT — PAIN DESCRIPTION - DESCRIPTORS: DESCRIPTORS: ACHING
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
## 2024-12-16 ENCOUNTER — HOSPITAL ENCOUNTER (INPATIENT)
Age: 29
LOS: 5 days | Discharge: HOME | DRG: 885 | End: 2024-12-21
Attending: EMERGENCY MEDICINE | Admitting: PSYCHIATRY & NEUROLOGY
Payer: MEDICARE

## 2024-12-16 DIAGNOSIS — R45.851 DEPRESSION WITH SUICIDAL IDEATION: Primary | ICD-10-CM

## 2024-12-16 DIAGNOSIS — F32.A DEPRESSION WITH SUICIDAL IDEATION: Primary | ICD-10-CM

## 2024-12-16 LAB
ALBUMIN SERPL-MCNC: 4.2 G/DL (ref 3.5–5.2)
ALP SERPL-CCNC: 82 U/L (ref 40–129)
ALT SERPL-CCNC: 34 U/L (ref 10–50)
ANION GAP SERPL CALCULATED.3IONS-SCNC: 13 MMOL/L (ref 9–16)
APAP SERPL-MCNC: <5 UG/ML (ref 10–30)
AST SERPL-CCNC: 31 U/L (ref 10–50)
BILIRUB SERPL-MCNC: 0.3 MG/DL (ref 0–1.2)
BUN SERPL-MCNC: 10 MG/DL (ref 6–20)
CALCIUM SERPL-MCNC: 9.8 MG/DL (ref 8.6–10.4)
CHLORIDE SERPL-SCNC: 102 MMOL/L (ref 98–107)
CO2 SERPL-SCNC: 25 MMOL/L (ref 20–31)
CREAT SERPL-MCNC: 0.9 MG/DL (ref 0.7–1.2)
ERYTHROCYTE [DISTWIDTH] IN BLOOD BY AUTOMATED COUNT: 14.6 % (ref 11.5–14.9)
ETHANOL PERCENT: ABNORMAL %
ETHANOLAMINE SERPL-MCNC: <10 MG/DL (ref 0–0.08)
GFR, ESTIMATED: >90 ML/MIN/1.73M2
GLUCOSE BLD-MCNC: 111 MG/DL (ref 75–110)
GLUCOSE SERPL-MCNC: 93 MG/DL (ref 74–99)
HCT VFR BLD AUTO: 38.8 % (ref 41–53)
HGB BLD-MCNC: 12.6 G/DL (ref 13.5–17.5)
MCH RBC QN AUTO: 27.2 PG (ref 26–34)
MCHC RBC AUTO-ENTMCNC: 32.4 G/DL (ref 31–37)
MCV RBC AUTO: 84.1 FL (ref 80–100)
PLATELET # BLD AUTO: 270 K/UL (ref 150–450)
PMV BLD AUTO: 7.9 FL (ref 6–12)
POTASSIUM SERPL-SCNC: 4.2 MMOL/L (ref 3.7–5.3)
PROT SERPL-MCNC: 7.5 G/DL (ref 6.6–8.7)
RBC # BLD AUTO: 4.62 M/UL (ref 4.5–5.9)
SALICYLATES SERPL-MCNC: <1 MG/DL (ref 0–10)
SODIUM SERPL-SCNC: 140 MMOL/L (ref 136–145)
TSH SERPL DL<=0.05 MIU/L-ACNC: 3.54 UIU/ML (ref 0.27–4.2)
WBC OTHER # BLD: 10.4 K/UL (ref 3.5–11)

## 2024-12-16 PROCEDURE — 80053 COMPREHEN METABOLIC PANEL: CPT

## 2024-12-16 PROCEDURE — 6370000000 HC RX 637 (ALT 250 FOR IP): Performed by: NURSE PRACTITIONER

## 2024-12-16 PROCEDURE — 99285 EMERGENCY DEPT VISIT HI MDM: CPT

## 2024-12-16 PROCEDURE — 82947 ASSAY GLUCOSE BLOOD QUANT: CPT

## 2024-12-16 PROCEDURE — 80143 DRUG ASSAY ACETAMINOPHEN: CPT

## 2024-12-16 PROCEDURE — 84443 ASSAY THYROID STIM HORMONE: CPT

## 2024-12-16 PROCEDURE — G0480 DRUG TEST DEF 1-7 CLASSES: HCPCS

## 2024-12-16 PROCEDURE — 80179 DRUG ASSAY SALICYLATE: CPT

## 2024-12-16 PROCEDURE — 85027 COMPLETE CBC AUTOMATED: CPT

## 2024-12-16 PROCEDURE — 36415 COLL VENOUS BLD VENIPUNCTURE: CPT

## 2024-12-16 PROCEDURE — 1240000000 HC EMOTIONAL WELLNESS R&B

## 2024-12-16 RX ORDER — HYDROXYZINE HYDROCHLORIDE 50 MG/1
50 TABLET, FILM COATED ORAL 3 TIMES DAILY PRN
Status: DISCONTINUED | OUTPATIENT
Start: 2024-12-16 | End: 2024-12-21 | Stop reason: HOSPADM

## 2024-12-16 RX ORDER — ACETAMINOPHEN 325 MG/1
650 TABLET ORAL EVERY 4 HOURS PRN
Status: DISCONTINUED | OUTPATIENT
Start: 2024-12-16 | End: 2024-12-21 | Stop reason: HOSPADM

## 2024-12-16 RX ORDER — TRAZODONE HYDROCHLORIDE 50 MG/1
50 TABLET, FILM COATED ORAL NIGHTLY PRN
Status: DISCONTINUED | OUTPATIENT
Start: 2024-12-16 | End: 2024-12-21 | Stop reason: HOSPADM

## 2024-12-16 RX ORDER — DIPHENHYDRAMINE HYDROCHLORIDE 50 MG/ML
50 INJECTION INTRAMUSCULAR; INTRAVENOUS EVERY 6 HOURS PRN
Status: DISCONTINUED | OUTPATIENT
Start: 2024-12-16 | End: 2024-12-21 | Stop reason: HOSPADM

## 2024-12-16 RX ORDER — IBUPROFEN 400 MG/1
400 TABLET, FILM COATED ORAL EVERY 6 HOURS PRN
Status: DISCONTINUED | OUTPATIENT
Start: 2024-12-16 | End: 2024-12-21 | Stop reason: HOSPADM

## 2024-12-16 RX ORDER — INSULIN LISPRO 100 [IU]/ML
0-4 INJECTION, SOLUTION INTRAVENOUS; SUBCUTANEOUS
Status: DISCONTINUED | OUTPATIENT
Start: 2024-12-17 | End: 2024-12-21 | Stop reason: HOSPADM

## 2024-12-16 RX ORDER — POLYETHYLENE GLYCOL 3350 17 G
2 POWDER IN PACKET (EA) ORAL
Status: DISCONTINUED | OUTPATIENT
Start: 2024-12-16 | End: 2024-12-21 | Stop reason: HOSPADM

## 2024-12-16 RX ORDER — HALOPERIDOL 5 MG/1
5 TABLET ORAL EVERY 6 HOURS PRN
Status: DISCONTINUED | OUTPATIENT
Start: 2024-12-16 | End: 2024-12-21 | Stop reason: HOSPADM

## 2024-12-16 RX ORDER — HALOPERIDOL 5 MG/ML
5 INJECTION INTRAMUSCULAR EVERY 6 HOURS PRN
Status: DISCONTINUED | OUTPATIENT
Start: 2024-12-16 | End: 2024-12-21 | Stop reason: HOSPADM

## 2024-12-16 RX ORDER — MAGNESIUM HYDROXIDE/ALUMINUM HYDROXICE/SIMETHICONE 120; 1200; 1200 MG/30ML; MG/30ML; MG/30ML
30 SUSPENSION ORAL EVERY 6 HOURS PRN
Status: DISCONTINUED | OUTPATIENT
Start: 2024-12-16 | End: 2024-12-21 | Stop reason: HOSPADM

## 2024-12-16 RX ORDER — DEXTROSE MONOHYDRATE 100 MG/ML
INJECTION, SOLUTION INTRAVENOUS CONTINUOUS PRN
Status: DISCONTINUED | OUTPATIENT
Start: 2024-12-16 | End: 2024-12-21 | Stop reason: HOSPADM

## 2024-12-16 RX ORDER — POLYETHYLENE GLYCOL 3350 17 G/17G
17 POWDER, FOR SOLUTION ORAL DAILY PRN
Status: DISCONTINUED | OUTPATIENT
Start: 2024-12-16 | End: 2024-12-21 | Stop reason: HOSPADM

## 2024-12-16 RX ADMIN — HYDROXYZINE HYDROCHLORIDE 50 MG: 50 TABLET ORAL at 23:32

## 2024-12-16 RX ADMIN — TRAZODONE HYDROCHLORIDE 50 MG: 50 TABLET ORAL at 23:32

## 2024-12-16 ASSESSMENT — PATIENT HEALTH QUESTIONNAIRE - PHQ9
1. LITTLE INTEREST OR PLEASURE IN DOING THINGS: SEVERAL DAYS
SUM OF ALL RESPONSES TO PHQ QUESTIONS 1-9: 2
SUM OF ALL RESPONSES TO PHQ9 QUESTIONS 1 & 2: 2
SUM OF ALL RESPONSES TO PHQ QUESTIONS 1-9: 2
2. FEELING DOWN, DEPRESSED OR HOPELESS: SEVERAL DAYS
SUM OF ALL RESPONSES TO PHQ QUESTIONS 1-9: 2
SUM OF ALL RESPONSES TO PHQ QUESTIONS 1-9: 2

## 2024-12-16 ASSESSMENT — SLEEP AND FATIGUE QUESTIONNAIRES
SLEEP PATTERN: DIFFICULTY FALLING ASLEEP;RESTLESSNESS
AVERAGE NUMBER OF SLEEP HOURS: 6
DO YOU USE A SLEEP AID: NO
DO YOU HAVE DIFFICULTY SLEEPING: YES

## 2024-12-16 ASSESSMENT — PAIN - FUNCTIONAL ASSESSMENT: PAIN_FUNCTIONAL_ASSESSMENT: NONE - DENIES PAIN

## 2024-12-16 ASSESSMENT — ENCOUNTER SYMPTOMS
SHORTNESS OF BREATH: 0
FACIAL SWELLING: 0
SINUS PRESSURE: 0
COLOR CHANGE: 0
EYE REDNESS: 0
WHEEZING: 0
CONSTIPATION: 0
VOMITING: 0
ABDOMINAL PAIN: 0
COUGH: 0
NAUSEA: 0
RHINORRHEA: 0
TROUBLE SWALLOWING: 0
CHEST TIGHTNESS: 0
EYE PAIN: 0
SORE THROAT: 0
BLOOD IN STOOL: 0
DIARRHEA: 0
EYE DISCHARGE: 0
BACK PAIN: 0

## 2024-12-17 PROBLEM — F32.A DEPRESSION WITH SUICIDAL IDEATION: Status: ACTIVE | Noted: 2024-12-17

## 2024-12-17 PROBLEM — R45.851 DEPRESSION WITH SUICIDAL IDEATION: Status: ACTIVE | Noted: 2024-12-17

## 2024-12-17 LAB
GLUCOSE BLD-MCNC: 100 MG/DL (ref 75–110)
GLUCOSE BLD-MCNC: 109 MG/DL (ref 75–110)
GLUCOSE BLD-MCNC: 134 MG/DL (ref 75–110)
GLUCOSE BLD-MCNC: 99 MG/DL (ref 75–110)

## 2024-12-17 PROCEDURE — 99222 1ST HOSP IP/OBS MODERATE 55: CPT | Performed by: INTERNAL MEDICINE

## 2024-12-17 PROCEDURE — 82947 ASSAY GLUCOSE BLOOD QUANT: CPT

## 2024-12-17 PROCEDURE — 99222 1ST HOSP IP/OBS MODERATE 55: CPT | Performed by: PSYCHIATRY & NEUROLOGY

## 2024-12-17 PROCEDURE — APPSS60 APP SPLIT SHARED TIME 46-60 MINUTES: Performed by: NURSE PRACTITIONER

## 2024-12-17 PROCEDURE — 6370000000 HC RX 637 (ALT 250 FOR IP): Performed by: NURSE PRACTITIONER

## 2024-12-17 PROCEDURE — 6370000000 HC RX 637 (ALT 250 FOR IP): Performed by: PSYCHIATRY & NEUROLOGY

## 2024-12-17 PROCEDURE — 1240000000 HC EMOTIONAL WELLNESS R&B

## 2024-12-17 RX ORDER — DIVALPROEX SODIUM 500 MG/1
500 TABLET, DELAYED RELEASE ORAL 2 TIMES DAILY
Status: DISCONTINUED | OUTPATIENT
Start: 2024-12-17 | End: 2024-12-21 | Stop reason: HOSPADM

## 2024-12-17 RX ORDER — LUMATEPERONE 42 MG/1
42 CAPSULE ORAL DAILY
COMMUNITY
Start: 2024-05-15

## 2024-12-17 RX ORDER — CALCIUM CARBONATE 500 MG/1
1 TABLET, CHEWABLE ORAL 3 TIMES DAILY PRN
Status: DISCONTINUED | OUTPATIENT
Start: 2024-12-17 | End: 2024-12-21 | Stop reason: HOSPADM

## 2024-12-17 RX ORDER — PROPRANOLOL HCL 20 MG
20 TABLET ORAL 3 TIMES DAILY
Status: DISCONTINUED | OUTPATIENT
Start: 2024-12-17 | End: 2024-12-21 | Stop reason: HOSPADM

## 2024-12-17 RX ORDER — PANTOPRAZOLE SODIUM 40 MG/1
40 TABLET, DELAYED RELEASE ORAL
Status: DISCONTINUED | OUTPATIENT
Start: 2024-12-18 | End: 2024-12-21 | Stop reason: HOSPADM

## 2024-12-17 RX ORDER — BUSPIRONE HYDROCHLORIDE 10 MG/1
10 TABLET ORAL 3 TIMES DAILY
COMMUNITY
Start: 2024-10-18

## 2024-12-17 RX ORDER — ESCITALOPRAM OXALATE 20 MG/1
20 TABLET ORAL DAILY
COMMUNITY
Start: 2024-10-18

## 2024-12-17 RX ORDER — BUSPIRONE HYDROCHLORIDE 10 MG/1
10 TABLET ORAL 3 TIMES DAILY
Status: DISCONTINUED | OUTPATIENT
Start: 2024-12-17 | End: 2024-12-21 | Stop reason: HOSPADM

## 2024-12-17 RX ORDER — ESCITALOPRAM OXALATE 20 MG/1
20 TABLET ORAL DAILY
Status: DISCONTINUED | OUTPATIENT
Start: 2024-12-17 | End: 2024-12-21 | Stop reason: HOSPADM

## 2024-12-17 RX ORDER — FLUTICASONE PROPIONATE 110 UG/1
2 AEROSOL, METERED RESPIRATORY (INHALATION)
Status: DISCONTINUED | OUTPATIENT
Start: 2024-12-17 | End: 2024-12-21 | Stop reason: HOSPADM

## 2024-12-17 RX ADMIN — METFORMIN HYDROCHLORIDE 500 MG: 500 TABLET, FILM COATED ORAL at 16:37

## 2024-12-17 RX ADMIN — HYDROXYZINE HYDROCHLORIDE 50 MG: 50 TABLET ORAL at 21:12

## 2024-12-17 RX ADMIN — TRAZODONE HYDROCHLORIDE 50 MG: 50 TABLET ORAL at 21:12

## 2024-12-17 RX ADMIN — HYDROXYZINE HYDROCHLORIDE 50 MG: 50 TABLET ORAL at 08:31

## 2024-12-17 RX ADMIN — FLUTICASONE PROPIONATE 2 PUFF: 110 AEROSOL, METERED RESPIRATORY (INHALATION) at 21:55

## 2024-12-17 RX ADMIN — PROPRANOLOL HYDROCHLORIDE 20 MG: 20 TABLET ORAL at 16:37

## 2024-12-17 RX ADMIN — PROPRANOLOL HYDROCHLORIDE 20 MG: 20 TABLET ORAL at 21:12

## 2024-12-17 RX ADMIN — BUSPIRONE HYDROCHLORIDE 10 MG: 10 TABLET ORAL at 21:12

## 2024-12-17 RX ADMIN — DIVALPROEX SODIUM 500 MG: 500 TABLET, DELAYED RELEASE ORAL at 21:12

## 2024-12-17 NOTE — GROUP NOTE
Group Therapy Note    Date: 12/17/2024    Group Start Time: 1335  Group End Time: 1430  Group Topic: Cognitive Skills    Marlene Carmen CTRS        Group Therapy Note    Attendees: 8/19     Topic: To increase socialization, practice mindfulness and relaxation techniques, and identify methods to use at home.        Comments:   Patient did not participate in Cognitive Skills Group, at 14:35, despite staff encouragement   and explanation of benefits. Pt was seclusive to self et room.     Q15 minute safety checks maintained for patient safety and will continue to encourage   patient to attend unit programming.       Discipline Responsible: Psychoeducational Specialist   Signature: SHIRIN GROVES
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
## 2024-12-17 NOTE — GROUP NOTE
Group Therapy Note    Date: 12/17/2024    Group Start Time: 1015  Group End Time: 1045  Group Topic: Psychoeducation    Nicolas Payton        Group Therapy Note    Attendees: 6/23     Patient was offered group therapy today but declined to participate despite encouragement from staff. 1:1 was offered.    Signature:  Nicolas Vaca
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
## 2024-12-17 NOTE — GROUP NOTE
Group Therapy Note    Date: 12/17/2024    Group Start Time: 0900  Group End Time: 0915  Group Topic: Community Meeting    Alysia Gaston    Community Meeting Group Note        Date: 12/17/2024 Start Time: 9am  End Time: 0915      Number of Participants in Group & Unit Census:  4/23        Goal of Group: To discuss daily goals       Comments:     Patient did not participate in Community Meeting group, despite staff encouragement and explanation of benefits.  Patient remain seclusive to self.  Q15 minute safety checks maintained for patient safety and will continue to encourage patient to attend unit programming.          Signature:  Alysia Jimenez

## 2024-12-17 NOTE — GROUP NOTE
Group Therapy Note    Date: 12/17/2024    Group Start Time: 1100  Group End Time: 1145  Group Topic: Cognitive Skills    Marlene Carmen CTRS        Group Therapy Note    Attendees: 6/21     Topic: To increase socialization, practice problem solving, and communication skills           Comments:   Patient did not participate in Cognitive Skills Group, at 11:00, despite staff encouragement   and explanation of benefits. Pt was seclusive to self et room.     Q15 minute safety checks maintained for patient safety and will continue to encourage   patient to attend unit programming.       Discipline Responsible: Psychoeducational Specialist   Signature: SHIRIN GROVES

## 2024-12-17 NOTE — CARE COORDINATION
[facility] Biopsychosocial Assessment    Current Level of Psychosocial Functioning     Independent xx  Dependent    Minimal Assist     Comments:    Psychosocial High Risk Factors (check all that apply)    Unable to obtain meds   Chronic illness/pain    Substance abuse   Lack of Family Support   Financial stress   Isolation   Inadequate Community Resources  Suicide attempt(s)  Not taking medications   Victim of crime   Developmental Delay  Unable to manage personal needs    Age 65 or older   Homeless  No transportation   Readmission within 30 days  Unemployment  Traumatic Event    Comments:   Psychiatric Advanced Directives: none reported     Family to Involve in Treatment: mom/dad/girlfriend are supportive     Sexual Orientation:  n/a    Patient Strengths: supportive family, receives social security income, linked with [facility], stable housing     Patient Barriers: history of prior psychiatric hospitalizations       Opiate Education Provided:  denies       CMHC/mental health history: linked with [facility]     Plan of Care   medication management, group/individual therapies, family meetings, psycho -education, treatment team meetings to assist with stabilization    Initial Discharge Plan:  return home and followup with [facility]       Clinical Summary:  Chino is a 29 year old single male who has been admitted to [facility] with report of suicidal ideation. Chino has been isolative to room throughout the day and was minimally participatory with assessment. Chino shares apartment with his supportive girlfriend. He is linked with [facility]. He denies any AOD issues, denies legal concerns. Writer offered ongoing support, encouragement.

## 2024-12-17 NOTE — ED PROVIDER NOTES
that is independently reviewed and interpreted by me as:  None    See more data below for the lab and radiology tests and orders.    3)  Treatment and Disposition      \"ED Course\" Notes From Epic Narrator:  ED Course as of 12/16/24 2233   Mon Dec 16, 2024   2208 Patient is medically cleared for psychiatric evaluation. [JL]   2232 Patient has been evaluated and will be admitted to the [facility] for further psychiatric evaluation and treatment. [JL]      ED Course User Index  [JL] Carlos Connelly MD         PROCEDURES:  None      DATA FOR LAB AND RADIOLOGY TESTS ORDERED BELOW ARE REVIEWED BY THE ED CLINICIAN:    RADIOLOGY: All x-rays, CT, MRI, and formal ultrasound images (except ED bedside ultrasound) are read by the radiologist, see reports below, unless otherwise noted in MDM or here.  Reports below are reviewed by myself.  No orders to display       LABS: Lab orders shown below, the results are reviewed by myself, and all abnormals are listed below.  Labs Reviewed   TOX SCR, BLD, ED - Abnormal; Notable for the following components:       Result Value    Acetaminophen Level <5 (*)     All other components within normal limits   CBC - Abnormal; Notable for the following components:    Hemoglobin 12.6 (*)     Hematocrit 38.8 (*)     All other components within normal limits   COMPREHENSIVE METABOLIC PANEL   TSH WITH REFLEX   URINE DRUG SCREEN       Vitals Reviewed:    Vitals:    12/16/24 2100 12/16/24 2225   BP:  116/83   Pulse: 90    Resp: 16    Temp: 98.2 °F (36.8 °C)    TempSrc: Oral    Weight: (!) 218.6 kg (482 lb)    Height: 2.032 m (6' 8\")      MEDICATIONS GIVEN TO PATIENT THIS ENCOUNTER:  No orders of the defined types were placed in this encounter.    DISCHARGE PRESCRIPTIONS:  New Prescriptions    No medications on file     PHYSICIAN CONSULTS ORDERED THIS ENCOUNTER:  None    FINAL IMPRESSION      1. Depression with suicidal ideation          DISPOSITION/PLAN   DISPOSITION Admitted 12/16/2024 10:27:01 PM

## 2024-12-17 NOTE — GROUP NOTE
Group Therapy Note    Date: 12/17/2024    Group Start Time: 1430  Group End Time: 1500  Group Topic: Healthy Living/Wellness    Alysia Gaston        Group Therapy Note    Attendees: 5/19       Patient's Goal:  share and discuss coping skills     Status After Intervention:  Unchanged    Participation Level: Minimal    Participation Quality: Resistant      Speech:  hesitant      Thought Process/Content: Logical      Affective Functioning: Blunted      Mood: euthymic      Level of consciousness:  Alert and Oriented x4      Response to Learning: Able to verbalize current knowledge/experience and Able to verbalize/acknowledge new learning      Endings: None Reported    Modes of Intervention: Education and Support      Discipline Responsible: Behavorial Health Tech      Signature:  Alysia Jimenez

## 2024-12-18 LAB
CHOLEST SERPL-MCNC: 166 MG/DL (ref 0–199)
CHOLESTEROL/HDL RATIO: 5.5
EST. AVERAGE GLUCOSE BLD GHB EST-MCNC: 103 MG/DL
GLUCOSE BLD-MCNC: 105 MG/DL (ref 75–110)
GLUCOSE BLD-MCNC: 90 MG/DL (ref 75–110)
GLUCOSE BLD-MCNC: 91 MG/DL (ref 75–110)
HBA1C MFR BLD: 5.2 % (ref 4–6)
HDLC SERPL-MCNC: 30 MG/DL
LDLC SERPL CALC-MCNC: 107 MG/DL (ref 0–100)
TRIGL SERPL-MCNC: 147 MG/DL (ref 0–149)

## 2024-12-18 PROCEDURE — 6370000000 HC RX 637 (ALT 250 FOR IP): Performed by: PSYCHIATRY & NEUROLOGY

## 2024-12-18 PROCEDURE — 36415 COLL VENOUS BLD VENIPUNCTURE: CPT

## 2024-12-18 PROCEDURE — 1240000000 HC EMOTIONAL WELLNESS R&B

## 2024-12-18 PROCEDURE — 99232 SBSQ HOSP IP/OBS MODERATE 35: CPT | Performed by: PSYCHIATRY & NEUROLOGY

## 2024-12-18 PROCEDURE — 83036 HEMOGLOBIN GLYCOSYLATED A1C: CPT

## 2024-12-18 PROCEDURE — APPSS30 APP SPLIT SHARED TIME 16-30 MINUTES

## 2024-12-18 PROCEDURE — 6370000000 HC RX 637 (ALT 250 FOR IP): Performed by: NURSE PRACTITIONER

## 2024-12-18 PROCEDURE — 82947 ASSAY GLUCOSE BLOOD QUANT: CPT

## 2024-12-18 PROCEDURE — 80061 LIPID PANEL: CPT

## 2024-12-18 PROCEDURE — 99232 SBSQ HOSP IP/OBS MODERATE 35: CPT | Performed by: INTERNAL MEDICINE

## 2024-12-18 RX ADMIN — BUSPIRONE HYDROCHLORIDE 10 MG: 10 TABLET ORAL at 08:53

## 2024-12-18 RX ADMIN — METFORMIN HYDROCHLORIDE 500 MG: 500 TABLET, FILM COATED ORAL at 08:53

## 2024-12-18 RX ADMIN — FLUTICASONE PROPIONATE 2 PUFF: 110 AEROSOL, METERED RESPIRATORY (INHALATION) at 08:53

## 2024-12-18 RX ADMIN — DIVALPROEX SODIUM 500 MG: 500 TABLET, DELAYED RELEASE ORAL at 08:53

## 2024-12-18 RX ADMIN — BUSPIRONE HYDROCHLORIDE 10 MG: 10 TABLET ORAL at 21:01

## 2024-12-18 RX ADMIN — ESCITALOPRAM OXALATE 20 MG: 20 TABLET, FILM COATED ORAL at 08:53

## 2024-12-18 RX ADMIN — PANTOPRAZOLE SODIUM 40 MG: 40 TABLET, DELAYED RELEASE ORAL at 08:53

## 2024-12-18 RX ADMIN — TRAZODONE HYDROCHLORIDE 50 MG: 50 TABLET ORAL at 21:01

## 2024-12-18 RX ADMIN — FLUTICASONE PROPIONATE 2 PUFF: 110 AEROSOL, METERED RESPIRATORY (INHALATION) at 21:00

## 2024-12-18 RX ADMIN — DIVALPROEX SODIUM 500 MG: 500 TABLET, DELAYED RELEASE ORAL at 21:01

## 2024-12-18 RX ADMIN — HYDROXYZINE HYDROCHLORIDE 50 MG: 50 TABLET ORAL at 21:01

## 2024-12-18 NOTE — GROUP NOTE
Group Therapy Note    Date: 12/18/2024    Group Start Time: 1015  Group End Time: 1045  Group Topic: Psychoeducation    Nicolas Payton        Group Therapy Note    Attendees: 6/23     Patient was offered group therapy today but declined to participate despite encouragement from staff. 1:1 was offered.  Signature:  Nicolas Vaca

## 2024-12-18 NOTE — GROUP NOTE
Group Therapy Note    Date: 12/18/2024    Group Start Time: 1325  Group End Time: 1420  Group Topic: Cognitive Skills    Marlene Carmen CTRS        Group Therapy Note    Attendees: 9/23     Topic: To increase socialization, practice thinking with Growth Mindset r/t 6 personal domains,explore   positive self talk           Comments:   Patient did not participate in Cognitive Skills Group, at 13:25, despite staff encouragement   and explanation of benefits. Pt was seclusive to self et room.     Q15 minute safety checks maintained for patient safety and will continue to encourage   patient to attend unit programming.       Discipline Responsible: Psychoeducational Specialist   Signature: SHIRIN GROVES

## 2024-12-18 NOTE — GROUP NOTE
Group Therapy Note    Date: 12/18/2024    Group Start Time: 1100  Group End Time: 1150  Group Topic: Cognitive Skills    Marlene Carmen CTRS        Group Therapy Note    Attendees: 8/23     Topic: To increase socialization, practice decision making, and communication skills           Comments:   Patient did not participate in Cognitive Skills Group, at 11:00, despite staff encouragement   and explanation of benefits. Pt was seclusive to self et room.     Q15 minute safety checks maintained for patient safety and will continue to encourage   patient to attend unit programming.       Discipline Responsible: Psychoeducational Specialist   Signature: SHIRIN GROVES

## 2024-12-18 NOTE — GROUP NOTE
Group Therapy Note    Date: 12/18/2024    Group Start Time: 0900  Group End Time: 0930  Group Topic: Community Meeting    Makenna Claros LPN        Group Therapy Note    Attendees: 8/23       Patient's Goal:  do some writing        Status After Intervention:  Unchanged    Participation Level: Interactive    Participation Quality: Appropriate and Attentive      Speech:  normal      Thought Process/Content: Linear      Affective Functioning: Blunted      Mood: anxious      Level of consciousness:  Alert      Response to Learning: Able to verbalize current knowledge/experience      Endings: None Reported    Modes of Intervention: Education, Support, Socialization, and Exploration      Discipline Responsible: Licensed Practical Nurse      Signature:  Makenna Godfrey LPN

## 2024-12-19 LAB
GLUCOSE BLD-MCNC: 102 MG/DL (ref 75–110)
GLUCOSE BLD-MCNC: 121 MG/DL (ref 75–110)
GLUCOSE BLD-MCNC: 90 MG/DL (ref 75–110)
GLUCOSE BLD-MCNC: 98 MG/DL (ref 75–110)

## 2024-12-19 PROCEDURE — APPSS30 APP SPLIT SHARED TIME 16-30 MINUTES

## 2024-12-19 PROCEDURE — 6370000000 HC RX 637 (ALT 250 FOR IP): Performed by: NURSE PRACTITIONER

## 2024-12-19 PROCEDURE — 1240000000 HC EMOTIONAL WELLNESS R&B

## 2024-12-19 PROCEDURE — 82947 ASSAY GLUCOSE BLOOD QUANT: CPT

## 2024-12-19 PROCEDURE — 6370000000 HC RX 637 (ALT 250 FOR IP): Performed by: PSYCHIATRY & NEUROLOGY

## 2024-12-19 PROCEDURE — 99232 SBSQ HOSP IP/OBS MODERATE 35: CPT | Performed by: PSYCHIATRY & NEUROLOGY

## 2024-12-19 PROCEDURE — 99231 SBSQ HOSP IP/OBS SF/LOW 25: CPT | Performed by: INTERNAL MEDICINE

## 2024-12-19 RX ADMIN — ESCITALOPRAM OXALATE 20 MG: 20 TABLET, FILM COATED ORAL at 09:30

## 2024-12-19 RX ADMIN — ACETAMINOPHEN 650 MG: 325 TABLET ORAL at 15:41

## 2024-12-19 RX ADMIN — METFORMIN HYDROCHLORIDE 500 MG: 500 TABLET, FILM COATED ORAL at 18:45

## 2024-12-19 RX ADMIN — PROPRANOLOL HYDROCHLORIDE 20 MG: 20 TABLET ORAL at 20:34

## 2024-12-19 RX ADMIN — BUSPIRONE HYDROCHLORIDE 10 MG: 10 TABLET ORAL at 09:30

## 2024-12-19 RX ADMIN — FLUTICASONE PROPIONATE 2 PUFF: 110 AEROSOL, METERED RESPIRATORY (INHALATION) at 09:30

## 2024-12-19 RX ADMIN — IBUPROFEN 400 MG: 400 TABLET, FILM COATED ORAL at 09:44

## 2024-12-19 RX ADMIN — PANTOPRAZOLE SODIUM 40 MG: 40 TABLET, DELAYED RELEASE ORAL at 09:30

## 2024-12-19 RX ADMIN — METFORMIN HYDROCHLORIDE 500 MG: 500 TABLET, FILM COATED ORAL at 09:30

## 2024-12-19 RX ADMIN — BUSPIRONE HYDROCHLORIDE 10 MG: 10 TABLET ORAL at 15:03

## 2024-12-19 RX ADMIN — BUSPIRONE HYDROCHLORIDE 10 MG: 10 TABLET ORAL at 20:33

## 2024-12-19 RX ADMIN — DIVALPROEX SODIUM 500 MG: 500 TABLET, DELAYED RELEASE ORAL at 09:30

## 2024-12-19 RX ADMIN — HYDROXYZINE HYDROCHLORIDE 50 MG: 50 TABLET ORAL at 20:33

## 2024-12-19 RX ADMIN — DIVALPROEX SODIUM 500 MG: 500 TABLET, DELAYED RELEASE ORAL at 20:35

## 2024-12-19 RX ADMIN — TRAZODONE HYDROCHLORIDE 50 MG: 50 TABLET ORAL at 20:35

## 2024-12-19 RX ADMIN — FLUTICASONE PROPIONATE 2 PUFF: 110 AEROSOL, METERED RESPIRATORY (INHALATION) at 20:33

## 2024-12-19 ASSESSMENT — PAIN DESCRIPTION - LOCATION
LOCATION: BACK;HIP
LOCATION: BACK;HIP

## 2024-12-19 ASSESSMENT — PAIN SCALES - GENERAL
PAINLEVEL_OUTOF10: 4
PAINLEVEL_OUTOF10: 4

## 2024-12-19 ASSESSMENT — PAIN - FUNCTIONAL ASSESSMENT: PAIN_FUNCTIONAL_ASSESSMENT: 0-10

## 2024-12-19 NOTE — GROUP NOTE
Group Refusal Note:     Patient refused to attend goals group even after encouragement from staff.  will continue to monitor and support.

## 2024-12-19 NOTE — GROUP NOTE
Group Therapy Note    Date: 12/19/2024    Group Start Time: 1015  Group End Time: 1045  Group Topic: Psychoeducation    Nicolas Payton        Group Therapy Note    Attendees: 12/23       Patient was offered group therapy today but declined to participate despite encouragement from staff. 1:1 was offered.  Signature:  Nicolas Vaca

## 2024-12-19 NOTE — GROUP NOTE
Group Therapy Note    Date: 12/19/2024    Group Start Time: 1100  Group End Time: 1155  Group Topic: Activity    [facility] Marlene Powell CTRS        Group Therapy Note    Attendees: 13/23     Topic: To increase socialization, explore leisure interests and outlets using creative expression, music,  and discussion.         Comments:   Patient did not participate in Activity Group, at 11:00, despite staff encouragement   and explanation of benefits. Pt was seclusive to self et room.     Q15 minute safety checks maintained for patient safety and will continue to encourage   patient to attend unit programming.       Discipline Responsible: Psychoeducational Specialist   Signature: SHIRIN GROVES

## 2024-12-20 LAB
GLUCOSE BLD-MCNC: 101 MG/DL (ref 75–110)
GLUCOSE BLD-MCNC: 102 MG/DL (ref 75–110)
GLUCOSE BLD-MCNC: 108 MG/DL (ref 75–110)
GLUCOSE BLD-MCNC: 77 MG/DL (ref 75–110)

## 2024-12-20 PROCEDURE — 6370000000 HC RX 637 (ALT 250 FOR IP): Performed by: PSYCHIATRY & NEUROLOGY

## 2024-12-20 PROCEDURE — 1240000000 HC EMOTIONAL WELLNESS R&B

## 2024-12-20 PROCEDURE — 90833 PSYTX W PT W E/M 30 MIN: CPT | Performed by: PSYCHIATRY & NEUROLOGY

## 2024-12-20 PROCEDURE — 99232 SBSQ HOSP IP/OBS MODERATE 35: CPT | Performed by: PSYCHIATRY & NEUROLOGY

## 2024-12-20 PROCEDURE — 6370000000 HC RX 637 (ALT 250 FOR IP): Performed by: NURSE PRACTITIONER

## 2024-12-20 PROCEDURE — 82947 ASSAY GLUCOSE BLOOD QUANT: CPT

## 2024-12-20 PROCEDURE — APPSS30 APP SPLIT SHARED TIME 16-30 MINUTES

## 2024-12-20 RX ORDER — INSULIN LISPRO 100 [IU]/ML
0-4 INJECTION, SOLUTION INTRAVENOUS; SUBCUTANEOUS
Qty: 10 ML | Refills: 0 | Status: SHIPPED | OUTPATIENT
Start: 2024-12-20

## 2024-12-20 RX ORDER — FLUTICASONE PROPIONATE 110 UG/1
2 AEROSOL, METERED RESPIRATORY (INHALATION)
Qty: 12 G | Refills: 3 | Status: SHIPPED | OUTPATIENT
Start: 2024-12-20

## 2024-12-20 RX ORDER — HYDROXYZINE HYDROCHLORIDE 25 MG/1
25 TABLET, FILM COATED ORAL 3 TIMES DAILY PRN
Qty: 30 TABLET | Refills: 0 | Status: SHIPPED | OUTPATIENT
Start: 2024-12-20 | End: 2024-12-30

## 2024-12-20 RX ADMIN — ESCITALOPRAM OXALATE 20 MG: 20 TABLET, FILM COATED ORAL at 10:09

## 2024-12-20 RX ADMIN — PANTOPRAZOLE SODIUM 40 MG: 40 TABLET, DELAYED RELEASE ORAL at 10:09

## 2024-12-20 RX ADMIN — BUSPIRONE HYDROCHLORIDE 10 MG: 10 TABLET ORAL at 15:08

## 2024-12-20 RX ADMIN — PROPRANOLOL HYDROCHLORIDE 20 MG: 20 TABLET ORAL at 20:46

## 2024-12-20 RX ADMIN — DIVALPROEX SODIUM 500 MG: 500 TABLET, DELAYED RELEASE ORAL at 10:09

## 2024-12-20 RX ADMIN — METFORMIN HYDROCHLORIDE 500 MG: 500 TABLET, FILM COATED ORAL at 17:19

## 2024-12-20 RX ADMIN — METFORMIN HYDROCHLORIDE 500 MG: 500 TABLET, FILM COATED ORAL at 20:44

## 2024-12-20 RX ADMIN — ACETAMINOPHEN 650 MG: 325 TABLET ORAL at 20:44

## 2024-12-20 RX ADMIN — HYDROXYZINE HYDROCHLORIDE 50 MG: 50 TABLET ORAL at 20:44

## 2024-12-20 RX ADMIN — DIVALPROEX SODIUM 500 MG: 500 TABLET, DELAYED RELEASE ORAL at 20:44

## 2024-12-20 RX ADMIN — BUSPIRONE HYDROCHLORIDE 10 MG: 10 TABLET ORAL at 10:09

## 2024-12-20 RX ADMIN — TRAZODONE HYDROCHLORIDE 50 MG: 50 TABLET ORAL at 20:44

## 2024-12-20 RX ADMIN — FLUTICASONE PROPIONATE 2 PUFF: 110 AEROSOL, METERED RESPIRATORY (INHALATION) at 20:44

## 2024-12-20 RX ADMIN — BUSPIRONE HYDROCHLORIDE 10 MG: 10 TABLET ORAL at 20:44

## 2024-12-20 ASSESSMENT — PAIN DESCRIPTION - LOCATION: LOCATION: BACK

## 2024-12-20 ASSESSMENT — PAIN SCALES - GENERAL
PAINLEVEL_OUTOF10: 0
PAINLEVEL_OUTOF10: 6
PAINLEVEL_OUTOF10: 4

## 2024-12-20 ASSESSMENT — PAIN DESCRIPTION - DESCRIPTORS: DESCRIPTORS: ACHING;CRAMPING;STABBING

## 2024-12-20 ASSESSMENT — PAIN DESCRIPTION - ORIENTATION: ORIENTATION: RIGHT;LEFT;LOWER;MID

## 2024-12-20 NOTE — GROUP NOTE
Group Therapy Note    Date: 12/20/2024    Group Start Time: 0900  Group End Time: 0930  Group Topic: Nursing    Marina Martinez LPN        Group Therapy Note    Attendees: 10/21       patient refused to attend goals group at 0900 after encouragement from staff.  1:1 talk time provided as alternative to group session       Signature:  Marina Gorman LPN

## 2024-12-20 NOTE — GROUP NOTE
Group Therapy Note    Date: 12/20/2024    Group Start Time: 1100  Group End Time: 1145  Group Topic: Cognitive Skills    Marlene Carmen CTRS        Group Therapy Note    Attendees: 14/21     Topic: : To increase socialization, practice decision making, communication skills,   and explore perception.          Comments:   Patient attended group but participated for only one round in Cognitive Skills group, at 11:00, despite   staff encouragement to continue.   Pt c/o not feeling well (diarrhea) and left group to lay down in room. Pt body odor was extremely strong and a peer quietly   complained to RT about this. After group RT encouraged pt to take a shower, ensured that pt had supplies and   offered support -pt was appreciative of being encouraged to take a shower when alone to maintain his dignity.   RN was informed of plan for pt to shower after his lunch to continue to motivate pt with self care.     Q15 minute safety checks maintained for patient safety and will continue to encourage   patient to attend unit programming.       Discipline Responsible: Psychoeducational Specialist   Signature: SHIRIN GROVES    Thanks for visiting us today!    Remember these important phone numbers:    [phone] for phone nurses during the day and our nurse answering service at night    [phone]  for scheduling or changing future appointments    [phone] for the Poison Control Center    When leaving a message for our staff, please include:   • the spelling of your child’s full name and date of birth  • your full name and relationship to child  • best phone number and time to reach you   • reason for the call    We strongly recommend that all people age 12 and older receive the COVID-19 vaccine.     • For adolescents we can provide the vaccine here. Call [phone] to schedule.      Is your child signed up for [employer]? If you do this, you can message us rather than playing phone tag! You can also look at labs, pay your bills, and do some scheduling. Go to your own account first. (If you don't have one yet, you can set one up at the website below or at your doctor's office).  Using a web browser (not the phone darian), on the right hand side of your page, click the button marked \"Request Access to my Child's Records.\" Fill out the information. In a few days your child's information will be linked to your account. It's that simple!! Here is the website for more information:     [email or web address]      If you haven't already liked us on [employer], please do so!  Just search for \"[facility]\"      --------------------------------------------------------------------------------------------------------------------

## 2024-12-20 NOTE — GROUP NOTE
Group Therapy Note    Date: 12/20/2024    Group Start Time: 1435  Group End Time: 1535  Group Topic: Cognitive Skills    Marlene Carmen CTRS        Group Therapy Note    Attendees: 10/21     Topic: : To increase socialization, practice leisure and communication skills, and relate to peers about interests.       Comments:   Patient did not participate in Recreation Group, at 14:35, despite staff encouragement. Pt was seclusive to room     Q15 minute safety checks maintained for patient safety and will continue to encourage   patient to attend unit programming.       Discipline Responsible: Psychoeducational Specialist   Signature: SHIRIN GROVES

## 2024-12-20 NOTE — GROUP NOTE
Group Therapy Note    Date: 12/19/2024    Group Start Time: 1440  Group End Time: 1540  Group Topic: Cognitive Skills    Marlene Carmen CTRS        Group Therapy Note    Attendees: 12/19       Topic: : To increase socialization, explore factors that can distort feelings, thoughts, perception,   and identify simple self care steps/supports/outlets to engage in at home/community          Comments:   Patient did not participate in Cognitive Skills group, at 14:40, despite staff encouragement. Pt was seclusive to room     Q15 minute safety checks maintained for patient safety and will continue to encourage   patient to attend unit programming.       Discipline Responsible: Psychoeducational Specialist   Signature: SHIRIN GROVES

## 2024-12-21 VITALS
WEIGHT: 315 LBS | RESPIRATION RATE: 14 BRPM | TEMPERATURE: 97.2 F | BODY MASS INDEX: 36.45 KG/M2 | OXYGEN SATURATION: 97 % | HEIGHT: 78 IN | HEART RATE: 67 BPM | SYSTOLIC BLOOD PRESSURE: 138 MMHG | DIASTOLIC BLOOD PRESSURE: 72 MMHG

## 2024-12-21 LAB
GLUCOSE BLD-MCNC: 96 MG/DL (ref 75–110)
GLUCOSE BLD-MCNC: 98 MG/DL (ref 75–110)

## 2024-12-21 PROCEDURE — 99239 HOSP IP/OBS DSCHRG MGMT >30: CPT | Performed by: PSYCHIATRY & NEUROLOGY

## 2024-12-21 PROCEDURE — 6370000000 HC RX 637 (ALT 250 FOR IP): Performed by: NURSE PRACTITIONER

## 2024-12-21 PROCEDURE — 6370000000 HC RX 637 (ALT 250 FOR IP): Performed by: PSYCHIATRY & NEUROLOGY

## 2024-12-21 PROCEDURE — 82947 ASSAY GLUCOSE BLOOD QUANT: CPT

## 2024-12-21 RX ADMIN — DIVALPROEX SODIUM 500 MG: 500 TABLET, DELAYED RELEASE ORAL at 08:42

## 2024-12-21 RX ADMIN — PROPRANOLOL HYDROCHLORIDE 20 MG: 20 TABLET ORAL at 14:55

## 2024-12-21 RX ADMIN — BUSPIRONE HYDROCHLORIDE 10 MG: 10 TABLET ORAL at 14:54

## 2024-12-21 RX ADMIN — BUSPIRONE HYDROCHLORIDE 10 MG: 10 TABLET ORAL at 08:42

## 2024-12-21 RX ADMIN — ESCITALOPRAM OXALATE 20 MG: 20 TABLET, FILM COATED ORAL at 08:42

## 2024-12-21 RX ADMIN — PANTOPRAZOLE SODIUM 40 MG: 40 TABLET, DELAYED RELEASE ORAL at 08:42

## 2024-12-21 ASSESSMENT — PAIN SCALES - GENERAL: PAINLEVEL_OUTOF10: 0

## 2024-12-21 NOTE — BH NOTE
[facility] [place]  Admission Note     Admission Type:   Voluntary     Reason for admission:  Reason for Admission: [facility] from the [facility] reporting suicidal ideation to stab himself in the neck with a shard of glass. He states that him and his long-time girlfriend have been fighting, Patient states police have been to his place 2-3 times a week. He stopped taking his medications. These factors combined with the 10 year aniversary of his best friend's death are the triggers for his suicidal thoughts. He was last here June of 2024.      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    Injury of frontal lobe     Multiple sensory deficit syndrome        Status EXAM:  Mental Status and Behavioral Exam  Normal: No  Level of Assistance: Independent/Self  Facial Expression: Flat, Sad  Affect: Blunt  Level of Consciousness: Alert  Frequency of Checks: 4 times per hour, close  Mood:Normal: No  Mood: Depressed, Anxious, Helpless  Motor Activity:Normal: Yes  Eye Contact: Fair  Observed Behavior: Cooperative, Preoccupied  Sexual Misconduct History: Current - no  Preception: [place] to person, [place] to time, [place] to place, [place] to situation  Attention:Normal: No  Attention: Distractible  Thought Processes: Blocking  Thought Content:Normal: No  Thought Content: Preoccupations  Depression Symptoms: Impaired concentration, Feelings of helplessness, Feelings of hopelessess, Feelings of worthlessness  Anxiety Symptoms: Generalized  Milly Symptoms: No problems reported or observed.  Hallucinations: None  Delusions: No  Memory:Normal: No  Memory: Poor recent, Poor remote  Insight and Judgment: No  Insight and Judgment: Poor judgment, Poor insight    Tobacco Screening:  Practical Counseling, on 
Metformin held due to patient not eating breakfast and 0700 BS was 101.  
OQ ID: [id number]   
Patient given tobacco quitline number 70779546874 at this time, refusing to call at this time, states \" I just dont want to quit now\"- patient given information as to the dangers of long term tobacco use. Continue to reinforce the importance of tobacco cessation.    
Patient given tobacco quitline number 99060730137 at this time, refusing to call at this time, states \" I just dont want to quit now\"- patient given information as to the dangers of long term tobacco use. Continue to reinforce the importance of tobacco cessation.    
Propanolol 20mg held due to patients blood pressure being 94/50 and HR under 60. Metformin also held due to patient not eating breakfast this morning and 0700 blood sugar was 96.   
Provider notified of best practice advisory suggesting to place patient on suicide precautions. Provider to discontinue the order as patient does not meet criteria for suicide precautions at this time. Continue to observe patient on every 15 minute checks.    
Basic information about quitting (benefits of quitting, techniques in how to quit, available resources  ( ) [facility]                                                                                                                   (x ) Patient refused counseling  ( x) Patient refused referral  ( x) Patient refused prescription upon discharge  ( ) Patient has not smoked in the last 30 days    Metabolic Screening:    Lab Results   Component Value Date    LABA1C 5.2 12/18/2024       Lab Results   Component Value Date    CHOL 166 12/18/2024    CHOL 172 01/16/2023    CHOL 169 11/23/2021    CHOL 161 06/11/2019    CHOL 150 11/03/2012     Lab Results   Component Value Date    TRIG 147 12/18/2024    TRIG 188 (H) 01/16/2023    TRIG 161 (H) 11/23/2021    TRIG 210 (H) 06/11/2019    TRIG 197 (H) 11/03/2012     Lab Results   Component Value Date    HDL 30 (L) 12/18/2024    HDL 26 (L) 01/16/2023    HDL 28 (L) 11/23/2021    HDL 25 (L) 06/11/2019    HDL 32 (L) 11/03/2012     No components found for: \"LDLCAL\"  No components found for: \"LABVLDL\"    Yudy Sorenson RN    Patient escorted off the unit by a staff member at 1505 with all belongings in hand to be discharged home.

## 2024-12-21 NOTE — GROUP NOTE
Group Therapy Note    Date: 12/21/2024    Group Start Time: 0900  Group End Time: 0915  Group Topic: Community Meeting    Alysia Gaston    Community Meeting Group Note        Date: 12/21/2024 Start Time: 9am  End Time: 0915      Number of Participants in Group & Unit Census:  8/22    Goal of Group:To discuss daily goals of the group      Comments:     Patient did not participate in Community Meeting group, despite staff encouragement and explanation of benefits.  Patient remain seclusive to self.  Q15 minute safety checks maintained for patient safety and will continue to encourage patient to attend unit programming.            Signature:  Alysia Jimenez

## 2024-12-21 NOTE — GROUP NOTE
Group Therapy Note    Date: 12/20/2024    Group Start Time: 2100  Group End Time: 2120  Group Topic: Wrap-Up    Sondra Whitley        Group Therapy Note    Attendees: 11/21           Status After Intervention:  Improved    Participation Level: Active Listener    Participation Quality: Appropriate      Speech:  normal      Thought Process/Content: Logical      Affective Functioning: Congruent      Mood: elevated      Level of consciousness:  Alert      Response to Learning: Able to verbalize current knowledge/experience      Endings: None Reported    Modes of Intervention: Socialization      Discipline Responsible: Behavorial [employer]      Signature:  Sondra Trna

## 2024-12-21 NOTE — DISCHARGE INSTR - DIET

## 2024-12-21 NOTE — DISCHARGE SUMMARY
than 0.01%.  Liver panel within normal limits.  EKG from 8/1/2024 with QTc of 428.     Patient is high risk to self.  He is actively suicidal and requires inpatient hospitalization for stability.      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    Injury of frontal lobe     Multiple sensory deficit syndrome        FAMILY/SOCIAL HISTORY:  Family History   Adopted: Yes     Social History     Socioeconomic History    Marital status: Single     Spouse name: Not on file    Number of children: Not on file    Years of education: Not on file    Highest education level: Not on file   Occupational History    Not on file   Tobacco Use    Smoking status: Some Days     Types: Cigarettes    Smokeless tobacco: Former     Types: Chew   Vaping Use    Vaping status: Every Day    Substances: Nicotine   Substance and Sexual Activity    Alcohol use: Yes     Comment: socially    Drug use: Yes     Types: Marijuana (Weed)     Comment: occasionally    Sexual activity: Never   Other Topics Concern    Not on file   Social History Narrative    Not on file     Social Determinants of Health     Financial Resource Strain: Low Risk  (4/12/2024)    [facility] [employer]    Overall Financial Resource Strain (CARDIA)     Difficulty of Paying Living Expenses: Not hard at all   Food Insecurity: Food Insecurity Present (12/16/2024)    Hunger Vital Sign     Worried About Running Out of Food in the Last Year: Sometimes true     Ran Out of Food in the Last Year: Sometimes true   Transportation Needs: Unmet Transportation Needs (12/16/2024)    PRAPARE - Transportation     Lack of Transportation (Medical): Yes     Lack of Transportation (Non-Medical): Yes   Physical Activity: Not on file   Stress: Not on file   Social Connections: Not on file   Intimate Partner Violence: Not on file   Housing Stability: Low Risk

## 2024-12-21 NOTE — GROUP NOTE
Group Therapy Note    Date: 12/21/2024    Group Start Time: 1030  Group End Time: 1125  Group Topic: Psychoeducation    STCZ BHClarisa Reyes LSW        Group Therapy Note    Attendees: 12/22       Patient's Goal:  mindful coping     Notes:      Status After Intervention:  Improved    Participation Level: Active Listener and Interactive    Participation Quality: Appropriate      Speech:  normal      Thought Process/Content: Logical      Affective Functioning: Congruent      Mood: euthymic      Level of consciousness:  Alert and Oriented x4      Response to Learning: Able to verbalize current knowledge/experience and Able to verbalize/acknowledge new learning      Endings: None Reported    Modes of Intervention: Education and Support      Discipline Responsible: /Counselor      Signature:  DANNY Holguin

## 2024-12-21 NOTE — PLAN OF CARE
Problem: Depression  Goal: Will be euthymic at discharge  Description: INTERVENTIONS:  1. Administer medication as ordered  2. Provide emotional support via 1:1 interaction with staff  3. Encourage involvement in milieu/groups/activities  4. Monitor for social isolation  12/18/2024 0126 by Fanny Severino  Outcome: Progressing  Note: Patient is depressed and anxious. Irritable at times. Isolative to room but out for needs. Compliant with all prescribed medications for this shift. Safety checks maintained q15min and irregular rounding.      Problem: Self Harm/Suicidality  Goal: Will have no self-injury during hospital stay  Description: INTERVENTIONS:  1.  Ensure constant observer at bedside with Q15M safety checks  2.  Maintain a safe environment  3.  Secure patient belongings  4.  Ensure family/visitors adhere to safety recommendations  5.  Ensure safety tray has been added to patient's diet order  6.  Every shift and PRN: Re-assess suicidal risk via Frequent Screener    12/18/2024 0126 by Fanny Severino  Outcome: Progressing  Note: Patient remains free from self harm at this time. Pt admits to having fleeting thoughts of self harm.  Agreeable to seeking out staff should feelings increase.  Able to identify positive coping skills and plan for safety.  Will cont to monitor q15 minutes for safety and provide support and reassurance as needed.       
  Problem: Depression  Goal: Will be euthymic at discharge  Description: INTERVENTIONS:  1. Administer medication as ordered  2. Provide emotional support via 1:1 interaction with staff  3. Encourage involvement in milieu/groups/activities  4. Monitor for social isolation  12/18/2024 2226 by Fanny Severino  Outcome: Progressing  Note: Out in the milieu at times. Denies suicidal thoughts, denies depression. Showered. Compliant with all prescribed medications for this shift. Safety checks maintained q15min and irregular rounding.      Problem: Self Harm/Suicidality  Goal: Will have no self-injury during hospital stay  Description: INTERVENTIONS:  1.  Ensure constant observer at bedside with Q15M safety checks  2.  Maintain a safe environment  3.  Secure patient belongings  4.  Ensure family/visitors adhere to safety recommendations  5.  Ensure safety tray has been added to patient's diet order  6.  Every shift and PRN: Re-assess suicidal risk via Frequent Screener    12/18/2024 2226 by Fanny Severino  Outcome: Progressing  Note: Remains free from self harm at this time. Pt denies thoughts of self harm and is agreeable to seeking out should thoughts of self harm arise.  Safe environment maintained.  Q15 minute checks for safety cont per unit policy.  Will cont to monitor for safety and provides support and reassurance as needed.       
  Problem: Self Harm/Suicidality  Goal: Will have no self-injury during hospital stay  Description: INTERVENTIONS:  1.  Ensure constant observer at bedside with Q15M safety checks  2.  Maintain a safe environment  3.  Secure patient belongings  4.  Ensure family/visitors adhere to safety recommendations  5.  Ensure safety tray has been added to patient's diet order  6.  Every shift and PRN: Re-assess suicidal risk via Frequent Screener    12/17/2024 1551 by Makenna Godfrey LPN  Outcome: Progressing  Flowsheets (Taken 12/17/2024 1535)  Will have no self-injury during hospital stay:   Maintain a safe environment   Secure patient belongings   Every shift and PRN: Re-assess suicidal risk via Frequent Screener  Note: No self harm noted this shift. Patient admitted to having fleeting suicidal thoughts without plan while inpatient. Patient agrees to seek out nursing staff if feeling unsafe or overwhelmed at any time. Patient showered this shift, appetite is appropriate and is attending select groups. Patient has been compliant with meds and behavior is controlled.   Safe environment maintained. Q15 minute checks for safety continued per unit policy. Will continue to monitor for safety and provide support and reassurance as needed.    12/17/2024 1240 by Louisa Guerrero, RN  Outcome: Progressing     
  Problem: Self Harm/Suicidality  Goal: Will have no self-injury during hospital stay  Description: INTERVENTIONS:  1.  Ensure constant observer at bedside with Q15M safety checks  2.  Maintain a safe environment  3.  Secure patient belongings  4.  Ensure family/visitors adhere to safety recommendations  5.  Ensure safety tray has been added to patient's diet order  6.  Every shift and PRN: Re-assess suicidal risk via Frequent Screener    12/19/2024 1057 by Akilah Méndez LPN  Outcome: Progressing  Flowsheets (Taken 12/19/2024 1056)  Will have no self-injury during hospital stay: Maintain a safe environment  Note:  Patient isolative to room, flat and evasive during 1:1 Patient denies thoughts of self harm th No violent or negative behaviors noted at this time.  Will cont to provide safe, calm, environment and use verbal de-escalation techniques when needed.  Support and reassurance given as needed.      Problem: Depression  Goal: Will be euthymic at discharge  Description: INTERVENTIONS:  1. Administer medication as ordered  2. Provide emotional support via 1:1 interaction with staff  3. Encourage involvement in milieu/groups/activities  4. Monitor for social isolation  12/19/2024 1057 by Akilah Mnédez LPN  Note: Instructed on and encouraged ADL's, patient declined at this time.     
  Problem: Self Harm/Suicidality  Goal: Will have no self-injury during hospital stay  Description: INTERVENTIONS:  1.  Ensure constant observer at bedside with Q15M safety checks  2.  Maintain a safe environment  3.  Secure patient belongings  4.  Ensure family/visitors adhere to safety recommendations  5.  Ensure safety tray has been added to patient's diet order  6.  Every shift and PRN: Re-assess suicidal risk via Frequent Screener    12/19/2024 2054 by Lisa Burns, RN  Outcome: Progressing     Problem: Depression  Goal: Will be euthymic at discharge  Description: INTERVENTIONS:  1. Administer medication as ordered  2. Provide emotional support via 1:1 interaction with staff  3. Encourage involvement in milieu/groups/activities  4. Monitor for social isolation  12/19/2024 2054 by Lisa Burns, RN  Note: Patient is out in day room aloof of peers and using phone to call supports. Patient continues to neglect personal hygiene even with much encouragement to shower. He is flat in appearance . He denies suicidal ideation and thoughts of self harm . He is medication compliant and in behavior control. Staff maintains 15 minute safety checks.     
  Problem: Self Harm/Suicidality  Goal: Will have no self-injury during hospital stay  Description: INTERVENTIONS:  1.  Ensure constant observer at bedside with Q15M safety checks  2.  Maintain a safe environment  3.  Secure patient belongings  4.  Ensure family/visitors adhere to safety recommendations  5.  Ensure safety tray has been added to patient's diet order  6.  Every shift and PRN: Re-assess suicidal risk via Frequent Screener    12/20/2024 2111 by Glenna Rea LPN  Outcome: Progressing  Flowsheets (Taken 12/20/2024 2111)  Will have no self-injury during hospital stay:   Every shift and PRN: Re-assess suicidal risk via Frequent Screener   Ensure family/visitors adhere to safety recommendations   Ensure constant observer at bedside with Q15M safety checks   Ensure safety tray has been added to patient's diet order   Maintain a safe environment   Secure patient belongings  Note: Patient denies thoughts of self harm or harm to others during this shift. Staff encouraged patient to notify staff if thoughts of self harm or harm to others occur. Staff ensures patient safety by intermediate and safety checks every 15 minutes.       Problem: Depression  Goal: Will be euthymic at discharge  Description: INTERVENTIONS:  1. Administer medication as ordered  2. Provide emotional support via 1:1 interaction with staff  3. Encourage involvement in milieu/groups/activities  4. Monitor for social isolation  12/20/2024 2111 by Glenna Rea LPN  Outcome: Progressing  Note: Patient remains free from harm to self or others but admits to continued anxiety and depression. Medications available upon request. Staff ensures safety by providing safety checks on the unit intermittently and every 15 minutes. Staff continues to provide a safe environment. Staff encouraged patient to notify if depression continues.      
  Problem: Self Harm/Suicidality  Goal: Will have no self-injury during hospital stay  Description: INTERVENTIONS:  1.  Ensure constant observer at bedside with Q15M safety checks  2.  Maintain a safe environment  3.  Secure patient belongings  4.  Ensure family/visitors adhere to safety recommendations  5.  Ensure safety tray has been added to patient's diet order  6.  Every shift and PRN: Re-assess suicidal risk via Frequent Screener    12/21/2024 0912 by Yudy Sorenson, RN  Outcome: Progressing     Problem: Depression  Goal: Will be euthymic at discharge  Description: INTERVENTIONS:  1. Administer medication as ordered  2. Provide emotional support via 1:1 interaction with staff  3. Encourage involvement in milieu/groups/activities  4. Monitor for social isolation  12/21/2024 0912 by Yudy Sorenson, RN  Outcome: Progressing     Patient has been isolative to his room this shift. Patients engagement with writer during assessment was minimal and focused on discharge. Patient denies suicidal idealization or thoughts to harm self or others. Patient denies feelings of anxiety or depression but has remained isolative and withdrawn.   
  Problem: Self Harm/Suicidality  Goal: Will have no self-injury during hospital stay  Description: INTERVENTIONS:  1.  Ensure constant observer at bedside with Q15M safety checks  2.  Maintain a safe environment  3.  Secure patient belongings  4.  Ensure family/visitors adhere to safety recommendations  5.  Ensure safety tray has been added to patient's diet order  6.  Every shift and PRN: Re-assess suicidal risk via Frequent Screener    Outcome: Progressing     Problem: Depression  Goal: Will be euthymic at discharge  Description: INTERVENTIONS:  1. Administer medication as ordered  2. Provide emotional support via 1:1 interaction with staff  3. Encourage involvement in milieu/groups/activities  4. Monitor for social isolation  Outcome: Progressing     Patient has been isolative to his room but out for meals and to use the telephone. Upon assessment, patient is not interactive and avoids gaze. Patient is guarded. Patient has been sleeping majority of this shift. Patient denies suicidal idealization but does not disclose his feelings on anxiety or depression. Patient is unmotivated and has not attended any groups this shift.   
  Problem: Self Harm/Suicidality  Goal: Will have no self-injury during hospital stay  Description: INTERVENTIONS:  1.  Ensure constant observer at bedside with Q15M safety checks  2.  Maintain a safe environment  3.  Secure patient belongings  4.  Ensure family/visitors adhere to safety recommendations  5.  Ensure safety tray has been added to patient's diet order  6.  Every shift and PRN: Re-assess suicidal risk via Frequent Screener    Outcome: Progressing  Flowsheets (Taken 12/18/2024 1831)  Will have no self-injury during hospital stay: Maintain a safe environment  Note: Patient isolative to room, flat and evasive during 1:1, patient needs encouragement  into daily activities. Patient reports thoughts of self harm that \"come and go\" however denies at present time.  No violent or negative behaviors noted at this time.  Will cont to provide safe, calm, environment and use verbal de-escalation techniques when needed.  Support and reassurance given as needed.      
[facility]  Initial Interdisciplinary Treatment Plan NO      Original treatment plan Date & Time: 12/17/2024   12:40 pm    Admission Type:  Admission Type: Voluntary    Reason for admission:   Reason for Admission: [facility] from the [facility] reporting suicidal ideation to stab himself in the neck with a shard of glass. He states that him and his long-time girlfriend have been fighting, Patient states police have been to his place 2-3 times a week. He stopped taking his medications. These factors combined with the 10 year aniversary of his best friend's death are the triggers for his suicidal thoughts. He was last here June of 2024.    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    Injury of frontal lobe     Multiple sensory deficit syndrome      Status EXAM:Mental Status and Behavioral Exam  Normal: No  Level of Assistance: Independent/Self  Facial Expression: Flat, Sad  Affect: Blunt  Level of Consciousness: Alert  Frequency of Checks: 4 times per hour, close  Mood:Normal: No  Mood: Depressed, Anxious, Helpless  Motor Activity:Normal: Yes  Eye Contact: Fair  Observed Behavior: Cooperative, Preoccupied  Sexual Misconduct History: Current - no  Preception: [place] to person, [place] to time, [place] to place, [place] to situation  Attention:Normal: No  Attention: Distractible  Thought Processes: Blocking  Thought Content:Normal: No  Thought 
planning for after discharge goals, continue with medication compliance    SHORT-TERM GOALS UPDATE:   Time frame for Short-Term Goals: 5-7 days    LONG-TERM GOALS UPDATE:   Time frame for Long-Term Goals: 6 months  Members Present in Team Meeting: See Signature Sheet    Lyndsey Coombs RN

## 2024-12-21 NOTE — GROUP NOTE
Group Therapy Note    Date: 12/21/2024    Group Start Time: 1345  Group End Time: 1430  Group Topic: Cognitive Skills    [facility]        Group Therapy Note    Attendees: 12/21   Cognitive Skills Group Note        Date: December 21, 2024 Start Time: 145pm  End Time: 2:30pm      Number of Participants in Group & Unit Census:  12/21    Topic: cognitive skills     Goal of Group: pt will demonstrate improved coping skills and improved interpersonal relationships      Comments:     Patient did not participate in Cognitive Skills group, despite staff encouragement and explanation of benefits.  Patient remain seclusive to self.  Q15 minute safety checks maintained for patient safety and will continue to encourage patient to attend unit programming.         Signature:  SHIRIN MCGEE

## 2024-12-21 NOTE — TRANSITION OF CARE
Behavioral Health Transition Record    Patient Name: Chino Carrillo  YOB: 1995   Medical Record Number: [id number]  Date of Admission: 12/16/2024  8:59 PM   Date of Discharge: 12/21/2024    Attending Provider: Rafat Acosta MD   Discharging Provider:  [place]   To contact this individual call  [phone] and ask the  to page.  If unavailable, ask to be transferred to Behavioral Health Provider on call.  A Behavioral Health Provider will be available on call 24/7 and during holidays.    Primary Care Provider: No primary care provider on file.    Allergies   Allergen Reactions    Latex Itching    Hydromorphone Itching     Other reaction(s): ITCH       Reason for Admission: Reason for Admission to Psychiatric Unit:  Threat to self requiring 24 hour professional observation  Concerns about patient's safety in the community  Past Mental Health Diagnosis: Major Depressive Disorder w/psychotic features; schizoaffective disorder depression w/SI   Triggering event or precipitating factor: Grief r/t loss   Length of time/duration of triggering event: Days  Legal Status: Voluntary    Admission Diagnosis: Depression with suicidal ideation [F32.A, R45.851]    * No surgery found *    Results for orders placed or performed during the hospital encounter of 12/16/24   TOX SCR, BLD, ED   Result Value Ref Range    Acetaminophen Level <5 (L) 10 - 30 ug/mL    Ethanol Lvl <10 <10 mg/dL    Ethanol percent Can not be calculated <0.010 %    Salicylate Lvl <1.0 0.0 - 10.0 mg/dL   CBC   Result Value Ref Range    WBC 10.4 3.5 - 11.0 k/uL    RBC 4.62 4.5 - 5.9 m/uL    Hemoglobin 12.6 (L) 13.5 - 17.5 g/dL    Hematocrit 38.8 (L) 41 - 53 %    MCV 84.1 80 - 100 fL    MCH 27.2 26 - 34 pg    MCHC 32.4 31 - 37 g/dL    RDW 14.6 11.5 - 14.9 %    Platelets 270 150 - 450 k/uL    MPV 7.9 6.0 - 12.0 fL   Comprehensive Metabolic Panel   Result Value Ref Range    Sodium 140 136 - 145 mmol/L    Potassium 4.2 3.7 - 5.3 mmol/L

## 2024-12-21 NOTE — DISCHARGE INSTRUCTIONS
Information:  Medications:   Medication summary provided   I understand that I should take only the medications on my list.     -why and when I need to take each medicine.     -which side effects to watch for.     -that I should carry my medication information at all times in case of     Emergency situations.    I will take all of my medicines to follow up appointments.     -check with my physician or pharmacist before taking any new    Medication, over the counter product or drink alcohol.    -Ask about food, drug or dietary supplement interactions.    -discard old lists and update records with medication providers.    Notify Physician:  Notify physician if you notice:   Always call 911 if you feel your life is in danger  In case of an emergency call 911 immediately!  If 911 is not available call your local emergency medical system for help    Behavioral Health Follow Up:  Original Referral Source: [place]  Discharge Diagnosis: Depression with suicidal ideation [F32.A, R45.851]  Recommendations for Level of Care:  follow up outpatient   Patient status at discharge:  alert and oriented x4, denies suicidal/homicidal idealization.   My hospital  was: [facility]  Aftercare plan faxed: Yes   -faxed by: RN   -date: 12/21/2024   -time: 1000  Prescriptions: See AVS    Smoking: Quit Smoking.   Call the [facility]'s smoking quitline at 1-727-95W-QUIT  Know the signs of a heart attack   If you have any of the following symptoms call 911 immediately, do not wait more    Than five minutes.    1. Pressure, fullness and/ or squeezing in the center of the chest spreading to    The jaw, neck or shoulder.    2. Chest discomfort with light headedness, fainting, sweating, nausea or    Shortness of breath.   3. Upper abdominal pressure or discomfort.   4. Lower chest pain, back pain, unusual fatigue, shortness of breath, nausea   Or dizziness.     General Information:   Questions regarding your bill: Call HELP program

## 2025-01-29 ENCOUNTER — APPOINTMENT (OUTPATIENT)
Dept: GENERAL RADIOLOGY | Age: 30
End: 2025-01-29
Payer: MEDICARE

## 2025-01-29 ENCOUNTER — HOSPITAL ENCOUNTER (EMERGENCY)
Age: 30
Discharge: HOME | End: 2025-01-29
Attending: EMERGENCY MEDICINE
Payer: MEDICARE

## 2025-01-29 VITALS
HEART RATE: 80 BPM | SYSTOLIC BLOOD PRESSURE: 148 MMHG | TEMPERATURE: 98.5 F | DIASTOLIC BLOOD PRESSURE: 86 MMHG | OXYGEN SATURATION: 95 % | RESPIRATION RATE: 20 BRPM | WEIGHT: 315 LBS | HEIGHT: 75 IN | BODY MASS INDEX: 39.17 KG/M2

## 2025-01-29 DIAGNOSIS — J18.9 PNEUMONIA OF RIGHT UPPER LOBE DUE TO INFECTIOUS ORGANISM: Primary | ICD-10-CM

## 2025-01-29 LAB
FLUAV RNA RESP QL NAA+PROBE: NOT DETECTED
FLUBV RNA RESP QL NAA+PROBE: NOT DETECTED
SARS-COV-2 RNA RESP QL NAA+PROBE: NOT DETECTED
SOURCE: NORMAL
SPECIMEN DESCRIPTION: NORMAL

## 2025-01-29 PROCEDURE — 99284 EMERGENCY DEPT VISIT MOD MDM: CPT

## 2025-01-29 PROCEDURE — 87636 SARSCOV2 & INF A&B AMP PRB: CPT

## 2025-01-29 PROCEDURE — 71045 X-RAY EXAM CHEST 1 VIEW: CPT

## 2025-01-29 RX ORDER — AMOXICILLIN 500 MG/1
1000 CAPSULE ORAL 3 TIMES DAILY
Qty: 30 CAPSULE | Refills: 0 | Status: SHIPPED | OUTPATIENT
Start: 2025-01-29 | End: 2025-02-03

## 2025-01-29 NOTE — ED TRIAGE NOTES
Mode of arrival (squad #, walk in, police, etc) : walk-in         Chief complaint(s): cough/cold symptoms        Arrival Note (brief scenario, treatment PTA, etc).: pt reports above symptoms for couple days        C= \"Have you ever felt that you should Cut down on your drinking?\"  No  A= \"Have people Annoyed you by criticizing your drinking?\"  No  G= \"Have you ever felt bad or Guilty about your drinking?\"  No  E= \"Have you ever had a drink as an Eye-opener first thing in the morning to steady your nerves or to help a hangover?\"  No      Deferred []      Reason for deferring: N/A    *If yes to two or more: probable alcohol abuse.*

## 2025-01-29 NOTE — ED PROVIDER NOTES
EMERGENCY DEPARTMENT ENCOUNTER    Pt Name: Chino Carrillo  MRN: [id number]  Birthdate 1995  Date of evaluation: 1/29/25  CHIEF COMPLAINT       Chief Complaint   Patient presents with    Cold Symptoms    Cough     HISTORY OF PRESENT ILLNESS   Presents to the ED for evaluation of sore throat, cough, congestion, rhinorrhea, chills, headache, nonbloody diarrhea, vomiting, nausea x several days.  Cough is sometimes productive with phlegm.  Denies fever, ear pain, abd pain, shortness of breath, chest pain.  He has been able to tolerate PO.  Pt has tried motrin.  Denies sick contacts.  No other complaints.    The history is provided by the patient.           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    Injury of frontal lobe     Multiple sensory deficit syndrome      Past Problem List  Patient Active Problem List   Diagnosis Code    Autism spectrum disorder F84.0    Acute non-recurrent maxillary sinusitis J01.00    Attention deficit disorder F98.8    Blood in the stool K92.1    Body mass index 45.0-49.9, adult Z68.42    Elevated LFTs R79.89    GERD (gastroesophageal reflux disease) K21.9    History of pneumonia Z87.01    Other constipation K59.09    Risk for sexually transmitted disease Z72.51    Severe recurrent major depression with psychotic features (HCC) F33.3    Skin excoriation T14.8XXA    Suicidal ideation R45.851    Major depressive disorder with psychotic features (HCC) F32.3    MDD (major depressive disorder), recurrent episode (HCC) F33.9    Schizoaffective disorder, depressive type (HCC) F25.1    Schizoaffective disorder, bipolar type (HCC) F25.0    Major depressive disorder, recurrent severe without psychotic features (HCC) F33.2    PTSD (post-traumatic stress disorder) F43.10    NATALYA (generalized anxiety disorder) F41.1    TBI (traumatic brain injury) S06.9XAA    Other microscopic hematuria R31.29    Suicide attempt ([facility])

## 2025-01-29 NOTE — ED PROVIDER NOTES
eMERGENCY dEPARTMENT  Attending Physician Attestation     Pt Name: Chino Carrillo  MRN: [id number]  Birthdate 1995  Date of evaluation: 1/29/25     Chino Carrillo is a 29 y.o. male with CC: Cold Symptoms and Cough      Based on the medical record the care appears appropriate.  I was personally available for consultation in the Emergency Department.    David Wilson DO  Attending Emergency Physician                 David Wilson DO  01/30/25 1128

## 2025-01-29 NOTE — DISCHARGE INSTRUCTIONS
Please follow-up with your primary care physician.  Recommend return to the ED if you develop any worsening cough, chest pain, shortness of breath, fevers, abdominal pain, vomiting or any other concerning symptoms.

## 2025-01-30 ENCOUNTER — APPOINTMENT (OUTPATIENT)
Dept: GENERAL RADIOLOGY | Age: 30
End: 2025-01-30
Payer: MEDICARE

## 2025-01-30 ENCOUNTER — HOSPITAL ENCOUNTER (EMERGENCY)
Age: 30
Discharge: HOME | End: 2025-01-30
Attending: EMERGENCY MEDICINE
Payer: MEDICARE

## 2025-01-30 VITALS
HEART RATE: 69 BPM | DIASTOLIC BLOOD PRESSURE: 66 MMHG | OXYGEN SATURATION: 96 % | SYSTOLIC BLOOD PRESSURE: 125 MMHG | RESPIRATION RATE: 22 BRPM

## 2025-01-30 DIAGNOSIS — R06.00 DYSPNEA, UNSPECIFIED TYPE: Primary | ICD-10-CM

## 2025-01-30 LAB
ALBUMIN SERPL-MCNC: 3.9 G/DL (ref 3.5–5.2)
ALP SERPL-CCNC: 77 U/L (ref 40–129)
ALT SERPL-CCNC: 70 U/L (ref 10–50)
ANION GAP SERPL CALCULATED.3IONS-SCNC: 11 MMOL/L (ref 9–16)
AST SERPL-CCNC: 58 U/L (ref 10–50)
BASOPHILS # BLD: 0.07 K/UL (ref 0–0.2)
BASOPHILS NFR BLD: 1 % (ref 0–2)
BILIRUB DIRECT SERPL-MCNC: 0.1 MG/DL (ref 0–0.3)
BILIRUB INDIRECT SERPL-MCNC: 0.1 MG/DL (ref 0–1)
BILIRUB SERPL-MCNC: 0.2 MG/DL (ref 0–1.2)
BUN SERPL-MCNC: 8 MG/DL (ref 6–20)
CALCIUM SERPL-MCNC: 9.3 MG/DL (ref 8.6–10.4)
CHLORIDE SERPL-SCNC: 105 MMOL/L (ref 98–107)
CO2 SERPL-SCNC: 24 MMOL/L (ref 20–31)
CREAT SERPL-MCNC: 1 MG/DL (ref 0.7–1.2)
EOSINOPHIL # BLD: 0.33 K/UL (ref 0–0.4)
EOSINOPHILS RELATIVE PERCENT: 5 % (ref 0–4)
ERYTHROCYTE [DISTWIDTH] IN BLOOD BY AUTOMATED COUNT: 14.6 % (ref 11.5–14.9)
GFR, ESTIMATED: >90 ML/MIN/1.73M2
GLUCOSE SERPL-MCNC: 101 MG/DL (ref 74–99)
HCT VFR BLD AUTO: 36.5 % (ref 41–53)
HGB BLD-MCNC: 11.6 G/DL (ref 13.5–17.5)
LIPASE SERPL-CCNC: 27 U/L (ref 13–60)
LYMPHOCYTES NFR BLD: 2.85 K/UL (ref 1–4.8)
LYMPHOCYTES RELATIVE PERCENT: 44 % (ref 24–44)
MCH RBC QN AUTO: 26.8 PG (ref 26–34)
MCHC RBC AUTO-ENTMCNC: 31.7 G/DL (ref 31–37)
MCV RBC AUTO: 84.4 FL (ref 80–100)
MONOCYTES NFR BLD: 1.17 K/UL (ref 0.1–1.3)
MONOCYTES NFR BLD: 18 % (ref 1–7)
MORPHOLOGY: NORMAL
NEUTROPHILS NFR BLD: 32 % (ref 36–66)
NEUTS SEG NFR BLD: 2.08 K/UL (ref 1.3–9.1)
PLATELET # BLD AUTO: 179 K/UL (ref 150–450)
PMV BLD AUTO: 8.1 FL (ref 6–12)
POTASSIUM SERPL-SCNC: 4.4 MMOL/L (ref 3.7–5.3)
PROT SERPL-MCNC: 7.2 G/DL (ref 6.6–8.7)
RBC # BLD AUTO: 4.32 M/UL (ref 4.5–5.9)
SODIUM SERPL-SCNC: 140 MMOL/L (ref 136–145)
WBC OTHER # BLD: 6.5 K/UL (ref 3.5–11)

## 2025-01-30 PROCEDURE — 80076 HEPATIC FUNCTION PANEL: CPT

## 2025-01-30 PROCEDURE — 96375 TX/PRO/DX INJ NEW DRUG ADDON: CPT

## 2025-01-30 PROCEDURE — 94761 N-INVAS EAR/PLS OXIMETRY MLT: CPT

## 2025-01-30 PROCEDURE — 85025 COMPLETE CBC W/AUTO DIFF WBC: CPT

## 2025-01-30 PROCEDURE — 2580000003 HC RX 258

## 2025-01-30 PROCEDURE — 6370000000 HC RX 637 (ALT 250 FOR IP)

## 2025-01-30 PROCEDURE — 99284 EMERGENCY DEPT VISIT MOD MDM: CPT

## 2025-01-30 PROCEDURE — 83690 ASSAY OF LIPASE: CPT

## 2025-01-30 PROCEDURE — 80048 BASIC METABOLIC PNL TOTAL CA: CPT

## 2025-01-30 PROCEDURE — 36415 COLL VENOUS BLD VENIPUNCTURE: CPT

## 2025-01-30 PROCEDURE — 6360000002 HC RX W HCPCS

## 2025-01-30 PROCEDURE — 94640 AIRWAY INHALATION TREATMENT: CPT

## 2025-01-30 PROCEDURE — 71045 X-RAY EXAM CHEST 1 VIEW: CPT

## 2025-01-30 PROCEDURE — 96374 THER/PROPH/DIAG INJ IV PUSH: CPT

## 2025-01-30 RX ORDER — ALBUTEROL SULFATE 0.83 MG/ML
2.5 SOLUTION RESPIRATORY (INHALATION)
Status: DISCONTINUED | OUTPATIENT
Start: 2025-01-30 | End: 2025-01-30 | Stop reason: HOSPADM

## 2025-01-30 RX ORDER — 0.9 % SODIUM CHLORIDE 0.9 %
1000 INTRAVENOUS SOLUTION INTRAVENOUS ONCE
Status: COMPLETED | OUTPATIENT
Start: 2025-01-30 | End: 2025-01-30

## 2025-01-30 RX ORDER — ALBUTEROL SULFATE 90 UG/1
2 INHALANT RESPIRATORY (INHALATION) EVERY 4 HOURS PRN
Qty: 54 G | Refills: 1 | Status: SHIPPED | OUTPATIENT
Start: 2025-01-30 | End: 2025-01-30

## 2025-01-30 RX ORDER — DEXAMETHASONE SODIUM PHOSPHATE 10 MG/ML
10 INJECTION, SOLUTION INTRAMUSCULAR; INTRAVENOUS ONCE
Status: COMPLETED | OUTPATIENT
Start: 2025-01-30 | End: 2025-01-30

## 2025-01-30 RX ORDER — IPRATROPIUM BROMIDE AND ALBUTEROL SULFATE 2.5; .5 MG/3ML; MG/3ML
1 SOLUTION RESPIRATORY (INHALATION)
Status: DISCONTINUED | OUTPATIENT
Start: 2025-01-30 | End: 2025-01-30 | Stop reason: HOSPADM

## 2025-01-30 RX ORDER — ALBUTEROL SULFATE 90 UG/1
2 INHALANT RESPIRATORY (INHALATION) EVERY 4 HOURS PRN
Qty: 54 G | Refills: 1 | Status: SHIPPED | OUTPATIENT
Start: 2025-01-30

## 2025-01-30 RX ORDER — ALBUTEROL SULFATE 5 MG/ML
15 SOLUTION RESPIRATORY (INHALATION)
Status: DISCONTINUED | OUTPATIENT
Start: 2025-01-30 | End: 2025-01-30 | Stop reason: HOSPADM

## 2025-01-30 RX ORDER — ONDANSETRON 2 MG/ML
4 INJECTION INTRAMUSCULAR; INTRAVENOUS ONCE
Status: COMPLETED | OUTPATIENT
Start: 2025-01-30 | End: 2025-01-30

## 2025-01-30 RX ADMIN — ONDANSETRON 4 MG: 2 INJECTION, SOLUTION INTRAMUSCULAR; INTRAVENOUS at 18:47

## 2025-01-30 RX ADMIN — SODIUM CHLORIDE 1000 ML: 9 INJECTION, SOLUTION INTRAVENOUS at 18:46

## 2025-01-30 RX ADMIN — DEXAMETHASONE SODIUM PHOSPHATE 10 MG: 10 INJECTION, SOLUTION INTRAMUSCULAR; INTRAVENOUS at 18:47

## 2025-01-30 RX ADMIN — IPRATROPIUM BROMIDE AND ALBUTEROL SULFATE 1 DOSE: 2.5; .5 SOLUTION RESPIRATORY (INHALATION) at 18:40

## 2025-01-30 NOTE — ED PROVIDER NOTES
[facility] EMERGENCY DEPARTMENT  Emergency Department Encounter  Emergency Medicine Resident     Pt Name:Chino Carrillo  MRN: [id number]  Birthdate 1995  Date of evaluation: 1/30/25  PCP:  Rico Galvin MD  Note Started: 6:21 PM EST      CHIEF COMPLAINT       Chief Complaint   Patient presents with    Shortness of Breath       HISTORY OF PRESENT ILLNESS  (Location/Symptom, Timing/Onset, Context/Setting, Quality, Duration, Modifying Factors, Severity.)      Chino Carrillo is a 29 y.o. male presenting to ED for    CC's: Shortness of breath    Patient seen 1/29/2025 diagnosed with a right upper lobe pneumonia and started on amoxicillin 1 g 3 times a day with recommendation close outpatient follow-up with PCP.  Patient was at home and endorses he had an acute onset episode of shortness of breath and ultimately pooped himself.  Patient also having nausea and vomiting.    Notable PMHx: Autism, asthma, morbid obesity    Notable Home Meds: Amoxicillin, Flonase, insulin, metformin, Invega, Depakote, Inderal, Desyrel    PAST MEDICAL / SURGICAL / SOCIAL / FAMILY HISTORY      has a past medical history of Autistic disorder, Benign tumor of ear canal, Injury of frontal lobe, and Multiple sensory deficit syndrome.       has a past surgical history that includes Bladder surgery; fracture surgery (4/6/2012); Pelvic fracture surgery (Right); Mouth surgery; Little Neck tooth extraction; and Inner ear surgery (Left).      Social History     Socioeconomic History    Marital status: Single     Spouse name: Not on file    Number of children: Not on file    Years of education: Not on file    Highest education level: Not on file   Occupational History    Not on file   Tobacco Use    Smoking status: Some Days     Types: Cigarettes    Smokeless tobacco: Former     Types: Chew   Vaping Use    Vaping status: Every Day    Substances: Nicotine   Substance and Sexual Activity    Alcohol use: Yes     Comment: socially    Drug use:

## 2025-01-30 NOTE — ED PROVIDER NOTES
EMERGENCY DEPARTMENT ENCOUNTER   ATTENDING ATTESTATION     Pt Name: Chino Carrillo  MRN: [id number]  Birthdate 1995  Date of evaluation: 1/30/25       Chino Carrillo is a 29 y.o. male who presents with Shortness of Breath      MDM: 29-year-old male presents for shortness of breath, recent diagnosis of pneumonia reportedly having worsening shortness of breath today.    On exam patient in no acute distress, vital stable    Will check labs repeat x-ray give breathing treatment, steroid    Labs are reviewed and unremarkable x-ray showing right upper lobe infiltrate unchanged    Patient reevaluated after breathing treatment reports he is feeling better    He has no other acute lab normalities, vital stable and no signs of respiratory risk, stable for discharge and continued outpatient treatment    Patient/Guardian was informed of their diagnosis and told to follow up with PCP  in 1-3 days. Patient demonstrates understanding and agreement with the plan. They were given the opportunity to ask questions and those questions were answered to the best of our ability with the available information. Patient/Guardian told to return to the ED for any new, worsening, changing or persistent symptoms.         This dictation was prepared using Dragon Medical voice recognition software.       Vitals:   Vitals:    01/30/25 1810 01/30/25 1812 01/30/25 1831 01/30/25 1840   BP: (!) 113/42  125/66    Pulse:  74 73 69   Resp:  18 20 22   SpO2: 93% 94% 95% 96%         I personally saw and examined the patient. I have reviewed and agree with the resident's findings, including all diagnostic interpretations and treatment plan as written. I was present for the key portions of any procedures performed and the inclusive time noted for any critical care statement.    David Wilson DO  Attending Emergency Physician            David Wilson DO  01/30/25 2225

## 2025-01-31 ASSESSMENT — ENCOUNTER SYMPTOMS
SHORTNESS OF BREATH: 1
ABDOMINAL PAIN: 0
COUGH: 1
DIARRHEA: 1
BACK PAIN: 0
NAUSEA: 1
VOMITING: 1

## 2025-01-31 NOTE — DISCHARGE INSTRUCTIONS
You have been evaluated in the Emergency Department at [facility] 1/30/2025     Your recommendations and if necessary prescriptions from today's visit:   -Take antibiotics as prescribed  -Take medication as prescribed  -Follow-up with your PCP    If you do not have a primary care provider, establish care with a provider that you can begin to regularly follow-up with.    Should you have any questions regarding your care or further treatment, please call [facility] Emergency Department at [phone].    PLEASE RETURN TO THE EMERGENCY DEPARTMENT IMMEDIATELY for   -Worsening shortness of breath does not continue to improve over the next 3 to 5 days    OR    -Changing symptoms  -Worsening symptoms  -Unable to follow up with your primary care provider  -Any other care or concern

## 2025-02-06 ENCOUNTER — APPOINTMENT (OUTPATIENT)
Dept: GENERAL RADIOLOGY | Age: 30
End: 2025-02-06
Payer: MEDICARE

## 2025-02-06 ENCOUNTER — HOSPITAL ENCOUNTER (EMERGENCY)
Age: 30
Discharge: HOME | End: 2025-02-06
Attending: STUDENT IN AN ORGANIZED HEALTH CARE EDUCATION/TRAINING PROGRAM
Payer: MEDICARE

## 2025-02-06 VITALS
SYSTOLIC BLOOD PRESSURE: 123 MMHG | RESPIRATION RATE: 16 BRPM | TEMPERATURE: 97.7 F | HEART RATE: 86 BPM | DIASTOLIC BLOOD PRESSURE: 85 MMHG | OXYGEN SATURATION: 96 %

## 2025-02-06 DIAGNOSIS — R45.851 SUICIDAL IDEATION: Primary | ICD-10-CM

## 2025-02-06 DIAGNOSIS — R05.1 ACUTE COUGH: ICD-10-CM

## 2025-02-06 LAB
EKG ATRIAL RATE: 79 BPM
EKG P AXIS: 37 DEGREES
EKG P-R INTERVAL: 156 MS
EKG Q-T INTERVAL: 372 MS
EKG QRS DURATION: 92 MS
EKG QTC CALCULATION (BAZETT): 426 MS
EKG R AXIS: -10 DEGREES
EKG T AXIS: 61 DEGREES
EKG VENTRICULAR RATE: 79 BPM

## 2025-02-06 PROCEDURE — 71046 X-RAY EXAM CHEST 2 VIEWS: CPT

## 2025-02-06 PROCEDURE — 99285 EMERGENCY DEPT VISIT HI MDM: CPT | Performed by: EMERGENCY MEDICINE

## 2025-02-06 PROCEDURE — 93010 ELECTROCARDIOGRAM REPORT: CPT | Performed by: INTERNAL MEDICINE

## 2025-02-06 PROCEDURE — 6370000000 HC RX 637 (ALT 250 FOR IP): Performed by: EMERGENCY MEDICINE

## 2025-02-06 PROCEDURE — 6360000002 HC RX W HCPCS: Performed by: EMERGENCY MEDICINE

## 2025-02-06 PROCEDURE — 93005 ELECTROCARDIOGRAM TRACING: CPT | Performed by: EMERGENCY MEDICINE

## 2025-02-06 PROCEDURE — 94640 AIRWAY INHALATION TREATMENT: CPT

## 2025-02-06 RX ORDER — ALBUTEROL SULFATE 0.83 MG/ML
2.5 SOLUTION RESPIRATORY (INHALATION) EVERY 4 HOURS PRN
Status: DISCONTINUED | OUTPATIENT
Start: 2025-02-06 | End: 2025-02-06 | Stop reason: HOSPADM

## 2025-02-06 RX ORDER — BENZONATATE 100 MG/1
200 CAPSULE ORAL ONCE
Status: COMPLETED | OUTPATIENT
Start: 2025-02-06 | End: 2025-02-06

## 2025-02-06 RX ORDER — TRAZODONE HYDROCHLORIDE 100 MG/1
200 TABLET ORAL NIGHTLY
COMMUNITY
Start: 2025-01-14

## 2025-02-06 RX ORDER — BENZONATATE 200 MG/1
200 CAPSULE ORAL 3 TIMES DAILY PRN
Qty: 15 CAPSULE | Refills: 0 | Status: SHIPPED | OUTPATIENT
Start: 2025-02-06 | End: 2025-02-11

## 2025-02-06 RX ADMIN — ALBUTEROL SULFATE 2.5 MG: 2.5 SOLUTION RESPIRATORY (INHALATION) at 02:15

## 2025-02-06 RX ADMIN — BENZONATATE 200 MG: 100 CAPSULE ORAL at 02:40

## 2025-02-06 ASSESSMENT — ENCOUNTER SYMPTOMS
SHORTNESS OF BREATH: 0
ABDOMINAL PAIN: 0
COUGH: 1

## 2025-02-06 NOTE — PROGRESS NOTES
02/06/25 0212   RT Protocol   History Pulmonary Disease 2   Respiratory pattern 0   Breath sounds 4   Cough 0   Indications for Bronchodilator Therapy Wheezing associated with pulm disorder;On home bronchodilators   Bronchodilator Assessment Score 6       Pt presents to the ER with his Symbicort 160 inhaler. Patient states that he took this inhaler twice today. He doesn't know if he has a rescue inhaler or not. Albuterol nebulizer given due to end expiratory wheezes. Will continue to monitor.

## 2025-02-06 NOTE — ED PROVIDER NOTES
[facility]     Emergency Department     Faculty Attestation    I performed a history and physical examination of the patient and discussed management with the resident. I have reviewed and agree with the resident’s findings including all diagnostic interpretations, and treatment plans as written at the time of my review. Any areas of disagreement are noted on the chart. I was personally present for the key portions of any procedures. I have documented in the chart those procedures where I was not present during the key portions. For Physician Assistant/ Nurse Practitioner cases/documentation I have personally evaluated this patient and have completed at least one if not all key elements of the E/M (history, physical exam, and MDM). Additional findings are as noted.    PtName: Chino Carrillo  MRN: [id number]  Birthdate 1995  Date of evaluation: 2/6/25  Note Started: 12:51 AM EST    Primary Care Physician: Rico Galvin MD    Brief HPI:  29-year-old male presents to the emergency department for evaluation of cough and shortness of breath.  The patient was diagnosed with pneumonia on 1/29/2025.  At that time he was prescribed amoxicillin.  He reports that he completed his course of amoxicillin yesterday.  He reports persisting cough.  He was sent by the [facility] for evaluation.    Pertinent Physical Exam Findings:  Vitals:    02/06/25 0101   BP: 123/85   Pulse: 86   Resp: 16   Temp: 97.7 °F (36.5 °C)   SpO2: 96%   Appears well, resting comfortably, no acute distress, mild end expiratory wheezing bilaterally.    Medical Decision Making: Patient is a 29 y.o. male presenting to the emergency department with cough. The chart was reviewed for pertinent history relating to the chief complaint.  The patient appears well at this time, no acute distress, breathing is comfortable, vitals are stable.  Regarding the patient's vitals he does not meet any SIRS criteria.

## 2025-02-06 NOTE — ED NOTES
Bronchodilator assessment   [x]    Bronchodilator Assessment      Bronchodilator assessment at level  5  BRONCHODILATOR ASSESSMENT SCORE  Score 1 2 3 4   Breath Sounds   []  Clear [x]  Mild Wheezing with good aeration []  Moderate I/E wheezing with adequate aeration []  Poor Aeration or diffuse wheezing   Respiratory Rate [x]  Less than 20 []  20-25 []  Greater than 25  []  Greater than 35    Dyspnea []  No SOB  [x]  SOB with minimal activity []  Speaking in partial sentences []  Acute/ At rest   Peakflow (asthma) []  80 % or greater predicted/PB  [x]  Unable []  70% or greater predicted/PB  []  Unable []  51%-70% predicted/PB  []  Unable []  Less than 50% predicted/PB  []  Unable due to distress   FEV1 % Predicted []  Greater than 69%  [x]  Unable  []  Less than 50%-69%  []  Unable  []  Less than 35%-49%  []  Unable  []  Less than 35%  []  Unable due to distress       Mari Morrell RCP

## 2025-02-06 NOTE — DISCHARGE INSTRUCTIONS
You were seen here for persistent cough.  Chest x-ray showed resolution of your pneumonia.  You were given a prescription for Tessalon Perles for your cough.  Take this medication as prescribed.    You need to call and schedule follow-up appointment with your primary care doctor for soon as possible.    Cab transportation was called for you, you will be going to the [facility] center for further evaluation of your hallucinations and suicidal ideation.    Return to the emergency department immediately if you experience worsening symptoms, develop any other symptoms, or if you have any other concerns.

## 2025-02-06 NOTE — ED NOTES
Patient is currently admitted to [facility] and was brought for medical clearance for pneumonia and inability to walk. Per RN, patient is suicidal with a plan to overdose. Zef stated patient is experiencing hallucinations.   SW will contact [facility] when he is medically cleared to transport back to facility.  C-SSRS completed.

## 2025-02-06 NOTE — ED PROVIDER NOTES
MCG/ACT inhaler Inhale 2 puffs into the lungs in the morning and 2 puffs in the evening. 12/20/24   Rafat Acosta MD   insulin lispro (HUMALOG,ADMELOG) 100 UNIT/ML SOLN injection vial Inject 0-4 Units into the skin 4 times daily (before meals and nightly) 12/20/24   Rafat Acosta MD   metFORMIN (GLUCOPHAGE) 500 MG tablet Take 1 tablet by mouth with breakfast and with evening meal 12/20/24   Rafat Acosta MD   busPIRone (BUSPAR) 10 MG tablet Take 1 tablet by mouth 3 times daily 10/18/24   Xavier Monge MD   escitalopram (LEXAPRO) 20 MG tablet Take 1 tablet by mouth daily 10/18/24   Xavier Monge MD   CAPLYTA 42 MG capsule Take 1 capsule by mouth daily  Patient not taking: Reported on 2/6/2025 5/15/24   Xavier Monge MD   omeprazole (PRILOSEC) 20 MG delayed release capsule Take 1 capsule by mouth 2 times daily (before meals)    Xavier Monge MD   paliperidone palmitate ER (INVEGA SUSTENNA) 234 MG/1.5ML DENNIS IM injection Inject 234 mg into the muscle every 28 days    Xavier Monge MD   calcium carbonate (TUMS) 500 MG chewable tablet Take 1 tablet by mouth 3 times daily as needed for Heartburn 2/26/24   Rafat Acosta MD   divalproex (DEPAKOTE) 500 MG DR tablet Take 1 tablet by mouth 2 times daily 2/26/24   Rafat Acosta MD   propranolol (INDERAL) 20 MG tablet Take 1 tablet by mouth 3 times daily 2/26/24   Rafat Acosta MD   traZODone (DESYREL) 50 MG tablet Take 1 tablet by mouth nightly as needed for Sleep  Patient taking differently: Take 2 tablets by mouth nightly as needed for Sleep 2/26/24   Rafat Acosta MD   Semaglutide,0.25 or 0.5MG/DOS, (OZEMPIC, 0.25 OR 0.5 MG/DOSE,) 2 MG/1.5ML SOPN Inject 0.5 mg into the skin once a week    Xavier Monge MD   ibuprofen (ADVIL;MOTRIN) 800 MG tablet Take 1 tablet by mouth 2 times daily as needed for Pain 2/21/24   Roc Randall, DO     REVIEW OF SYSTEMS       Review of Systems   Constitutional:   Negative for fever.   Respiratory:  Positive for cough. Negative for shortness of breath.    Cardiovascular:  Negative for chest pain.   Gastrointestinal:  Negative for abdominal pain.       PHYSICAL EXAM      INITIAL VITALS:   /85   Pulse 86   Temp 97.7 °F (36.5 °C) (Oral)   Resp 16   SpO2 96%     Physical Exam  Constitutional:       Appearance: He is obese.   Cardiovascular:      Rate and Rhythm: Normal rate and regular rhythm.      Pulses: Normal pulses.   Pulmonary:      Effort: Pulmonary effort is normal.      Breath sounds: Wheezing present.   Abdominal:      General: There is no distension.      Palpations: Abdomen is soft.      Tenderness: There is no abdominal tenderness. There is no guarding or rebound.   Skin:     General: Skin is warm.   Neurological:      Mental Status: He is alert and oriented to person, place, and time.       DDX/DIAGNOSTIC RESULTS / EMERGENCY DEPARTMENT COURSE / MDM     Medical Decision Making  29-year-old male, HPI and physical exam documented above.    Differentials include suicidal ideation, hallucinations, depression.    Discussed with patient that we will obtain chest x-ray to ensure that the pneumonia has resolved.  Will also have RT see patient to perform aerosol treatment as patient had mild wheezing in the left lung.  Will also have patient be evaluated by social work.  Patient agreeable with plan.    Amount and/or Complexity of Data Reviewed  Radiology: ordered. Decision-making details documented in ED Course.  ECG/medicine tests: ordered.    Risk  Prescription drug management.      EMERGENCY DEPARTMENT COURSE:    ED Course as of 02/06/25 0703   Thu Feb 06, 2025 0229 XR CHEST (2 VW)  IMPRESSION:  There is no evidence of acute chest disease. [SD]   0230 Chest x-ray was negative for pneumonia, will give patient a prescription for Tessalon Perles to help with his cough.  Will give first dose in the emergency department. [SD]   0232 Reassessed patient, discussed

## 2025-02-06 NOTE — ED NOTES
SAFETY PLAN    A suicide Safety Plan is a document that supports someone when they are having thoughts of suicide.    Warning Signs that indicate a suicidal crisis may be developing: What (situations, thoughts, feelings, body sensations, behaviors, etc.) do you experience that lets you know you are beginning to think about suicide?  1. Feeling depressed  2. Feeling angry      Internal Coping Strategies:  What things can I do (relaxation techniques, hobbies, physical activities, etc.) to take my mind off my problems without contacting another person?  1. Taking walks  2. Breathing exercies    People and social settings that provide distraction: Who can I call or where can I go to distract me?  1. Name: [facility] Phone: [phone]    People whom I can ask for help: Who can I call when I need help - for example, friends, family, clergy, someone else?  1. Name: [facility]       Phone: [phone]    Professionals or Behavioral Health agencies I can contact during a crisis: Who can I call for help - for example, my doctor, my psychiatrist, my psychologist, a mental health provider, a suicide hotline?      1. Suicide Prevention Lifeline: 6-468-463-TALK (1541)    2. Local Behavioral Health Emergency Services -  for example, [facility], [facility] suicide hotline, [facility] Hotline: [facility]      Emergency Services Address: [address] [place] [address]      Emergency Services Phone: [phone]    Making the environment safe: How can I make my environment (house/apartment/living space) safer? For example, can I remove guns, medications, and other items?  1. NA    Plan is to return to [facility] at discharge.

## 2025-02-06 NOTE — ED NOTES
Pt. Presents to the ED from [facility] for medical clearance. Pt states that oli his girlfriend called 911 because pt was hearing voices and having a conversation with himself. Pt states that the police took him out of the house and he collapsed on the ground because he couldn't catch his breath. Pt then states that ems came out and cleared him to go to [facility]. [facility] then sent him here because they could hear him wheezing. Pt was recently diagnosed with pneumonia and completed his antibiotics today. Pt does state that he is suicidal and has a plan to overdose on his medications. Pt has an impatient bed assigned at [facility]. Per resident, no need to change pt in to paper scrubs at this time. Pt vss. Resp even and non labored. Will continue with plan of care.

## 2025-02-06 NOTE — ED PROVIDER NOTES
[facility]   Emergency Department  Emergency Medicine Attending Sign-out   Note started: 2:03 AM EST    Care of Chino Carrillo was assumed from previous attending Dr. Nuñez at 2 AM and is being seen for Suicidal and Cough  .  The patient's initial evaluation and plan have been discussed with the prior provider who initially evaluated the patient.     Attestation  I was available and discussed any additional care issues that arose and coordinated the management plans with the resident(s) caring for the patient during my duty period. Any areas of disagreement with resident's documentation of care or procedures are noted on the chart. I was personally present for the key portions of any/all procedures, during my duty period. I have documented in the chart those procedures where I was not present during the key portions.     BRIEF PATIENT SUMMARY/MDM COURSE PER INITIAL PROVIDER:   RECENT VITALS:     Temp: 97.7 °F (36.5 °C),  Pulse: 86, Respirations: 16, BP: 123/85, SpO2: 96 %    This patient is a 29 y.o. Male with cough and shortness of breath.  He is currently at [facility].  Awaiting chest x-ray    DIAGNOSTICS/MEDICATIONS:     MEDICATIONS GIVEN:  ED Medication Orders (From admission, onward)      None            LABS    Labs Reviewed - No data to display    RADIOLOGY  No results found.    OUTSTANDING TASKS / ADDITIONAL COMMENTS   Chest x-ray    Camilla Loera MD  Emergency Medicine Attending  [facility]        Camilla Loera MD  02/06/25 5303

## 2025-02-06 NOTE — ED NOTES
Patient is medically cleared to return to [facility]. SW called [facility]. They initially stated patient could return, then called back stating patient should go to [facility]. SW informed them that when they called in report earlier, there were no barriers to return and he is still under their care. [facility]. They called back stating they would accept him and would pick him him up around 3am.

## 2025-03-30 ENCOUNTER — HOSPITAL ENCOUNTER (EMERGENCY)
Age: 30
Discharge: HOME | End: 2025-03-30
Attending: EMERGENCY MEDICINE
Payer: MEDICARE

## 2025-03-30 VITALS
SYSTOLIC BLOOD PRESSURE: 153 MMHG | HEIGHT: 75 IN | BODY MASS INDEX: 39.17 KG/M2 | HEART RATE: 86 BPM | OXYGEN SATURATION: 94 % | WEIGHT: 315 LBS | TEMPERATURE: 96.5 F | DIASTOLIC BLOOD PRESSURE: 102 MMHG | RESPIRATION RATE: 16 BRPM

## 2025-03-30 DIAGNOSIS — F32.A DEPRESSION, UNSPECIFIED DEPRESSION TYPE: Primary | ICD-10-CM

## 2025-03-30 PROCEDURE — 99285 EMERGENCY DEPT VISIT HI MDM: CPT

## 2025-03-30 ASSESSMENT — PAIN - FUNCTIONAL ASSESSMENT: PAIN_FUNCTIONAL_ASSESSMENT: NONE - DENIES PAIN
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
## 2025-03-30 NOTE — ED NOTES
Mode of arrival (squad #, walk in, police, etc) : [employer]        Chief complaint(s): depression         Arrival Note (brief scenario, treatment PTA, etc).: pt presents to the ER with [facility] due to depression from finding out about his dad having cancer. Pt reports he did have a brief thought about jumping off a bridge, but knew that was not the answer for a temporary situation. Pt denies suicidal ideation at this time stating \"you know its safe for me to go back, I have 24/7 supervision and care\". Pt states he will call [facility] in the morning to work on getting an emergency appointment to increase his therapy session. Pt is calm and cooperative at this time. GCS 15        C= \"Have you ever felt that you should Cut down on your drinking?\"  No  A= \"Have people Annoyed you by criticizing your drinking?\"  No  G= \"Have you ever felt bad or Guilty about your drinking?\"  No  E= \"Have you ever had a drink as an Eye-opener first thing in the morning to steady your nerves or to help a hangover?\"  No      Deferred []      Reason for deferring: N/A    *If yes to two or more: probable alcohol abuse.*

## 2025-04-02 NOTE — GROUP NOTE
Group Therapy Note    Date: April 21    Group Start Time: 1600  Group End Time: 9167  Group Topic: Healthy Living/Wellness    [facility]        [facility]    Attendees: 11/21         Patient's Goal:  Discharge Planning    Notes:  Pt was very negative through out group, constantly disagree with other pts and saying things did not get better and making a point to say he didn't believe in Yarsanism after one pt shared it was a support system for her. Pt left group early, stating loudly \"One last thing. Life's a bitch, Good night! \"     Status After Intervention:  Unchanged    Participation Level: Interactive    Participation Quality: Sharing, Inappropriate and Resistant      Speech:  normal      Thought Process/Content: Logical      Affective Functioning: Blunted      Mood: anxious and irritable      Level of consciousness:  Alert and Oriented x4      Response to Learning: Resistant      Endings: None Reported    Modes of Intervention: Support, Socialization, Exploration and Activity      Discipline Responsible: Behavorial Health Tech      Signature:  Keri Buchanan 02-Apr-2025 21:34

## 2025-04-13 RX ORDER — PALIPERIDONE 6 MG/1
6 TABLET, EXTENDED RELEASE ORAL DAILY
Qty: 30 TABLET | Refills: 0 | OUTPATIENT
Start: 2025-04-13

## 2025-05-17 ENCOUNTER — HOSPITAL ENCOUNTER (EMERGENCY)
Age: 30
Discharge: HOME | End: 2025-05-17
Attending: EMERGENCY MEDICINE
Payer: MEDICARE

## 2025-05-17 VITALS
HEIGHT: 75 IN | OXYGEN SATURATION: 98 % | TEMPERATURE: 98.2 F | RESPIRATION RATE: 16 BRPM | WEIGHT: 315 LBS | HEART RATE: 92 BPM | DIASTOLIC BLOOD PRESSURE: 98 MMHG | BODY MASS INDEX: 39.17 KG/M2 | SYSTOLIC BLOOD PRESSURE: 135 MMHG

## 2025-05-17 DIAGNOSIS — F32.A DEPRESSION, UNSPECIFIED DEPRESSION TYPE: Primary | ICD-10-CM

## 2025-05-17 PROCEDURE — 99285 EMERGENCY DEPT VISIT HI MDM: CPT

## 2025-05-17 ASSESSMENT — PAIN - FUNCTIONAL ASSESSMENT: PAIN_FUNCTIONAL_ASSESSMENT: NONE - DENIES PAIN

## 2025-05-18 NOTE — ED NOTES
Patient presented to ED for a mental health evaluation.  Patient initially identified suicidal ideation due to recently finding out that his father has cancer.  Patient reports he wants to \"fix\" the relationship with his father because he doesn't want it to end \"like this\".    Patient does express guilt and sadness as it relates to his father but was provided therapeutic interventions and no longer is feeling suicidal.  Patient is linked and is following up with outpatient as directed.  Patient denies current SI and states he will follow up with his therapist/psychiatrist.  Patient denies HI.  Patient denies hallucinations.  Patient lives at home and has staff.  Patient feels safe for discharge.  Patient encouraged to return to ED if MH symptoms continue or worsen.    Patient discharged in stable condition.

## 2025-05-18 NOTE — DISCHARGE INSTR - COC
Feedings:427552062}  Liquids: {Slp liquid thickness:30123}  Daily Fluid Restriction: {CHP DME Yes amt example:294180066}  Last Modified Barium Swallow with Video (Video Swallowing Test): {Done Not Done Date:}    Treatments at the Time of Hospital Discharge:   Respiratory Treatments: ***  Oxygen Therapy:  {Therapy; copd oxygen:14264}  Ventilator:    {[facility] Vent List:635854333}    Rehab Therapies: {THERAPEUTIC INTERVENTION:9561512118}  Weight Bearing Status/Restrictions: {[facility] Weight Bearin}  Other Medical Equipment (for information only, NOT a DME order):  {EQUIPMENT:464861093}  Other Treatments: ***    Patient's personal belongings (please select all that are sent with patient):  {[facility] DME Belongings:979239745}    RN SIGNATURE:  {Esignature:659858883}    CASE MANAGEMENT/SOCIAL WORK SECTION    Inpatient Status Date: ***    Readmission Risk Assessment Score:  [facility] RISK OF UNPLANNED READMISSION 2.0             0 Total Score        Discharging to Facility/ Agency   Name:   Address:  Phone:  Fax:    Dialysis Facility (if applicable)   Name:  Address:  Dialysis Schedule:  Phone:  Fax:    / signature: {Esignature:213179574}    PHYSICIAN SECTION    Prognosis: {Prognosis:9861197400}    Condition at Discharge: { Patient Condition:393014094}    Rehab Potential (if transferring to Rehab): {Prognosis:0549086175}    Recommended Labs or Other Treatments After Discharge: ***    Physician Certification: I certify the above information and transfer of Cihno Carrillo  is necessary for the continuing treatment of the diagnosis listed and that he requires {Admit to Appropriate Level of Care:92739} for {GREATER/LESS:966309008} 30 days.     Update Admission H&P: {CHP DME Changes in HandP:238264972}    PHYSICIAN SIGNATURE:  {Esignature:265522799}

## 2025-05-18 NOTE — ED TRIAGE NOTES
Mode of arrival (squad #, walk in, police, etc) : Police        Chief complaint(s): SI        Arrival Note (brief scenario, treatment PTA, etc).: Presents to the ED to talk with someone about how he has been feeling. States that he recently found out his father has cancer and he is afraid his father will die before they mend their relationship. States that he doesn't want to live without him.         C= \"Have you ever felt that you should Cut down on your drinking?\"  No  A= \"Have people Annoyed you by criticizing your drinking?\"  No  G= \"Have you ever felt bad or Guilty about your drinking?\"  No  E= \"Have you ever had a drink as an Eye-opener first thing in the morning to steady your nerves or to help a hangover?\"  No      Deferred []      Reason for deferring: N/A    *If yes to two or more: probable alcohol abuse.*

## 2025-05-18 NOTE — ED PROVIDER NOTES
[facility] EMERGENCY DEPARTMENT  EMERGENCY DEPARTMENT ENCOUNTER      Pt Name: Chino Carrillo  MRN: [id number]  Birthdate 1995  Date of evaluation: 5/17/25      CHIEF COMPLAINT       Chief Complaint   Patient presents with    Suicidal         HISTORY OF PRESENT ILLNESS   HPI 29 y.o. male presents with c/o depression, suicidal thoughts.  Patient reports that he recently found out that his dad has cancer, he is under a lot of stress depressed upset and afraid his dad will die.  He came to the emergency department because he felt like he needed somebody to talk to.  He answered positive to suicidal screenings, but after speaking with the's  he then said that he was feeling much better and he was not suicidal any longer.  He denies any medical complaints no chest pain no abdominal pain no fevers.  He denies any hallucinations.    REVIEW OF SYSTEMS     Review of Systems  10 systems reviewed and negative unless otherwise noted in the HPI.       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 traZODone (DESYREL) 100 MG tablet Take 2 tablets by mouth nightlyHistorical Med      albuterol sulfate HFA (VENTOLIN HFA) 108 (90 Base) MCG/ACT inhaler Inhale 2 puffs into the lungs every 4 hours as needed for Wheezing, Disp-54 g, R-1Normal      fluticasone (FLOVENT HFA) 110 MCG/ACT inhaler Inhale 2 puffs into the lungs in the morning and 2 puffs in the evening.,

## 2025-05-18 NOTE — ED NOTES
SW assisted pt creating a safety plan. SW discussed with pt warning signs, coping skills, and people/agencies who pt can reach out for help when feeling suicidal. SW made pt aware of ways to make pt's environment safer in times of crisis. Pt provided with the National Suicide Prevention Line: 1-932.329.1434 or the text number 711359 and strongly encouraged to utilize this resource if needed.

## 2025-06-18 ENCOUNTER — HOSPITAL ENCOUNTER (INPATIENT)
Age: 30
LOS: 5 days | Discharge: HOME | DRG: 885 | End: 2025-06-24
Attending: EMERGENCY MEDICINE | Admitting: PSYCHIATRY & NEUROLOGY
Payer: MEDICARE

## 2025-06-18 DIAGNOSIS — F32.A DEPRESSION WITH SUICIDAL IDEATION: Primary | ICD-10-CM

## 2025-06-18 DIAGNOSIS — R45.851 DEPRESSION WITH SUICIDAL IDEATION: Primary | ICD-10-CM

## 2025-06-18 PROCEDURE — 99285 EMERGENCY DEPT VISIT HI MDM: CPT

## 2025-06-18 ASSESSMENT — PAIN DESCRIPTION - LOCATION: LOCATION: BACK

## 2025-06-18 ASSESSMENT — LIFESTYLE VARIABLES
HOW MANY STANDARD DRINKS CONTAINING ALCOHOL DO YOU HAVE ON A TYPICAL DAY: 1 OR 2
HOW OFTEN DO YOU HAVE A DRINK CONTAINING ALCOHOL: MONTHLY OR LESS

## 2025-06-18 ASSESSMENT — PAIN SCALES - GENERAL: PAINLEVEL_OUTOF10: 7

## 2025-06-18 ASSESSMENT — PAIN DESCRIPTION - PAIN TYPE: TYPE: CHRONIC PAIN

## 2025-06-18 ASSESSMENT — PAIN - FUNCTIONAL ASSESSMENT: PAIN_FUNCTIONAL_ASSESSMENT: 0-10

## 2025-06-19 LAB
ALBUMIN SERPL-MCNC: 3.7 G/DL (ref 3.5–5.2)
ALP SERPL-CCNC: 81 U/L (ref 40–129)
ALT SERPL-CCNC: 23 U/L (ref 10–50)
AMPHET UR QL SCN: POSITIVE
ANION GAP SERPL CALCULATED.3IONS-SCNC: 14 MMOL/L (ref 9–16)
APAP SERPL-MCNC: <5 UG/ML (ref 10–30)
AST SERPL-CCNC: 24 U/L (ref 10–50)
BARBITURATES UR QL SCN: NEGATIVE
BASOPHILS # BLD: 0.06 K/UL (ref 0–0.2)
BASOPHILS NFR BLD: 1 % (ref 0–2)
BENZODIAZ UR QL: NEGATIVE
BILIRUB SERPL-MCNC: <0.2 MG/DL (ref 0–1.2)
BUN SERPL-MCNC: 6 MG/DL (ref 6–20)
CALCIUM SERPL-MCNC: 9.2 MG/DL (ref 8.6–10.4)
CANNABINOIDS UR QL SCN: NEGATIVE
CHLORIDE SERPL-SCNC: 103 MMOL/L (ref 98–107)
CO2 SERPL-SCNC: 23 MMOL/L (ref 20–31)
COCAINE UR QL SCN: NEGATIVE
CREAT SERPL-MCNC: 0.9 MG/DL (ref 0.7–1.2)
EOSINOPHIL # BLD: 0.12 K/UL (ref 0–0.44)
EOSINOPHILS RELATIVE PERCENT: 1 % (ref 0–4)
ERYTHROCYTE [DISTWIDTH] IN BLOOD BY AUTOMATED COUNT: 14.1 % (ref 11.5–14.9)
ETHANOL PERCENT: NORMAL %
ETHANOLAMINE SERPL-MCNC: <10 MG/DL (ref 0–0.08)
FENTANYL UR QL: NEGATIVE
GFR, ESTIMATED: >90 ML/MIN/1.73M2
GLUCOSE BLD-MCNC: 118 MG/DL (ref 75–110)
GLUCOSE BLD-MCNC: 119 MG/DL (ref 75–110)
GLUCOSE BLD-MCNC: 132 MG/DL (ref 75–110)
GLUCOSE BLD-MCNC: 170 MG/DL (ref 75–110)
GLUCOSE BLD-MCNC: 98 MG/DL (ref 75–110)
GLUCOSE SERPL-MCNC: 217 MG/DL (ref 74–99)
HCT VFR BLD AUTO: 35.8 % (ref 41–53)
HGB BLD-MCNC: 11.5 G/DL (ref 13.5–17.5)
IMM GRANULOCYTES # BLD AUTO: 0.03 K/UL (ref 0–0.3)
IMM GRANULOCYTES NFR BLD: 0 %
LYMPHOCYTES NFR BLD: 4.72 K/UL (ref 1.1–3.7)
LYMPHOCYTES RELATIVE PERCENT: 43 % (ref 24–44)
MAGNESIUM SERPL-MCNC: 1.6 MG/DL (ref 1.6–2.6)
MCH RBC QN AUTO: 27.4 PG (ref 26–34)
MCHC RBC AUTO-ENTMCNC: 32.1 G/DL (ref 31–37)
MCV RBC AUTO: 85.4 FL (ref 80–100)
METHADONE UR QL: NEGATIVE
MONOCYTES NFR BLD: 1.26 K/UL (ref 0.1–1.2)
MONOCYTES NFR BLD: 11 % (ref 3–12)
NEUTROPHILS NFR BLD: 44 % (ref 36–66)
NEUTS SEG NFR BLD: 4.92 K/UL (ref 1.5–8.1)
NRBC BLD-RTO: 0 PER 100 WBC
OPIATES UR QL SCN: NEGATIVE
OXYCODONE UR QL SCN: NEGATIVE
PCP UR QL SCN: NEGATIVE
PLATELET # BLD AUTO: 244 K/UL (ref 150–450)
PMV BLD AUTO: 9.6 FL (ref 8–13.5)
POTASSIUM SERPL-SCNC: 3.6 MMOL/L (ref 3.7–5.3)
PROT SERPL-MCNC: 7 G/DL (ref 6.6–8.7)
RBC # BLD AUTO: 4.19 M/UL (ref 4.21–5.77)
SALICYLATES SERPL-MCNC: <1 MG/DL (ref 0–10)
SODIUM SERPL-SCNC: 140 MMOL/L (ref 136–145)
TEST INFORMATION: ABNORMAL
WBC OTHER # BLD: 11.1 K/UL (ref 3.5–11)

## 2025-06-19 PROCEDURE — 99222 1ST HOSP IP/OBS MODERATE 55: CPT | Performed by: PSYCHIATRY & NEUROLOGY

## 2025-06-19 PROCEDURE — 6370000000 HC RX 637 (ALT 250 FOR IP): Performed by: NURSE PRACTITIONER

## 2025-06-19 PROCEDURE — 1240000000 HC EMOTIONAL WELLNESS R&B

## 2025-06-19 PROCEDURE — 99222 1ST HOSP IP/OBS MODERATE 55: CPT | Performed by: INTERNAL MEDICINE

## 2025-06-19 PROCEDURE — 6370000000 HC RX 637 (ALT 250 FOR IP): Performed by: PSYCHIATRY & NEUROLOGY

## 2025-06-19 PROCEDURE — 85025 COMPLETE CBC W/AUTO DIFF WBC: CPT

## 2025-06-19 PROCEDURE — 36415 COLL VENOUS BLD VENIPUNCTURE: CPT

## 2025-06-19 PROCEDURE — G0480 DRUG TEST DEF 1-7 CLASSES: HCPCS

## 2025-06-19 PROCEDURE — 80143 DRUG ASSAY ACETAMINOPHEN: CPT

## 2025-06-19 PROCEDURE — 82947 ASSAY GLUCOSE BLOOD QUANT: CPT

## 2025-06-19 PROCEDURE — 6370000000 HC RX 637 (ALT 250 FOR IP): Performed by: INTERNAL MEDICINE

## 2025-06-19 PROCEDURE — 80179 DRUG ASSAY SALICYLATE: CPT

## 2025-06-19 PROCEDURE — 80307 DRUG TEST PRSMV CHEM ANLYZR: CPT

## 2025-06-19 PROCEDURE — 83735 ASSAY OF MAGNESIUM: CPT

## 2025-06-19 PROCEDURE — 80053 COMPREHEN METABOLIC PANEL: CPT

## 2025-06-19 PROCEDURE — GZHZZZZ GROUP PSYCHOTHERAPY: ICD-10-PCS | Performed by: PSYCHIATRY & NEUROLOGY

## 2025-06-19 PROCEDURE — APPSS60 APP SPLIT SHARED TIME 46-60 MINUTES: Performed by: NURSE PRACTITIONER

## 2025-06-19 RX ORDER — MAGNESIUM HYDROXIDE/ALUMINUM HYDROXICE/SIMETHICONE 120; 1200; 1200 MG/30ML; MG/30ML; MG/30ML
30 SUSPENSION ORAL EVERY 6 HOURS PRN
Status: DISCONTINUED | OUTPATIENT
Start: 2025-06-19 | End: 2025-06-24 | Stop reason: HOSPADM

## 2025-06-19 RX ORDER — BUSPIRONE HYDROCHLORIDE 10 MG/1
10 TABLET ORAL 3 TIMES DAILY
Status: DISCONTINUED | OUTPATIENT
Start: 2025-06-19 | End: 2025-06-24 | Stop reason: HOSPADM

## 2025-06-19 RX ORDER — TRAZODONE HYDROCHLORIDE 50 MG/1
50 TABLET ORAL NIGHTLY PRN
Status: DISCONTINUED | OUTPATIENT
Start: 2025-06-19 | End: 2025-06-24 | Stop reason: HOSPADM

## 2025-06-19 RX ORDER — DIVALPROEX SODIUM 500 MG/1
500 TABLET, DELAYED RELEASE ORAL 2 TIMES DAILY
Status: DISCONTINUED | OUTPATIENT
Start: 2025-06-19 | End: 2025-06-24 | Stop reason: HOSPADM

## 2025-06-19 RX ORDER — TRAZODONE HYDROCHLORIDE 50 MG/1
50 TABLET ORAL NIGHTLY PRN
Status: DISCONTINUED | OUTPATIENT
Start: 2025-06-19 | End: 2025-06-19

## 2025-06-19 RX ORDER — ACETAMINOPHEN 325 MG/1
650 TABLET ORAL EVERY 4 HOURS PRN
Status: DISCONTINUED | OUTPATIENT
Start: 2025-06-19 | End: 2025-06-24 | Stop reason: HOSPADM

## 2025-06-19 RX ORDER — POLYETHYLENE GLYCOL 3350 17 G
2 POWDER IN PACKET (EA) ORAL
Status: DISCONTINUED | OUTPATIENT
Start: 2025-06-19 | End: 2025-06-24 | Stop reason: HOSPADM

## 2025-06-19 RX ORDER — PANTOPRAZOLE SODIUM 40 MG/1
40 TABLET, DELAYED RELEASE ORAL
Status: DISCONTINUED | OUTPATIENT
Start: 2025-06-20 | End: 2025-06-24 | Stop reason: HOSPADM

## 2025-06-19 RX ORDER — POLYETHYLENE GLYCOL 3350 17 G/17G
17 POWDER, FOR SOLUTION ORAL DAILY PRN
Status: DISCONTINUED | OUTPATIENT
Start: 2025-06-19 | End: 2025-06-24 | Stop reason: HOSPADM

## 2025-06-19 RX ORDER — IBUPROFEN 400 MG/1
400 TABLET, FILM COATED ORAL EVERY 6 HOURS PRN
Status: DISCONTINUED | OUTPATIENT
Start: 2025-06-19 | End: 2025-06-24 | Stop reason: HOSPADM

## 2025-06-19 RX ORDER — CALCIUM CARBONATE 500 MG/1
1 TABLET, CHEWABLE ORAL 3 TIMES DAILY PRN
Status: DISCONTINUED | OUTPATIENT
Start: 2025-06-19 | End: 2025-06-24 | Stop reason: HOSPADM

## 2025-06-19 RX ORDER — DEXTROSE MONOHYDRATE 100 MG/ML
INJECTION, SOLUTION INTRAVENOUS CONTINUOUS PRN
Status: DISCONTINUED | OUTPATIENT
Start: 2025-06-19 | End: 2025-06-24 | Stop reason: HOSPADM

## 2025-06-19 RX ORDER — INSULIN LISPRO 100 [IU]/ML
0-4 INJECTION, SOLUTION INTRAVENOUS; SUBCUTANEOUS
Status: DISCONTINUED | OUTPATIENT
Start: 2025-06-19 | End: 2025-06-19

## 2025-06-19 RX ORDER — FLUTICASONE PROPIONATE 110 UG/1
2 AEROSOL, METERED RESPIRATORY (INHALATION)
Status: DISCONTINUED | OUTPATIENT
Start: 2025-06-19 | End: 2025-06-24 | Stop reason: HOSPADM

## 2025-06-19 RX ORDER — INSULIN LISPRO 100 [IU]/ML
0-8 INJECTION, SOLUTION INTRAVENOUS; SUBCUTANEOUS
Status: DISCONTINUED | OUTPATIENT
Start: 2025-06-19 | End: 2025-06-24 | Stop reason: HOSPADM

## 2025-06-19 RX ORDER — ESCITALOPRAM OXALATE 20 MG/1
20 TABLET ORAL DAILY
Status: DISCONTINUED | OUTPATIENT
Start: 2025-06-19 | End: 2025-06-24 | Stop reason: HOSPADM

## 2025-06-19 RX ORDER — PROPRANOLOL HCL 20 MG
20 TABLET ORAL 3 TIMES DAILY
Status: DISCONTINUED | OUTPATIENT
Start: 2025-06-19 | End: 2025-06-24 | Stop reason: HOSPADM

## 2025-06-19 RX ORDER — ALBUTEROL SULFATE 90 UG/1
2 INHALANT RESPIRATORY (INHALATION) EVERY 4 HOURS PRN
Status: DISCONTINUED | OUTPATIENT
Start: 2025-06-19 | End: 2025-06-24 | Stop reason: HOSPADM

## 2025-06-19 RX ORDER — AMLODIPINE BESYLATE 5 MG/1
2.5 TABLET ORAL DAILY
Status: DISCONTINUED | OUTPATIENT
Start: 2025-06-19 | End: 2025-06-24 | Stop reason: HOSPADM

## 2025-06-19 RX ORDER — HYDROXYZINE HYDROCHLORIDE 50 MG/1
50 TABLET, FILM COATED ORAL 3 TIMES DAILY PRN
Status: DISCONTINUED | OUTPATIENT
Start: 2025-06-19 | End: 2025-06-24 | Stop reason: HOSPADM

## 2025-06-19 RX ORDER — POTASSIUM CHLORIDE 1500 MG/1
40 TABLET, EXTENDED RELEASE ORAL ONCE
Status: COMPLETED | OUTPATIENT
Start: 2025-06-19 | End: 2025-06-19

## 2025-06-19 RX ORDER — PALIPERIDONE 6 MG/1
6 TABLET, EXTENDED RELEASE ORAL EVERY MORNING
Status: ON HOLD | COMMUNITY
End: 2025-06-23 | Stop reason: HOSPADM

## 2025-06-19 RX ADMIN — BUSPIRONE HYDROCHLORIDE 10 MG: 10 TABLET ORAL at 21:50

## 2025-06-19 RX ADMIN — FLUTICASONE PROPIONATE 2 PUFF: 110 AEROSOL, METERED RESPIRATORY (INHALATION) at 14:30

## 2025-06-19 RX ADMIN — FLUTICASONE PROPIONATE 2 PUFF: 110 AEROSOL, METERED RESPIRATORY (INHALATION) at 21:50

## 2025-06-19 RX ADMIN — DIVALPROEX SODIUM 500 MG: 500 TABLET, DELAYED RELEASE ORAL at 21:50

## 2025-06-19 RX ADMIN — ESCITALOPRAM OXALATE 20 MG: 20 TABLET ORAL at 21:54

## 2025-06-19 RX ADMIN — TRAZODONE HYDROCHLORIDE 50 MG: 50 TABLET ORAL at 21:50

## 2025-06-19 RX ADMIN — POTASSIUM CHLORIDE 40 MEQ: 1500 TABLET, EXTENDED RELEASE ORAL at 14:31

## 2025-06-19 RX ADMIN — PROPRANOLOL HYDROCHLORIDE 20 MG: 20 TABLET ORAL at 21:50

## 2025-06-19 RX ADMIN — TRAZODONE HYDROCHLORIDE 50 MG: 50 TABLET ORAL at 02:15

## 2025-06-19 ASSESSMENT — LIFESTYLE VARIABLES
HOW OFTEN DO YOU HAVE A DRINK CONTAINING ALCOHOL: MONTHLY OR LESS
HOW MANY STANDARD DRINKS CONTAINING ALCOHOL DO YOU HAVE ON A TYPICAL DAY: 1 OR 2
HOW OFTEN DO YOU HAVE A DRINK CONTAINING ALCOHOL: MONTHLY OR LESS
HOW MANY STANDARD DRINKS CONTAINING ALCOHOL DO YOU HAVE ON A TYPICAL DAY: 1 OR 2

## 2025-06-19 ASSESSMENT — PATIENT HEALTH QUESTIONNAIRE - PHQ9
SUM OF ALL RESPONSES TO PHQ QUESTIONS 1-9: 2
2. FEELING DOWN, DEPRESSED OR HOPELESS: SEVERAL DAYS
1. LITTLE INTEREST OR PLEASURE IN DOING THINGS: SEVERAL DAYS

## 2025-06-19 ASSESSMENT — SLEEP AND FATIGUE QUESTIONNAIRES
SLEEP PATTERN: RESTLESSNESS;DIFFICULTY FALLING ASLEEP
DO YOU USE A SLEEP AID: YES
AVERAGE NUMBER OF SLEEP HOURS: 5
DO YOU HAVE DIFFICULTY SLEEPING: YES

## 2025-06-19 NOTE — PLAN OF CARE
Problem: Self Harm/Suicidality  Goal: Will have no self-injury during hospital stay  Description: INTERVENTIONS:  1.  Ensure constant observer at bedside with Q15M safety checks  2.  Maintain a safe environment  3.  Secure patient belongings  4.  Ensure family/visitors adhere to safety recommendations  5.  Ensure safety tray has been added to patient's diet order  6.  Every shift and PRN: Re-assess suicidal risk via Frequent Screener    Outcome: Progressing   Patient denies thoughts of self harm   Problem: Depression  Goal: Will be euthymic at discharge  Description: INTERVENTIONS:  1. Administer medication as ordered  2. Provide emotional support via 1:1 interaction with staff  3. Encourage involvement in milieu/groups/activities  4. Monitor for social isolation  Outcome: Progressing     Problem: Pain  Goal: Verbalizes/displays adequate comfort level or baseline comfort level  Outcome: Progressing   Patient reports chronic back pain, able to use over the counter medications

## 2025-06-19 NOTE — BH NOTE
Patient given tobacco quitline number 01728948591 at this time, refusing to call at this time, patient given information as to the dangers of long term tobacco use. Continue to reinforce the importance of tobacco cessation.

## 2025-06-19 NOTE — GROUP NOTE
Group Therapy Note    Date: 6/19/2025    Group Start Time: 0800  Group End Time: 0810  Group Topic: Orientation Group    [facility]    Mayra Vergara        Group Therapy Note    Attendees: 17/19     Morning Orientation Group Note        Date: June 25, 2025 Start Time: 0800 End Time: 0810      Number of Participants in Group & Unit Census:  17/19    Topic: Morning orientation    Goal of Group: review of staff, rules and schedule      Comments:     Patient did not participate in Morning Orientation group, despite staff encouragement and explanation of benefits.  Patient remain seclusive to self.  Q15 minute safety checks maintained for patient safety and will continue to encourage patient to attend unit programming.

## 2025-06-19 NOTE — GROUP NOTE
Group Therapy Note    Date: 6/19/2025    Group Start Time: 1430  Group End Time: 1510  Group Topic: Cognitive Skills    [facility]    Marlene Duron CTRS        Group Therapy Note    Attendees: 4/15        Topic: To increase social interaction , express feelings, explore positive stress management r/t the senses   through creative expression and discussion        Comments:   Patient did not participate in Cognitive Skills, at 14:30, despite staff encouragement and explanation of benefits. Pt was seclusive to self and room.     Q15 minute safety checks maintained for patient safety and will continue to encourage patient to attend unit programming.      Discipline Responsible: Psychoeducational Specialist   Signature: SHIRIN GROVES

## 2025-06-19 NOTE — ED PROVIDER NOTES
EMERGENCY DEPARTMENT ENCOUNTER    Pt Name: Chino Carrillo  MRN: [id number]  Birthdate 1995  Date of evaluation: 6/19/25  CHIEF COMPLAINT       Chief Complaint   Patient presents with    Mental Health Problem     HISTORY OF PRESENT ILLNESS   30-year-old male presents for mental health evaluation.  Patient states that recently has been feeling more anxious, states also been feeling increasingly suicidal recently.  States that it was getting worse today and reportedly was having thoughts of jumping off a bridge or slitting his wrist.  He denies homicidal ideation denies visual or auditory hallucinations denies recent drug or alcohol use denies any current medical complaints.    The history is provided by the patient.           REVIEW OF SYSTEMS     Review of Systems   Constitutional:  Negative for chills and fever.   HENT:  Negative for congestion and ear pain.    Eyes:  Negative for pain.   Respiratory:  Negative for shortness of breath.    Cardiovascular:  Negative for chest pain, palpitations and leg swelling.   Gastrointestinal:  Negative for abdominal pain.   Genitourinary:  Negative for dysuria and flank pain.   Musculoskeletal:  Negative for back pain.   Skin:  Negative for color change.   Neurological:  Negative for numbness and headaches.   Psychiatric/Behavioral:  Positive for suicidal ideas. Negative for confusion.    All other systems reviewed and are negative.    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

## 2025-06-19 NOTE — GROUP NOTE
Group Therapy Note    Date: 6/19/2025    Group Start Time: 0900  Group End Time: 0945  Group Topic: Community Meeting    [facility]    Mayra Vergara        Group Therapy Note    Attendees: 12/19       Patient's Goal:  be more social    Notes:      Status After Intervention:  Unchanged    Participation Level: Active Listener and Interactive    Participation Quality: Appropriate and Attentive      Speech:  normal      Thought Process/Content: Logical      Affective Functioning: Congruent      Mood: anxious and depressed      Level of consciousness:  Alert      Response to Learning: Able to verbalize current knowledge/experience and Progressing to goal      Endings: None Reported    Modes of Intervention: Education, Support, and Socialization      Discipline Responsible: Behavorial Health Tech      Signature:  Mayra Vergara

## 2025-06-19 NOTE — H&P
MCG/ACT inhaler Inhale 2 puffs into the lungs in the morning and 2 puffs in the evening. 12/20/24   Rafat Acosta MD   insulin lispro (HUMALOG,ADMELOG) 100 UNIT/ML SOLN injection vial Inject 0-4 Units into the skin 4 times daily (before meals and nightly) 12/20/24   Rafat Acosta MD   metFORMIN (GLUCOPHAGE) 500 MG tablet Take 1 tablet by mouth with breakfast and with evening meal 12/20/24   Rafat Acosta MD   busPIRone (BUSPAR) 10 MG tablet Take 1 tablet by mouth 3 times daily 10/18/24   Xavier Monge MD   escitalopram (LEXAPRO) 20 MG tablet Take 1 tablet by mouth daily 10/18/24   Xavier Mnoge MD   CAPLYTA 42 MG capsule Take 1 capsule by mouth daily  Patient not taking: Reported on 2/6/2025 5/15/24   Xavier Monge MD   omeprazole (PRILOSEC) 20 MG delayed release capsule Take 1 capsule by mouth 2 times daily (before meals)    Xavier Monge MD   paliperidone palmitate ER (INVEGA SUSTENNA) 234 MG/1.5ML DENNIS IM injection Inject 234 mg into the muscle every 21 days    Xavier Monge MD   calcium carbonate (TUMS) 500 MG chewable tablet Take 1 tablet by mouth 3 times daily as needed for Heartburn 2/26/24   Rafat Acosta MD   divalproex (DEPAKOTE) 500 MG DR tablet Take 1 tablet by mouth 2 times daily 2/26/24   Rafat Acosta MD   propranolol (INDERAL) 20 MG tablet Take 1 tablet by mouth 3 times daily 2/26/24   Rafat Acosta MD   Semaglutide,0.25 or 0.5MG/DOS, (OZEMPIC, 0.25 OR 0.5 MG/DOSE,) 2 MG/1.5ML SOPN Inject 0.5 mg into the skin once a week    Xavier Monge MD   ibuprofen (ADVIL;MOTRIN) 800 MG tablet Take 1 tablet by mouth 2 times daily as needed for Pain 2/21/24   Roc Randall DO        Allergies:     Latex and Hydromorphone    Social History:     Tobacco:    reports that he has been smoking cigarettes. He has quit using smokeless tobacco.  His smokeless tobacco use included chew.  Alcohol:      reports current alcohol use.  Drug Use:  reports 
patient will understand benefits/risks and potential side effects from proposed medications, and patient will understand their role in recovery.  Family is not active in patient's care.   Patient assets that may be helpful during treatment include: Intent to participate and engage in treatment, sufficient fund of knowledge and intellect to understand and utilize treatments.      Goals:    1) Remission of depression w/ suicidal ideation.  2) Stabilization of symptoms prior to discharge.  3) Establish efficacy and tolerability of medications.       Behavioral Services  Medicare Certification     Admission Day 1  I certify that this patient's inpatient psychiatric hospital admission is medically necessary for:    x (1) treatment which could reasonably be expected to improve this patient's condition, or    x (2) diagnostic study or its equivalent.     --LITO Christian - CNP on 6/19/2025 at 3:12 PM    An electronic signature was used to authenticate this note.     Please note that this chart was generated using voice recognition Dragon dictation software.  Although every effort was made to ensure the accuracy of this automated transcription, some errors in transcription may have occurred.     I independently saw and evaluated the patient.  I reviewed the nurse practitioners documentation above.  Principle diagnosis we are treating for is Schizoaffective disorder, depressive type (HCC). Any additional comments or changes to the nurse practitioners documentation are stated below otherwise agree with assessment.  Plan will be as follows:  Discontinue oral invega as patient on alvarado.  Dc caplyta.  Monitor.  Electronically signed by CHIVO CHOWDHURY MD on 6/19/2025 at 6:51 PM

## 2025-06-19 NOTE — GROUP NOTE
Group Therapy Note    Date: 6/19/2025    Group Start Time: 1000  Group End Time: 1030  Group Topic: Psychoeducation    [facility]    Nicolas Vaca        Group Therapy Note    Attendees: 7/19       Patient was offered group therapy today but declined to participate despite encouragement from staff. 1:1 was offered.  Signature:  Nicolas Vaca

## 2025-06-19 NOTE — BH NOTE
[facility]  Admission Note     Admission Type:   Voluntary     Reason for admission:  Reason for Admission: Patient has been having suicidal thoughts to jump off a brdige. States he is depressed because his dad has cancer and his friend killed himself recently. States he has been having poor sleep and high anxiety.      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    Injury of frontal lobe (HCC)     Multiple sensory deficit syndrome        Status EXAM:  Mental Status and Behavioral Exam  Normal: No  Level of Assistance: Independent/Self  Facial Expression: Flat, Sad, Worried  Affect: Appropriate  Level of Consciousness: Alert  Frequency of Checks: 4 times per hour, close  Mood:Normal: No  Mood: Depressed, Anxious, Helpless, Sad  Motor Activity:Normal: Yes  Eye Contact: Good  Observed Behavior: Preoccupied  Sexual Misconduct History: Current - no  Preception: [place] to person, [place] to time, [place] to place, [place] to situation  Attention:Normal: No  Attention: Distractible  Thought Processes: Circumstantial  Thought Content:Normal: No  Thought Content: Preoccupations  Depression Symptoms: Feelings of helplessness, Feelings of hopelessess, Impaired concentration  Anxiety Symptoms: Generalized  Milly Symptoms: No problems reported or observed.  Hallucinations: None  Delusions: No  Memory:Normal: Yes  Insight and Judgment: No  Insight and Judgment: Poor judgment, Poor insight    Tobacco Screening:  Practical Counseling, on admission, garrett X, if applicable and completed (first 3 are required if patient doesn't refuse):            ( ) Recognizing danger situations (included triggers and roadblocks)                    ( ) Coping skills (new ways to manage stress,relaxation techniques,

## 2025-06-19 NOTE — PLAN OF CARE
[facility]  Initial Interdisciplinary Treatment Plan Note      Original treatment plan Date & Time: 6/19/2025 1245    Admission Type:  Admission Type: Voluntary    Reason for admission:   Reason for Admission: Patient has been having suicidal thoughts to jump off a brdige. States he is depressed because his dad has cancer and his friend killed himself recently. States he has been having poor sleep and high anxiety.    Estimated Length of Stay:  5-7days  Estimated Discharge Date: To be determined by physician.    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    Injury of frontal lobe (HCC)     Multiple sensory deficit syndrome      Status EXAM:Mental Status and Behavioral Exam  Normal: No  Level of Assistance: Independent/Self  Facial Expression: Flat, Sad, Worried  Affect: Appropriate  Level of Consciousness: Alert  Frequency of Checks: 4 times per hour, close  Mood:Normal: No  Mood: Depressed, Anxious, Helpless, Sad  Motor Activity:Normal: Yes  Eye Contact: Good  Observed Behavior: Preoccupied  Sexual Misconduct History: Current - no  Preception: [place] to person, [place] to time, [place] to place, [place] to situation  Attention:Normal: No  Attention: Distractible  Thought Processes: Circumstantial  Thought Content:Normal: No  Thought Content: Preoccupations  Depression Symptoms: Feelings of helplessness, Feelings of hopelessess, Impaired concentration  Anxiety Symptoms: Generalized  Milly Symptoms: No problems reported or observed.  Hallucinations: None  Delusions: No  Memory:Normal: Yes  Insight and Judgment: No  Insight and Judgment: Poor judgment, Poor insight    EDUCATION:   Learner Progress Toward Treatment

## 2025-06-19 NOTE — ED TRIAGE NOTES
Mode of arrival (squad #, walk in, police, etc) : Medic 6        Chief complaint(s): Mental Health Problem        Arrival Note (brief scenario, treatment PTA, etc).: Pt c/o suicidal ideation. Pt states he plans to jump off of the bridge in attempt to harm himself. Pt denies HI at this time. Pt states he sometimes has visual and auditory hallucinations but denies it currently. Pt A&Ox4.        C= \"Have you ever felt that you should Cut down on your drinking?\"  No  A= \"Have people Annoyed you by criticizing your drinking?\"  No  G= \"Have you ever felt bad or Guilty about your drinking?\"  No  E= \"Have you ever had a drink as an Eye-opener first thing in the morning to steady your nerves or to help a hangover?\"  No      Deferred []      Reason for deferring: N/A    *If yes to two or more: probable alcohol abuse.*

## 2025-06-20 LAB
EST. AVERAGE GLUCOSE BLD GHB EST-MCNC: 120 MG/DL
GLUCOSE BLD-MCNC: 104 MG/DL (ref 75–110)
GLUCOSE BLD-MCNC: 109 MG/DL (ref 75–110)
GLUCOSE BLD-MCNC: 115 MG/DL (ref 75–110)
GLUCOSE BLD-MCNC: 118 MG/DL (ref 75–110)
HBA1C MFR BLD: 5.8 % (ref 4–6)

## 2025-06-20 PROCEDURE — 6370000000 HC RX 637 (ALT 250 FOR IP): Performed by: NURSE PRACTITIONER

## 2025-06-20 PROCEDURE — 6370000000 HC RX 637 (ALT 250 FOR IP): Performed by: INTERNAL MEDICINE

## 2025-06-20 PROCEDURE — 85025 COMPLETE CBC W/AUTO DIFF WBC: CPT

## 2025-06-20 PROCEDURE — 83036 HEMOGLOBIN GLYCOSYLATED A1C: CPT

## 2025-06-20 PROCEDURE — 85045 AUTOMATED RETICULOCYTE COUNT: CPT

## 2025-06-20 PROCEDURE — 6370000000 HC RX 637 (ALT 250 FOR IP): Performed by: PSYCHIATRY & NEUROLOGY

## 2025-06-20 PROCEDURE — 82947 ASSAY GLUCOSE BLOOD QUANT: CPT

## 2025-06-20 PROCEDURE — 99231 SBSQ HOSP IP/OBS SF/LOW 25: CPT

## 2025-06-20 PROCEDURE — APPSS30 APP SPLIT SHARED TIME 16-30 MINUTES

## 2025-06-20 PROCEDURE — 36415 COLL VENOUS BLD VENIPUNCTURE: CPT

## 2025-06-20 PROCEDURE — 1240000000 HC EMOTIONAL WELLNESS R&B

## 2025-06-20 PROCEDURE — 99232 SBSQ HOSP IP/OBS MODERATE 35: CPT | Performed by: PSYCHIATRY & NEUROLOGY

## 2025-06-20 RX ADMIN — TRAZODONE HYDROCHLORIDE 50 MG: 50 TABLET ORAL at 21:46

## 2025-06-20 RX ADMIN — DIVALPROEX SODIUM 500 MG: 500 TABLET, DELAYED RELEASE ORAL at 21:45

## 2025-06-20 RX ADMIN — PANTOPRAZOLE SODIUM 40 MG: 40 TABLET, DELAYED RELEASE ORAL at 06:59

## 2025-06-20 RX ADMIN — BUSPIRONE HYDROCHLORIDE 10 MG: 10 TABLET ORAL at 21:45

## 2025-06-20 RX ADMIN — DIVALPROEX SODIUM 500 MG: 500 TABLET, DELAYED RELEASE ORAL at 09:13

## 2025-06-20 RX ADMIN — BUSPIRONE HYDROCHLORIDE 10 MG: 10 TABLET ORAL at 15:05

## 2025-06-20 RX ADMIN — ESCITALOPRAM OXALATE 20 MG: 20 TABLET ORAL at 09:13

## 2025-06-20 RX ADMIN — IBUPROFEN 400 MG: 400 TABLET ORAL at 21:45

## 2025-06-20 RX ADMIN — METFORMIN HYDROCHLORIDE 500 MG: 500 TABLET ORAL at 09:13

## 2025-06-20 RX ADMIN — FLUTICASONE PROPIONATE 2 PUFF: 110 AEROSOL, METERED RESPIRATORY (INHALATION) at 21:46

## 2025-06-20 RX ADMIN — PROPRANOLOL HYDROCHLORIDE 20 MG: 20 TABLET ORAL at 21:45

## 2025-06-20 RX ADMIN — FLUTICASONE PROPIONATE 2 PUFF: 110 AEROSOL, METERED RESPIRATORY (INHALATION) at 09:13

## 2025-06-20 RX ADMIN — AMLODIPINE BESYLATE 2.5 MG: 5 TABLET ORAL at 15:09

## 2025-06-20 RX ADMIN — PROPRANOLOL HYDROCHLORIDE 20 MG: 20 TABLET ORAL at 15:06

## 2025-06-20 RX ADMIN — BUSPIRONE HYDROCHLORIDE 10 MG: 10 TABLET ORAL at 09:13

## 2025-06-20 ASSESSMENT — PAIN SCALES - GENERAL
PAINLEVEL_OUTOF10: 4
PAINLEVEL_OUTOF10: 4
PAINLEVEL_OUTOF10: 0

## 2025-06-20 ASSESSMENT — LIFESTYLE VARIABLES: HOW OFTEN DO YOU HAVE A DRINK CONTAINING ALCOHOL: MONTHLY OR LESS

## 2025-06-20 ASSESSMENT — PAIN DESCRIPTION - LOCATION
LOCATION: HEAD
LOCATION: HEAD

## 2025-06-20 ASSESSMENT — PAIN DESCRIPTION - ORIENTATION: ORIENTATION: MID

## 2025-06-20 NOTE — GROUP NOTE
Group Therapy Note    Date: 6/19/2025    Group Start Time: 2000  Group End Time: 2030  Group Topic: Wrap-Up    [facility]    Carissa Mccord, RN      Group Therapy Note    Attendees: 11/15      Patient's Goal(s):    1.  To be able to reflect on daily unit activities/experiences.  2.  To review accomplished daily goals and be encouraged to set new goals for the next day.  3.  To improve interpersonal interaction through socialization.        Notes:  Pt attended and actively participated in Wrap-Up/Goal Review group this evening.     Status After Intervention:  Improved     Participation Level: Active Listener and Interactive     Participation Quality: Appropriate, Attentive and Sharing     Speech:  normal     Thought Process/Content: Logical     Affective Functioning: Congruent     Mood: euthymic     Level of consciousness:  Alert, Oriented x4 and Attentive     Response to Learning: Able to verbalize current knowledge/experience, Able to verbalize/acknowledge new learning, Progressing to goal     Endings: None Reported     Modes of Intervention: Education, Support and Socialization     Discipline Responsible: Registered Nurse        Signature:  Carissa Mccord, RN

## 2025-06-20 NOTE — GROUP NOTE
Group Therapy Note    Date: 6/20/2025    Group Start Time: 1015  Group End Time: 1045  Group Topic: Psychoeducation    [facility]    Nicolas Vaca        Group Therapy Note    Attendees: 7/16     Patient was offered group therapy today but declined to participate despite encouragement from staff. 1:1 was offered.    Signature:  Nicolas Vaca

## 2025-06-20 NOTE — PLAN OF CARE
Problem: Depression  Goal: Will be euthymic at discharge  Description: INTERVENTIONS:  1. Administer medication as ordered  2. Provide emotional support via 1:1 interaction with staff  3. Encourage involvement in milieu/groups/activities  4. Monitor for social isolation  6/20/2025 0148 by Carissa Mccord, RN  Outcome: Progressing  Note: Patient interactive during shift assessment, reports mild depression rates at a 4 on a scale of 0-10 (10 being the worst depression and 0 being no depression). Patient out in day room for short periods, making phone calls, but mostly isolative to room. However, patient participated in evening group session.       Problem: Self Harm/Suicidality  Goal: Will have no self-injury during hospital stay  Description: INTERVENTIONS:  1.  Ensure constant observer at bedside with Q15M safety checks  2.  Maintain a safe environment  3.  Secure patient belongings  4.  Ensure family/visitors adhere to safety recommendations  5.  Ensure safety tray has been added to patient's diet order  6.  Every shift and PRN: Re-assess suicidal risk via Frequent Screener  6/20/2025 0148 by Carissa Mccord, RN  Outcome: Progressing  Flowsheets (Taken 6/20/2025 0148)  Will have no self-injury during hospital stay:   Maintain a safe environment   Every shift and PRN: Re-assess suicidal risk via Frequent Screener  Note: Patient denies any suicidal or homicidal ideations, denies any hallucinations. Pt agreed to seek staff and report any intrusive thoughts of hurting self or others.  Pt remains free from any self-harm, safe environment maintained. Regular rounding every 15 minutes and random patient checks conducted for safety as per unit policy.        Problem: Pain  Goal: Verbalizes/displays adequate comfort level or baseline comfort level  6/20/2025 0148 by Carissa Mccord, RN  Outcome: Progressing  Flowsheets (Taken 6/20/2025 0148)  Verbalizes/displays adequate comfort level or baseline comfort level:

## 2025-06-20 NOTE — GROUP NOTE
Group Therapy Note    Date: 6/20/2025    Group Start Time: 1100  Group End Time: 1150  Group Topic: Cognitive Skills    [facility]    Marlene Duron CTRS        Group Therapy Note    Attendees: 7/16     Topic: To increase social interaction , problem solving, deductive reasoning, and communication skills.        Comments:   Patient did not participate in Cognitive Skills, at 11:00, despite staff encouragement and explanation of benefits. Pt was seclusive to self and room.     Q15 minute safety checks maintained for patient safety and will continue to encourage patient to attend unit programming.      Discipline Responsible: Psychoeducational Specialist   Signature: SHIRIN GROVES

## 2025-06-20 NOTE — GROUP NOTE
Group Therapy Note    Date: 6/20/2025    Group Start Time: 1330  Group End Time: 1430  Group Topic: Activity    [facility]    Marlene Duron CTRS        Group Therapy Note    Attendees: 9/16     Topic: To increase social interaction , creative expression and music for leisure participation, relating to peers, and communication skills.        Comments:   Patient did not participate in Activity Group, at 13:30, despite staff encouragement and explanation of benefits. Pt was seclusive to self and room.     Q15 minute safety checks maintained for patient safety and will continue to encourage patient to attend unit programming.      Discipline Responsible: Psychoeducational Specialist   Signature: SHIRIN GROVES

## 2025-06-20 NOTE — PLAN OF CARE
Problem: Self Harm/Suicidality  Goal: Will have no self-injury during hospital stay  Description: INTERVENTIONS:  1.  Ensure constant observer at bedside with Q15M safety checks  2.  Maintain a safe environment  3.  Secure patient belongings  4.  Ensure family/visitors adhere to safety recommendations  5.  Ensure safety tray has been added to patient's diet order  6.  Every shift and PRN: Re-assess suicidal risk via Frequent Screener    Outcome: Progressing  Flowsheets (Taken 6/20/2025 8496)  Will have no self-injury during hospital stay: Maintain a safe environment  Note: Pt denies thoughts of self harm and is agreeable to seeking out should thoughts of self harm arise. Safe environment maintained.  15 minute checks for safety cont per unit policy.  Patient participate in select programming with encouragement.  Will cont to monitor for safety and provides support and reassurance as needed.        Problem: Depression  Goal: Will be euthymic at discharge  Description: INTERVENTIONS:  1. Administer medication as ordered  2. Provide emotional support via 1:1 interaction with staff  3. Encourage involvement in milieu/groups/activities  4. Monitor for social isolation  Outcome: Progressing

## 2025-06-21 LAB
BASOPHILS # BLD: 0.07 K/UL (ref 0–0.2)
BASOPHILS NFR BLD: 1 % (ref 0–2)
EOSINOPHIL # BLD: 0.12 K/UL (ref 0–0.44)
EOSINOPHILS RELATIVE PERCENT: 1 % (ref 1–4)
ERYTHROCYTE [DISTWIDTH] IN BLOOD BY AUTOMATED COUNT: 14.2 % (ref 11.8–14.4)
GLUCOSE BLD-MCNC: 117 MG/DL (ref 75–110)
GLUCOSE BLD-MCNC: 127 MG/DL (ref 75–110)
GLUCOSE BLD-MCNC: 128 MG/DL (ref 75–110)
HCT VFR BLD AUTO: 36.2 % (ref 40.7–50.3)
HGB BLD-MCNC: 11.1 G/DL (ref 13–17)
IMM GRANULOCYTES # BLD AUTO: <0.03 K/UL (ref 0–0.3)
IMM GRANULOCYTES NFR BLD: 0 %
IMM RETICS NFR: 18 % (ref 2.7–18.3)
LYMPHOCYTES NFR BLD: 4.62 K/UL (ref 1.1–3.7)
LYMPHOCYTES RELATIVE PERCENT: 47 % (ref 24–43)
MCH RBC QN AUTO: 26.9 PG (ref 25.2–33.5)
MCHC RBC AUTO-ENTMCNC: 30.7 G/DL (ref 28.4–34.8)
MCV RBC AUTO: 87.9 FL (ref 82.6–102.9)
MONOCYTES NFR BLD: 0.98 K/UL (ref 0.1–1.2)
MONOCYTES NFR BLD: 10 % (ref 3–12)
NEUTROPHILS NFR BLD: 41 % (ref 36–65)
NEUTS SEG NFR BLD: 4.03 K/UL (ref 1.5–8.1)
NRBC BLD-RTO: 0.3 PER 100 WBC
PLATELET # BLD AUTO: 240 K/UL (ref 138–453)
PMV BLD AUTO: 10.5 FL (ref 8.1–13.5)
POTASSIUM SERPL-SCNC: 4.1 MMOL/L (ref 3.7–5.3)
RBC # BLD AUTO: 4.12 M/UL (ref 4.21–5.77)
RETIC HEMOGLOBIN: 28 PG (ref 28.2–35.7)
RETICS # AUTO: 0.08 M/UL (ref 0.03–0.08)
RETICS/RBC NFR AUTO: 1.9 % (ref 0.5–1.9)
WBC OTHER # BLD: 9.8 K/UL (ref 3.5–11.3)

## 2025-06-21 PROCEDURE — 6370000000 HC RX 637 (ALT 250 FOR IP): Performed by: NURSE PRACTITIONER

## 2025-06-21 PROCEDURE — 36415 COLL VENOUS BLD VENIPUNCTURE: CPT

## 2025-06-21 PROCEDURE — 84132 ASSAY OF SERUM POTASSIUM: CPT

## 2025-06-21 PROCEDURE — 99232 SBSQ HOSP IP/OBS MODERATE 35: CPT

## 2025-06-21 PROCEDURE — 82947 ASSAY GLUCOSE BLOOD QUANT: CPT

## 2025-06-21 PROCEDURE — 6370000000 HC RX 637 (ALT 250 FOR IP): Performed by: PSYCHIATRY & NEUROLOGY

## 2025-06-21 PROCEDURE — 6370000000 HC RX 637 (ALT 250 FOR IP): Performed by: INTERNAL MEDICINE

## 2025-06-21 PROCEDURE — 1240000000 HC EMOTIONAL WELLNESS R&B

## 2025-06-21 RX ORDER — LOPERAMIDE HYDROCHLORIDE 2 MG/1
2 CAPSULE ORAL 4 TIMES DAILY PRN
Status: DISCONTINUED | OUTPATIENT
Start: 2025-06-21 | End: 2025-06-24 | Stop reason: HOSPADM

## 2025-06-21 RX ADMIN — METFORMIN HYDROCHLORIDE 500 MG: 500 TABLET ORAL at 10:17

## 2025-06-21 RX ADMIN — DIVALPROEX SODIUM 500 MG: 500 TABLET, DELAYED RELEASE ORAL at 10:18

## 2025-06-21 RX ADMIN — FLUTICASONE PROPIONATE 2 PUFF: 110 AEROSOL, METERED RESPIRATORY (INHALATION) at 21:27

## 2025-06-21 RX ADMIN — PROPRANOLOL HYDROCHLORIDE 20 MG: 20 TABLET ORAL at 21:27

## 2025-06-21 RX ADMIN — PROPRANOLOL HYDROCHLORIDE 20 MG: 20 TABLET ORAL at 10:17

## 2025-06-21 RX ADMIN — BUSPIRONE HYDROCHLORIDE 10 MG: 10 TABLET ORAL at 14:53

## 2025-06-21 RX ADMIN — AMLODIPINE BESYLATE 2.5 MG: 5 TABLET ORAL at 10:17

## 2025-06-21 RX ADMIN — TRAZODONE HYDROCHLORIDE 50 MG: 50 TABLET ORAL at 21:27

## 2025-06-21 RX ADMIN — PANTOPRAZOLE SODIUM 40 MG: 40 TABLET, DELAYED RELEASE ORAL at 10:17

## 2025-06-21 RX ADMIN — DIVALPROEX SODIUM 500 MG: 500 TABLET, DELAYED RELEASE ORAL at 21:27

## 2025-06-21 RX ADMIN — ESCITALOPRAM OXALATE 20 MG: 20 TABLET ORAL at 10:18

## 2025-06-21 RX ADMIN — BUSPIRONE HYDROCHLORIDE 10 MG: 10 TABLET ORAL at 21:27

## 2025-06-21 RX ADMIN — BUSPIRONE HYDROCHLORIDE 10 MG: 10 TABLET ORAL at 10:18

## 2025-06-21 RX ADMIN — LOPERAMIDE HYDROCHLORIDE 2 MG: 2 CAPSULE ORAL at 23:06

## 2025-06-21 RX ADMIN — PROPRANOLOL HYDROCHLORIDE 20 MG: 20 TABLET ORAL at 14:53

## 2025-06-21 RX ADMIN — FLUTICASONE PROPIONATE 2 PUFF: 110 AEROSOL, METERED RESPIRATORY (INHALATION) at 10:16

## 2025-06-21 NOTE — GROUP NOTE
Group Therapy Note    Date: 6/21/2025    Group Start Time: 1030  Group End Time: 1100  Group Topic: Psychoeducation    [facility]    Nicolas Vaca        Group Therapy Note    Attendees: 8/14       Patient's Goal:  PT will participate actively in group discussion using conversation cubes.     Notes:  :  Patient is making progress, AEB participating in group discussion, actively listening, and supporting other group members.      Status After Intervention:  Unchanged    Participation Level: Active Listener and Interactive    Participation Quality: Appropriate, Attentive, and Sharing      Speech:  normal      Thought Process/Content: Logical      Affective Functioning: Flat      Mood: euthymic      Level of consciousness:  Alert, Oriented x4, and Attentive      Response to Learning: Able to verbalize/acknowledge new learning and Progressing to goal      Endings: None Reported    Modes of Intervention: Education, Support, and Socialization      Discipline Responsible: /Counselor      Signature:  Nicolas Vaca

## 2025-06-21 NOTE — PLAN OF CARE
[facility]  Day 3 Interdisciplinary Treatment Plan NOTE    Review Date & Time: 6/21/2025   12:45 pm    Admission Type:   Admission Type: Voluntary    Reason for admission:  Reason for Admission: Patient has been having suicidal thoughts to jump off a brdige. States he is depressed because his dad has cancer and his friend killed himself recently. States he has been having poor sleep and high anxiety.  Estimated Length of Stay: 5-7 days  Estimated Discharge Date Update: to be determined by physician    PATIENT STRENGTHS:  Patient Strengths    Patient Strengths and Limitations:Limitations: Tendency to isolate self, Inappropriate/potentially harmful leisure interests, Difficulty problem solving/relies on others to help solve problems, Difficult relationships / poor social skills, General negative or hopeless attitude about future/recovery  Addictive Behavior:Addictive Behavior  In the Past 3 Months, Have You Felt or Has Someone Told You That You Have a Problem With  : None  Medical Problems:  Past Medical History:   Diagnosis Date    Autistic disorder     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    Injury of frontal lobe (HCC)     Multiple sensory deficit syndrome        Risk:  Fall Risk   Bairon Scale Bairon Scale Score: 19  BVC    Change in scores no Changes to plan of Care no    Status EXAM:   Mental Status and Behavioral Exam  Normal: No  Level of Assistance: Independent/Self  Facial Expression: Avoids Gaze, Flat, Expressionless, Worried  Affect: Blunt  Level of Consciousness: Alert  Frequency of Checks: 4 times per hour, close  Mood:Normal: No  Mood: Depressed, Empty, Sad, Helpless  Motor Activity:Normal: No  Motor Activity: Decreased, Unusual posture/gait  Eye Contact: Fair  Observed Behavior: Cooperative, Withdrawn, Preoccupied, Guarded  Sexual Misconduct History: Current - no  Preception: Sharon to person, Sharon to time, Sharon to place, Sharon to situation  Attention:Normal: No  Attention:

## 2025-06-21 NOTE — BH NOTE
Patient didn't participate in the morning Orientation goal setting group despite staff invite to attend. Patient preferred to rest in bed.

## 2025-06-21 NOTE — PLAN OF CARE
Problem: Self Harm/Suicidality  Goal: Will have no self-injury during hospital stay  Description: INTERVENTIONS:  1.  Ensure constant observer at bedside with Q15M safety checks  2.  Maintain a safe environment  3.  Secure patient belongings  4.  Ensure family/visitors adhere to safety recommendations  5.  Ensure safety tray has been added to patient's diet order  6.  Every shift and PRN: Re-assess suicidal risk via Frequent Screener    6/21/2025 1537 by Marisa Bay LPN  Outcome: Progressing   Patient denies thoughts of self harm  Problem: Depression  Goal: Will be euthymic at discharge  Description: INTERVENTIONS:  1. Administer medication as ordered  2. Provide emotional support via 1:1 interaction with staff  3. Encourage involvement in milieu/groups/activities  4. Monitor for social isolation  6/21/2025 1537 by Marisa Bay LPN  Outcome: Progressing   Mr. Carrillo is seen in his room affect is brightened, he is cooperative and approachable. Patient refused to get up for breakfast or attend morning group. Patient encouraged to shower, due to patient weight size he does not wipe his backside, bed changed multiple time, patient took medications.   Problem: Pain  Goal: Verbalizes/displays adequate comfort level or baseline comfort level  6/21/2025 1537 by Marisa Bay LPN  Outcome: Progressing   Patient denies pain

## 2025-06-21 NOTE — PLAN OF CARE
Problem: Self Harm/Suicidality  Goal: Will have no self-injury during hospital stay  Description: INTERVENTIONS:  1.  Ensure constant observer at bedside with Q15M safety checks  2.  Maintain a safe environment  3.  Secure patient belongings  4.  Ensure family/visitors adhere to safety recommendations  5.  Ensure safety tray has been added to patient's diet order  6.  Every shift and PRN: Re-assess suicidal risk via Frequent Screener    6/21/2025 0023 by Glenna Rea LPN  Outcome: Progressing  Flowsheets (Taken 6/21/2025 0023)  Will have no self-injury during hospital stay:   Every shift and PRN: Re-assess suicidal risk via Frequent Screener   Ensure family/visitors adhere to safety recommendations   Ensure constant observer at bedside with Q15M safety checks   Ensure safety tray has been added to patient's diet order   Maintain a safe environment   Secure patient belongings  Note: Patient denies thoughts of self harm or harm to others during this shift. Staff encouraged patient to notify staff if thoughts of self harm or harm to others occur. Staff ensures patient safety by intermediate and safety checks every 15 minutes.       Problem: Depression  Goal: Will be euthymic at discharge  Description: INTERVENTIONS:  1. Administer medication as ordered  2. Provide emotional support via 1:1 interaction with staff  3. Encourage involvement in milieu/groups/activities  4. Monitor for social isolation  6/21/2025 0023 by Glenna Rea LPN  Outcome: Not Progressing  Note: Patient remains free from harm to self or others but admits to continued anxiety and depression. Medications available upon request. Staff ensures safety by providing safety checks on the unit intermittently and every 15 minutes. Staff continues to provide a safe environment. Staff encouraged patient to notify if depression continues.      Problem: Depression  Goal: Will be euthymic at discharge  Description: INTERVENTIONS:  1. Administer

## 2025-06-22 LAB
GLUCOSE BLD-MCNC: 104 MG/DL (ref 75–110)
GLUCOSE BLD-MCNC: 95 MG/DL (ref 75–110)

## 2025-06-22 PROCEDURE — 6370000000 HC RX 637 (ALT 250 FOR IP): Performed by: PSYCHIATRY & NEUROLOGY

## 2025-06-22 PROCEDURE — 1240000000 HC EMOTIONAL WELLNESS R&B

## 2025-06-22 PROCEDURE — 99232 SBSQ HOSP IP/OBS MODERATE 35: CPT

## 2025-06-22 PROCEDURE — 6370000000 HC RX 637 (ALT 250 FOR IP): Performed by: INTERNAL MEDICINE

## 2025-06-22 PROCEDURE — 82947 ASSAY GLUCOSE BLOOD QUANT: CPT

## 2025-06-22 PROCEDURE — 6370000000 HC RX 637 (ALT 250 FOR IP): Performed by: NURSE PRACTITIONER

## 2025-06-22 RX ADMIN — AMLODIPINE BESYLATE 2.5 MG: 5 TABLET ORAL at 12:40

## 2025-06-22 RX ADMIN — FLUTICASONE PROPIONATE 2 PUFF: 110 AEROSOL, METERED RESPIRATORY (INHALATION) at 12:39

## 2025-06-22 RX ADMIN — BUSPIRONE HYDROCHLORIDE 10 MG: 10 TABLET ORAL at 12:41

## 2025-06-22 RX ADMIN — PANTOPRAZOLE SODIUM 40 MG: 40 TABLET, DELAYED RELEASE ORAL at 12:40

## 2025-06-22 RX ADMIN — ESCITALOPRAM OXALATE 20 MG: 20 TABLET ORAL at 12:39

## 2025-06-22 RX ADMIN — DIVALPROEX SODIUM 500 MG: 500 TABLET, DELAYED RELEASE ORAL at 12:40

## 2025-06-22 NOTE — PLAN OF CARE
Problem: Self Harm/Suicidality  Goal: Will have no self-injury during hospital stay  Description: INTERVENTIONS:  1.  Ensure constant observer at bedside with Q15M safety checks  2.  Maintain a safe environment  3.  Secure patient belongings  4.  Ensure family/visitors adhere to safety recommendations  5.  Ensure safety tray has been added to patient's diet order  6.  Every shift and PRN: Re-assess suicidal risk via Frequent Screener    6/22/2025 0007 by Glenna Rea LPN  Outcome: Progressing  Flowsheets (Taken 6/21/2025 0023)  Will have no self-injury during hospital stay:   Every shift and PRN: Re-assess suicidal risk via Frequent Screener   Ensure family/visitors adhere to safety recommendations   Ensure constant observer at bedside with Q15M safety checks   Ensure safety tray has been added to patient's diet order   Maintain a safe environment   Secure patient belongings  Note: Patient denies thoughts of self harm or harm to others during this shift. Staff encouraged patient to notify staff if thoughts of self harm or harm to others occur. Staff ensures patient safety by intermediate and safety checks every 15 minutes.       Problem: Pain  Goal: Verbalizes/displays adequate comfort level or baseline comfort level  6/22/2025 0007 by Glenna Rea LPN  Outcome: Progressing  Flowsheets (Taken 6/20/2025 0148 by Carissa Mccord RN)  Verbalizes/displays adequate comfort level or baseline comfort level:   Encourage patient to monitor pain and request assistance   Assess pain using appropriate pain scale  Note: Patient remains free from pain throughout the shift. Staff encouraged patient to notify nursing if pain arises.

## 2025-06-22 NOTE — PLAN OF CARE
Problem: Self Harm/Suicidality  Goal: Will have no self-injury during hospital stay  Description: INTERVENTIONS:  1.  Ensure constant observer at bedside with Q15M safety checks  2.  Maintain a safe environment  3.  Secure patient belongings  4.  Ensure family/visitors adhere to safety recommendations  5.  Ensure safety tray has been added to patient's diet order  6.  Every shift and PRN: Re-assess suicidal risk via Frequent Screener    Outcome: Progressing     patient denies thoughts of self harm Depression  Goal: Will be euthymic at discharge  Description: INTERVENTIONS:  1. Administer medication as ordered  2. Provide emotional support via 1:1 interaction with staff  3. Encourage involvement in milieu/groups/activities  4. Monitor for social isolation  Outcome: Progressing   Mr. Carrillo is seen in his room at breakfast time he did not eat breakfast, he was out for lunch and took medications. Brightened and cooperative with staff,   Problem: Pain  Goal: Verbalizes/displays adequate comfort level or baseline comfort level  Outcome: Progressing   Patient reports pain to legs if he walks to long

## 2025-06-22 NOTE — BH NOTE
New orders added for Imodium for patient's diarrhea and medicated powder for red areas between folds.

## 2025-06-22 NOTE — GROUP NOTE
Group Therapy Note    Date: 6/22/2025    Group Start Time: 1000  Group End Time: 1030  Group Topic: Psychoeducation    [facility]    Nicolas Vaca        Group Therapy Note    Attendees: 11/20     Patient was offered group therapy today but declined to participate despite encouragement from staff. 1:1 was offered.    Signature:  Nicolas Vaca

## 2025-06-23 LAB
GLUCOSE BLD-MCNC: 111 MG/DL (ref 75–110)
GLUCOSE BLD-MCNC: 112 MG/DL (ref 75–110)
GLUCOSE BLD-MCNC: 116 MG/DL (ref 75–110)
PATH REV BLD -IMP: NORMAL
SURGICAL PATHOLOGY REPORT: NORMAL

## 2025-06-23 PROCEDURE — 99232 SBSQ HOSP IP/OBS MODERATE 35: CPT | Performed by: PSYCHIATRY & NEUROLOGY

## 2025-06-23 PROCEDURE — 99231 SBSQ HOSP IP/OBS SF/LOW 25: CPT

## 2025-06-23 PROCEDURE — 6370000000 HC RX 637 (ALT 250 FOR IP): Performed by: INTERNAL MEDICINE

## 2025-06-23 PROCEDURE — 6370000000 HC RX 637 (ALT 250 FOR IP): Performed by: NURSE PRACTITIONER

## 2025-06-23 PROCEDURE — 6370000000 HC RX 637 (ALT 250 FOR IP): Performed by: PSYCHIATRY & NEUROLOGY

## 2025-06-23 PROCEDURE — 1240000000 HC EMOTIONAL WELLNESS R&B

## 2025-06-23 PROCEDURE — 82947 ASSAY GLUCOSE BLOOD QUANT: CPT

## 2025-06-23 PROCEDURE — 90833 PSYTX W PT W E/M 30 MIN: CPT | Performed by: PSYCHIATRY & NEUROLOGY

## 2025-06-23 RX ORDER — ALBUTEROL SULFATE 90 UG/1
2 INHALANT RESPIRATORY (INHALATION) EVERY 4 HOURS PRN
Qty: 54 G | Refills: 0 | Status: SHIPPED | OUTPATIENT
Start: 2025-06-23

## 2025-06-23 RX ORDER — INSULIN LISPRO 100 [IU]/ML
0-8 INJECTION, SOLUTION INTRAVENOUS; SUBCUTANEOUS
Qty: 10 ML | Refills: 0 | Status: SHIPPED | OUTPATIENT
Start: 2025-06-23

## 2025-06-23 RX ORDER — AMLODIPINE BESYLATE 2.5 MG/1
2.5 TABLET ORAL DAILY
Qty: 30 TABLET | Refills: 0 | Status: SHIPPED | OUTPATIENT
Start: 2025-06-24

## 2025-06-23 RX ADMIN — FLUTICASONE PROPIONATE 2 PUFF: 110 AEROSOL, METERED RESPIRATORY (INHALATION) at 21:11

## 2025-06-23 RX ADMIN — BUSPIRONE HYDROCHLORIDE 10 MG: 10 TABLET ORAL at 15:00

## 2025-06-23 RX ADMIN — PROPRANOLOL HYDROCHLORIDE 20 MG: 20 TABLET ORAL at 21:12

## 2025-06-23 RX ADMIN — METFORMIN HYDROCHLORIDE 500 MG: 500 TABLET ORAL at 08:35

## 2025-06-23 RX ADMIN — ESCITALOPRAM OXALATE 20 MG: 20 TABLET ORAL at 08:29

## 2025-06-23 RX ADMIN — PANTOPRAZOLE SODIUM 40 MG: 40 TABLET, DELAYED RELEASE ORAL at 08:29

## 2025-06-23 RX ADMIN — AMLODIPINE BESYLATE 2.5 MG: 5 TABLET ORAL at 08:29

## 2025-06-23 RX ADMIN — PROPRANOLOL HYDROCHLORIDE 20 MG: 20 TABLET ORAL at 15:00

## 2025-06-23 RX ADMIN — DIVALPROEX SODIUM 500 MG: 500 TABLET, DELAYED RELEASE ORAL at 21:12

## 2025-06-23 RX ADMIN — BUSPIRONE HYDROCHLORIDE 10 MG: 10 TABLET ORAL at 21:12

## 2025-06-23 RX ADMIN — DIVALPROEX SODIUM 500 MG: 500 TABLET, DELAYED RELEASE ORAL at 08:29

## 2025-06-23 RX ADMIN — TRAZODONE HYDROCHLORIDE 50 MG: 50 TABLET ORAL at 21:12

## 2025-06-23 RX ADMIN — PROPRANOLOL HYDROCHLORIDE 20 MG: 20 TABLET ORAL at 08:29

## 2025-06-23 RX ADMIN — LOPERAMIDE HYDROCHLORIDE 2 MG: 2 CAPSULE ORAL at 18:50

## 2025-06-23 RX ADMIN — BUSPIRONE HYDROCHLORIDE 10 MG: 10 TABLET ORAL at 08:29

## 2025-06-23 RX ADMIN — FLUTICASONE PROPIONATE 2 PUFF: 110 AEROSOL, METERED RESPIRATORY (INHALATION) at 08:30

## 2025-06-23 NOTE — GROUP NOTE
Group Therapy Note    Date: 6/23/2025    Group Start Time: 1100  Group End Time: 1154  Group Topic: Cognitive Skills    [facility]    Marlene Duron CTRS        Group Therapy Note    Attendees: 12/20     Topic: To increase social interaction , decision making, and communication skills.        Comments:   Patient did not participate in Cognitive Skills, at 11:00, despite staff encouragement and explanation of benefits. Pt was seclusive to self and room.     Q15 minute safety checks maintained for patient safety and will continue to encourage patient to attend unit programming.      Discipline Responsible: Psychoeducational Specialist   Signature: SHIRIN GROVES

## 2025-06-23 NOTE — BH NOTE
Patient instructed and encouraged to attend to ADLs after episode of incontinence, writer gathered supplies to assist patient and aid patient in the process, patient declined, and laid back in bed.

## 2025-06-23 NOTE — PLAN OF CARE
Problem: Self Harm/Suicidality  Goal: Will have no self-injury during hospital stay  Description: INTERVENTIONS:  1.  Ensure constant observer at bedside with Q15M safety checks  2.  Maintain a safe environment  3.  Secure patient belongings  4.  Ensure family/visitors adhere to safety recommendations  5.  Ensure safety tray has been added to patient's diet order  6.  Every shift and PRN: Re-assess suicidal risk via Frequent Screener    6/22/2025 2313 by Glenna Rea LPN  Outcome: Progressing  Flowsheets (Taken 6/21/2025 0023)  Will have no self-injury during hospital stay:   Every shift and PRN: Re-assess suicidal risk via Frequent Screener   Ensure family/visitors adhere to safety recommendations   Ensure constant observer at bedside with Q15M safety checks   Ensure safety tray has been added to patient's diet order   Maintain a safe environment   Secure patient belongings  Note: Patient denies thoughts of self harm or harm to others during this shift. Staff encouraged patient to notify staff if thoughts of self harm or harm to others occur. Staff ensures patient safety by intermediate and safety checks every 15 minutes.       Problem: Pain  Goal: Verbalizes/displays adequate comfort level or baseline comfort level  6/22/2025 2313 by Glenan Rea LPN  Outcome: Progressing  Flowsheets (Taken 6/20/2025 0148 by Carissa Mccord RN)  Verbalizes/displays adequate comfort level or baseline comfort level:   Encourage patient to monitor pain and request assistance   Assess pain using appropriate pain scale  Note: Patient remains free from pain throughout the shift. Staff encouraged patient to notify nursing if pain arises.

## 2025-06-23 NOTE — PLAN OF CARE
Problem: Self Harm/Suicidality  Goal: Will have no self-injury during hospital stay  Description: INTERVENTIONS:  1.  Ensure constant observer at bedside with Q15M safety checks  2.  Maintain a safe environment  3.  Secure patient belongings  4.  Ensure family/visitors adhere to safety recommendations  5.  Ensure safety tray has been added to patient's diet order  6.  Every shift and PRN: Re-assess suicidal risk via Frequent Screener    6/23/2025 1235 by Akilah Méndez LPN  Outcome: Progressing  Flowsheets (Taken 6/23/2025 1235)  Will have no self-injury during hospital stay: Maintain a safe environment  Note: No violent or negative behaviors noted at this time.  Will cont to provide safe, calm, environment and use verbal de-escalation techniques when needed.  Support and reassurance given as needed.

## 2025-06-23 NOTE — GROUP NOTE
Group Therapy Note    Date: 6/22/2025    Group Start Time: 2005  Group End Time: 2045  Group Topic: Wrap-Up    [facility]    Carissa Mccord, RN      Group Therapy Note    Attendees: 12/19      Patient's Goal(s):    1.  To be able to reflect on daily unit activities/experiences.  2.  To review accomplished daily goals and be encouraged to set new goals for the next day.  3.  To reduce anxiety and improve mood by focusing on positive life experiences.   4.  To improve interpersonal interaction through socialization.        Notes:  Pt attended and actively participated in Wrap-Up/Goal Review group this evening.     Status After Intervention:  Improved     Participation Level: Active Listener and Interactive     Participation Quality: Appropriate, Attentive and Sharing     Speech:  normal     Thought Process/Content: Logical     Affective Functioning: Congruent     Mood: euthymic     Level of consciousness:  Alert, Oriented x4 and Attentive     Response to Learning: Able to verbalize current knowledge/experience, Able to verbalize/acknowledge new learning, Progressing to goal     Endings: None Reported     Modes of Intervention: Education, Support and Socialization     Discipline Responsible: Registered Nurse        Signature:  Carissa Mccord, RN

## 2025-06-23 NOTE — GROUP NOTE
Group Therapy Note    Date: 6/23/2025    Group Start Time: 1430  Group End Time: 1515  Group Topic: Cognitive Skills    [facility]    Marlene Duron CTRS        Group Therapy Note    Attendees: 13/19     Topic: To increase social interaction , practice self expession, exploring values/preferences and different points of view, and communication skills        Comments:   Patient did not participate in Cognitive Skills, at 14:30, despite staff encouragement and explanation of benefits. Pt was seclusive to self and room.     Q15 minute safety checks maintained for patient safety and will continue to encourage patient to attend unit programming.      Discipline Responsible: Psychoeducational Specialist   Signature: SHIRIN GROVES

## 2025-06-24 VITALS
BODY MASS INDEX: 39.17 KG/M2 | TEMPERATURE: 97.8 F | OXYGEN SATURATION: 98 % | HEART RATE: 58 BPM | DIASTOLIC BLOOD PRESSURE: 64 MMHG | RESPIRATION RATE: 16 BRPM | HEIGHT: 75 IN | WEIGHT: 315 LBS | SYSTOLIC BLOOD PRESSURE: 116 MMHG

## 2025-06-24 PROCEDURE — 6370000000 HC RX 637 (ALT 250 FOR IP): Performed by: INTERNAL MEDICINE

## 2025-06-24 PROCEDURE — 99238 HOSP IP/OBS DSCHRG MGMT 30/<: CPT | Performed by: PSYCHIATRY & NEUROLOGY

## 2025-06-24 PROCEDURE — 97161 PT EVAL LOW COMPLEX 20 MIN: CPT

## 2025-06-24 PROCEDURE — 97116 GAIT TRAINING THERAPY: CPT

## 2025-06-24 PROCEDURE — 6370000000 HC RX 637 (ALT 250 FOR IP): Performed by: NURSE PRACTITIONER

## 2025-06-24 PROCEDURE — 6370000000 HC RX 637 (ALT 250 FOR IP): Performed by: PSYCHIATRY & NEUROLOGY

## 2025-06-24 RX ADMIN — BUSPIRONE HYDROCHLORIDE 10 MG: 10 TABLET ORAL at 10:18

## 2025-06-24 RX ADMIN — ESCITALOPRAM OXALATE 20 MG: 20 TABLET ORAL at 10:18

## 2025-06-24 RX ADMIN — AMLODIPINE BESYLATE 2.5 MG: 5 TABLET ORAL at 10:17

## 2025-06-24 RX ADMIN — PANTOPRAZOLE SODIUM 40 MG: 40 TABLET, DELAYED RELEASE ORAL at 10:17

## 2025-06-24 RX ADMIN — PROPRANOLOL HYDROCHLORIDE 20 MG: 20 TABLET ORAL at 10:18

## 2025-06-24 RX ADMIN — METFORMIN HYDROCHLORIDE 500 MG: 500 TABLET ORAL at 10:18

## 2025-06-24 RX ADMIN — FLUTICASONE PROPIONATE 2 PUFF: 110 AEROSOL, METERED RESPIRATORY (INHALATION) at 10:17

## 2025-06-24 RX ADMIN — DIVALPROEX SODIUM 500 MG: 500 TABLET, DELAYED RELEASE ORAL at 10:18

## 2025-06-24 NOTE — BH NOTE
Patient given tobacco quitline number 11108790927 at this time, refusing to call at this time, states \" I just dont want to quit now\"- patient given information as to the dangers of long term tobacco use. Continue to reinforce the importance of tobacco cessation.

## 2025-06-24 NOTE — DISCHARGE SUMMARY
Provider Discharge Summary     Patient ID:  Chino Carrillo  [id number]  30 y.o.  1995    Admit date: 6/18/2025    Discharge date and time: 6/24/2025  7:49 PM     Admitting Physician: Rafat Acosta MD     Discharge Physician: Rafat Acosta MD    Admission Diagnoses: Depression with suicidal ideation [F32.A, R45.851]    Discharge Diagnoses:      Schizoaffective disorder, depressive type ([facility])     Patient Active Problem List   Diagnosis Code    Autism spectrum disorder F84.0    Acute non-recurrent maxillary sinusitis J01.00    Attention deficit disorder F98.8    Blood in the stool K92.1    Body mass index 45.0-49.9, adult ([facility]) Z68.42    Elevated LFTs R79.89    GERD (gastroesophageal reflux disease) K21.9    History of pneumonia Z87.01    Other constipation K59.09    Risk for sexually transmitted disease Z72.51    Severe recurrent major depression with psychotic features ([facility]) F33.3    Skin excoriation T14.8XXA    Suicidal ideation R45.851    Major depressive disorder with psychotic features ([facility]) F32.3    MDD (major depressive disorder), recurrent episode F33.9    Schizoaffective disorder, depressive type ([facility]) F25.1    Schizoaffective disorder, bipolar type ([facility]) F25.0    Major depressive disorder, recurrent severe without psychotic features ([facility]) F33.2    PTSD (post-traumatic stress disorder) F43.10    NATALYA (generalized anxiety disorder) F41.1    TBI (traumatic brain injury) ([facility]) S06.9XAA    Other microscopic hematuria R31.29    Suicide attempt ([facility]) T14.91XA    Depression with suicidal ideation F32.A, R45.851        Admission Condition: poor    Discharged Condition: stable    Indication for Admission: threat to self    History of Present Illnes (present tense wording is of findings from admission exam and are not necessarily indicative of current findings):   Chino Carrillo is a 30 y.o. male who has a past medical history of autism spectrum disorder, brain injury, recurrent MDD w/psychotic features, and

## 2025-06-24 NOTE — DISCHARGE INSTRUCTIONS
Information:  Medications:   Medication summary provided   I understand that I should take only the medications on my list.     -why and when I need to take each medicine.     -which side effects to watch for.     -that I should carry my medication information at all times in case of     Emergency situations.    I will take all of my medicines to follow up appointments.     -check with my physician or pharmacist before taking any new    Medication, over the counter product or drink alcohol.    -Ask about food, drug or dietary supplement interactions.    -discard old lists and update records with medication providers.    Notify Physician:  Notify physician if you notice:   Always call 911 if you feel your life is in danger  In case of an emergency call 911 immediately!  If 911 is not available call your local emergency medical system for help    Behavioral Health Follow Up:  Original Referral Source:[place] [facility]  Discharge Diagnosis: Depression with suicidal ideation [F32.A, R45.851]  Recommendations for Level of Care: Outpatient follow up appointment scheduled at [facility]  Patient status at discharge: stabilized on meds, denies any thoughts of harm to self or others  [facility] hospital  was: [facility]  Aftercare plan faxed: to [facility]   -faxed by: nursing staff   -date: 6/24/25   -time: 1500  Prescriptions: ***    Smoking: Quit Smoking.   Call the [employer]'s smoking quitline at 2-557-43F-QUIT  Know the signs of a heart attack   If you have any of the following symptoms call 911 immediately, do not wait more    Than five minutes.    1. Pressure, fullness and/ or squeezing in the center of the chest spreading to    The jaw, neck or shoulder.    2. Chest discomfort with light headedness, fainting, sweating, nausea or    Shortness of breath.   3. Upper abdominal pressure or discomfort.   4. Lower chest pain, back pain, unusual fatigue, shortness of breath, nausea   Or dizziness.     General

## 2025-06-24 NOTE — GROUP NOTE
Group Therapy Note    Date: 6/23/2025    Group Start Time: 2000  Group End Time: 2035  Group Topic: Wrap-Up    [facility]    Carissa Mccord, RN      Group Therapy Note    Attendees: 11/15      Patient's Goal(s):    1.  To be able to reflect on daily unit activities/experiences.  2.  To review accomplished daily goals and be encouraged to set new goals for the next day.  3.  To improve interpersonal interaction through socialization.       Notes:  Pt attended and actively participated in Wrap-Up/Goal Review group this evening.     Status After Intervention:  Improved     Participation Level: Active Listener and Interactive     Participation Quality: Appropriate, Attentive and Sharing     Speech:  normal     Thought Process/Content: Logical     Affective Functioning: Congruent     Mood: euthymic     Level of consciousness:  Alert, Oriented x4 and Attentive     Response to Learning: Able to verbalize current knowledge/experience, Able to verbalize/acknowledge new learning, Progressing to goal     Endings: None Reported     Modes of Intervention: Education, Support and Socialization     Discipline Responsible: Registered Nurse        Signature:  Carissa Mccord, RN

## 2025-06-24 NOTE — GROUP NOTE
Group Therapy Note    Date: 6/24/2025    Group Start Time: 1015  Group End Time: 1045  Group Topic: Psychoeducation    [facility]    Nicolas Vaca        Group Therapy Note    Attendees: 10/18       Patient's Goal:  PT will demonstrate increased interpersonal interaction and participate in group activities of discussing ways to build happiness.       Notes:  Patient is making progress, AEB participating in group discussion, actively listening, and supporting other group members    Status After Intervention:  Unchanged    Participation Level: Active Listener and Interactive    Participation Quality: Appropriate and Attentive      Speech:  normal      Thought Process/Content: Logical      Affective Functioning: Flat      Mood: depressed      Level of consciousness:  Alert and Oriented x4      Response to Learning: Able to verbalize/acknowledge new learning and Progressing to goal      Endings: None Reported    Modes of Intervention: Education, Support, and Socialization      Discipline Responsible: /Counselor      Signature:  Nicolas Vaca

## 2025-06-24 NOTE — CARE COORDINATION
Discharge Arrangements:  N/A    Guardian notified: n/a    Discharge destination/address: [address] [facility] [address]    Transported by:  FAMILY     [facility] was accepting of his MENTAL HEALTH followup which was scheduled with [facility] 06/25/2025 at 230pm. He did not require a substance abuse referral.     *FERNANDA resources were offered to patient throughout admission and at time of discharge. This list of [facility] FERNANDA providers was provided to patient:     [facility]  [address] [facility] [address]   [address] [facility] [address]  Phone: [phone]     Phone: [phone]    [facility][1]   [address] [address]   [address] [facility] [address]  Phone: [phone]     Phone: [phone]    [facility] [place] [place] [facility]  [address] [facility] [address]    [address] [facility] [facility] [address]  Phone: [phone]     Phone: [phone]    [facility]     [facility]   [address] [facility] [address] [facility] [address] [facility] [address] [place] [address]      Phone: [phone]  Phone: [phone]        [facility]  [address] [facility]. [facility] [address]   [address] [place] [address]  Phone: [phone]     Phone:  [phone]    [facility]      [facility]  [address] [facility] [address]   [address] [facility] [address]  Phone: [phone]     Phone: [phone]    [facility]  [address] [facility] [address]   [address] [facility] [address]  Phone: [phone]     Phone: [phone]    [facility]  [facility]  [address] [facility] [address] [address] [facility] [address]  Phone: [phone]     Phone: [phone]    [facility]     [facility]  [address] [place] [address] [address] [place] [address] 
BHI Biopsychosocial Assessment    Current Level of Psychosocial Functioning     Independent XX  Dependent    Minimal Assist     Psychosocial High Risk Factors (check all that apply)    Unable to obtain meds   Chronic illness/pain    Substance abuse   Lack of Family Support   Financial stress   Isolation XX  Inadequate Community Resources  Suicide attempt(s)  Not taking medications   Victim of crime   Developmental Delay XX  Unable to manage personal needs    Age 65 or older   Homeless  No transportation   Readmission within 30 days  Unemployment  Traumatic Event    Psychiatric Advanced Directives: N/A    Family to Involve in Treatment: Girlfriend, Dad    Sexual Orientation:  N/A    Patient Strengths: Housing, SSDI, Medicare and Medicaid, Linked to [facility], Linked to [facility]    Patient Barriers: Lack of coping skills    Opiate Education Provided:  Denies    CMHC/mental health history: [facility] 12/16- 12/21/24. Linked to [facility].    Plan of Care   medication management, group/individual therapies, family meetings, psycho -education, treatment team meetings to assist with stabilization    Initial Discharge Plan:  Return home, Continue with [facility].    Clinical Summary:    Flaco is a 31yo admitted to the [facility] for depression with suicidal ideation. At time of assessment, he denies suicidal and homicidal ideation and hallucinations. He states he feels tired and anxious.    Flaco has inpatient treatment history and was last at the [facility] 12/16- 12/21/24. He is linked to [facility].  He denies current opiate and other substance use; No noted FERNANDA history; Informed of FERNANDA treatment resources.  He endorses past and present legal concerns; On probation.  He endorses past and denies present abuse concerns.    Flaco states that his provider at [facility] and his [facility] [employer] worker have been giving him Adderall, which in the past caused him extreme anxiety. He states he is beginning to feel that extreme anxiety again. He states he also is 
Name: Chino Carrillo    : 1995    Auth number: [id number]     Discharge Date: 2025    Destination: home    *If you have any specific discharge questions, please contact the assigned /discharge planner: Clarisa ([phone])       Discharge Medications:      Medication List        START taking these medications      amLODIPine 2.5 MG tablet  Commonly known as: NORVASC  Take 1 tablet by mouth daily  Notes to patient: For the heart, b/p     miconazole 2 % powder  Commonly known as: MICOTIN  Apply topically 2 times daily.  Notes to patient: Antifungal powder            CHANGE how you take these medications      insulin lispro 100 UNIT/ML Soln injection vial  Commonly known as: HUMALOG,ADMELOG  Inject 0-8 Units into the skin 4 times daily (before meals and nightly)  What changed: how much to take  Notes to patient: Diabetes management            CONTINUE taking these medications      albuterol sulfate  (90 Base) MCG/ACT inhaler  Commonly known as: Ventolin HFA  Inhale 2 puffs into the lungs every 4 hours as needed for Wheezing  Notes to patient: For wheezing     busPIRone 10 MG tablet  Commonly known as: BUSPAR  Notes to patient: For anxiety     calcium carbonate 500 MG chewable tablet  Commonly known as: TUMS  Take 1 tablet by mouth 3 times daily as needed for Heartburn  Notes to patient: For heartburn     divalproex 500 MG DR tablet  Commonly known as: DEPAKOTE  Take 1 tablet by mouth 2 times daily  Notes to patient: For mood     escitalopram 20 MG tablet  Commonly known as: LEXAPRO  Notes to patient: For mood/depression     fluticasone 110 MCG/ACT inhaler  Commonly known as: FLOVENT HFA  Inhale 2 puffs into the lungs in the morning and 2 puffs in the evening.  Notes to patient: Respiratory health     ibuprofen 800 MG tablet  Commonly known as: ADVIL;MOTRIN  Take 1 tablet by mouth 2 times daily as needed for Pain  Notes to patient: For pain     Invega Sustenna 234 MG/1.5ML Vy IM 
[address]   [address] [facility] [address]  Phone: [phone]     Phone: [phone]    [facility]    [facility] Department of Psychiatry  [address] [facility] [address]  [address] [place], [place] [address]  Phone: [phone]     Phone: [phone]    [facility]      [facility]  [address] [facility]note 2[facility] [address]    [address] [facility] [address]  Phone: [phone]     Phone: [phone]    [facility]   [address] [facility] [address]   [address] [facility] [address] [facility] [address]  Phone: [phone]     Phone: [phone] Ext: [phone]    [place] [address] [facility] [address] or  [address] [facility] [address] or  [address] [facility] [address]  Phone: [phone]    [facility]  [facility]  [address] [facility] [address]    [address] [place] [address]  [facility] [address]     Phone: [phone]  Phone: [phone]          [facility]  [address] [facility] [address]   [address] [facility] [address]  Phone: [phone]     Phone: [phone]

## 2025-06-24 NOTE — PLAN OF CARE
Problem: Self Harm/Suicidality  Goal: Will have no self-injury during hospital stay  Description: INTERVENTIONS:  1.  Ensure constant observer at bedside with Q15M safety checks  2.  Maintain a safe environment  3.  Secure patient belongings  4.  Ensure family/visitors adhere to safety recommendations  5.  Ensure safety tray has been added to patient's diet order  6.  Every shift and PRN: Re-assess suicidal risk via Frequent Screener    6/24/2025 0002 by Vidal De Guzman RN  Outcome: Progressing  Patient has made no attempt to harm self at this time.      Problem: Depression  Goal: Will be euthymic at discharge  Description: INTERVENTIONS:  1. Administer medication as ordered  2. Provide emotional support via 1:1 interaction with staff  3. Encourage involvement in milieu/groups/activities  4. Monitor for social isolation  Outcome: Progressing  Patient is bright and social with peers. He is social with peers, spending most of shift in day room. He reports readiness for discharge. Safety plan reviewed with patient, agrees to approach staff when feeling upset.  15 minute and random checks maintained for safety.  No violent or escalating behaviors noted during this shift. Patient is currently calm, controlled and medication-compliant. Patient denies suicidal ideation, homicidal ideation and hallucinations at this time.        Problem: Pain  Goal: Verbalizes/displays adequate comfort level or baseline comfort level  Outcome: Progressing  Patient denies pain at this time.

## 2025-06-24 NOTE — PROGRESS NOTES
Behavioral Services  Medicare Certification Upon Admission    I certify that this patient's inpatient psychiatric hospital admission is medically necessary for:    [x] (1) Treatment which could reasonably be expected to improve this patient's condition,       [x] (2) Or for diagnostic study;     AND     [x](2) The inpatient psychiatric services are provided while the individual is under the care of a physician and are included in the individualized plan of care.    Estimated length of stay/service 4-7 days    Plan for post-hospital care home with outpatient [facility] f/u    Electronically signed by CHIVO CHOWDHURY MD on 6/20/2025 at 4:06 PM      
    [facility] Internal Medicine  Rico Galvin MD; Krishna Cat MD,, Alisson Waldrop MD, Dr Antwon Linda MD   ; Demarco Varma MD    [facility] Internal Medicine   IN-PATIENT SERVICE   [facility]    Progress Note            Date:   6/21/2025  Patient name:  Chino Carrillo  Date of admission:  6/18/2025 11:15 PM  MRN:   [id number]  Account:  [id number]  YOB: 1995  PCP:    Rico Galvin MD  Room:   [address] [place] [address]  Code Status:    Full Code      Chief Complaint:     Suicidal /Ac Psychosis    History Obtained From:     Patient/EMR/bedside RN     History of Present Illness:   Patient is a 30-year-old male morbidly obese BMI of 63, history of autism spectrum, type 2 diabetes mellitus, depression and anxiety has been admitted at  for further management of depression suicidal ideation,    Currently denies any chest pain shortness of breath.  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 Authorizing Provider   paliperidone (INVEGA) 6 MG extended release tablet Take 1 tablet by mouth every morning   Yes ProviderXavier MD   traZODone (DESYREL) 100 MG tablet Take 2 tablets by mouth nightly 1/14/25   ProviderXavier MD   albuterol sulfate HFA (VENTOLIN HFA) 108 (90 Base) MCG/ACT inhaler Inhale 2 puffs into the lungs every 4 hours as needed for Wheezing 1/30/25   Joe Salvador DO   fluticasone (FLOVENT HFA) 110 MCG/ACT inhaler 
  Daily Progress Note  6/20/2025    Patient Name: Chino Carrillo    CHIEF COMPLAINT:  Depression with suicidal ideation           SUBJECTIVE:    Patient is seen today for a follow up assessment. He has been compliant with scheduled medications and behaviorally in control. He has not required emergency medications for agitation in the past 24 hours. He is resting in bed upon approach. Hygiene and appearance remain poor. Thought process and speech are slow. He tells writer that he is \"good.\" He reports that his depression is \"better\" but continues to endorse hopelessness and helplessness. He rates his anxiety a 6/10. He reports improvement in suicidal ideation but is not yet able to contract for safety in the community. He denies homicidal ideation and hallucinations. He is not observed responding to internal stimuli. He denies issues with appetite but reports poor sleep. He describes his mediations as \"good,\" denying side effects. At this time, he remains at an increased risk to self and is not able to contract for safety. He requires continued inpatient hospitalization for safety and stabilization.     Appetite:  [x] Adequate/Unchanged  [] Increased  [] Decreased      Sleep:       [] Adequate/Unchanged  [] Fair  [x] Poor      Group Attendance on Unit:   [] Yes   [x] Selectively    [] No    Compliant with scheduled medications: [x] Yes  [] No    Received emergency medications in past 24 hrs: [] Yes   [x] No    Medication Side Effects: Denies         Mental Status Exam  Level of consciousness: Alert and awake   Appearance: Appropriate attire for setting, resting in bed, with poor grooming and hygiene   Behavior/Motor: Approachable, engages with interviewer, no psychomotor abnormalities   Attitude toward examiner: Cooperative, attentive, fair eye contact  Speech: slow, monotone  Mood:  \"Good\"  Affect: Blunted  Thought processes: linear, goal directed, and slow   Thought content: Denies homicidal ideation  Suicidal 
  Daily Progress Note  6/22/2025    Patient Name: Chino Carrillo    CHIEF COMPLAINT: Depression with suicidal ideation         SUBJECTIVE:    Patient is seen today for a follow up assessment.  He is found lying in bed awake.  Agrees to conduct interview in private room door open.  Speech and thought processes are slow. He is compliant taking BuSpar, Depakote, Lexapro, and propranolol.  No adverse effects reported.  He has been without behavioral disturbances or need for emergency medications. He describes his mood as \"better\".  He endorses improvement in depression and anxiety.  He endorses improvement in suicidal ideation.  He denies homicidal ideation.  Denies hallucinations or paranoia.  Reports sleep is improving.  Reports adequate appetite.  He is attending groups and socializing with peers.  Grooming and hygiene remain poor.  His room appears less unkempt today.     At this time, patient's symptoms are showing improvement but remained unstable for discharge.  He requires continued inpatient hospitalization for safety and stabilization.    Appetite:  [x] Adequate/Unchanged  [] Increased  [] Decreased      Sleep:       [] Adequate/Unchanged  [] Fair  [x] Improving    Group Attendance on Unit:   [x] Yes   [] Selectively    [] No    Compliant with scheduled medications: [x] Yes  [] No    Received emergency medications in past 24 hrs: [] Yes   [x] No    Medication Side Effects: Denies         Mental Status Exam  Level of consciousness: Alert and awake   Appearance: Appropriate attire for setting, resting in bed, with poor grooming and hygiene   Behavior/Motor: Approachable, engages with interviewer, no psychomotor abnormalities   Attitude toward examiner: Cooperative, attentive, fair eye contact  Speech: Slow, monotone  Mood: \"Better\"  Affect: Blunted  Thought processes: Slow, coherent and linear  Thought content:  Denies homicidal ideation  Suicidal Ideation: Reports improvement in suicidal ideations, contracts 
Daily Progress Note  Rafat Acosta MD  6/23/2025  CHIEF COMPLAINT: Depression with suicidal ideation    Reviewed patient's current plan of care and vital signs with nursing staff.  Sleep:  several hours last night  Attending groups: Yes    SUBJECTIVE:    Staff reports patient slept well.  He showered today, continuing to demonstrate improvement in hygiene.  He is denying side effects to medication adjustments that we made.  He is reporting feeling more hopeful.  He reports better mood.  He is denying auditory visual hallucinations.  Denying suicidal or homicidal ideation intent or plan.  Denying paranoia.  More forward-looking and constructive.  Reports good appetite.  Reports good focus and concentration.  Spent time in supportive psychotherapy    Mental Status Exam  Level of consciousness:  Within normal limits  Appearance: Hospital attire, seated in chair, with good grooming and hygiene   Behavior/Motor: No abnormalities noted  Attitude toward examiner:  Cooperative, attentive, good eye contact  Speech:  spontaneous, normal rate, normal volume and well articulated  Mood: \"Way better\"  Affect: Congruent with stated mood  Thought processes:  linear, goal directed and coherent  Thought content:  denies homicidal ideation  Suicidal Ideation: Denies suicidal ideation  Delusions:  no evidence of delusions  Perceptual Disturbance:  denies any perceptual disturbance  Cognition:  Oriented to self, location, time, and situation  Memory: age appropriate  Insight & Judgement: improving  Medication side effects:  denies       Data   height is 1.905 m (6' 3\") and weight is 228.6 kg (504 lb) (abnormal). His oral temperature is 97 °F (36.1 °C). His blood pressure is 130/60 and his pulse is 62. His respiration is 16 and oxygen saturation is 97%.   Labs:   Admission on 06/18/2025   Component Date Value Ref Range Status    Amphetamine Screen, Ur 06/19/2025 POSITIVE (A)  NEGATIVE Final    Cutoff: 1000 ng/mL    Barbiturate Screen, 
Pharmacy Medication History Note      List of current medications patient is taking is complete.    Source of information: [facility], [facility], Malia Walker (Elena)    Changes made to medication list:  Medications removed (include reason, ex. therapy complete or physician discontinued, noncompliance):  N/A    Medications flagged for provider review:  Caplyta 42 mg - last filled 12/16/2024, patient not taking    Medications added/doses adjusted:  Trazodone 50 mg --> 100 mg    Other notes (ex. Recent course of antibiotics, Coumadin dosing):  OARRS negative  Per Elena from [facility], patient last received Invega 234 mg Q3w on 6/12/2025.      Current Home Medication List at Time of Admission:  Prior to Admission medications    Medication Sig Start Date End Date Taking? Authorizing Provider   paliperidone (INVEGA) 6 MG extended release tablet Take 1 tablet by mouth every morning   Yes Xavier Monge MD   traZODone (DESYREL) 100 MG tablet Take 2 tablets by mouth nightly 1/14/25   Xavier Monge MD   albuterol sulfate HFA (VENTOLIN HFA) 108 (90 Base) MCG/ACT inhaler Inhale 2 puffs into the lungs every 4 hours as needed for Wheezing 1/30/25   Joe Salvador DO   fluticasone (FLOVENT HFA) 110 MCG/ACT inhaler Inhale 2 puffs into the lungs in the morning and 2 puffs in the evening. 12/20/24   Rafat Acosta MD   insulin lispro (HUMALOG,ADMELOG) 100 UNIT/ML SOLN injection vial Inject 0-4 Units into the skin 4 times daily (before meals and nightly) 12/20/24   Rafat Acosta MD   metFORMIN (GLUCOPHAGE) 500 MG tablet Take 1 tablet by mouth with breakfast and with evening meal 12/20/24   Rafat Acosta MD   busPIRone (BUSPAR) 10 MG tablet Take 1 tablet by mouth 3 times daily 10/18/24   Xavier Monge MD   escitalopram (LEXAPRO) 20 MG tablet Take 1 tablet by mouth daily 10/18/24   Xavier Monge MD   omeprazole (PRILOSEC) 20 MG delayed release capsule Take 1 capsule by mouth 2 
RT ASSESSMENT TREATMENT GOALS    [x]Pt Goal: Pt will identify 1-2 positive coping skills by time of discharge.    [x]Pt Goal: Pt will identify 1-2 positive aspects of self by time of discharge.    []Pt Goal: Pt will remain on task/topic for 15-30 minutes during group by time of discharge.    []Pt Goal: Pt will identify 1-2 aspects of relapse prevention plan by time of discharge.    []Pt Goal: Pt will join in conversation with peers 1-2 times per group by time of discharge.    []Pt Goal: Pt will identify 1-2 new leisure interests by time of discharge.    []Pt Goal: Pt will not voice any delusional content by time of discharge.    
excessive creatine ingestion, or following therapy that affects   renal tubular secretion.      Calcium 06/19/2025 9.2  8.6 - 10.4 mg/dL Final    Total Protein 06/19/2025 7.0  6.6 - 8.7 g/dL Final    Albumin 06/19/2025 3.7  3.5 - 5.2 g/dL Final    Total Bilirubin 06/19/2025 <0.2  0.0 - 1.2 mg/dL Final    Alkaline Phosphatase 06/19/2025 81  40 - 129 U/L Final    ALT 06/19/2025 23  10 - 50 U/L Final    AST 06/19/2025 24  10 - 50 U/L Final    Magnesium 06/19/2025 1.6  1.6 - 2.6 mg/dL Final    Salicylate Lvl 06/19/2025 <1.0  0.0 - 10.0 mg/dL Final    POC Glucose 06/19/2025 170 (H)  75 - 110 mg/dL Final    POC Glucose 06/19/2025 118 (H)  75 - 110 mg/dL Final    POC Glucose 06/19/2025 132 (H)  75 - 110 mg/dL Final    Hemoglobin A1C 06/20/2025 5.8  4.0 - 6.0 % Final    Estimated Avg Glucose 06/20/2025 120  mg/dL Final    Comment: The ADA and AACC recommend providing the estimated average glucose result to permit better   patient understanding of their HBA1c result.      POC Glucose 06/19/2025 98  75 - 110 mg/dL Final    POC Glucose 06/19/2025 119 (H)  75 - 110 mg/dL Final    POC Glucose 06/20/2025 118 (H)  75 - 110 mg/dL Final    POC Glucose 06/20/2025 115 (H)  75 - 110 mg/dL Final    POC Glucose 06/20/2025 109  75 - 110 mg/dL Final    Potassium 06/21/2025 4.1  3.7 - 5.3 mmol/L Final    POC Glucose 06/20/2025 104  75 - 110 mg/dL Final    WBC 06/20/2025 9.8  3.5 - 11.3 k/uL Final    RBC 06/20/2025 4.12 (L)  4.21 - 5.77 m/uL Final    Hemoglobin 06/20/2025 11.1 (L)  13.0 - 17.0 g/dL Final    Hematocrit 06/20/2025 36.2 (L)  40.7 - 50.3 % Final    MCV 06/20/2025 87.9  82.6 - 102.9 fL Final    MCH 06/20/2025 26.9  25.2 - 33.5 pg Final    MCHC 06/20/2025 30.7  28.4 - 34.8 g/dL Final    RDW 06/20/2025 14.2  11.8 - 14.4 % Final    Platelets 06/20/2025 240  138 - 453 k/uL Final    MPV 06/20/2025 10.5  8.1 - 13.5 fL Final    NRBC Automated 06/20/2025 0.3 (H)  0.0 per 100 WBC Final    Neutrophils % 06/20/2025 41  36 - 65 % Final    
limited by fatigue, Patient limited by endurance     Patient Education  Patient Education  Education Given To: Patient  Education Provided: Role of Therapy, Plan of Care, Transfer Training  Education Provided Comments: Education to pt on recommendations to follow up with outpatient PT or community based exercise program to facilitate increased activity tolerance and reduce pain levels  Education Method: Verbal  Education Outcome: Continued education needed     Functional Outcome Measures  AM-PAC Basic Mobility - Inpatient   How much help is needed turning from your back to your side while in a flat bed without using bedrails?: None  How much help is needed moving from lying on your back to sitting on the side of a flat bed without using bedrails?: None  How much help is needed moving to and from a bed to a chair?: None  How much help is needed standing up from a chair using your arms?: None  How much help is needed walking in hospital room?: A Little  How much help is needed climbing 3-5 steps with a railing?: A Little  [facility] Inpatient Mobility Raw Score : 22  AM-PAC Inpatient T-Scale Score : 53.28  Mobility Inpatient CMS 0-100% Score: 20.91  Mobility Inpatient CMS G-Code Modifier : CJ     Goals  Patient Goals   Patient Goals : Pt reports he is going home today  Short Term Goals  Time Frame for Short Term Goals: 1-2 visits  Short Term Goal 1: Pt to ambulate without AD for 100 feet supervision assist  Short Term Goal 2: Pt to tolerate 20 min therapeutic exercise/activity to facilitate increased independence with household and community mobility  Short Term Goal 3: Pt to complete ther ex for HEP independently with handouts to facilitate increased activity tolerance and reduced back pain  Short Term Goal 4: Pt to negotiate 1 flight of stairs with right hand rail SBA  Short Term Goal 5: Pt to improve dynamic standing balance to good to reduce fall risk      Plan  Physical Therapy Plan  General Plan: 2-3 times per 
sounds, no hepatomegaly or splenomegaly  Neurologic: CN II-XII intact , DTR normal, no new focal motor or sensory deficits, moving all extremities spontaneously.   Skin: No gross lesions, rashes, bruising or bleeding on exposed skin area  Extremities:  peripheral pulses palpable, no pedal edema or calf pain with palpation    Investigations:      Laboratory Testing:  Recent Results (from the past 24 hours)   POC Glucose Fingerstick    Collection Time: 06/19/25  8:41 PM   Result Value Ref Range    POC Glucose 119 (H) 75 - 110 mg/dL   Hemoglobin A1C    Collection Time: 06/20/25  7:03 AM   Result Value Ref Range    Hemoglobin A1C 5.8 4.0 - 6.0 %    Estimated Avg Glucose 120 mg/dL   POC Glucose Fingerstick    Collection Time: 06/20/25  8:09 AM   Result Value Ref Range    POC Glucose 118 (H) 75 - 110 mg/dL   POC Glucose Fingerstick    Collection Time: 06/20/25 12:03 PM   Result Value Ref Range    POC Glucose 115 (H) 75 - 110 mg/dL   POC Glucose Fingerstick    Collection Time: 06/20/25  5:08 PM   Result Value Ref Range    POC Glucose 109 75 - 110 mg/dL       Imaging/Diagonstics:  No results found.    Assessment :      Hospital Problems           Last Modified POA    * (Principal) Schizoaffective disorder, depressive type (HCC) 6/19/2025 Yes       Plan:     Admitted to inpatient psych with depression suicidal ideation  Type 2 diabetes mellitus on metformin which has been started check HbA1c  Leukocytosis, lymphocytic predominant, will check peripheral blood smear recommended outpatient evaluation  Hypokalemia replace potassium  Hypertension, start Norvasc 2.5  Morbid obesity, BMI 63, recommended to follow-up with weight management as outpatient  Labs and medications reviewed.     DVT prophylaxis,  Pt mobile   Full code status   Increased amlodipine  Pbs pending  Gluc acceptable  Repeat k tomorrow am      Consultations:   IP CONSULT TO INTERNAL MEDICINE      Zenia Linda MD  6/20/2025  6:08 PM    Copy sent to  
spontaneously.   Skin: No gross lesions, rashes, bruising or bleeding on exposed skin area  Extremities:  peripheral pulses palpable, no pedal edema or calf pain with palpation    Investigations:      Laboratory Testing:  Recent Results (from the past 24 hours)   POC Glucose Fingerstick    Collection Time: 06/22/25  8:51 PM   Result Value Ref Range    POC Glucose 95 75 - 110 mg/dL   POC Glucose Fingerstick    Collection Time: 06/23/25 12:27 PM   Result Value Ref Range    POC Glucose 116 (H) 75 - 110 mg/dL   POC Glucose Fingerstick    Collection Time: 06/23/25  4:46 PM   Result Value Ref Range    POC Glucose 111 (H) 75 - 110 mg/dL       Imaging/Diagonstics:  No results found.    Assessment :      Hospital Problems           Last Modified POA    * (Principal) Schizoaffective disorder, depressive type (HCC) 6/19/2025 Yes       Plan:     Admitted to inpatient psych with depression suicidal ideation  Type 2 diabetes mellitus on metformin which has been started check HbA1c  Leukocytosis, lymphocytic predominant, will check peripheral blood smear recommended outpatient evaluation  Hypokalemia replace potassium  Hypertension, continue Norvasc   Morbid obesity, BMI 63, recommended to follow-up with weight management as outpatient  Labs and medications reviewed.     DVT prophylaxis,  Pt mobile   Full code status   Continue amlodipine current dose  Pbs reviewed unremarkable  Gluc acceptable  Repeat k normal       Consultations:   IP CONSULT TO INTERNAL MEDICINE      Zenia Linda MD  6/23/2025  7:58 PM    Copy sent to Rico Ireland MD    Please note that this chart was generated using voice recognition Dragon dictation software.  Although every effort was made to ensure the accuracy of this automated transcription, some errors in transcription may have occurred.

## 2025-06-24 NOTE — TRANSITION OF CARE
Behavioral Health Transition Record    Patient Name: Chino Carrillo  YOB: 1995   Medical Record Number: [id number]  Date of Admission: 6/18/2025 11:15 PM   Date of Discharge: 6/24/25    Attending Provider: Rafat Acosta MD   Discharging Provider: Rafat Acosta MD  To contact this individual call [phone] and ask the  to page.  If unavailable, ask to be transferred to Behavioral Health Provider on call.  A Behavioral Health Provider will be available on call 24/7 and during holidays.    Primary Care Provider: Rico Galvin MD    Allergies   Allergen Reactions    Latex Itching    Hydromorphone Itching     Other reaction(s): ITCH       Reason for Admission: Reason for Admission to Psychiatric Unit:  Threat to self requiring 24 hour professional observation  Concerns about patient's safety in the community  Past Mental Health Diagnosis: Major Depressive Disorder w/psychotic features; schizoaffective disorder depression w/SI   Triggering event or precipitating factor: Grief r/t loss   Length of time/duration of triggering event: Days  Legal Status: Voluntary    Admission Diagnosis: Depression with suicidal ideation [F32.A, R45.851]    * No surgery found *    Results for orders placed or performed during the hospital encounter of 06/18/25   Urine Drug Screen   Result Value Ref Range    Amphetamine Screen, Ur POSITIVE (A) NEGATIVE    Barbiturate Screen, Ur NEGATIVE NEGATIVE    Benzodiazepine Screen, Urine NEGATIVE NEGATIVE    Cocaine Metabolite, Urine NEGATIVE NEGATIVE    Methadone Screen, Urine NEGATIVE NEGATIVE    Opiates, Urine NEGATIVE NEGATIVE    Phencyclidine, Urine NEGATIVE NEGATIVE    Cannabinoid Scrn, Ur NEGATIVE NEGATIVE    Oxycodone Screen, Ur NEGATIVE NEGATIVE    Fentanyl, Ur NEGATIVE NEGATIVE    Test Information       This method is a screening test to detect only these drug classes as part of a medical workup. Confirmatory testing by another method should be ordered if

## 2025-06-24 NOTE — GROUP NOTE
Group Therapy Note    Date: 6/24/2025    Group Start Time: 0900  Group End Time: 0930  Group Topic: Nursing    [facility]    Marina Gorman LPN        Group Therapy Note    Attendees: 8/18       Patient was offered group therapy today but declined to participate despite encouragement from staff. 1:1 was offered.           Signature:  Marina Gorman LPN

## 2025-06-24 NOTE — BH NOTE
[facility]  Discharge Note    Pt discharged with followings belongings:   Dental Appliances: None (Simultaneous filing. User may not have seen previous data.)  Vision - Corrective Lenses: None (Simultaneous filing. User may not have seen previous data.)  Hearing Aid: None (Simultaneous filing. User may not have seen previous data.)  Jewelry: None (Simultaneous filing. User may not have seen previous data.)  Body Piercings Removed: N/A (Simultaneous filing. User may not have seen previous data.)  Clothing: Shirt, Pants, Footwear (Simultaneous filing. User may not have seen previous data.)  Other Valuables: Other (Comment) (none)   Valuables returned to patient. Patient educated on aftercare instructions: yes, medication education and outpatient follow up appointment at [facility].  Information faxed to [facility]  by nursing staff  at 11:39 AM .Patient verbalize understanding of AVS:  yes, patient picked up by girlfriend and taken home.    Status EXAM upon discharge:  Mental Status and Behavioral Exam  Normal: No  Level of Assistance: Independent/Self  Facial Expression: Brightened  Affect: Appropriate  Level of Consciousness: Alert  Frequency of Checks: 4 times per hour, close  Mood:Normal: No  Mood: Anxious  Motor Activity:Normal: No  Motor Activity: Unusual posture/gait  Eye Contact: Good  Observed Behavior: Friendly, Preoccupied  Sexual Misconduct History: Current - no  Preception: [place] to person, [place] to time, [place] to place, [place] to situation  Attention:Normal: Yes  Attention: Hyperalert  Thought Processes: Unremarkable  Thought Content:Normal: No  Thought Content: Preoccupations  Depression Symptoms: No problems reported or observed.  Anxiety Symptoms: Generalized  Milly Symptoms: No problems reported or observed.  Hallucinations: None  Delusions: No  Memory:Normal: Yes  Memory: Confabulation  Insight and Judgment: No  Insight and Judgment: Poor judgment, Poor insight    Tobacco

## 2025-07-28 ENCOUNTER — HOSPITAL ENCOUNTER (EMERGENCY)
Age: 30
Discharge: HOME | End: 2025-07-29
Attending: EMERGENCY MEDICINE
Payer: MEDICARE

## 2025-07-28 VITALS
SYSTOLIC BLOOD PRESSURE: 135 MMHG | OXYGEN SATURATION: 93 % | WEIGHT: 315 LBS | TEMPERATURE: 98.1 F | HEART RATE: 63 BPM | HEIGHT: 75 IN | BODY MASS INDEX: 39.17 KG/M2 | DIASTOLIC BLOOD PRESSURE: 81 MMHG | RESPIRATION RATE: 19 BRPM

## 2025-07-28 DIAGNOSIS — H60.392 INFECTIVE OTITIS EXTERNA OF LEFT EAR: Primary | ICD-10-CM

## 2025-07-28 PROCEDURE — 99285 EMERGENCY DEPT VISIT HI MDM: CPT

## 2025-07-28 ASSESSMENT — LIFESTYLE VARIABLES
HOW OFTEN DO YOU HAVE A DRINK CONTAINING ALCOHOL: 2-4 TIMES A MONTH
HOW MANY STANDARD DRINKS CONTAINING ALCOHOL DO YOU HAVE ON A TYPICAL DAY: 1 OR 2

## 2025-07-29 PROCEDURE — 6370000000 HC RX 637 (ALT 250 FOR IP)

## 2025-07-29 RX ADMIN — AMOXICILLIN AND CLAVULANATE POTASSIUM 1 TABLET: 875; 125 TABLET, FILM COATED ORAL at 00:01

## 2025-07-29 NOTE — DISCHARGE INSTRUCTIONS
You were seen for ear pain.  Your ear was examined and you likely just have an ear infection.  You were given a dose of oral antibiotics.  You are discharged with a prescription for oral antibiotics.  Take these until completed.    Call the doctor who did your surgery and schedule an appointment to follow-up with him.  If the antibiotics resolve your pain, you can cancel your appointment, but call him and get it scheduled so that you can follow-up if the antibiotics do not work.    Return to the emergency department if you have any fever, chills, worsening pain, worsening drainage, decreased hearing, or any other concerning symptoms.

## 2025-07-29 NOTE — ED PROVIDER NOTES
[facility] EMERGENCY DEPARTMENT  Emergency Department Encounter  Emergency Medicine Resident     Pt Name:Chino Carrillo  MRN: [id number]  Birthdate 1995  Date of evaluation: 7/29/25  PCP:  Rico Galvin MD  Note Started: 12:08 AM EDT      CHIEF COMPLAINT       Chief Complaint   Patient presents with    Ear Pain     L    Ear Drainage       HISTORY OF PRESENT ILLNESS  (Location/Symptom, Timing/Onset, Context/Setting, Quality, Duration, Modifying Factors, Severity.)      Chino Carrillo is a 30 y.o. male who presents with complaint of left ear pain and drainage.  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  He is concerned that this is due to the tumor.  Patient states there has been some drainage coming from his left ear. There is a notable odor coming from the patient.  Patient states that the smell is coming from his ear.  Patient denies any decreased hearing, fever, chills, abdominal pain, nausea, vomiting, difficulty swallowing, sore throat, or any other concerning symptoms.  All other review of systems are negative.    PAST MEDICAL / SURGICAL / SOCIAL / FAMILY HISTORY      has a past medical history of Autistic disorder, Benign tumor of ear canal, Injury of frontal lobe (HCC), and Multiple sensory deficit syndrome.     has a past surgical history that includes Bladder surgery; fracture surgery (4/6/2012); Pelvic fracture surgery (Right); Mouth surgery; [place] tooth extraction; and Inner ear surgery (Left).    Social History     Socioeconomic History    Marital status: Single     Spouse name: Not on file    Number of children: Not on file    Years of education: Not on file    Highest education level: Not on file   Occupational History    Not on file   Tobacco Use    Smoking status: Some Days     Types: Cigarettes    Smokeless tobacco: Former     Types: Chew   Vaping Use    Vaping status: Every Day    Substances: Nicotine   Substance and Sexual Activity

## 2025-07-29 NOTE — ED PROVIDER NOTES
[facility] EMERGENCY DEPARTMENT     Emergency Department     Faculty Attestation        I performed a history and physical examination of the patient and discussed management with the resident. I reviewed the resident’s note and agree with the documented findings and plan of care. Any areas of disagreement are noted on the chart. I was personally present for the key portions of any procedures. I have documented in the chart those procedures where I was not present during the key portions. I have reviewed the emergency nurses triage note. I agree with the chief complaint, past medical history, past surgical history, allergies, medications, social and family history as documented unless otherwise noted below. Documentation of the HPI, Physical Exam and Medical Decision Making performed by medical students or scribes is based on my personal performance of the HPI, PE and MDM. For Physician Assistant/ Nurse Practitioner cases/documentation I have have had a face to face evaluation with this patient and have completed at least one if not all key elements of the E/M (history, physical exam, and MDM). Additional findings are as noted.    Vital Signs: /81   Pulse 63   Temp 98.1 °F (36.7 °C) (Oral)   Resp 19   Ht 1.905 m (6' 3\")   Wt (!) 235.9 kg (520 lb)   SpO2 93%   BMI 65.00 kg/m²   PCP:  Rico Galvin MD    Pertinent Comments:     History: 30-year-old male comes in complaining of left ear pain and drainage.  He relates he has a history of a tumor in that ear and was told 10 years ago that he had 2 weeks to live.  But he has been doing well since it was removed.    Exam: Patient has some mild systolic hypertension.  The rest of his vitals are stable.  He appears to be in no acute distress.  He does have some earwax in the left canal otherwise is negative.  The rest of his exam is benign    Critical Care  None    (Please note that portions of this note were

## 2025-07-29 NOTE — ED TRIAGE NOTES
Mode of arrival (squad #, walk in, police, etc) : walk-in        Chief complaint(s): Ear pain / drainage         Arrival Note (brief scenario, treatment PTA, etc).: pt arrived to ED with above complaints in w/c. When asking pt what was going on and if they felt weak, pt states \"no, I am just fat and it is not fun to walk\". Pt states they have had drainage to their L ear for around three days.         C= \"Have you ever felt that you should Cut down on your drinking?\"  No  A= \"Have people Annoyed you by criticizing your drinking?\"  No  G= \"Have you ever felt bad or Guilty about your drinking?\"  No  E= \"Have you ever had a drink as an Eye-opener first thing in the morning to steady your nerves or to help a hangover?\"  No      Deferred []      Reason for deferring: N/A    *If yes to two or more: probable alcohol abuse.*

## 2025-08-09 ENCOUNTER — HOSPITAL ENCOUNTER (EMERGENCY)
Age: 30
Discharge: HOME | End: 2025-08-09
Attending: EMERGENCY MEDICINE
Payer: MEDICARE

## 2025-08-09 VITALS
OXYGEN SATURATION: 99 % | WEIGHT: 315 LBS | DIASTOLIC BLOOD PRESSURE: 93 MMHG | RESPIRATION RATE: 16 BRPM | BODY MASS INDEX: 39.17 KG/M2 | HEART RATE: 66 BPM | SYSTOLIC BLOOD PRESSURE: 121 MMHG | TEMPERATURE: 98.2 F | HEIGHT: 75 IN

## 2025-08-09 DIAGNOSIS — F32.A DEPRESSION, UNSPECIFIED DEPRESSION TYPE: Primary | ICD-10-CM

## 2025-08-09 DIAGNOSIS — R45.4 ANGER REACTION: ICD-10-CM

## 2025-08-09 PROCEDURE — 99283 EMERGENCY DEPT VISIT LOW MDM: CPT

## 2025-08-09 ASSESSMENT — PAIN SCALES - GENERAL: PAINLEVEL_OUTOF10: 0

## 2025-08-09 ASSESSMENT — LIFESTYLE VARIABLES
HOW MANY STANDARD DRINKS CONTAINING ALCOHOL DO YOU HAVE ON A TYPICAL DAY: 1 OR 2
HOW OFTEN DO YOU HAVE A DRINK CONTAINING ALCOHOL: 2-4 TIMES A MONTH

## 2025-08-09 ASSESSMENT — PAIN - FUNCTIONAL ASSESSMENT: PAIN_FUNCTIONAL_ASSESSMENT: 0-10

## 2025-08-19 ENCOUNTER — HOSPITAL ENCOUNTER (EMERGENCY)
Age: 30
Discharge: HOME | End: 2025-08-19
Attending: EMERGENCY MEDICINE
Payer: MEDICARE

## 2025-08-19 VITALS
RESPIRATION RATE: 16 BRPM | OXYGEN SATURATION: 98 % | TEMPERATURE: 98.1 F | HEIGHT: 75 IN | HEART RATE: 77 BPM | SYSTOLIC BLOOD PRESSURE: 115 MMHG | BODY MASS INDEX: 39.17 KG/M2 | WEIGHT: 315 LBS | DIASTOLIC BLOOD PRESSURE: 70 MMHG

## 2025-08-19 DIAGNOSIS — F43.21 GRIEF: ICD-10-CM

## 2025-08-19 DIAGNOSIS — F43.0 STRESS REACTION: Primary | ICD-10-CM

## 2025-08-19 PROCEDURE — 99282 EMERGENCY DEPT VISIT SF MDM: CPT

## 2025-08-19 ASSESSMENT — PAIN - FUNCTIONAL ASSESSMENT
PAIN_FUNCTIONAL_ASSESSMENT: 0-10
PAIN_FUNCTIONAL_ASSESSMENT: 0-10

## 2025-08-20 ASSESSMENT — ENCOUNTER SYMPTOMS
SHORTNESS OF BREATH: 0
NAUSEA: 0
VOMITING: 0

## 2025-09-06 ENCOUNTER — HOSPITAL ENCOUNTER (EMERGENCY)
Age: 30
Discharge: HOME | End: 2025-09-06
Attending: EMERGENCY MEDICINE

## 2025-09-06 VITALS
TEMPERATURE: 97.5 F | BODY MASS INDEX: 39.17 KG/M2 | RESPIRATION RATE: 16 BRPM | DIASTOLIC BLOOD PRESSURE: 87 MMHG | OXYGEN SATURATION: 100 % | WEIGHT: 315 LBS | HEIGHT: 75 IN | SYSTOLIC BLOOD PRESSURE: 141 MMHG | HEART RATE: 69 BPM

## 2025-09-06 DIAGNOSIS — F32.A DEPRESSION, UNSPECIFIED DEPRESSION TYPE: Primary | ICD-10-CM

## 2025-09-06 ASSESSMENT — PAIN SCALES - GENERAL: PAINLEVEL_OUTOF10: 0

## 2025-09-06 ASSESSMENT — PAIN - FUNCTIONAL ASSESSMENT: PAIN_FUNCTIONAL_ASSESSMENT: 0-10
